# Patient Record
Sex: FEMALE | Race: WHITE | NOT HISPANIC OR LATINO | Employment: UNEMPLOYED | URBAN - METROPOLITAN AREA
[De-identification: names, ages, dates, MRNs, and addresses within clinical notes are randomized per-mention and may not be internally consistent; named-entity substitution may affect disease eponyms.]

---

## 2017-02-21 ENCOUNTER — APPOINTMENT (OUTPATIENT)
Dept: GENERAL RADIOLOGY | Facility: CLINIC | Age: 27
End: 2017-02-21
Attending: EMERGENCY MEDICINE
Payer: COMMERCIAL

## 2017-02-21 ENCOUNTER — HOSPITAL ENCOUNTER (EMERGENCY)
Facility: CLINIC | Age: 27
Discharge: HOME | End: 2017-02-21
Attending: EMERGENCY MEDICINE | Admitting: EMERGENCY MEDICINE
Payer: COMMERCIAL

## 2017-02-21 VITALS
DIASTOLIC BLOOD PRESSURE: 90 MMHG | HEART RATE: 114 BPM | TEMPERATURE: 99.3 F | BODY MASS INDEX: 31.17 KG/M2 | SYSTOLIC BLOOD PRESSURE: 146 MMHG | WEIGHT: 205 LBS | OXYGEN SATURATION: 94 % | RESPIRATION RATE: 22 BRPM

## 2017-02-21 DIAGNOSIS — J18.9 PNEUMONIA OF LEFT LOWER LOBE DUE TO INFECTIOUS ORGANISM: ICD-10-CM

## 2017-02-21 DIAGNOSIS — R50.9 FEVER, UNSPECIFIED: ICD-10-CM

## 2017-02-21 LAB
DEPRECATED S PYO AG THROAT QL EIA: NORMAL
MICRO REPORT STATUS: NORMAL
SPECIMEN SOURCE: NORMAL

## 2017-02-21 PROCEDURE — 71020 XR CHEST 2 VW: CPT

## 2017-02-21 PROCEDURE — 87081 CULTURE SCREEN ONLY: CPT | Performed by: EMERGENCY MEDICINE

## 2017-02-21 PROCEDURE — 94640 AIRWAY INHALATION TREATMENT: CPT

## 2017-02-21 PROCEDURE — 87880 STREP A ASSAY W/OPTIC: CPT | Performed by: EMERGENCY MEDICINE

## 2017-02-21 PROCEDURE — 99284 EMERGENCY DEPT VISIT MOD MDM: CPT | Mod: 25

## 2017-02-21 PROCEDURE — 25000308 HC RX OP HPI UCR WEL MED 250 IP 250: Performed by: EMERGENCY MEDICINE

## 2017-02-21 RX ORDER — AZITHROMYCIN 250 MG/1
TABLET, FILM COATED ORAL
Qty: 6 TABLET | Refills: 0 | Status: SHIPPED | OUTPATIENT
Start: 2017-02-21 | End: 2017-02-27

## 2017-02-21 RX ORDER — ALBUTEROL SULFATE 90 UG/1
2 AEROSOL, METERED RESPIRATORY (INHALATION) EVERY 4 HOURS PRN
Qty: 1 INHALER | Refills: 0 | Status: SHIPPED | OUTPATIENT
Start: 2017-02-21 | End: 2017-12-13

## 2017-02-21 RX ORDER — ALBUTEROL SULFATE 0.83 MG/ML
2.5 SOLUTION RESPIRATORY (INHALATION) ONCE
Status: COMPLETED | OUTPATIENT
Start: 2017-02-21 | End: 2017-02-21

## 2017-02-21 RX ADMIN — ALBUTEROL SULFATE 2.5 MG: 2.5 SOLUTION RESPIRATORY (INHALATION) at 04:53

## 2017-02-21 ASSESSMENT — ENCOUNTER SYMPTOMS
COUGH: 1
MYALGIAS: 1
FEVER: 1
VOMITING: 0

## 2017-02-21 NOTE — ED AVS SNAPSHOT
[facility] Emergency Department    [address]    [facility] [address]    Phone:  [phone]    Fax:  [phone]                                       Mihir Waldron   MRN: [id number]    Department:  [facility] Emergency Department   Date of Visit:  2/21/2017           After Visit Summary Signature Page     I have received my discharge instructions, and my questions have been answered. I have discussed any challenges I see with this plan with the nurse or doctor.    ..........................................................................................................................................  Patient/Patient Representative Signature      ..........................................................................................................................................  Patient Representative Print Name and Relationship to Patient    ..................................................               ................................................  Date                                            Time    ..........................................................................................................................................  Reviewed by Signature/Title    ...................................................              ..............................................  Date                                                            Time

## 2017-02-21 NOTE — ED PROVIDER NOTES
History     Chief Complaint:  Fever & Cough    HPI   Mihir Waldron is a 26 year old female who presents for evaluation of a fever and cough. The patient reports that she took her daughter in yesterday for evaluation of flu-like symptoms and she was diagnosed with influenza and started on Tamiflu. The patient states that she personally began developing a cough 4 days ago, which has been progressively worsening. The patient has also had fevers up to 101, body aches, and chest congestion. The patient decided to present to the ED for further evaluation with concern for influenza, also mentioning that she had a syncopal episode yesterday at 1600 in the afternoon, with which she hit her head and sustained a small abrasion to her forehead. The patient denies vomiting or any other complaints at this time.    Allergies:  Benadryl   Codeine   Latex     Medications:    Celexa  Prenatal MVI  Epipen     Past Medical History:    POTS  Anxiety  Postpartum depression  Depressive disorder    Past Surgical History:    Dental surgery    Family History:    HTN    Social History:  Relationship status:   Tobacco use: Neg  Alcohol use: Neg  The patient presents alone.  PCP: Chante Mueller     Review of Systems   Constitutional: Positive for fever.   Respiratory: Positive for cough.    Gastrointestinal: Negative for vomiting.   Musculoskeletal: Positive for myalgias.   Neurological: Positive for syncope.   All other systems reviewed and are negative.    Physical Exam     Patient Vitals for the past 24 hrs:   BP Temp Temp src Pulse Resp SpO2 Weight   02/21/17 0435 146/90 99.3  F (37.4  C) Oral 114 22 94 % 93 kg (205 lb)     Physical Exam  Nursing note and vitals reviewed.  Constitutional: Cooperative.  HENT:   Mouth/Throat: Mucous membranes are normal. Erythema without abscess in posterior oropharynx.  Eyes: Pupils are equal, round, and reactive to light.   Ears: TMs normal.  Cardiovascular: Tachycardic rate, regular rhythm  and normal heart sounds.  No murmur.  Pulmonary/Chest: Frequent cough. Effort normal and breath sounds normal. No respiratory distress. No wheezes. No rales.   Abdominal: Soft. Normal appearance and bowel sounds are normal. No distension. There is no tenderness. There is no rigidity and no guarding.   Neurological: Alert.   Skin: Skin is warm and dry. No rash noted.   Psychiatric: Normal mood and affect.     Emergency Department Course   Imaging:  Radiographic findings were communicated with the patient who voiced understanding of the findings.  Chest XR, PA and LAT, per radiology:  A small subtle region of patchy opacities projecting over the mid left lung, suspicious for pneumonia.    Laboratory:  Rapid strep screen: Negative  Beta strep group A culture: Pending    Interventions:  0453 Albuterol neb solution, 2.5 mg, Nebulization    Emergency Department Course:  Nursing notes and vitals reviewed.  I performed an exam of the patient as documented above.  The above workup was undertaken.  0515: I rechecked the patient and discussed results.  Findings and plan explained to the Patient. Patient discharged home with instructions regarding supportive care, medications, and reasons to return. The importance of close follow-up was reviewed.    Impression & Plan      Medical Decision Making:  Mihir Waldron is a healthy 26 year old female who presents with 3-4 days of a cough and fever. It sounds like her child was diagnosed with influenza and is on Tamiflu, but she is outside the window for Tamiflu administration. Especially in the setting of a patient without underlying lung disease, I would hold on this. She did get significant relief from the Albuterol, so I will write for an inhaler to go home with. Chest XR does reveal a patchy infiltrate in the left lower lobe consistent with pneumonia. We will treat her with antibiotics. She is not hypoxic, and I anticipate her doing well with outpatient treatment. Otherwise,  fever control with Motrin/Tylenol, good hand hygiene, and supportive care measures were discussed.    Diagnosis:    ICD-10-CM   1. Fever, unspecified R50.9   2. Pneumonia of left lower lobe due to infectious organism J18.9     Disposition:  Discharge to home with primary care follow up.     Discharge Medications:   Details   albuterol (ALBUTEROL) 108 (90 BASE) MCG/ACT Inhaler Inhale 2 puffs into the lungs every 4 hours as needed for shortness of breath / dyspnea  Qty: 1 Inhaler, Refills: 0      azithromycin (ZITHROMAX Z-ALESSIO) 250 MG tablet Two tablets on the first day, then one tablet daily for the next 4 days  Qty: 6 tablet, Refills: 0         Sandor TANG am serving as a scribe on 2/21/2017 at 4:42 AM to personally document services performed by Juvencio Rea MD, based on my observations and the provider's statements to me.     [facility] EMERGENCY DEPARTMENT       Juvencio Rea MD  02/21/17 0552

## 2017-02-21 NOTE — ED NOTES
"26-year-old female presents to the ER with flu-like symptoms for the last couple of days. Cough, fever, and body aches. Child was dx with influenza yesterday. Pt states she \"passed out\" last evening when she was home with her two kids. Pt has an area of \"rug burn\" on her forehead from that episode.   "

## 2017-02-21 NOTE — DISCHARGE INSTRUCTIONS
Pneumonia (Adult)  Pneumonia is an infection deep within the lungs. It is in the small air sacs (alveoli). Pneumonia may be caused by a virus or bacteria. Pneumonia caused by bacteria is usually treated with an antibiotic. Severe cases may need to be treated in the hospital. Milder cases can be treated at home. Symptoms usually start to get better during the first 2 days of treatment.    Home care  Follow these guidelines when caring for yourself at home:    Rest at home for the first 2 to 3 days, or until you feel stronger. Don t let yourself get overly tired when you go back to your activities.    Stay away from cigarette smoke - yours or other people s.    You may use acetaminophen or ibuprofen to control fever or pain, unless another medicine was prescribed. If you have chronic liver or kidney disease, talk with your health care provider before using these medicines. Also talk with your provider if you ve had a stomach ulcer or GI bleeding. Don t give aspirin to anyone younger than 18 years of age who is ill with a fever. It may cause severe liver damage.    Your appetite may be poor, so a light diet is fine.    Drink 6 to 8 glasses of fluids every day to make sure you are getting enough fluids. Beverages can include water, sport drinks, sodas without caffeine, juices, tea, or soup. Fluids will help loosen secretions in the lung. This will make it easier for you to cough up the phlegm (sputum). If you also have heart or kidney disease, check with your health care provider before you drink extra fluids.    Take antibiotic medicine prescribed until it is all gone, even if you are feeling better after a few days.  Follow-up care  Follow up with your health care provider in the next 2 to 3 days, or as advised. This is to be sure the medicine is helping you get better.  If you are 65 or older, you should get a pneumococcal vaccine and a yearly flu (influenza) shot. You should also get these vaccines if you have  chronic lung disease like asthma, emphysema, or COPD. Ask your provider about this.  When to seek medical advice  Call your health care provider right away if any of these occur:    You don t get better within the first 48 hours of treatment    Shortness of breath gets worse    Rapid breathing (more than 25 breaths per minute)    Coughing up blood    Chest pain gets worse with breathing    Fever of 102 F (38 C) or higher that doesn t get better with fever medicine    Weakness, dizziness, or fainting that gets worse    Thirst or dry mouth that gets worse    Sinus pain, headache, or a stiff neck    Chest pain not caused by coughing    [id number] [employer]. [address] [place] [id number]. All rights reserved. This information is not intended as a substitute for professional medical care. Always follow your healthcare professional's instructions.      Discharge Instructions  Influenza    You were diagnosed today with influenza or influenza like illness.  Influenza is a respiratory illness caused by influenza A or B viruses.  Influenza causes fever, headache, muscle aches, and fatigue.  These symptoms start one to four days after you have been around a person with this illness.  People with influenza commonly have a dry cough, sore throat, and a runny nose.   Influenza is spread through sneezing and coughing and can live on surfaces for several days.  It is usually contagious for 5 days but in some cases up to 10 days and often affects several family members. If you have a family member who is less than 2 years old, older than 65 years old, pregnant or has a serious medical condition, they should be seen right away by a physician to decide if they should take preventative medications.      Return to the Emergency Department if:    You have trouble breathing.    You develop pain in your chest.    You have signs of being dehydrated, such as dizziness or unable to urinate at least three times  daily.    You feel confused.    You cannot stop vomiting or you cannot drink enough fluids.    In children, you should seek help if the child has any of the above or if child:    Has blue or purplish skin color.    Is so irritable that he or she does not want to be held.    Does not have tears when crying (in infants) or does not urinate at least three times daily.    Does not wake up easily.    Follow-up with your doctor if you are not improving after 5 days.    What can I do to help myself?    Rest.    Fluids -- Drink hydrating solutions such as Gatorade  or Pedialyte  as often as you can. If you are drinking enough, you should pass urine at least every eight hours.    Tylenol  (acetaminophen) and Advil  (ibuprofen) can relieve fever, headache, and muscle aches. Do not give aspirin to children under 18 years old.     Antiviral treatment -- Antiviral medicines do not make the flu symptoms go away immediately.  They have only been shown to make the symptoms go away 12 to 24 hours sooner than they would without treatment.       Antibiotics -- Antibiotics are NOT useful for treating viral illnesses such as influenza. Antibiotics should only be used if there is a bacterial complication of the flu such as bacterial pneumonia, ear infection, or sinusitis.    Because you were diagnosed with a flu like illness you are very contagious.  This means you cannot work, attend school or  for at least 24 hours or until you no longer have a fever.  If you were given a prescription for medicine here today, be sure to read all of the information (including the package insert) that comes with your prescription.  This will include important information about the medicine, its side effects, and any warnings that you need to know about.  The pharmacist who fills the prescription can provide more information and answer questions you may have about the medicine.  If you have questions or concerns that the pharmacist cannot address,  please call or return to the Emergency Department.   Remember that you can always come back to the Emergency Department if you are not able to see your regular doctor in the amount of time listed above, if you get any new symptoms, or if there is anything that worries you.

## 2017-02-21 NOTE — ED AVS SNAPSHOT
[facility] Emergency Department    [address] [place] [address]    Phone:  [phone]    Fax:  [phone]                                       Mihir Waldron   MRN: [id number]    Department:  [facility] Emergency Department   Date of Visit:  2/21/2017           Patient Information     Date Of Birth          1990        Your diagnoses for this visit were:     Fever, unspecified     Pneumonia of left lower lobe due to infectious organism        You were seen by Juvencio Rea MD.      Follow-up Information     Follow up with Chante Mueller MD.    Specialty:  Family Practice    Why:  As needed    Contact information:    [facility]  [address]  [facility] [address]  [phone]          Discharge Instructions           Pneumonia (Adult)  Pneumonia is an infection deep within the lungs. It is in the small air sacs (alveoli). Pneumonia may be caused by a virus or bacteria. Pneumonia caused by bacteria is usually treated with an antibiotic. Severe cases may need to be treated in the hospital. Milder cases can be treated at home. Symptoms usually start to get better during the first 2 days of treatment.    Home care  Follow these guidelines when caring for yourself at home:    Rest at home for the first 2 to 3 days, or until you feel stronger. Don t let yourself get overly tired when you go back to your activities.    Stay away from cigarette smoke - yours or other people s.    You may use acetaminophen or ibuprofen to control fever or pain, unless another medicine was prescribed. If you have chronic liver or kidney disease, talk with your health care provider before using these medicines. Also talk with your provider if you ve had a stomach ulcer or GI bleeding. Don t give aspirin to anyone younger than 18 years of age who is ill with a fever. It may cause severe liver damage.    Your appetite may be poor, so a light diet is  fine.    Drink 6 to 8 glasses of fluids every day to make sure you are getting enough fluids. Beverages can include water, sport drinks, sodas without caffeine, juices, tea, or soup. Fluids will help loosen secretions in the lung. This will make it easier for you to cough up the phlegm (sputum). If you also have heart or kidney disease, check with your health care provider before you drink extra fluids.    Take antibiotic medicine prescribed until it is all gone, even if you are feeling better after a few days.  Follow-up care  Follow up with your health care provider in the next 2 to 3 days, or as advised. This is to be sure the medicine is helping you get better.  If you are 65 or older, you should get a pneumococcal vaccine and a yearly flu (influenza) shot. You should also get these vaccines if you have chronic lung disease like asthma, emphysema, or COPD. Ask your provider about this.  When to seek medical advice  Call your health care provider right away if any of these occur:    You don t get better within the first 48 hours of treatment    Shortness of breath gets worse    Rapid breathing (more than 25 breaths per minute)    Coughing up blood    Chest pain gets worse with breathing    Fever of 102 F (38 C) or higher that doesn t get better with fever medicine    Weakness, dizziness, or fainting that gets worse    Thirst or dry mouth that gets worse    Sinus pain, headache, or a stiff neck    Chest pain not caused by coughing    7991-4325 [employer]. [address] [place] [address], [place] [address]. All rights reserved. This information is not intended as a substitute for professional medical care. Always follow your healthcare professional's instructions.      Discharge Instructions  Influenza    You were diagnosed today with influenza or influenza like illness.  Influenza is a respiratory illness caused by influenza A or B viruses.  Influenza causes fever, headache, muscle aches, and fatigue.  These  symptoms start one to four days after you have been around a person with this illness.  People with influenza commonly have a dry cough, sore throat, and a runny nose.   Influenza is spread through sneezing and coughing and can live on surfaces for several days.  It is usually contagious for 5 days but in some cases up to 10 days and often affects several family members. If you have a family member who is less than 2 years old, older than 65 years old, pregnant or has a serious medical condition, they should be seen right away by a physician to decide if they should take preventative medications.      Return to the Emergency Department if:    You have trouble breathing.    You develop pain in your chest.    You have signs of being dehydrated, such as dizziness or unable to urinate at least three times daily.    You feel confused.    You cannot stop vomiting or you cannot drink enough fluids.    In children, you should seek help if the child has any of the above or if child:    Has blue or purplish skin color.    Is so irritable that he or she does not want to be held.    Does not have tears when crying (in infants) or does not urinate at least three times daily.    Does not wake up easily.    Follow-up with your doctor if you are not improving after 5 days.    What can I do to help myself?    Rest.    Fluids -- Drink hydrating solutions such as Gatorade  or Pedialyte  as often as you can. If you are drinking enough, you should pass urine at least every eight hours.    Tylenol  (acetaminophen) and Advil  (ibuprofen) can relieve fever, headache, and muscle aches. Do not give aspirin to children under 18 years old.     Antiviral treatment -- Antiviral medicines do not make the flu symptoms go away immediately.  They have only been shown to make the symptoms go away 12 to 24 hours sooner than they would without treatment.       Antibiotics -- Antibiotics are NOT useful for treating viral illnesses such as influenza.  Antibiotics should only be used if there is a bacterial complication of the flu such as bacterial pneumonia, ear infection, or sinusitis.    Because you were diagnosed with a flu like illness you are very contagious.  This means you cannot work, attend school or  for at least 24 hours or until you no longer have a fever.  If you were given a prescription for medicine here today, be sure to read all of the information (including the package insert) that comes with your prescription.  This will include important information about the medicine, its side effects, and any warnings that you need to know about.  The pharmacist who fills the prescription can provide more information and answer questions you may have about the medicine.  If you have questions or concerns that the pharmacist cannot address, please call or return to the Emergency Department.   Remember that you can always come back to the Emergency Department if you are not able to see your regular doctor in the amount of time listed above, if you get any new symptoms, or if there is anything that worries you.        24 Hour Appointment Hotline       To make an appointment at any [facility], call [phone] ([phone]). If you don't have a family doctor or clinic, we will help you find one. [facility] clinics are conveniently located to serve the needs of you and your family.             Review of your medicines      START taking        Dose / Directions Last dose taken    albuterol 108 (90 BASE) MCG/ACT Inhaler   Commonly known as:  albuterol   Dose:  2 puff   Quantity:  1 Inhaler        Inhale 2 puffs into the lungs every 4 hours as needed for shortness of breath / dyspnea   Refills:  0        azithromycin 250 MG tablet   Commonly known as:  ZITHROMAX Z-ALESSIO   Quantity:  6 tablet        Two tablets on the first day, then one tablet daily for the next 4 days   Refills:  0          Our records show that you are taking the medicines listed  below. If these are incorrect, please call your family doctor or clinic.        Dose / Directions Last dose taken    CELEXA PO   Dose:  30 mg        Take 30 mg by mouth daily   Refills:  0        EPINEPHrine 0.3 MG/0.3ML injection   Dose:  0.3 mg   Quantity:  2 each        Inject 0.3 mLs (0.3 mg) into the muscle once as needed   Refills:  3        PRENATAL VITAMIN PO        Refills:  0                Prescriptions were sent or printed at these locations (2 Prescriptions)                   Other Prescriptions                Printed at Department/Unit printer (2 of 2)         albuterol (ALBUTEROL) 108 (90 BASE) MCG/ACT Inhaler               azithromycin (ZITHROMAX Z-ALESSIO) 250 MG tablet                Procedures and tests performed during your visit     Beta strep group A culture    Chest XR,  PA & LAT    Rapid strep screen      Orders Needing Specimen Collection     None      Pending Results     Date and Time Order Name Status Description    2/21/2017 0447 Chest XR,  PA & LAT Preliminary     2/21/2017 0440 Beta strep group A culture In process             Pending Culture Results     Date and Time Order Name Status Description    2/21/2017 0440 Beta strep group A culture In process              Test Results from your hospital stay     2/21/2017  5:02 AM - Interface, Flexilab Results      Component Results     Component    Specimen Description    Throat    Rapid Strep A Screen    NEGATIVE: No Group A streptococcal antigen detected by immunoassay, await   culture report.      Micro Report Status    FINAL 02/21/2017 2/21/2017  5:39 AM - Interface, Radiant Ib      Narrative     CHEST 2 VIEWS  2/21/2017 5:11 AM     HISTORY: Cough.    COMPARISON: 5/21/2013 - CT chest.    FINDINGS: A small subtle region of patchy opacities projecting over  the mid left lung. The lungs are otherwise clear. Normal-sized cardiac  silhouette.        Impression     IMPRESSION: A small subtle region of patchy opacities projecting over  the  mid left lung, suspicious for pneumonia.         2/21/2017  5:02 AM - Interface, Flexilab Results                Clinical Quality Measure: Blood Pressure Screening     Your blood pressure was checked while you were in the emergency department today. The last reading we obtained was  BP: 146/90 . Please read the guidelines below about what these numbers mean and what you should do about them.  If your systolic blood pressure (the top number) is less than 120 and your diastolic blood pressure (the bottom number) is less than 80, then your blood pressure is normal. There is nothing more that you need to do about it.  If your systolic blood pressure (the top number) is 120-139 or your diastolic blood pressure (the bottom number) is 80-89, your blood pressure may be higher than it should be. You should have your blood pressure rechecked within a year by a primary care provider.  If your systolic blood pressure (the top number) is 140 or greater or your diastolic blood pressure (the bottom number) is 90 or greater, you may have high blood pressure. High blood pressure is treatable, but if left untreated over time it can put you at risk for heart attack, stroke, or kidney failure. You should have your blood pressure rechecked by a primary care provider within the next 4 weeks.  If your provider in the emergency department today gave you specific instructions to follow-up with your doctor or provider even sooner than that, you should follow that instruction and not wait for up to 4 weeks for your follow-up visit.        Thank you for choosing [facility]       Thank you for choosing [facility] for your care. Our goal is always to provide you with excellent care. Hearing back from our patients is one way we can continue to improve our services. Please take a few minutes to complete the written survey that you may receive in the mail after you visit with us. Thank you!        All At Home Information     [facility] At [facility] lets you send messages  "to your doctor, view your test results, renew your prescriptions, schedule appointments and more. To sign up, go to [email or web address] . Click on \"Log in\" on the left side of the screen, which will take you to the Welcome page. Then click on \"Sign up Now\" on the right side of the page.     You will be asked to enter the access code listed below, as well as some personal information. Please follow the directions to create your username and password.     Your access code is: [email or web address]  Expires: 2017  5:26 AM     Your access code will  in 90 days. If you need help or a new code, please call your [facility] clinic or [phone].        Care EveryWhere ID     This is your Care EveryWhere ID. This could be used by other organizations to access your [facility] medical records  [id number]        After Visit Summary       This is your record. Keep this with you and show to your community pharmacist(s) and doctor(s) at your next visit.                  "

## 2017-02-23 LAB
BACTERIA SPEC CULT: NORMAL
Lab: NORMAL
MICRO REPORT STATUS: NORMAL
SPECIMEN SOURCE: NORMAL

## 2017-02-27 ENCOUNTER — HOSPITAL ENCOUNTER (EMERGENCY)
Facility: CLINIC | Age: 27
Discharge: HOME | End: 2017-02-27
Attending: EMERGENCY MEDICINE | Admitting: EMERGENCY MEDICINE
Payer: COMMERCIAL

## 2017-02-27 ENCOUNTER — APPOINTMENT (OUTPATIENT)
Dept: GENERAL RADIOLOGY | Facility: CLINIC | Age: 27
End: 2017-02-27
Attending: EMERGENCY MEDICINE
Payer: COMMERCIAL

## 2017-02-27 VITALS
WEIGHT: 204 LBS | TEMPERATURE: 98 F | OXYGEN SATURATION: 97 % | BODY MASS INDEX: 31.02 KG/M2 | DIASTOLIC BLOOD PRESSURE: 77 MMHG | SYSTOLIC BLOOD PRESSURE: 120 MMHG | HEART RATE: 88 BPM | RESPIRATION RATE: 18 BRPM
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
## 2017-02-27 DIAGNOSIS — T73.3XXA OVEREXERTION FROM PROLONGED STATIC POSITION: ICD-10-CM

## 2017-02-27 DIAGNOSIS — S63.501A WRIST SPRAIN, RIGHT, INITIAL ENCOUNTER: ICD-10-CM

## 2017-02-27 PROCEDURE — 25000132 ZZH RX MED GY IP 250 OP 250 PS 637: Performed by: EMERGENCY MEDICINE

## 2017-02-27 PROCEDURE — 29125 APPL SHORT ARM SPLINT STATIC: CPT | Mod: RT

## 2017-02-27 PROCEDURE — 73110 X-RAY EXAM OF WRIST: CPT | Mod: RT

## 2017-02-27 PROCEDURE — 99284 EMERGENCY DEPT VISIT MOD MDM: CPT | Mod: 25

## 2017-02-27 PROCEDURE — 99283 EMERGENCY DEPT VISIT LOW MDM: CPT | Mod: Z6 | Performed by: EMERGENCY MEDICINE

## 2017-02-27 RX ORDER — IBUPROFEN 200 MG
600 TABLET ORAL 3 TIMES DAILY
Qty: 45 TABLET | Refills: 0 | Status: SHIPPED | OUTPATIENT
Start: 2017-02-27 | End: 2017-03-04

## 2017-02-27 RX ORDER — IBUPROFEN 600 MG/1
600 TABLET, FILM COATED ORAL
Status: COMPLETED | OUTPATIENT
Start: 2017-02-27 | End: 2017-02-27

## 2017-02-27 RX ADMIN — IBUPROFEN 600 MG: 600 TABLET ORAL at 12:35

## 2017-02-27 NOTE — LETTER
[facility], [place], EMERGENCY DEPARTMENT  [address]  [facility] [place] [address]  [phone]    2017    Mihir Waldron  [address] [facility] [address]  [phone] (home) none (work)    : 1990      To Whom it may concern:    Mihir Waldron was seen in our Emergency Department today, 2017.  I expect her condition to improve over the next week.  She will be in a wrist brace for 1 week and during that time should not lift anything more than 10 pounds with her right arm.    Sincerely,        Judson Jimenez MD

## 2017-02-27 NOTE — ED AVS SNAPSHOT
[facility], Emergency Department    [address]    [facility] [place] [address]    Phone:  [phone]    Fax:  [phone]                                       Mihir Waldron   MRN: [id number]    Department:  [facility], Emergency Department   Date of Visit:  2/27/2017           Patient Information     Date Of Birth          1990        Your diagnoses for this visit were:     Wrist sprain, right, initial encounter        You were seen by Judson Jimenez MD.        Discharge Instructions       Wear wrist splint for the next week.  No lifting more than 10 pounds of the right arm for one week.    Please make an appointment to follow up with Your Primary Care Provider in one week unless symptoms completely resolve.    Discharge References/Attachments     WRIST SPRAIN (ENGLISH)      24 Hour Appointment Hotline       To make an appointment at any [facility] clinic, call [phone] ([phone]). If you don't have a family doctor or clinic, we will help you find one. [place] clinics are conveniently located to serve the needs of you and your family.          ED Discharge Orders     Wrist splint velcro                    Review of your medicines      START taking        Dose / Directions Last dose taken    ibuprofen 200 MG tablet   Commonly known as:  ADVIL/MOTRIN   Dose:  600 mg   Quantity:  45 tablet        Take 3 tablets (600 mg) by mouth 3 times daily for 5 days   Refills:  0          Our records show that you are taking the medicines listed below. If these are incorrect, please call your family doctor or clinic.        Dose / Directions Last dose taken    albuterol 108 (90 BASE) MCG/ACT Inhaler   Commonly known as:  albuterol   Dose:  2 puff   Quantity:  1 Inhaler        Inhale 2 puffs into the lungs every 4 hours as needed for shortness of breath / dyspnea   Refills:  0        EPINEPHrine 0.3 MG/0.3ML injection   Dose:  0.3 mg   Quantity:  2 each        Inject 0.3 mLs (0.3 mg) into  "the muscle once as needed   Refills:  3                Prescriptions were sent or printed at these locations (1 Prescription)                   Other Prescriptions                Printed at Department/Unit printer (1 of 1)         ibuprofen (ADVIL/MOTRIN) 200 MG tablet                Procedures and tests performed during your visit     Wrist XR, G/E 3 views, right      Orders Needing Specimen Collection     None      Pending Results     No orders found from 2017 to 2017.            Pending Culture Results     No orders found from 2017 to 2017.            Thank you for choosing [facility]       Thank you for choosing [facility] for your care. Our goal is always to provide you with excellent care. Hearing back from our patients is one way we can continue to improve our services. Please take a few minutes to complete the written survey that you may receive in the mail after you visit with us. Thank you!        [employer] Information     [employer] lets you send messages to your doctor, view your test results, renew your prescriptions, schedule appointments and more. To sign up, go to [email or web address] . Click on \"Log in\" on the left side of the screen, which will take you to the Welcome page. Then click on \"Sign up Now\" on the right side of the page.     You will be asked to enter the access code listed below, as well as some personal information. Please follow the directions to create your username and password.     Your access code is: [email or web address]  Expires: 2017  5:26 AM     Your access code will  in 90 days. If you need help or a new code, please call your [facility] clinic or [phone].        Care EveryWhere ID     This is your Care EveryWhere ID. This could be used by other organizations to access your [facility] medical records  [id number]        After Visit Summary       This is your record. Keep this with you and show to your community pharmacist(s) and doctor(s) at your next " visit.

## 2017-02-27 NOTE — ED NOTES
Given discharge instructions.  All questions answered.  Verbalizes understanding.  Discharged home.

## 2017-02-27 NOTE — DISCHARGE INSTRUCTIONS
Wear wrist splint for the next week.  No lifting more than 10 pounds of the right arm for one week.    Please make an appointment to follow up with Your Primary Care Provider in one week unless symptoms completely resolve.

## 2017-02-27 NOTE — ED PROVIDER NOTES
History     Chief Complaint   Patient presents with     Wrist Pain     swelling and numbness in wrist and fingers     HPI  Mihir Waldron is a 26 year old female who twisted her right wrist this morning and experienced pain in her right wrist for which she comes to the ER for evaluation.  Patient denies any direct trauma and denies any elbow or shoulder pain.  Patient denies any other injury.    I have reviewed the Medications, Allergies, Past Medical and Surgical History, and Social History in the Epic system.  Past Medical History   Diagnosis Date     Anxiety      Depressive disorder      Postpartum depression      POTS (postural orthostatic tachycardia syndrome)      Past Surgical History   Procedure Laterality Date     Dental surgery Bilateral      Previous Medications    ALBUTEROL (ALBUTEROL) 108 (90 BASE) MCG/ACT INHALER    Inhale 2 puffs into the lungs every 4 hours as needed for shortness of breath / dyspnea    EPINEPHRINE (EPIPEN) 0.3 MG/0.3ML INJECTION    Inject 0.3 mLs (0.3 mg) into the muscle once as needed      Social History     Social History     Marital status:      Spouse name: N/A     Number of children: N/A     Years of education: N/A     Occupational History     Not on file.     Social History Main Topics     Smoking status: Never Smoker     Smokeless tobacco: Never Used     Alcohol use No     Drug use: No     Sexual activity: Not Currently     Other Topics Concern     Caffeine Concern No     Special Diet No     Exercise No     Seat Belt Yes     Parent/Sibling W/ Cabg, Mi Or Angioplasty Before 65f 55m? No     Social History Narrative    ** Merged History Encounter **             Allergies   Allergen Reactions     Benadryl [Diphenhydramine] Other (See Comments)     Pt got warm and passed out with IV benadryl     Codeine Sulfate GI Disturbance     Latex Hives       Review of Systems    ROS: 10 point ROS neg other than the symptoms noted above in the HPI.    Physical Exam   BP:  125/78  Pulse: 98  Temp: 95.9  F (35.5  C)  Resp: 18  Weight: 92.5 kg (204 lb)  SpO2: 99 %  Physical Exam   Constitutional: She is oriented to person, place, and time. No distress.   HENT:   Head: Atraumatic.   Musculoskeletal:   RUE - Patient has tenderness of the radial carpal joint with no snuffbox tenderness of the right wrist.  There is no elbow or shoulder tenderness and supination pronation is intact.  Distal CMS intact.   Neurological: She is alert and oriented to person, place, and time.   Grossly intact and symmetric   Psychiatric: She has a normal mood and affect.       ED Course     ED Course     Procedures          X-ray evaluation of the right wrist was negative for bony injury.      Assessments & Plan (with Medical Decision Making)     I have reviewed the nursing notes.    Patient will be placed in a Velcro wrist brace and be placed on the medication below.    I have reviewed the findings, diagnosis, plan and need for follow up with the patient.    New Prescriptions    IBUPROFEN (ADVIL/MOTRIN) 200 MG TABLET    Take 3 tablets (600 mg) by mouth 3 times daily for 5 days       Final diagnoses:   Wrist sprain, right, initial encounter     Wear wrist splint for the next week.  No lifting more than 10 pounds of the right arm for one week.  Work note given    Please make an appointment to follow up with Your Primary Care Provider in one week unless symptoms completely resolve.    Routine discharge instructions were given for this diagnosis    Judson Jimenez MD    2/27/2017   [facility], EMERGENCY DEPARTMENT     Judson Jimenez MD  02/27/17 1582

## 2017-02-27 NOTE — ED NOTES
Patient twisted her wrist last night when locking the crib.  (baby upstairs in [place])  She heard a pop.  She wrapped it.  This morning the wrist was very swollen and now she has numbness on posterior hand radiating toward fingertips.

## 2017-02-27 NOTE — ED AVS SNAPSHOT
[facility], [place], Emergency Department    [address]    [facility] [place] [address]    Phone:  [phone]    Fax:  [phone]                                       Mihir Waldron   MRN: [id number]    Department:  [facility], Emergency Department   Date of Visit:  2/27/2017           After Visit Summary Signature Page     I have received my discharge instructions, and my questions have been answered. I have discussed any challenges I see with this plan with the nurse or doctor.    ..........................................................................................................................................  Patient/Patient Representative Signature      ..........................................................................................................................................  Patient Representative Print Name and Relationship to Patient    ..................................................               ................................................  Date                                            Time    ..........................................................................................................................................  Reviewed by Signature/Title    ...................................................              ..............................................  Date                                                            Time

## 2017-06-15 ENCOUNTER — HOSPITAL PATHOLOGY (OUTPATIENT)
Dept: OTHER | Facility: CLINIC | Age: 27
End: 2017-06-15

## 2017-06-16 LAB — COPATH REPORT: NORMAL

## 2017-10-19 ENCOUNTER — APPOINTMENT (OUTPATIENT)
Dept: ULTRASOUND IMAGING | Facility: CLINIC | Age: 27
End: 2017-10-19
Attending: EMERGENCY MEDICINE
Payer: COMMERCIAL

## 2017-10-19 ENCOUNTER — APPOINTMENT (OUTPATIENT)
Dept: CT IMAGING | Facility: CLINIC | Age: 27
End: 2017-10-19
Attending: EMERGENCY MEDICINE
Payer: COMMERCIAL

## 2017-10-19 ENCOUNTER — HOSPITAL ENCOUNTER (EMERGENCY)
Facility: CLINIC | Age: 27
Discharge: HOME | End: 2017-10-19
Attending: EMERGENCY MEDICINE | Admitting: EMERGENCY MEDICINE
Payer: COMMERCIAL

## 2017-10-19 VITALS
RESPIRATION RATE: 30 BRPM | SYSTOLIC BLOOD PRESSURE: 126 MMHG | OXYGEN SATURATION: 97 % | DIASTOLIC BLOOD PRESSURE: 77 MMHG | HEART RATE: 137 BPM | TEMPERATURE: 99.1 F

## 2017-10-19 DIAGNOSIS — R10.11 ABDOMINAL PAIN, RIGHT UPPER QUADRANT: ICD-10-CM

## 2017-10-19 LAB
ALBUMIN SERPL-MCNC: 4.1 G/DL (ref 3.4–5)
ALBUMIN UR-MCNC: NEGATIVE MG/DL
ALP SERPL-CCNC: 172 U/L (ref 40–150)
ALT SERPL W P-5'-P-CCNC: 17 U/L (ref 0–50)
ANION GAP SERPL CALCULATED.3IONS-SCNC: 6 MMOL/L (ref 3–14)
APPEARANCE UR: CLEAR
AST SERPL W P-5'-P-CCNC: 12 U/L (ref 0–45)
BACTERIA #/AREA URNS HPF: ABNORMAL /HPF
BASOPHILS # BLD AUTO: 0 10E9/L (ref 0–0.2)
BASOPHILS NFR BLD AUTO: 0.4 %
BILIRUB SERPL-MCNC: 0.2 MG/DL (ref 0.2–1.3)
BILIRUB UR QL STRIP: NEGATIVE
BUN SERPL-MCNC: 9 MG/DL (ref 7–30)
CALCIUM SERPL-MCNC: 9.1 MG/DL (ref 8.5–10.1)
CHLORIDE SERPL-SCNC: 110 MMOL/L (ref 94–109)
CO2 SERPL-SCNC: 25 MMOL/L (ref 20–32)
COLOR UR AUTO: YELLOW
CREAT SERPL-MCNC: 0.58 MG/DL (ref 0.52–1.04)
DIFFERENTIAL METHOD BLD: ABNORMAL
EOSINOPHIL # BLD AUTO: 0.1 10E9/L (ref 0–0.7)
EOSINOPHIL NFR BLD AUTO: 0.6 %
ERYTHROCYTE [DISTWIDTH] IN BLOOD BY AUTOMATED COUNT: 12.7 % (ref 10–15)
GFR SERPL CREATININE-BSD FRML MDRD: >90 ML/MIN/1.7M2
GLUCOSE SERPL-MCNC: 107 MG/DL (ref 70–99)
GLUCOSE UR STRIP-MCNC: NEGATIVE MG/DL
HCG UR QL: NEGATIVE
HCT VFR BLD AUTO: 44.2 % (ref 35–47)
HGB BLD-MCNC: 14.5 G/DL (ref 11.7–15.7)
HGB UR QL STRIP: ABNORMAL
IMM GRANULOCYTES # BLD: 0 10E9/L (ref 0–0.4)
IMM GRANULOCYTES NFR BLD: 0.4 %
KETONES UR STRIP-MCNC: NEGATIVE MG/DL
LEUKOCYTE ESTERASE UR QL STRIP: NEGATIVE
LIPASE SERPL-CCNC: 114 U/L (ref 73–393)
LYMPHOCYTES # BLD AUTO: 1.9 10E9/L (ref 0.8–5.3)
LYMPHOCYTES NFR BLD AUTO: 16.8 %
MCH RBC QN AUTO: 28.2 PG (ref 26.5–33)
MCHC RBC AUTO-ENTMCNC: 32.8 G/DL (ref 31.5–36.5)
MCV RBC AUTO: 86 FL (ref 78–100)
MONOCYTES # BLD AUTO: 0.6 10E9/L (ref 0–1.3)
MONOCYTES NFR BLD AUTO: 5.3 %
MUCOUS THREADS #/AREA URNS LPF: PRESENT /LPF
NEUTROPHILS # BLD AUTO: 8.5 10E9/L (ref 1.6–8.3)
NEUTROPHILS NFR BLD AUTO: 76.5 %
NITRATE UR QL: NEGATIVE
NRBC # BLD AUTO: 0 10*3/UL
NRBC BLD AUTO-RTO: 0 /100
PH UR STRIP: 6 PH (ref 5–7)
PLATELET # BLD AUTO: 297 10E9/L (ref 150–450)
POTASSIUM SERPL-SCNC: 3.4 MMOL/L (ref 3.4–5.3)
PROT SERPL-MCNC: 8.1 G/DL (ref 6.8–8.8)
RBC # BLD AUTO: 5.14 10E12/L (ref 3.8–5.2)
RBC #/AREA URNS AUTO: 7 /HPF (ref 0–2)
SODIUM SERPL-SCNC: 141 MMOL/L (ref 133–144)
SOURCE: ABNORMAL
SP GR UR STRIP: 1.02 (ref 1–1.03)
SQUAMOUS #/AREA URNS AUTO: 1 /HPF (ref 0–1)
UROBILINOGEN UR STRIP-MCNC: 0 MG/DL (ref 0–2)
WBC # BLD AUTO: 11 10E9/L (ref 4–11)
WBC #/AREA URNS AUTO: 3 /HPF (ref 0–2)

## 2017-10-19 PROCEDURE — 96376 TX/PRO/DX INJ SAME DRUG ADON: CPT

## 2017-10-19 PROCEDURE — 96374 THER/PROPH/DIAG INJ IV PUSH: CPT

## 2017-10-19 PROCEDURE — 80053 COMPREHEN METABOLIC PANEL: CPT | Performed by: EMERGENCY MEDICINE

## 2017-10-19 PROCEDURE — 74176 CT ABD & PELVIS W/O CONTRAST: CPT

## 2017-10-19 PROCEDURE — 85025 COMPLETE CBC W/AUTO DIFF WBC: CPT | Performed by: EMERGENCY MEDICINE

## 2017-10-19 PROCEDURE — 99285 EMERGENCY DEPT VISIT HI MDM: CPT | Mod: 25

## 2017-10-19 PROCEDURE — 81001 URINALYSIS AUTO W/SCOPE: CPT | Performed by: EMERGENCY MEDICINE

## 2017-10-19 PROCEDURE — 81025 URINE PREGNANCY TEST: CPT | Performed by: EMERGENCY MEDICINE

## 2017-10-19 PROCEDURE — 25000128 H RX IP 250 OP 636: Performed by: EMERGENCY MEDICINE

## 2017-10-19 PROCEDURE — 76705 ECHO EXAM OF ABDOMEN: CPT

## 2017-10-19 PROCEDURE — 83690 ASSAY OF LIPASE: CPT | Performed by: EMERGENCY MEDICINE

## 2017-10-19 PROCEDURE — 96375 TX/PRO/DX INJ NEW DRUG ADDON: CPT

## 2017-10-19 PROCEDURE — 96361 HYDRATE IV INFUSION ADD-ON: CPT

## 2017-10-19 RX ORDER — MORPHINE SULFATE 4 MG/ML
4 INJECTION, SOLUTION INTRAMUSCULAR; INTRAVENOUS
Status: COMPLETED | OUTPATIENT
Start: 2017-10-19 | End: 2017-10-19

## 2017-10-19 RX ORDER — HYDROMORPHONE HYDROCHLORIDE 1 MG/ML
0.5 INJECTION, SOLUTION INTRAMUSCULAR; INTRAVENOUS; SUBCUTANEOUS
Status: DISCONTINUED | OUTPATIENT
Start: 2017-10-19 | End: 2017-10-19 | Stop reason: HOSPADM

## 2017-10-19 RX ORDER — ONDANSETRON 2 MG/ML
4 INJECTION INTRAMUSCULAR; INTRAVENOUS EVERY 30 MIN PRN
Status: DISCONTINUED | OUTPATIENT
Start: 2017-10-19 | End: 2017-10-19 | Stop reason: HOSPADM

## 2017-10-19 RX ORDER — SODIUM CHLORIDE 9 MG/ML
1000 INJECTION, SOLUTION INTRAVENOUS CONTINUOUS
Status: DISCONTINUED | OUTPATIENT
Start: 2017-10-19 | End: 2017-10-19 | Stop reason: HOSPADM

## 2017-10-19 RX ADMIN — MORPHINE SULFATE 4 MG: 4 INJECTION, SOLUTION INTRAMUSCULAR; INTRAVENOUS at 18:49

## 2017-10-19 RX ADMIN — ONDANSETRON 4 MG: 2 INJECTION INTRAMUSCULAR; INTRAVENOUS at 18:05

## 2017-10-19 RX ADMIN — SODIUM CHLORIDE 1000 ML: 9 INJECTION, SOLUTION INTRAVENOUS at 18:06

## 2017-10-19 RX ADMIN — MORPHINE SULFATE 4 MG: 4 INJECTION, SOLUTION INTRAMUSCULAR; INTRAVENOUS at 19:13

## 2017-10-19 RX ADMIN — HYDROMORPHONE HYDROCHLORIDE 0.5 MG: 1 INJECTION, SOLUTION INTRAMUSCULAR; INTRAVENOUS; SUBCUTANEOUS at 19:32

## 2017-10-19 RX ADMIN — MORPHINE SULFATE 4 MG: 4 INJECTION, SOLUTION INTRAMUSCULAR; INTRAVENOUS at 18:05

## 2017-10-19 ASSESSMENT — ENCOUNTER SYMPTOMS
ABDOMINAL PAIN: 1
NAUSEA: 1
VOMITING: 1

## 2017-10-19 NOTE — ED AVS SNAPSHOT
[facility] Emergency Department    [address]    [facility] [address]    Phone:  [phone]    Fax:  [phone]                                       Mihir Waldron   MRN: [id number]    Department:  [facility] Emergency Department   Date of Visit:  10/19/2017           After Visit Summary Signature Page     I have received my discharge instructions, and my questions have been answered. I have discussed any challenges I see with this plan with the nurse or doctor.    ..........................................................................................................................................  Patient/Patient Representative Signature      ..........................................................................................................................................  Patient Representative Print Name and Relationship to Patient    ..................................................               ................................................  Date                                            Time    ..........................................................................................................................................  Reviewed by Signature/Title    ...................................................              ..............................................  Date                                                            Time

## 2017-10-19 NOTE — ED AVS SNAPSHOT
[facility] Emergency Department    [address]    [facility] [address]    Phone:  [phone]    Fax:  [phone]                                       Mihir Waldron   MRN: [id number]    Department:  [facility] Emergency Department   Date of Visit:  10/19/2017           Patient Information     Date Of Birth          1990        Your diagnoses for this visit were:     Abdominal pain, right upper quadrant        You were seen by Nehemias Lockhart MD.      Follow-up Information     Follow up with [facility]. Schedule an appointment as soon as possible for a visit in 1 day.    Why:  As needed, If symptoms worsen    Contact information:    [address]  [address]  [facility] [address]          Discharge Instructions         *Abdominal Pain, Unknown Cause (Female)    The exact cause of your abdominal (stomach) pain is not certain. This does not mean that this is something to worry about, or the right tests were not done. Everyone likes to know the exact cause of the problem, but sometimes with abdominal pain, there is no clear-cut cause, and this could be a good thing. The good news is that your symptoms can be treated, and you will feel better.   Your condition does not seem serious now; however, sometimes the signs of a serious problem may take more time to appear. For this reason, it is important for you to watch for any new symptoms, problems, or worsening of your condition.  Over the next few days, the abdominal pain may come and go, or be continuous. Other common symptoms can include nausea and vomiting. Sometimes it can be difficult to tell if you feel nauseous, you may just feel bad and not associate that feeling with nausea. Constipation, diarrhea, and a fever may go along with the pain.  The pain may continue even if treated correctly over the following days. Depending on how things go, sometimes the cause can become clear and  may require further or different treatment. Additional evaluations, medications, or tests may be needed.  Home care  Your health care provider may prescribe medications for pain, symptoms, or an infection.  Follow the health care provider's instructions for taking these medications.  General care    Rest until your next exam. No strenuous activities.    Try to find positions that ease discomfort. A small pillow placed on the abdomen may help relieve pain.    Something warm on your abdomen (such as a heating pad) may help, but be careful not to burn yourself.  Diet    Do not force yourself to eat, especially if having cramps, vomiting, or diarrhea.    Water is important so you do not get dehydrated. Soup may also be good. Sports drinks may also help, especially if they are not too acidic. Make sure you don't drink sugary drinks as this can make things worse. Take liquids in small amounts. Do not guzzle them.    Caffeine sometimes makes the pain and cramping worse.    Avoid dairy products if you have vomiting or diarrhea.    Don't eat large amounts at a time. Wait a few minutes between bites.    Eat a diet low in fiber (called a low-residue diet). Foods allowed include refined breads, white rice, fruit and vegetable juices without pulp, tender meats. These foods will pass more easily through the intestine.    Avoid fried or fatty foods, dairy, alcohol and spicy foods until your symptoms go away.  Follow-up care  Follow up with your health care provider as instructed, or if your pain does not begin to improve in the next 24 hours.  When to seek medical care  Seek prompt medical care if any of the following occur:    Pain gets worse or moves to the right lower abdomen    New or worsening vomiting or diarrhea    Swelling of the abdomen    Unable to pass stool for more than three days    New fever over 101  F (38.3 C), or rising fever    Blood in vomit or bowel movements (dark red or black color)    Jaundice (yellow color  of eyes and skin)    Weakness, dizziness    Chest, arm, back, neck or jaw pain    Unexpected vaginal bleeding or missed period  Call 911  Call emergency services if any of the following occur:    Trouble breathing    Confusion    Fainting or loss of consciousness    Rapid heart rate    Seizure    [phone] Stephanie Granado, [address] [facility], [place] [address]. All rights reserved. This information is not intended as a substitute for professional medical care. Always follow your healthcare professional's instructions.          24 Hour Appointment Hotline       To make an appointment at any [facility], call [phone] ([phone]). If you don't have a family doctor or clinic, we will help you find one. [facility] are conveniently located to serve the needs of you and your family.             Review of your medicines      Our records show that you are taking the medicines listed below. If these are incorrect, please call your family doctor or clinic.        Dose / Directions Last dose taken    albuterol 108 (90 BASE) MCG/ACT Inhaler   Commonly known as:  PROAIR HFA   Dose:  2 puff   Quantity:  1 Inhaler        Inhale 2 puffs into the lungs every 4 hours as needed for shortness of breath / dyspnea   Refills:  0        EPINEPHrine 0.3 MG/0.3ML injection 2-pack   Commonly known as:  EPIPEN/ADRENACLICK/or ANY BX GENERIC EQUIV   Dose:  0.3 mg   Quantity:  2 each        Inject 0.3 mLs (0.3 mg) into the muscle once as needed   Refills:  3                Procedures and tests performed during your visit     CBC with platelets differential    CT Abdomen Pelvis WITHOUT Contrast (stone protocol)    Comprehensive metabolic panel    HCG qualitative urine    Lipase    UA with Microscopic    US Abdomen Limited      Orders Needing Specimen Collection     None      Pending Results     No orders found from 10/17/2017 to 10/20/2017.            Pending Culture Results     No orders found from 10/17/2017 to  10/20/2017.            Pending Results Instructions     If you had any lab results that were not finalized at the time of your Discharge, you can call the ED Lab Result RN at [phone]. You will be contacted by this team for any positive Lab results or changes in treatment. The nurses are available 7 days a week from 10A to 6:30P.  You can leave a message 24 hours per day and they will return your call.        Test Results From Your Hospital Stay        10/19/2017  6:20 PM      Component Results     Component Value Ref Range & Units Status    WBC 11.0 4.0 - 11.0 10e9/L Final    RBC Count 5.14 3.8 - 5.2 10e12/L Final    Hemoglobin 14.5 11.7 - 15.7 g/dL Final    Hematocrit 44.2 35.0 - 47.0 % Final    MCV 86 78 - 100 fl Final    MCH 28.2 26.5 - 33.0 pg Final    MCHC 32.8 31.5 - 36.5 g/dL Final    RDW 12.7 10.0 - 15.0 % Final    Platelet Count 297 150 - 450 10e9/L Final    Diff Method Automated Method  Final    % Neutrophils 76.5 % Final    % Lymphocytes 16.8 % Final    % Monocytes 5.3 % Final    % Eosinophils 0.6 % Final    % Basophils 0.4 % Final    % Immature Granulocytes 0.4 % Final    Nucleated RBCs 0 0 /100 Final    Absolute Neutrophil 8.5 (H) 1.6 - 8.3 10e9/L Final    Absolute Lymphocytes 1.9 0.8 - 5.3 10e9/L Final    Absolute Monocytes 0.6 0.0 - 1.3 10e9/L Final    Absolute Eosinophils 0.1 0.0 - 0.7 10e9/L Final    Absolute Basophils 0.0 0.0 - 0.2 10e9/L Final    Abs Immature Granulocytes 0.0 0 - 0.4 10e9/L Final    Absolute Nucleated RBC 0.0  Final         10/19/2017  6:39 PM      Component Results     Component Value Ref Range & Units Status    Sodium 141 133 - 144 mmol/L Final    Potassium 3.4 3.4 - 5.3 mmol/L Final    Chloride 110 (H) 94 - 109 mmol/L Final    Carbon Dioxide 25 20 - 32 mmol/L Final    Anion Gap 6 3 - 14 mmol/L Final    Glucose 107 (H) 70 - 99 mg/dL Final    Urea Nitrogen 9 7 - 30 mg/dL Final    Creatinine 0.58 0.52 - 1.04 mg/dL Final    GFR Estimate >90 >60 mL/min/1.7m2 Final    Non   GFR Calc    GFR Estimate If Black >90 >60 mL/min/1.7m2 Final    African American GFR Calc    Calcium 9.1 8.5 - 10.1 mg/dL Final    Bilirubin Total 0.2 0.2 - 1.3 mg/dL Final    Albumin 4.1 3.4 - 5.0 g/dL Final    Protein Total 8.1 6.8 - 8.8 g/dL Final    Alkaline Phosphatase 172 (H) 40 - 150 U/L Final    ALT 17 0 - 50 U/L Final    AST 12 0 - 45 U/L Final         10/19/2017  6:39 PM      Component Results     Component Value Ref Range & Units Status    Lipase 114 73 - 393 U/L Final         10/19/2017  7:16 PM      Narrative     US ABDOMEN LIMITED 10/19/2017 7:11 PM    HISTORY: Right upper quadrant pain.    TECHNIQUE: Limited abdominal ultrasound to the right upper quadrant is  performed.    COMPARISON: None.    FINDINGS: The liver is normal, measuring 14.6 cm in diameter.  Visualized portions of the pancreas are unremarkable.    Normal gallbladder. No sonographic evidence of gallstones. Normal  gallbladder wall thickness. No sonographic Polanco sign.    Normal right kidney.        Impression     IMPRESSION:  Normal right upper quadrant ultrasound.      KATRINA INIGUEZ MD         10/19/2017  6:29 PM      Component Results     Component Value Ref Range & Units Status    HCG Qual Urine Negative NEG^Negative Final    This test is for screening purposes.  Results should be interpreted along with   the clinical picture.  Confirmation testing is available if warranted by   ordering [id number], HCG Quantitative Pregnancy.           10/19/2017  6:30 PM      Component Results     Component Value Ref Range & Units Status    Color Urine Yellow  Final    Appearance Urine Clear  Final    Glucose Urine Negative NEG^Negative mg/dL Final    Bilirubin Urine Negative NEG^Negative Final    Ketones Urine Negative NEG^Negative mg/dL Final    Specific Gravity Urine 1.024 1.003 - 1.035 Final    Blood Urine Small (A) NEG^Negative Final    pH Urine 6.0 5.0 - 7.0 pH Final    Protein Albumin Urine Negative NEG^Negative mg/dL Final     Urobilinogen mg/dL 0.0 0.0 - 2.0 mg/dL Final    Nitrite Urine Negative NEG^Negative Final    Leukocyte Esterase Urine Negative NEG^Negative Final    Source Midstream Urine  Final    WBC Urine 3 (H) 0 - 2 /HPF Final    RBC Urine 7 (H) 0 - 2 /HPF Final    Bacteria Urine Few (A) NEG^Negative /HPF Final    Squamous Epithelial /HPF Urine 1 0 - 1 /HPF Final    Mucous Urine Present (A) NEG^Negative /LPF Final         10/19/2017  8:17 PM      Narrative     CT ABDOMEN PELVIS W/O CONTRAST 10/19/2017 8:13 PM    HISTORY: Abdominal pain in the right flank.    TECHNIQUE: CT imaging of the abdomen and pelvis is performed without  IV contrast.  Abdominal organs, pelvic organs, bowel, aorta,  retroperitoneum, and abdominal wall are also assessed to the limits of  no IV contrast.  Radiation dose for this scan was reduced using  automated exposure control, adjustment of the mA and/or kV according  to patient size, or iterative reconstruction technique.    COMPARISON: None.    FINDINGS:    Liver: Normal.  Gallbladder:Normal.  Pancreas:Normal.  Spleen:Normal.  Adrenals:Normal.  Ascites:None.    Kidneys/ureters:Normal.  No evidence of hydronephrosis or  nephrolithiasis.  Bladder:Normal.  Pelvic free fluid:None.    Bowels:Unremarkable.  No evidence of obstruction.  Appendix:Normal.    Abdominal or pelvic lymphadenopathy:None.    Miscellaneous findings:None.        Impression     IMPRESSION:  Unremarkable noncontrast CT scan of the abdomen and pelvis.    KATRINA INIGUEZ MD                Clinical Quality Measure: Blood Pressure Screening     Your blood pressure was checked while you were in the emergency department today. The last reading we obtained was  BP: 130/78 . Please read the guidelines below about what these numbers mean and what you should do about them.  If your systolic blood pressure (the top number) is less than 120 and your diastolic blood pressure (the bottom number) is less than 80, then your blood pressure is normal. There  "is nothing more that you need to do about it.  If your systolic blood pressure (the top number) is 120-139 or your diastolic blood pressure (the bottom number) is 80-89, your blood pressure may be higher than it should be. You should have your blood pressure rechecked within a year by a primary care provider.  If your systolic blood pressure (the top number) is 140 or greater or your diastolic blood pressure (the bottom number) is 90 or greater, you may have high blood pressure. High blood pressure is treatable, but if left untreated over time it can put you at risk for heart attack, stroke, or kidney failure. You should have your blood pressure rechecked by a primary care provider within the next 4 weeks.  If your provider in the emergency department today gave you specific instructions to follow-up with your doctor or provider even sooner than that, you should follow that instruction and not wait for up to 4 weeks for your follow-up visit.        Thank you for choosing [facility]       Thank you for choosing [facility] for your care. Our goal is always to provide you with excellent care. Hearing back from our patients is one way we can continue to improve our services. Please take a few minutes to complete the written survey that you may receive in the mail after you visit with us. Thank you!        [employer] [employer] Information     [facility] lets you send messages to your doctor, view your test results, renew your prescriptions, schedule appointments and more. To sign up, go to [email or web address] . Click on \"Log in\" on the left side of the screen, which will take you to the Welcome page. Then click on \"Sign up Now\" on the right side of the page.     You will be asked to enter the access code listed below, as well as some personal information. Please follow the directions to create your username and password.     Your access code is: [email or web address]  Expires: 2018  8:43 PM     Your access code will  in 90 days. If " you need help or a new code, please call your [facility] or [phone].        Care EveryWhere ID     This is your Care EveryWhere ID. This could be used by other organizations to access your [facility] medical records  [id number]        Equal Access to Services     YANDEL FOOTE : Iftikhar Winter, manuel castellanosadaha, qaeduardo kaalmamadelin carlton, tatiana jerome. So [facility] [phone].    ATENCIÓN: Si habla español, tiene a ventura disposición servicios gratuitos de asistencia lingüística. Llame al [phone].    We comply with applicable federal civil rights laws and [place] laws. We do not discriminate on the basis of race, color, national origin, age, disability, sex, sexual orientation, or gender identity.            After Visit Summary       This is your record. Keep this with you and show to your community pharmacist(s) and doctor(s) at your next visit.

## 2017-10-19 NOTE — ED PROVIDER NOTES
History     Chief Complaint:  Abdominal Pain    HPI   Mihir Waldron is a 27 year old female who presents with abdominal pain. The patient states that she had a sudden onset of right upper quadrant abdominal pain today at approximately 1300 after eating a grilled cheese sandwich, prompting her to visit the ED. Here, the patient states that she has had nausea and 3 episodes of emesis in addition to her abdominal pain.       Allergies:  Benadryl [Diphenhydramine]  Codeine Sulfate  Latex      Medications:    Albuterol  EpiPen    Past Medical History:    Anxiety  Depression  Postpartum depression  POTS    Past Surgical History:    Dental Surgery     Family History:    Hypertension    Social History:  Negative for alcohol use.  Negative for tobacco use.   Marital Status:   [2]     Review of Systems   Gastrointestinal: Positive for abdominal pain, nausea and vomiting.   All other systems reviewed and are negative.      Physical Exam   First Vitals:  BP: (!) 155/110  Pulse: 137  Temp: 99.1  F (37.3  C)  Resp: 30  SpO2: 98 %    Physical Exam  Constitutional: Patient is well appearing. No distress.  Head: Atraumatic.  Mouth/Throat: Oropharynx is clear and moist. No oropharyngeal exudate.  Eyes: Conjunctivae and EOM are normal. No scleral icterus.  Neck: Normal range of motion. Neck supple.   Cardiovascular: Normal rate, regular rhythm, normal heart sounds and intact distal pulses.   Pulmonary/Chest: Breath sounds normal. No respiratory distress.  Abdominal: Right upper quadrant abdominal pain. Positive Polanco's Sign.   Musculoskeletal: Normal range of motion. No edema or tenderness.   Neurological: Alert and orientated to person, place, and time. No observable focal neuro deficit  Skin: Warm and dry. No rash noted. Not diaphoretic.       Emergency Department Course     Imaging:  Radiographic findings were communicated with the patient who voiced understanding of the findings.  US Abdomen, Limited:  Normal right  upper quadrant ultrasound. As per radiology.    CT Abdomen/Pelvis w/o IV contrast-stone protocol:   Unremarkable noncontrast CT scan of the abdomen and pelvis. As per radiology.    Laboratory:  CBC: WBC: 11.0, HGB: 14.5, PLT: 297  CMP: Glucose 107(H), Chloride: 110(H), Alkphos: 172(H), o/w WNL (Creatinine: 0.58)    UA with micro: small blood, few bacteria, mucous present, WBC: 3(H), RBC: 7(H) o/w negative  HCG qualitative:Negative  Lipase:114    Interventions:  1805 Zofran, 4 mg, IV  1805 Morphine, 4 mg, IV   1913 Morphine, 4 mg, IV  1932 Dilaudid, 0.5 mg, IV    Emergency Department Course:  Nursing notes and vitals reviewed. I performed an exam of the patient as documented above.     IV inserted. Medicine administered as documented above. Blood drawn. This was sent to the lab for further testing, results above. The patient provided a urine sample here in the emergency department. This was sent for laboratory testing, findings above.      The patient was sent for an abdominal US while in the emergency department, findings above.     2038 I rechecked the patient and discussed the results of her workup thus far.     Findings and plan explained to the Patient. Patient discharged home with instructions regarding supportive care, medications, and reasons to return. The importance of close follow-up was reviewed.     I personally reviewed the laboratory results with the Patient and answered all related questions prior to discharge.       Impression & Plan      Medical Decision Making:  RUQ and flank pain without resp or chest sx.  Immediately after meal and relieved to nonexistent in the department.  Extensive workup as above screening neg at this time.  GB and bad expectant and return instructions given.    Diagnosis:    ICD-10-CM   1. Abdominal pain, right upper quadrant R10.11       Disposition:  discharged to home    IDian, jamilah serving as a scribe on 10/19/2017 at 5:25 PM to personally document services  performed by Nehemias Lockhart MD based on my observations and the provider's statements to me.      Dian Pratt  10/19/2017   [facility] EMERGENCY DEPARTMENT       eNhemias Lockhart MD  10/20/17 0010

## 2017-10-20 NOTE — DISCHARGE INSTRUCTIONS
*Abdominal Pain, Unknown Cause (Female)    The exact cause of your abdominal (stomach) pain is not certain. This does not mean that this is something to worry about, or the right tests were not done. Everyone likes to know the exact cause of the problem, but sometimes with abdominal pain, there is no clear-cut cause, and this could be a good thing. The good news is that your symptoms can be treated, and you will feel better.   Your condition does not seem serious now; however, sometimes the signs of a serious problem may take more time to appear. For this reason, it is important for you to watch for any new symptoms, problems, or worsening of your condition.  Over the next few days, the abdominal pain may come and go, or be continuous. Other common symptoms can include nausea and vomiting. Sometimes it can be difficult to tell if you feel nauseous, you may just feel bad and not associate that feeling with nausea. Constipation, diarrhea, and a fever may go along with the pain.  The pain may continue even if treated correctly over the following days. Depending on how things go, sometimes the cause can become clear and may require further or different treatment. Additional evaluations, medications, or tests may be needed.  Home care  Your health care provider may prescribe medications for pain, symptoms, or an infection.  Follow the health care provider's instructions for taking these medications.  General care    Rest until your next exam. No strenuous activities.    Try to find positions that ease discomfort. A small pillow placed on the abdomen may help relieve pain.    Something warm on your abdomen (such as a heating pad) may help, but be careful not to burn yourself.  Diet    Do not force yourself to eat, especially if having cramps, vomiting, or diarrhea.    Water is important so you do not get dehydrated. Soup may also be good. Sports drinks may also help, especially if they are not too acidic. Make sure you  don't drink sugary drinks as this can make things worse. Take liquids in small amounts. Do not guzzle them.    Caffeine sometimes makes the pain and cramping worse.    Avoid dairy products if you have vomiting or diarrhea.    Don't eat large amounts at a time. Wait a few minutes between bites.    Eat a diet low in fiber (called a low-residue diet). Foods allowed include refined breads, white rice, fruit and vegetable juices without pulp, tender meats. These foods will pass more easily through the intestine.    Avoid fried or fatty foods, dairy, alcohol and spicy foods until your symptoms go away.  Follow-up care  Follow up with your health care provider as instructed, or if your pain does not begin to improve in the next 24 hours.  When to seek medical care  Seek prompt medical care if any of the following occur:    Pain gets worse or moves to the right lower abdomen    New or worsening vomiting or diarrhea    Swelling of the abdomen    Unable to pass stool for more than three days    New fever over 101  F (38.3 C), or rising fever    Blood in vomit or bowel movements (dark red or black color)    Jaundice (yellow color of eyes and skin)    Weakness, dizziness    Chest, arm, back, neck or jaw pain    Unexpected vaginal bleeding or missed period  Call 911  Call emergency services if any of the following occur:    Trouble breathing    Confusion    Fainting or loss of consciousness    Rapid heart rate    Seizure    [id number] Stephanie RichardsGe[facility], [address] [place] [address], [place] [address]. All rights reserved. This information is not intended as a substitute for professional medical care. Always follow your healthcare professional's instructions.

## 2017-12-03 ENCOUNTER — HEALTH MAINTENANCE LETTER (OUTPATIENT)
Age: 27
End: 2017-12-03

## 2017-12-13 ENCOUNTER — OFFICE VISIT (OUTPATIENT)
Dept: FAMILY MEDICINE | Facility: CLINIC | Age: 27
End: 2017-12-13

## 2017-12-13 VITALS
WEIGHT: 202.2 LBS | HEART RATE: 85 BPM | DIASTOLIC BLOOD PRESSURE: 78 MMHG | TEMPERATURE: 98.5 F | BODY MASS INDEX: 31.74 KG/M2 | HEIGHT: 67 IN | OXYGEN SATURATION: 98 % | SYSTOLIC BLOOD PRESSURE: 124 MMHG

## 2017-12-13 DIAGNOSIS — F41.0 PANIC ATTACK: ICD-10-CM

## 2017-12-13 DIAGNOSIS — Z71.89 ACP (ADVANCE CARE PLANNING): ICD-10-CM

## 2017-12-13 DIAGNOSIS — F41.1 GAD (GENERALIZED ANXIETY DISORDER): Primary | ICD-10-CM

## 2017-12-13 DIAGNOSIS — G90.A POSTURAL ORTHOSTATIC TACHYCARDIA SYNDROME: ICD-10-CM

## 2017-12-13 PROBLEM — Z76.89 HEALTH CARE HOME: Status: ACTIVE | Noted: 2017-12-13

## 2017-12-13 PROCEDURE — 99202 OFFICE O/P NEW SF 15 MIN: CPT | Performed by: FAMILY MEDICINE

## 2017-12-13 RX ORDER — ALPRAZOLAM 0.5 MG
0.5 TABLET ORAL 3 TIMES DAILY PRN
Qty: 10 TABLET | Refills: 0 | Status: SHIPPED | OUTPATIENT
Start: 2017-12-13 | End: 2018-04-15

## 2017-12-13 ASSESSMENT — ANXIETY QUESTIONNAIRES
5. BEING SO RESTLESS THAT IT IS HARD TO SIT STILL: SEVERAL DAYS
GAD7 TOTAL SCORE: 10
6. BECOMING EASILY ANNOYED OR IRRITABLE: MORE THAN HALF THE DAYS
1. FEELING NERVOUS, ANXIOUS, OR ON EDGE: NEARLY EVERY DAY
IF YOU CHECKED OFF ANY PROBLEMS ON THIS QUESTIONNAIRE, HOW DIFFICULT HAVE THESE PROBLEMS MADE IT FOR YOU TO DO YOUR WORK, TAKE CARE OF THINGS AT HOME, OR GET ALONG WITH OTHER PEOPLE: VERY DIFFICULT
2. NOT BEING ABLE TO STOP OR CONTROL WORRYING: SEVERAL DAYS
3. WORRYING TOO MUCH ABOUT DIFFERENT THINGS: SEVERAL DAYS
7. FEELING AFRAID AS IF SOMETHING AWFUL MIGHT HAPPEN: NOT AT ALL

## 2017-12-13 ASSESSMENT — PATIENT HEALTH QUESTIONNAIRE - PHQ9
SUM OF ALL RESPONSES TO PHQ QUESTIONS 1-9: 4
5. POOR APPETITE OR OVEREATING: MORE THAN HALF THE DAYS

## 2017-12-13 NOTE — NURSING NOTE
Mihir is here to establish care and go over medications    Pre-Visit Screening :  Immunizations : up to date    Colonoscopy : NA  Mammogram : NA  Asthma Action Test/Plan : NA  PHQ9/GAD7 :  Done today  Pulse - regular  My Chart - accepts    CLASSIFICATION OF OVERWEIGHT AND OBESITY BY BMI                         Obesity Class           BMI(kg/m2)  Underweight                                    < 18.5  Normal                                         18.5-24.9  Overweight                                     25.0-29.9  OBESITY                     I                  30.0-34.9                              II                 35.0-39.9  EXTREME OBESITY             III                >40                             Patient's  BMI Body mass index is 31.67 kg/(m^2).  http://hin.nhlbi.nih.gov/menuplanner/menu.cgi  Questioned patient about current smoking habits.  Pt. has never smoked.

## 2017-12-13 NOTE — MR AVS SNAPSHOT
After Visit Summary   12/13/2017    Mihir Waldron    MRN: [id number]           Patient Information     Date Of Birth          1990        Visit Information        Provider Department      12/13/2017 5:30 PM Fuentes Green MD [facility]        Today's Diagnoses     CHIDI (generalized anxiety disorder)    -  1    Post partum depression        Panic attack        ACP (advance care planning)        Postural orthostatic tachycardia syndrome           Follow-ups after your visit        Who to contact     If you have questions or need follow up information about today's clinic visit or your schedule please contact [facility] directly at [phone].  Normal or non-critical lab and imaging results will be communicated to you by MyChart, letter or phone within 4 business days after the clinic has received the results. If you do not hear from us within 7 days, please contact the clinic through Azullohart or phone. If you have a critical or abnormal lab result, we will notify you by phone as soon as possible.  Submit refill requests through Medine or call your pharmacy and they will forward the refill request to us. Please allow 3 business days for your refill to be completed.          Additional Information About Your Visit        MyChart Information     Medine gives you secure access to your electronic health record. If you see a primary care provider, you can also send messages to your care team and make appointments. If you have questions, please call your primary care clinic.  If you do not have a primary care provider, please call [phone] and they will assist you.        Care EveryWhere ID     This is your Care EveryWhere ID. This could be used by other organizations to access your [place] medical records  [id number]        Your Vitals Were     Pulse Temperature Height Pulse Oximetry Breastfeeding? BMI (Body Mass Index)    85 98.5  F  "(36.9  C) (Oral) 1.702 m (5' 7\") 98% No 31.67 kg/m2       Blood Pressure from Last 3 Encounters:   12/13/17 124/78   10/19/17 126/77   02/27/17 120/77    Weight from Last 3 Encounters:   12/13/17 91.7 kg (202 lb 3.2 oz)   02/27/17 92.5 kg (204 lb)   02/21/17 93 kg (205 lb)              Today, you had the following     No orders found for display         Today's Medication Changes          These changes are accurate as of: 12/13/17  6:01 PM.  If you have any questions, ask your nurse or doctor.               Start taking these medicines.        Dose/Directions    ALPRAZolam 0.5 MG tablet   Commonly known as:  XANAX   Used for:  CHIDI (generalized anxiety disorder), Panic attack   Started by:  Fuentes Green MD        Dose:  0.5 mg   Take 1 tablet (0.5 mg) by mouth 3 times daily as needed for anxiety   Quantity:  10 tablet   Refills:  0            Where to get your medicines      Some of these will need a paper prescription and others can be bought over the counter.  Ask your nurse if you have questions.     Bring a paper prescription for each of these medications     ALPRAZolam 0.5 MG tablet                Primary Care Provider Office Phone # Fax #    Fuentes Green [phone] [address] [address]        Equal Access to Services     [facility] AH: Hadii aad ku hadasho Soomaali, waaxda luqadaha, qaybta kaalmada adeegyada, tatiana francisco haytamia guillen . So [facility] [phone].    ATENCIÓN: Si habla español, tiene a ventura disposición servicios gratuitos de asistencia lingüística. Llame al [phone].    We comply with applicable federal civil rights laws and [place] laws. We do not discriminate on the basis of race, color, national origin, age, disability, sex, sexual orientation, or gender identity.            Thank you!     Thank you for choosing [facility]  for your care. Our goal is always to provide you with " excellent care. Hearing back from our patients is one way we can continue to improve our services. Please take a few minutes to complete the written survey that you may receive in the mail after your visit with us. Thank you!             Your Updated Medication List - Protect others around you: Learn how to safely use, store and throw away your medicines at www.disposemymeds.org.          This list is accurate as of: 12/13/17  6:01 PM.  Always use your most recent med list.                   Brand Name Dispense Instructions for use Diagnosis    ALPRAZolam 0.5 MG tablet    XANAX    10 tablet    Take 1 tablet (0.5 mg) by mouth 3 times daily as needed for anxiety    CHIDI (generalized anxiety disorder), Panic attack       EPINEPHrine 0.3 MG/0.3ML injection 2-pack    EPIPEN/ADRENACLICK/or ANY BX GENERIC EQUIV    2 each    Inject 0.3 mLs (0.3 mg) into the muscle once as needed        FLUOXETINE HCL PO      Take 20 mg by mouth 3 times daily        VITAMIN D (CHOLECALCIFEROL) PO      Take 10,000 Units by mouth daily

## 2017-12-13 NOTE — PROGRESS NOTES
SUBJECTIVE:  Mihir Waldron, a 27 year old female scheduled an appointment to discuss the following issues:     CHIDI (generalized anxiety disorder)  Post partum depression  Panic attack  ACP (advance care planning)  Postural orthostatic tachycardia syndrome  Pt new to clinic-establishing care.  She has a hx of depression-started post partum- was on citalopram and sertraline in the past with good results.  She started having more issues with anxiety and panic over time and was switched to fluoxetine by psych NP in hopes this would help more with anxious symptoms .  Pt was also given small rx xanax for panic by OB-states she used <10 in 6 months.    Pt was seeing therapist but hasn't had time since second child born.      Pt relates panic attack about 2 /week but only one bad one per month    She does also have a diagnosis of POTS- saw therapist at [facility]-helped some    Pt does not currently see any therapist or psychologist    Medical, social, surgical, and family histories reviewed.    Patient Active Problem List   Diagnosis     Sinus tachycardia     Postural orthostatic tachycardia syndrome     Encounter for triage in pregnant patient     Health Care Home     ACP (advance care planning)     Post partum depression     CHIDI (generalized anxiety disorder)     Past Medical History:   Diagnosis Date     Anxiety      Depressive disorder      Postpartum depression      POTS (postural orthostatic tachycardia syndrome)      Family History   Problem Relation Age of Onset     Coronary Artery Disease Mother      Depression Mother      Anxiety Disorder Mother      Hypertension Father      Anxiety Disorder Father      Depression Father      Depression Sister      Anxiety Disorder Sister      DIABETES Maternal Grandmother      DIABETES Paternal Grandmother      CEREBROVASCULAR DISEASE No family hx of      Breast Cancer No family hx of      Colon Cancer No family hx of      Social History     Social History     Marital status:  "     Spouse name: Tee     Number of children: 2     Years of education: N/A     Occupational History     Not on file.     Social History Main Topics     Smoking status: Never Smoker     Smokeless tobacco: Never Used     Alcohol use No     Drug use: No     Sexual activity: Not Currently     Other Topics Concern     Caffeine Concern No     Special Diet No     Exercise No     Seat Belt Yes     Parent/Sibling W/ Cabg, Mi Or Angioplasty Before 65f 55m? No     Social History Narrative    ** Merged History Encounter **          Past Surgical History:   Procedure Laterality Date     DENTAL SURGERY Bilateral        Current Outpatient Prescriptions on File Prior to Visit:  EPINEPHrine (EPIPEN) 0.3 MG/0.3ML injection Inject 0.3 mLs (0.3 mg) into the muscle once as needed     No current facility-administered medications on file prior to visit.      Allergies: Benadryl [diphenhydramine]; Codeine sulfate; and Latex    Immunization History   Administered Date(s) Administered     Influenza (IIV3) PF 11/21/2014     Rhogam 06/26/2015, 10/12/2016     Tdap (Adacel,Boostrix) 05/11/2015        ROS:  C: NEGATIVE for fever, chills  R: NEGATIVE for significant cough or SOB  CV: NEGATIVE for chest pain, palpitations   GI: NEGATIVE for nausea, abdominal pain, heartburn, or change in bowel habits  M: NEGATIVE for significant arthralgias or myalgia  N: NEGATIVE for weakness, dizziness or paresthesias or headache    OBJECTIVE:  /78 (BP Location: Left arm, Patient Position: Chair, Cuff Size: Adult Large)  Pulse 85  Temp 98.5  F (36.9  C) (Oral)  Ht 1.702 m (5' 7\")  Wt 91.7 kg (202 lb 3.2 oz)  SpO2 98%  Breastfeeding? No  BMI 31.67 kg/m2  EXAM:  GENERAL APPEARANCE: healthy, alert and no distress  EYES: EOMI,  PERRL  HENT: ear canals and TM's normal and nose and mouth without ulcers or lesions  RESP: lungs clear to auscultation - no rales, rhonchi or wheezes  CV: regular rates and rhythm, normal S1 S2, no S3 or S4 and no " murmur, click or rub -  ABDOMEN:  soft, nontender, no HSM or masses and bowel sounds normal  PSYCH: mentation appears normal and affect normal/bright    ASSESSMENT/PLAN:  (F41.1) CHIDI (generalized anxiety disorder)  (primary encounter diagnosis)  Comment: Patient is having persistent symptoms despite previous therapies. We discussed options including BUspar, rare use benzos, CBT  Plan: ALPRAZolam (XANAX) 0.5 MG tablet        Recommend she establish with new psychologist to consider CBT-she is agreeable and will call her insurer    OK for small amount xanax, I reviewed the risks, benefits, and possible side effects of the medication.  The patient had an opportunity to ask any questions regarding the treatment plan. The patient was encouraged to call my office if any problems.     (F53) Post partum depression  Comment: resolved  Plan: continue current medications at current doses     (F41.0) Panic attack  Comment: as above  Plan: ALPRAZolam (XANAX) 0.5 MG tablet            (Z71.89) ACP (advance care planning)  Comment:   Plan:     (R00.0,  I95.1) Postural orthostatic tachycardia syndrome  Comment: stable  Plan: CBT may be helpful for this as well

## 2017-12-14 ASSESSMENT — ANXIETY QUESTIONNAIRES: GAD7 TOTAL SCORE: 10

## 2018-01-05 ENCOUNTER — MYC REFILL (OUTPATIENT)
Dept: FAMILY MEDICINE | Facility: CLINIC | Age: 28
End: 2018-01-05

## 2018-01-05 ENCOUNTER — OFFICE VISIT (OUTPATIENT)
Dept: FAMILY MEDICINE | Facility: CLINIC | Age: 28
End: 2018-01-05

## 2018-01-05 VITALS
OXYGEN SATURATION: 98 % | BODY MASS INDEX: 31.61 KG/M2 | TEMPERATURE: 98.5 F | HEIGHT: 67 IN | DIASTOLIC BLOOD PRESSURE: 80 MMHG | WEIGHT: 201.4 LBS | SYSTOLIC BLOOD PRESSURE: 122 MMHG | HEART RATE: 97 BPM

## 2018-01-05 DIAGNOSIS — F41.1 GAD (GENERALIZED ANXIETY DISORDER): Primary | ICD-10-CM

## 2018-01-05 DIAGNOSIS — J06.9 VIRAL URI WITH COUGH: Primary | ICD-10-CM

## 2018-01-05 DIAGNOSIS — J04.0 LARYNGITIS: ICD-10-CM

## 2018-01-05 LAB — S PYO AG THROAT QL IA.RAPID: NORMAL

## 2018-01-05 PROCEDURE — 99213 OFFICE O/P EST LOW 20 MIN: CPT | Performed by: PHYSICIAN ASSISTANT

## 2018-01-05 PROCEDURE — 87880 STREP A ASSAY W/OPTIC: CPT | Performed by: PHYSICIAN ASSISTANT

## 2018-01-05 PROCEDURE — 87070 CULTURE OTHR SPECIMN AEROBIC: CPT | Performed by: PHYSICIAN ASSISTANT

## 2018-01-05 NOTE — TELEPHONE ENCOUNTER
Message from Ec[employer]iate[employer]:  Mihir Waldron would like a refill of the following medications:  FLUOXETINE HCL PO [Patient Reported]    Preferred pharmacy: [facility] [address] [place] [address] [id number] [place], [place] [address] [place] [address]    Comment:

## 2018-01-05 NOTE — MR AVS SNAPSHOT
After Visit Summary   1/5/2018    Mihir Waldron    MRN: [id number]           Patient Information     Date Of Birth          1990        Visit Information        Provider Department      1/5/2018 10:45 AM Lillian Zambrano PA-C [facility]        Today's Diagnoses     Throat pain    -  1      Care Instructions    Alternate taking Tylenol 2 tablet with ibuprofen (2 tablets, with food) every 3 hours.  Continue to push fluid, warm fluids.   Also consider substitute Dayquil with Tylenol.  Contact me next week if not much better.           Follow-ups after your visit        Who to contact     If you have questions or need follow up information about today's clinic visit or your schedule please contact [facility] directly at [phone].  Normal or non-critical lab and imaging results will be communicated to you by [email or web address], letter or phone within 4 business days after the clinic has received the results. If you do not hear from us within 7 days, please contact the clinic through [email or web address] or phone. If you have a critical or abnormal lab result, we will notify you by phone as soon as possible.  Submit refill requests through [employer] or call your pharmacy and they will forward the refill request to us. Please allow 3 business days for your refill to be completed.          Additional Information About Your Visit        MyChart Information     [employer] gives you secure access to your electronic health record. If you see a primary care provider, you can also send messages to your care team and make appointments. If you have questions, please call your primary care clinic.  If you do not have a primary care provider, please call [phone] and they will assist you.        Care EveryWhere ID     This is your Care EveryWhere ID. This could be used by other organizations to access your [place] medical records  [id number]        Your Vitals Were     Pulse  "Temperature Height Last Period Pulse Oximetry Breastfeeding?    97 98.5  F (36.9  C) (Oral) 1.712 m (5' 7.4\") 12/12/2017 (Within Days) 98% No    BMI (Body Mass Index)                   31.17 kg/m2            Blood Pressure from Last 3 Encounters:   01/05/18 122/80   12/13/17 124/78   10/19/17 126/77    Weight from Last 3 Encounters:   01/05/18 91.4 kg (201 lb 6.4 oz)   12/13/17 91.7 kg (202 lb 3.2 oz)   02/27/17 92.5 kg (204 lb)              We Performed the Following     RAPID STREP (BFP)     THROAT CULTURE (BFP)        Primary Care Provider Office Phone # Fax #    Fuentes Green [phone] [address] [address]        Equal Access to Services     [facility]: Hadii aad ku hadasho Soomaali, waaxda luqadaha, qaybta kaalmada adeegyada, waxay idiin haylaexn kishore covarrubiasn . So [facility] [phone].    ATENCIÓN: Si habla español, tiene a ventura disposición servicios gratuitos de asistencia lingüística. Kalaniame al [phone].    We comply with applicable federal civil rights laws and [place] laws. We do not discriminate on the basis of race, color, national origin, age, disability, sex, sexual orientation, or gender identity.            Thank you!     Thank you for choosing [facility]  for your care. Our goal is always to provide you with excellent care. Hearing back from our patients is one way we can continue to improve our services. Please take a few minutes to complete the written survey that you may receive in the mail after your visit with us. Thank you!             Your Updated Medication List - Protect others around you: Learn how to safely use, store and throw away your medicines at www.disposemymeds.org.          This list is accurate as of: 1/5/18 11:19 AM.  Always use your most recent med list.                   Brand Name Dispense Instructions for use Diagnosis    ALPRAZolam 0.5 MG tablet    XANAX    10 tablet    Take 1 " tablet (0.5 mg) by mouth 3 times daily as needed for anxiety    CHIDI (generalized anxiety disorder), Panic attack       EPINEPHrine 0.3 MG/0.3ML injection 2-pack    EPIPEN/ADRENACLICK/or ANY BX GENERIC EQUIV    2 each    Inject 0.3 mLs (0.3 mg) into the muscle once as needed        FLUOXETINE HCL PO      Take 20 mg by mouth 3 times daily        VITAMIN D (CHOLECALCIFEROL) PO      Take 10,000 Units by mouth daily

## 2018-01-05 NOTE — TELEPHONE ENCOUNTER
Pending Prescriptions:                       Disp   Refills    FLUOXETINE HCL PO                                             Sig: Take 20 mg by mouth 3 times daily    This is in pt chart as historical.   Pt was here to discuss this with you on 12-  This is a my chart refill message, please send message to pt if she needs to see you.  Rosina  [phone] (home) NONE (work)

## 2018-01-05 NOTE — PATIENT INSTRUCTIONS
Alternate taking Tylenol 2 tablet with ibuprofen (2 tablets, with food) every 3 hours.  Continue to push fluid, warm fluids.   Also consider substitute Dayquil with Tylenol.  Contact me next week if not much better.

## 2018-01-05 NOTE — NURSING NOTE
"Mihir Waldron is here today for an URI Sore Throat, Cough, Chest Pressure, Fatigue, and Nausea x3 days.    Pre-visit Planning:    Immunizations -up to date  Asthma test --NA  PHQ9/GAD7 -is up to date    Vitals:    BP Cuff left  Arm with large Cuff  PULSE regular  201 lbs 6.4 oz and 5' 7.4\"  CLASSIFICATION OF OVERWEIGHT AND OBESITY BY BMI                        Obesity Class           BMI(kg/m2)  Underweight                                    < 18.5  Normal                                         18.5-24.9  Overweight                                     25.0-29.9  OBESITY                     I                  30.0-34.9                             II                 35.0-39.9  EXTREME OBESITY             III                >40      Patient's  BMI Body mass index is 31.17 kg/(m^2).  Http://hin.nhlbi.nih.gov/menuplanner/menu.cgi    My Chart: - accepts     Tobacco Use: Questioned patient about current smoking habits.  Pt. has never smoked.    The patient has verbalized that it is ok to leave a detailed voice message on the patient's cell phone with results/recommendations from this visit.     Verified Phone Number:    Telephone Information:   Mobile [phone]   Mobile [phone]       Grisel Lockhart [facility]    "

## 2018-01-05 NOTE — PROGRESS NOTES
"CC: Sore throat, cough    History:  Yesterday, Mihir developed a sore throat. Had pneumonia last February and felt like it was ilke this, but cough is not as deep. When she woke up this morning had laryngitis. Has been having coughing fits, and evened coughed so hard she threw up. Chest feels tight. Sore throat yesterday was 7/8 out of 10 with 10 being the worst, but got better. Has felt better over the course of the day. Has not been eating much due to nausea. Still getting fluids.     Tried hot tea, Dayquil yesterday.     PMH, MEDICATIONS, ALLERGIES, SOCIAL AND FAMILY HISTORY in [facility] and reviewed by me personally.      ROS negative other than the symptoms noted above in the HPI.        Examination   /80 (BP Location: Left arm, Patient Position: Chair, Cuff Size: Adult Large)  Pulse 97  Temp 98.5  F (36.9  C) (Oral)  Ht 1.712 m (5' 7.4\")  Wt 91.4 kg (201 lb 6.4 oz)  LMP 12/12/2017 (Within Days)  SpO2 98%  Breastfeeding? No  BMI 31.17 kg/m2       Constitutional: Sitting comfortably, in no acute distress. Vital signs noted  Eyes: pupils equal round reactive to light and accomodation, extra ocular movements intact  Ears: external canals and TMs free of abnormalities  Nose: patent, without mucosal abnormalities  Mouth and throat: without erythema or lesions of the mucosa  Neck:  no adenopathy, trachea midline and normal to palpation  Cardiovascular:  regular rate and rhythm, no murmurs, clicks, or gallops  Respiratory:  normal respiratory rate and rhythm, lungs clear to auscultation  SKIN: No jaundice/pallor/rash.   Psychiatric: mentation appears normal and affect normal/bright        A/P    ICD-10-CM    1. Viral URI with cough J06.9 RAPID STREP (BFP)    B97.89 THROAT CULTURE (BFP)   2. Laryngitis J04.0        DISCUSSION:   RST negative. Will culture to confirm. This is most consistent with viral URI with cough. Recommended:  Alternate taking Tylenol 2 tablet with ibuprofen (2 tablets, with food) every 3 " hours.  Continue to push fluid, warm fluids.   Also consider substitute Dayquil with Tylenol.  Contact me next week if not much better.     follow up visit: As needed    Lillian Zambrano PA-C  [facility]

## 2018-01-08 LAB — THROAT CULTURE: NORMAL

## 2018-01-08 RX ORDER — FLUOXETINE 10 MG/1
20 CAPSULE ORAL 3 TIMES DAILY
Qty: 30 CAPSULE | Status: CANCELLED | OUTPATIENT
Start: 2018-01-08

## 2018-01-08 NOTE — TELEPHONE ENCOUNTER
Can you clarify dose- I am ok filling as she likely has not found psych provider yet-20 mg TID seems odd

## 2018-01-08 NOTE — TELEPHONE ENCOUNTER
Mihir called back and she takes 60 mg tablets in the am.  She is not sure if they only come in 20 mg tablets so currently she takes 3 three,  20 mg tabs daily for a total of 60 mg.

## 2018-02-05 ENCOUNTER — OFFICE VISIT (OUTPATIENT)
Dept: FAMILY MEDICINE | Facility: CLINIC | Age: 28
End: 2018-02-05

## 2018-02-05 VITALS
RESPIRATION RATE: 16 BRPM | TEMPERATURE: 98.2 F | WEIGHT: 192 LBS | DIASTOLIC BLOOD PRESSURE: 70 MMHG | SYSTOLIC BLOOD PRESSURE: 124 MMHG | HEART RATE: 70 BPM | OXYGEN SATURATION: 98 % | HEIGHT: 68 IN | BODY MASS INDEX: 29.1 KG/M2

## 2018-02-05 DIAGNOSIS — S13.9XXD NECK SPRAIN, SUBSEQUENT ENCOUNTER: ICD-10-CM

## 2018-02-05 DIAGNOSIS — G43.009 MIGRAINE WITHOUT AURA AND WITHOUT STATUS MIGRAINOSUS, NOT INTRACTABLE: Primary | ICD-10-CM

## 2018-02-05 PROCEDURE — 99214 OFFICE O/P EST MOD 30 MIN: CPT | Performed by: FAMILY MEDICINE

## 2018-02-05 RX ORDER — CYCLOBENZAPRINE HCL 5 MG
5 TABLET ORAL 3 TIMES DAILY PRN
Qty: 21 TABLET | Refills: 0 | Status: SHIPPED | OUTPATIENT
Start: 2018-02-05 | End: 2018-04-15

## 2018-02-05 RX ORDER — SUMATRIPTAN 25 MG/1
25-50 TABLET, FILM COATED ORAL
Qty: 9 TABLET | Refills: 1 | Status: SHIPPED | OUTPATIENT
Start: 2018-02-05 | End: 2018-04-15

## 2018-02-05 NOTE — PROGRESS NOTES
" S:    Mihir Waldron is a 27 year old year old female who presents with a 10+ year history of headaches occurring in the occipital area. Those responded ibuprofen. Other headaches were more severe with light sensitivity and responded to rest. This headache last night was immediate in onset of a entire head pain with scalp tenderness and feeling miserable. No response to aspirin and aleve and her last dose of aleve made her stomach upset with an emesis/ keeping down clear liquids now. The headaches are described as \"pain\", throbbing and pounding. They are associated with nausea, vomiting and photophobia.     ROS: 10 point ROS neg other than the symptoms noted above in the HPI.  No new medications or exposures    Long standing neck issues and she goes to a chiropractor for neck manipulation. Her depression and anxiety have been difficult to control especially post partum but she felt she was doing well with fluoxetine.    Current Outpatient Prescriptions   Medication     FLUoxetine (PROZAC) 20 MG capsule     VITAMIN D, CHOLECALCIFEROL, PO     EPINEPHrine (EPIPEN) 0.3 MG/0.3ML injection     ALPRAZolam (XANAX) 0.5 MG tablet     No current facility-administered medications for this visit.                    ROS:       CONSTITUTIONAL: no constitutional symptoms.       EYES:  no eye complaints      .       ENT: no ENT symptoms .       NEUROLOGICAL:  no neuro symptoms  .       PSYCHIATRIC: no psychiatric symptoms .       ENDOCRINE: no endocrine symptoms        .       ALLERGY/IMMUNOLOGIC- COMPLAINS of no allergy/immunologic symptoms.              O:       GENERAL:  Well developed, well nourished female sitting in a dark room. Miserable       HEENT: Normocephalic, atraumatic; Ears-TMs and external auditory        canals normal; Eyes- PERRL, EOMI,  fundi benign; Nose- clear;        Mouth- normal; Pharynx- clear       NECK: Tight with bilateral trapezius musculature spasms, knots and tenderness. Pain with flexion       " CHEST: Normal       LUNGS: CTA       HEART:  RRR, S1S2 without murmur.       ABDOMEN: Soft, nontender, normal bowel sounds, no masses, no        hepatosplenomegaly.       NEUROLOGICAL: Cranial nerves II-XII intact. Reflexes symmetrical.        No focal motor or sensory deficits. Cerebellar normal.       LYMPH: No lymphadenopathy.       SKIN:  No rashes.    CT scan: negative for bleed              Assessment: (G43.009) Migraine without aura and without status migrainosus, not intractable  (primary encounter diagnosis)  Comment: handout given/ options  Plan: SUMAtriptan (IMITREX) 25 MG tablet        Potential medication side effects were discussed with the patient; let me know if any occur.  Monitor response    (S13.9XXD) Neck sprain, subsequent encounter  Comment: anatomy reviewed  Plan: cyclobenzaprine (FLEXERIL) 5 MG tablet        Potential medication side effects were discussed with the patient; let me know if any occur.  Ice. Rehab stretches/ exercises to begin immediately and given in writing with illustrations.    Let's recheck in 24-48 hours/ Called 2/7/2018 at 9:33 am/ doing much better. Low grade headache remains. Neck stretches very helpful.

## 2018-02-05 NOTE — NURSING NOTE
Telephone call made to schedule Mihir Waldron for the following: CT Head w/o contrast    Date: 02/05/2018  Time: 1:30 pm  Place: [facility] [place]

## 2018-02-05 NOTE — MR AVS SNAPSHOT
After Visit Summary   2/5/2018    Mihir Waldron    MRN: [id number]           Patient Information     Date Of Birth          1990        Visit Information        Provider Department      2/5/2018 11:45 AM Cha James MD [facility]        Today's Diagnoses     Migraine without aura and without status migrainosus, not intractable    -  1    Neck sprain, subsequent encounter          Care Instructions    Migraine without aura and without status migrainosus, not intractable  (primary encounter diagnosis)  Comment: handout given/ options  Plan: SUMAtriptan (IMITREX) 25 MG tablet        Potential medication side effects were discussed with the patient; let me know if any occur.  Monitor response    (S13.9XXD) Neck sprain, subsequent encounter  Comment: anatomy reviewed  Plan: cyclobenzaprine (FLEXERIL) 5 MG tablet        Potential medication side effects were discussed with the patient; let me know if any occur.  Ice. Rehab stretches/ exercises to begin immediately and given in writing with illustrations.    Let's recheck in 24-48 hours.            Follow-ups after your visit        Follow-up notes from your care team     Return in about 2 days (around 2/7/2018), or if symptoms worsen or fail to improve.      Who to contact     If you have questions or need follow up information about today's clinic visit or your schedule please contact [facility] directly at [phone].  Normal or non-critical lab and imaging results will be communicated to you by MyChart, letter or phone within 4 business days after the clinic has received the results. If you do not hear from us within 7 days, please contact the clinic through MyChart or phone. If you have a critical or abnormal lab result, we will notify you by phone as soon as possible.  Submit refill requests through [employer] or call your pharmacy and they will forward the refill request to us. Please allow 3  "business days for your refill to be completed.          Additional Information About Your Visit        [employer] Information     [facility] gives you secure access to your electronic health record. If you see a primary care provider, you can also send messages to your care team and make appointments. If you have questions, please call your primary care clinic.  If you do not have a primary care provider, please call [phone] and they will assist you.        Care EveryWhere ID     This is your Care EveryWhere ID. This could be used by other organizations to access your [place] medical records  [id number]        Your Vitals Were     Pulse Temperature Respirations Height Last Period Pulse Oximetry    70 98.2  F (36.8  C) (Oral) 16 1.715 m (5' 7.5\") 02/04/2018 98%    Breastfeeding? BMI (Body Mass Index)                No 29.63 kg/m2           Blood Pressure from Last 3 Encounters:   02/05/18 124/70   01/05/18 122/80   12/13/17 124/78    Weight from Last 3 Encounters:   02/05/18 87.1 kg (192 lb)   01/05/18 91.4 kg (201 lb 6.4 oz)   12/13/17 91.7 kg (202 lb 3.2 oz)              Today, you had the following     No orders found for display         Today's Medication Changes          These changes are accurate as of 2/5/18  2:20 PM.  If you have any questions, ask your nurse or doctor.               Start taking these medicines.        Dose/Directions    cyclobenzaprine 5 MG tablet   Commonly known as:  FLEXERIL   Used for:  Neck sprain, subsequent encounter   Started by:  Cha James MD        Dose:  5 mg   Take 1 tablet (5 mg) by mouth 3 times daily as needed for muscle spasms   Quantity:  21 tablet   Refills:  0       SUMAtriptan 25 MG tablet   Commonly known as:  IMITREX   Used for:  Migraine without aura and without status migrainosus, not intractable   Started by:  Cha James MD        Dose:  25-50 mg   Take 1-2 tablets (25-50 mg) by mouth at onset of headache for migraine May repeat in 2 hours. Max 8 " tablets/24 hours.   Quantity:  9 tablet   Refills:  1            Where to get your medicines      These medications were sent to [facility] [address] [place] [address] [address] AT [place] [address]  [address] [facility] [address]    Hours:  24-hours Phone:  [phone]     cyclobenzaprine 5 MG tablet    SUMAtriptan 25 MG tablet                Primary Care Provider Office Phone # Fax #    Fuentes Green [phone] [address] [facility] [address]  [facility] [address]        Equal Access to Services     [facility]: Hadii aad ku hadasho Soomaali, waaxda luqadaha, qaybta kaalmada adeegyada, waxay idiin hayaan adeeg kharachar guillen . So [facility] [phone].    ATENCIÓN: Si habla español, tiene a ventura disposición servicios gratuitos de asistencia lingüística. [facility] [phone].    We comply with applicable federal civil rights laws and [place] laws. We do not discriminate on the basis of race, color, national origin, age, disability, sex, sexual orientation, or gender identity.            Thank you!     Thank you for choosing [facility]  for your care. Our goal is always to provide you with excellent care. Hearing back from our patients is one way we can continue to improve our services. Please take a few minutes to complete the written survey that you may receive in the mail after your visit with us. Thank you!             Your Updated Medication List - Protect others around you: Learn how to safely use, store and throw away your medicines at www.disposemymeds.org.          This list is accurate as of 2/5/18  2:20 PM.  Always use your most recent med list.                   Brand Name Dispense Instructions for use Diagnosis    ALPRAZolam 0.5 MG tablet    XANAX    10 tablet    Take 1 tablet (0.5 mg) by mouth 3 times daily as needed for anxiety    CHIDI (generalized anxiety disorder), Panic attack       cyclobenzaprine 5 MG  tablet    FLEXERIL    21 tablet    Take 1 tablet (5 mg) by mouth 3 times daily as needed for muscle spasms    Neck sprain, subsequent encounter       EPINEPHrine 0.3 MG/0.3ML injection 2-pack    EPIPEN/ADRENACLICK/or ANY BX GENERIC EQUIV    2 each    Inject 0.3 mLs (0.3 mg) into the muscle once as needed        FLUoxetine 20 MG capsule    PROzac    90 capsule    Take 3 capsules (60 mg) by mouth daily    CHIDI (generalized anxiety disorder)       SUMAtriptan 25 MG tablet    IMITREX    9 tablet    Take 1-2 tablets (25-50 mg) by mouth at onset of headache for migraine May repeat in 2 hours. Max 8 tablets/24 hours.    Migraine without aura and without status migrainosus, not intractable       VITAMIN D (CHOLECALCIFEROL) PO      Take 10,000 Units by mouth daily

## 2018-02-05 NOTE — NURSING NOTE
"Mihir is here for STORY, dizziness, neck pain radiating to her left arm \"worse HA ever\" vomited this morning as well    Pre-visit Screening:  Immunizations:  up to date  Colonoscopy:  NA  Mammogram: NA  Asthma Action Test/Plan:  NA  PHQ9:  NA  GAD7:  NA  Questioned patient about current smoking habits Pt. has never smoked.  Ok to leave detailed message on voice mail for today's visit only Yes, phone # [phone] cell      "

## 2018-02-07 ENCOUNTER — OFFICE VISIT (OUTPATIENT)
Dept: FAMILY MEDICINE | Facility: CLINIC | Age: 28
End: 2018-02-07

## 2018-02-07 ENCOUNTER — TELEPHONE (OUTPATIENT)
Dept: FAMILY MEDICINE | Facility: CLINIC | Age: 28
End: 2018-02-07

## 2018-02-07 VITALS
WEIGHT: 189.2 LBS | HEART RATE: 88 BPM | DIASTOLIC BLOOD PRESSURE: 70 MMHG | HEIGHT: 68 IN | SYSTOLIC BLOOD PRESSURE: 111 MMHG | OXYGEN SATURATION: 98 % | BODY MASS INDEX: 28.67 KG/M2 | TEMPERATURE: 98.4 F

## 2018-02-07 DIAGNOSIS — R11.2 NON-INTRACTABLE VOMITING WITH NAUSEA, UNSPECIFIED VOMITING TYPE: ICD-10-CM

## 2018-02-07 DIAGNOSIS — G43.119 INTRACTABLE MIGRAINE WITH AURA WITHOUT STATUS MIGRAINOSUS: Primary | ICD-10-CM

## 2018-02-07 PROCEDURE — 99213 OFFICE O/P EST LOW 20 MIN: CPT | Performed by: PHYSICIAN ASSISTANT

## 2018-02-07 RX ORDER — ONDANSETRON 4 MG/1
4 TABLET, FILM COATED ORAL EVERY 8 HOURS PRN
Qty: 18 TABLET | Refills: 0 | Status: SHIPPED | OUTPATIENT
Start: 2018-02-07 | End: 2018-04-15

## 2018-02-07 RX ORDER — KETOROLAC TROMETHAMINE 30 MG/ML
30 INJECTION, SOLUTION INTRAMUSCULAR; INTRAVENOUS ONCE
Qty: 1 ML | Refills: 0 | OUTPATIENT
Start: 2018-02-07 | End: 2018-02-07

## 2018-02-07 RX ORDER — RIZATRIPTAN BENZOATE 5 MG/1
5-10 TABLET ORAL
Qty: 18 TABLET | Refills: 1 | Status: SHIPPED | OUTPATIENT
Start: 2018-02-07 | End: 2018-04-15

## 2018-02-07 RX ORDER — ONDANSETRON 4 MG/1
4-8 TABLET, ORALLY DISINTEGRATING ORAL ONCE
Qty: 1 TABLET | Refills: 0 | Status: CANCELLED
Start: 2018-02-07 | End: 2018-02-07

## 2018-02-07 NOTE — NURSING NOTE
The following medication was given:     MEDICATION: Ketorolac Tromethamine 60MG/2ML (30 mg/mL) (Toradol)  ROUTE: IM  SITE: RUQ - Gluteus  DOSE: 1mL/30mg  LOT #: [id number]  :  IdaniaWray  EXPIRATION DATE:  05/01/19  NDC#: 2562-4115-40    Monitored patient for longer than 30 minutes.    The following medication was given:     MEDICATION: Ondansetron Orally Disintegrating Tablets 4mg  ROUTE: PO  SITE: Medication was given orally   DOSE: 4mg  LOT #: [id number]  :  SandPili Pop  EXPIRATION DATE:  04/2018  NDC#: 3796-2571-55     Administered by: Grisel Lockhart CMA

## 2018-02-07 NOTE — PROGRESS NOTES
"CC: Recurrent Migraine    History:  Started migraine 72 hours ago. Has not had a migraine before, but did have 2 years of chronic headache for which she was seen in pain clinic and at [facility]. Took 2 ASA right when it started. Woke up the following morning and took 2 Aleve. Had appt with Dr. James 2/5/2018. Took Imitrex 2 tablets (25 mg, separately) that night. Was able to sleep some. Migraine had resolved by following morning, but still had persistent dull headache. Following day took 2 ASA in AM, then 1 ibuprofen at night. Took 1 imitrex today at 11 AM, but no other medications.     Had normal CT scan 2/5/2018 to rule out hemorrhage, infarction, masses.     Continues to have sharp temporal pain, to the point where she is nauseous, and is having trouble concentrating. Vision seems strained.     Has been on fluoxetine 60 mg daily for the past 2 years without change. Has not scheduled therapy yet, but has not had panic attack, and has not had to Xanax.     Has benefited from neck exercises, greatly improving neck tension and has not taken cyclobenzaprine.     PMH, MEDICATIONS, ALLERGIES, SOCIAL AND FAMILY HISTORY in [facility] and reviewed by me personally.      ROS negative other than the symptoms noted above in the HPI.        Examination   /70 (BP Location: Right arm, Patient Position: Chair, Cuff Size: Adult Large)  Pulse 88  Temp 98.4  F (36.9  C) (Oral)  Ht 1.715 m (5' 7.5\")  Wt 85.8 kg (189 lb 3.2 oz)  LMP 02/04/2018 (Exact Date)  SpO2 98%  Breastfeeding? No  BMI 29.2 kg/m2       Constitutional: Laying down in dark, mildly uncomfortable, but no obvious distress. Vital signs noted  Eyes: pupils equal round reactive to light and accomodation, extra ocular movements intact  Cardiovascular:  regular rate and rhythm, no murmurs, clicks, or gallops  Respiratory:  normal respiratory rate and rhythm, lungs clear to auscultation  SKIN: No jaundice/pallor/rash.   Psychiatric: mentation appears normal and " affect normal/bright. A&O x 3.         A/P    ICD-10-CM    1. Intractable migraine with aura without status migrainosus G43.119 ketorolac (TORADOL) 30 MG/ML injection     rizatriptan (MAXALT) 5 MG tablet     ondansetron (ZOFRAN) 4 MG tablet   2. Non-intractable vomiting with nausea, unspecified vomiting type R11.2        DISCUSSION: This is still consistent with migraine that has not been fully aborted. Given stable symptoms, with partial resolution inbetween, and normal CT scan, recommended:  Toradol 30 mg injection given. After 25 minutes no significant improvement in headache.  Gave SL ondansetron with relief of nausea.  Take 2nd Imitrex to have 50 mg first dose, and then in 1.5-2 hours take 2nd 50 mg dose.  If still not getting relief, tomorrow would like to try Maxalt (rizatriptan) 5 mg tablet and then again in 2 hours. If this isn't effective, for next round, okay to increase to 2 mg tablets.   Perscribed ondansetron tablets as needed for nausea.   Contact me tomorrow with update, or sooner if worsening.     follow up visit: As needed    Lillian Zambrano PA-C  [facility]

## 2018-02-07 NOTE — MR AVS SNAPSHOT
After Visit Summary   2/7/2018    Mihir Waldron    MRN: [id number]           Patient Information     Date Of Birth          1990        Visit Information        Provider Department      2/7/2018 11:30 AM Lillian Zambrano PA-C [facility]        Today's Diagnoses     Intractable migraine with aura without status migrainosus    -  1    Nausea and vomiting, intractability of vomiting not specified, unspecified vomiting type          Care Instructions    Continue to let Toradol take effect.  Take 2nd Imitrex to have 50 mg first dose, and then in 1.5-2 hours take 2nd 50 mg dose.  If still not getting relief, tomorrow would like to try Maxalt (rizatriptan) 5 mg tablet and then again 2 hours. If this isn't effective, for next round, okay to increase to 2 mg tablets.   Okay to use ondansetron tablet (swallow not disintegrating) as needed for nausea.   Contact me tomorrow with update.             Follow-ups after your visit        Who to contact     If you have questions or need follow up information about today's clinic visit or your schedule please contact [facility] directly at [phone].  Normal or non-critical lab and imaging results will be communicated to you by [employer], letter or phone within 4 business days after the clinic has received the results. If you do not hear from us within 7 days, please contact the clinic through [employer] or phone. If you have a critical or abnormal lab result, we will notify you by phone as soon as possible.  Submit refill requests through [employer] or call your pharmacy and they will forward the refill request to us. Please allow 3 business days for your refill to be completed.          Additional Information About Your Visit        [employer] Information     [employer] gives you secure access to your electronic health record. If you see a primary care provider, you can also send messages to your care team and make  "appointments. If you have questions, please call your primary care clinic.  If you do not have a primary care provider, please call [phone] and they will assist you.        Care EveryWhere ID     This is your Care EveryWhere ID. This could be used by other organizations to access your [place] medical records  [id number]        Your Vitals Were     Pulse Temperature Height Last Period Pulse Oximetry Breastfeeding?    88 98.4  F (36.9  C) (Oral) 1.715 m (5' 7.5\") 02/04/2018 (Exact Date) 98% No    BMI (Body Mass Index)                   29.2 kg/m2            Blood Pressure from Last 3 Encounters:   02/07/18 111/70   02/05/18 124/70   01/05/18 122/80    Weight from Last 3 Encounters:   02/07/18 85.8 kg (189 lb 3.2 oz)   02/05/18 87.1 kg (192 lb)   01/05/18 91.4 kg (201 lb 6.4 oz)              Today, you had the following     No orders found for display         Today's Medication Changes          These changes are accurate as of 2/7/18  1:01 PM.  If you have any questions, ask your nurse or doctor.               Start taking these medicines.        Dose/Directions    ketorolac 30 MG/ML injection   Commonly known as:  TORADOL   Used for:  Intractable migraine with aura without status migrainosus   Started by:  Lillian Zambrano PA-C        Dose:  30 mg   Inject 1 mL (30 mg) into the muscle once for 1 dose   Quantity:  1 mL   Refills:  0       ondansetron 4 MG tablet   Commonly known as:  ZOFRAN   Used for:  Intractable migraine with aura without status migrainosus   Started by:  Lillian Zambrano PA-C        Dose:  4 mg   Take 1 tablet (4 mg) by mouth every 8 hours as needed for nausea   Quantity:  18 tablet   Refills:  0       rizatriptan 5 MG tablet   Commonly known as:  MAXALT   Used for:  Intractable migraine with aura without status migrainosus   Started by:  Lillian Zambrano PA-C        Dose:  5-10 mg   Take 1-2 tablets (5-10 mg) by mouth at onset of headache for migraine May repeat in 2 " hours. Max 6 tablets/24 hours.   Quantity:  18 tablet   Refills:  1            Where to get your medicines      These medications were sent to [facility] [address] [facility] [address] AT [place] [address]  [address] [facility] [address]    Hours:  24-hours Phone:  [phone]     ondansetron 4 MG tablet    rizatriptan 5 MG tablet         Some of these will need a paper prescription and others can be bought over the counter.  Ask your nurse if you have questions.     You don't need a prescription for these medications     ketorolac 30 MG/ML injection                Primary Care Provider Office Phone # Fax #    Fuentes Green -[phone] [address] [facility] [address]  [facility] [address]        Equal Access to Services     [employer] [facility] : Hadii alexia do hadasho Soomaali, waaxda luqadaha, qaybta kaalmada adeashleighyamadelin, tatiana guillen . So [facility] [phone].    ATENCIÓN: Si habla español, tiene a ventura disposición servicios gratuitos de asistencia lingüística. Hiram al [phone].    We comply with applicable federal civil rights laws and [place] laws. We do not discriminate on the basis of race, color, national origin, age, disability, sex, sexual orientation, or gender identity.            Thank you!     Thank you for choosing [facility]  for your care. Our goal is always to provide you with excellent care. Hearing back from our patients is one way we can continue to improve our services. Please take a few minutes to complete the written survey that you may receive in the mail after your visit with us. Thank you!             Your Updated Medication List - Protect others around you: Learn how to safely use, store and throw away your medicines at www.disposemymeds.org.          This list is accurate as of 2/7/18  1:01 PM.  Always use your most recent med list.                   Brand Name Dispense  Instructions for use Diagnosis    ALPRAZolam 0.5 MG tablet    XANAX    10 tablet    Take 1 tablet (0.5 mg) by mouth 3 times daily as needed for anxiety    CHIDI (generalized anxiety disorder), Panic attack       cyclobenzaprine 5 MG tablet    FLEXERIL    21 tablet    Take 1 tablet (5 mg) by mouth 3 times daily as needed for muscle spasms    Neck sprain, subsequent encounter       EPINEPHrine 0.3 MG/0.3ML injection 2-pack    EPIPEN/ADRENACLICK/or ANY BX GENERIC EQUIV    2 each    Inject 0.3 mLs (0.3 mg) into the muscle once as needed        FLUoxetine 20 MG capsule    PROzac    90 capsule    Take 3 capsules (60 mg) by mouth daily    CHIDI (generalized anxiety disorder)       ketorolac 30 MG/ML injection    TORADOL    1 mL    Inject 1 mL (30 mg) into the muscle once for 1 dose    Intractable migraine with aura without status migrainosus       ondansetron 4 MG tablet    ZOFRAN    18 tablet    Take 1 tablet (4 mg) by mouth every 8 hours as needed for nausea    Intractable migraine with aura without status migrainosus       rizatriptan 5 MG tablet    MAXALT    18 tablet    Take 1-2 tablets (5-10 mg) by mouth at onset of headache for migraine May repeat in 2 hours. Max 6 tablets/24 hours.    Intractable migraine with aura without status migrainosus       SUMAtriptan 25 MG tablet    IMITREX    9 tablet    Take 1-2 tablets (25-50 mg) by mouth at onset of headache for migraine May repeat in 2 hours. Max 8 tablets/24 hours.    Migraine without aura and without status migrainosus, not intractable       VITAMIN D (CHOLECALCIFEROL) PO      Take 10,000 Units by mouth daily

## 2018-02-07 NOTE — TELEPHONE ENCOUNTER
"She was on the clinic support line at 10:29 and her voice sounded a bit \"out of it\" on the phone and she made no mention of a conversation with you earlier.  "

## 2018-02-07 NOTE — PATIENT INSTRUCTIONS
Continue to let Toradol take effect.  Take 2nd Imitrex to have 50 mg first dose, and then in 1.5-2 hours take 2nd 50 mg dose.  If still not getting relief, tomorrow would like to try Maxalt (rizatriptan) 5 mg tablet and then again 2 hours. If this isn't effective, for next round, okay to increase to 2 mg tablets.   Okay to use ondansetron tablet (swallow not disintegrating) as needed for nausea.   Contact me tomorrow with update.

## 2018-02-07 NOTE — NURSING NOTE
Mihir Waldron is here today for a migraine that started back up at 10:45AM. Patient took a dose of Imitrex at 11:00am. She feels very dizzy and confused, her entire head hurts, nausea/vomitting, and light sensitivity.    Pre-visit Screening:    Immunizations:  up to date  Mammogram: NA  Asthma Action Test/Plan:  NA  PHQ9:  Is due and will be completed by patient at a later visit  GAD7:  is up to date    Questioned patient about current smoking habits Pt. has never smoked.    Is it ok to leave a detailed message on cell phone's voice mail for today's visit only? Yes   Phone # [phone]      Grisel Lockhart [facility]

## 2018-02-07 NOTE — TELEPHONE ENCOUNTER
Mihir was in the other day for migraine.  She was given medication that stopped the migraines, and only had a HA for the last 4 days.  Today she woke up with a migraine again, vomiting, dizziness, blurred vision as well.  She is wondering what the next step would be for her to get relief of this-please advise    Patient's phone #[phone]

## 2018-02-07 NOTE — TELEPHONE ENCOUNTER
I talked to patient one hour ago (9;30 am)as a follow up from 2 days ago. Is this message after that time frame?

## 2018-02-09 NOTE — TELEPHONE ENCOUNTER
Glad to here migraine gone. Could have been Maxalt or end of cycle. Nothing to add at this time. Will hope this does not reoccur.

## 2018-02-09 NOTE — TELEPHONE ENCOUNTER
Mihir called clinic support today and LM that she didn't call to update yesterday because she still had her migraine but today she is doing better because she said her cycle ended and when that ended so did her migraine. She said if Lillian (the call today was a update for Lillian) needed to call back her number is [phone]

## 2018-03-05 ENCOUNTER — OFFICE VISIT (OUTPATIENT)
Dept: ORTHOPEDICS | Facility: CLINIC | Age: 28
End: 2018-03-05
Payer: COMMERCIAL

## 2018-03-05 ENCOUNTER — RADIANT APPOINTMENT (OUTPATIENT)
Dept: GENERAL RADIOLOGY | Facility: CLINIC | Age: 28
End: 2018-03-05
Attending: FAMILY MEDICINE
Payer: COMMERCIAL

## 2018-03-05 VITALS — WEIGHT: 185 LBS | BODY MASS INDEX: 28.04 KG/M2 | HEIGHT: 68 IN | RESPIRATION RATE: 14 BRPM

## 2018-03-05 DIAGNOSIS — M22.2X1 PATELLOFEMORAL PAIN SYNDROME OF BOTH KNEES: ICD-10-CM

## 2018-03-05 DIAGNOSIS — M25.561 ARTHRALGIA OF BOTH LOWER LEGS: Primary | ICD-10-CM

## 2018-03-05 DIAGNOSIS — M25.562 ARTHRALGIA OF BOTH LOWER LEGS: ICD-10-CM

## 2018-03-05 DIAGNOSIS — R29.898 WEAKNESS OF BOTH HIPS: ICD-10-CM

## 2018-03-05 DIAGNOSIS — M25.561 ARTHRALGIA OF BOTH LOWER LEGS: ICD-10-CM

## 2018-03-05 DIAGNOSIS — M25.551 PAIN IN JOINT INVOLVING RIGHT PELVIC REGION AND THIGH: ICD-10-CM

## 2018-03-05 DIAGNOSIS — M22.2X2 PATELLOFEMORAL PAIN SYNDROME OF BOTH KNEES: ICD-10-CM

## 2018-03-05 DIAGNOSIS — M25.562 ARTHRALGIA OF BOTH LOWER LEGS: Primary | ICD-10-CM

## 2018-03-05 PROCEDURE — 73562 X-RAY EXAM OF KNEE 3: CPT | Mod: RT | Performed by: FAMILY MEDICINE

## 2018-03-05 PROCEDURE — 99203 OFFICE O/P NEW LOW 30 MIN: CPT | Performed by: FAMILY MEDICINE

## 2018-03-05 NOTE — MR AVS SNAPSHOT
After Visit Summary   3/5/2018    Mihir Waldron    MRN: [id number]           Patient Information     Date Of Birth          1990        Visit Information        Provider Department      3/5/2018 9:40 AM Adarsh Go MD [facility]        Today's Diagnoses     Arthralgia of both lower legs    -  1    Pain in joint involving right pelvic region and thigh        Weakness of both hips        Patellofemoral pain syndrome of both knees           Follow-ups after your visit        Additional Services     [facility] PT, HAND, AND CHIROPRACTIC REFERRAL       **This order will print in the [facility] Scheduling Office**    Physical Therapy, Hand Therapy and Chiropractic Care are available through:    *[facility]  *[facility]  *[facility]    Call one number to schedule at any of the above locations: [phone].    Your provider has referred you to: Physical Therapy at [facility] or [facility] or [place]    Indication/Reason for Referral:      Weakness of both hips  Patellofemoral pain syndrome of both knees    Onset of Illness:   Therapy Orders: Evaluate and Treat  Special Programs: None  Special Request: None    Jolie Dave      Additional Comments for the Therapist or Chiropractor:     Please be aware that coverage of these services is subject to the terms and limitations of your health insurance plan.  Call member services at your health plan with any benefit or coverage questions.      Please bring the following to your appointment:    *Your personal calendar for scheduling future appointments  *Comfortable clothing                  Who to contact     If you have questions or need follow up information about today's clinic visit or your schedule please contact [facility] SP[facility] directly at [phone].  Normal or non-critical lab and imaging results will be communicated to you by MyChart, letter or phone  "within 4 business days after the clinic has received the results. If you do not hear from us within 7 days, please contact the clinic through [employer] or phone. If you have a critical or abnormal lab result, we will notify you by phone as soon as possible.  Submit refill requests through [employer] or call your pharmacy and they will forward the refill request to us. Please allow 3 business days for your refill to be completed.          Additional Information About Your Visit        [employer] Information     [employer] gives you secure access to your electronic health record. If you see a primary care provider, you can also send messages to your care team and make appointments. If you have questions, please call your primary care clinic.  If you do not have a primary care provider, please call [phone] and they will assist you.        Care EveryWhere ID     This is your Care EveryWhere ID. This could be used by other organizations to access your [place] medical records  [id number]        Your Vitals Were     Respirations Height Last Period BMI (Body Mass Index)          14 5' 8\" (1.727 m) 02/04/2018 (Exact Date) 28.13 kg/m2         Blood Pressure from Last 3 Encounters:   02/07/18 111/70   02/05/18 124/70   01/05/18 122/80    Weight from Last 3 Encounters:   03/05/18 185 lb (83.9 kg)   02/07/18 189 lb 3.2 oz (85.8 kg)   02/05/18 192 lb (87.1 kg)              We Performed the Following     [facility] PT, HAND, AND CHIR[facility]RACTIC REFERRAL        Primary Care Provider Office Phone # Fax #    Fuentes Green [phone] [address] [facility] [address]  [facility] [address]        Equal Access to Services     [facility]: Hadii aad ku hadasho Soomaali, waaxda luqadaha, qaybta kaalmada adeegyada, tatiana guillen . So [facility] [phone].    ATENCIÓN: Si habla español, tiene a ventura disposición servicios gratuitos de asistencia lingüística. Llame al [phone].    We comply with " applicable federal civil rights laws and [place] laws. We do not discriminate on the basis of race, color, national origin, age, disability, sex, sexual orientation, or gender identity.            Thank you!     Thank you for choosing [facility] MEDICINE  for your care. Our goal is always to provide you with excellent care. Hearing back from our patients is one way we can continue to improve our services. Please take a few minutes to complete the written survey that you may receive in the mail after your visit with us. Thank you!             Your Updated Medication List - Protect others around you: Learn how to safely use, store and throw away your medicines at www.disposemymeds.org.          This list is accurate as of 3/5/18 10:33 AM.  Always use your most recent med list.                   Brand Name Dispense Instructions for use Diagnosis    ALPRAZolam 0.5 MG tablet    XANAX    10 tablet    Take 1 tablet (0.5 mg) by mouth 3 times daily as needed for anxiety    CHIDI (generalized anxiety disorder), Panic attack       cyclobenzaprine 5 MG tablet    FLEXERIL    21 tablet    Take 1 tablet (5 mg) by mouth 3 times daily as needed for muscle spasms    Neck sprain, subsequent encounter       EPINEPHrine 0.3 MG/0.3ML injection 2-pack    EPIPEN/ADRENACLICK/or ANY BX GENERIC EQUIV    2 each    Inject 0.3 mLs (0.3 mg) into the muscle once as needed        FLUoxetine 20 MG capsule    PROzac    90 capsule    Take 3 capsules (60 mg) by mouth daily    CHIDI (generalized anxiety disorder)       ondansetron 4 MG tablet    ZOFRAN    18 tablet    Take 1 tablet (4 mg) by mouth every 8 hours as needed for nausea    Intractable migraine with aura without status migrainosus       rizatriptan 5 MG tablet    MAXALT    18 tablet    Take 1-2 tablets (5-10 mg) by mouth at onset of headache for migraine May repeat in 2 hours. Max 6 tablets/24 hours.    Intractable migraine with aura without status migrainosus       SUMAtriptan 25  MG tablet    IMITREX    9 tablet    Take 1-2 tablets (25-50 mg) by mouth at onset of headache for migraine May repeat in 2 hours. Max 8 tablets/24 hours.    Migraine without aura and without status migrainosus, not intractable       VITAMIN D (CHOLECALCIFEROL) PO      Take 10,000 Units by mouth daily

## 2018-03-05 NOTE — LETTER
3/5/2018         RE: Mihir Waldron  [address] [facility] [address]        Dear Colleague,    Thank you for referring your patient, Mihir Waldron, to the [facility]. Please see a copy of my visit note below.    [facility]   Clinic Visit s Mar 5, 2018    PCP: Fuentes Green      Mihir is a 27 year old female who is seen as self referral for   Chief Complaint   Patient presents with     Musculoskeletal Problem     bilateral knee pain       Injury: Patient reports insidious onset without acute precipitating event.     right-hand dominant    Location of Pain: bilateral - some anterior but mostly posterior  Duration of Pain: 10+ years   Rating of Pain at worst: 8/10  Rating of Pain Currently: 2/10  Pain is better with: Aleve  Pain is worse with: taking care of kids, prolonged weight bearing, weather changes  Treatment so far consists of: ice, Aleve,   Associated symptoms: stiffness/sore, no distal numbness or tingling; denies swelling or warmth  Recent imaging completed: No recent imaging completed.  Prior History of related problems: hx of chronic joint pain since child (hips and knees)    Social History: home with kids     Past Medical History:   Diagnosis Date     Anxiety      Depressive disorder      Postpartum depression      POTS (postural orthostatic tachycardia syndrome)        Patient Active Problem List    Diagnosis Date Noted     Post partum depression 12/13/2017     Priority: Medium     Resolved with prozac       CHIDI (generalized anxiety disorder) 12/13/2017     Priority: Medium     Sinus tachycardia 06/14/2016     Priority: Medium     Postural orthostatic tachycardia syndrome 06/14/2016     Priority: Medium     Health Care Home 12/13/2017     Priority: Low     ACP (advance care planning) 12/13/2017     Priority: Low     Advance Care Planning 12/13/2017: ACP Review of Chart / Resources Provided:  Reviewed chart for advance  "care plan.  Mihir Waldron has no plan or code status on file. Discussed available resources and provided with information.   Added by Cris Viera               Family History   Problem Relation Age of Onset     Coronary Artery Disease Mother      Depression Mother      Anxiety Disorder Mother      Hypertension Father      Anxiety Disorder Father      Depression Father      Depression Sister      Anxiety Disorder Sister      DIABETES Maternal Grandmother      DIABETES Paternal Grandmother      CEREBROVASCULAR DISEASE No family hx of      Breast Cancer No family hx of      Colon Cancer No family hx of        Social History     Social History     Marital status:      Spouse name: Tee     Number of children: 2     Years of education: N/A     Social History Main Topics     Smoking status: Never Smoker     Smokeless tobacco: Never Used     Alcohol use No     Drug use: No     Sexual activity: Not Currently     Partners: Male     Birth control/ protection: Surgical     Other Topics Concern     Caffeine Concern No     Special Diet No     Exercise No     Seat Belt Yes     Parent/Sibling W/ Cabg, Mi Or Angioplasty Before 65f 55m? No     Social History Narrative    ** Merged History Encounter **            Past Surgical History:   Procedure Laterality Date     DENTAL SURGERY Bilateral              Review of Systems   Musculoskeletal: Positive for joint pain.   All other systems reviewed and are negative.        Physical Exam   Resp 14  Ht 5' 8\" (1.727 m)  Wt 185 lb (83.9 kg)  LMP 02/04/2018 (Exact Date)  BMI 28.13 kg/m2  Constitutional:well-developed, well-nourished, and in no distress.   Cardiovascular: Intact distal pulses.    Neurological: alert. Gait Normal:   Gait, station, stance, and balance appear normal for age  Skin: Skin is warm and dry.   Psychiatric: Mood and affect normal.   Respiratory: unlabored, speaks in full sentences  Lymph: no LAD, no lymphangitis          Left Knee Exam   Swelling: " None  Effusion: No    Tenderness   The patient is experiencing tenderness in the lateral retinaculum, and lateral patella facet.    Range of Motion   Extension: Normal  Flexion:     Normal    Tests   McMurrays:  Medial - Negative      Lateral - Negative  Lachman:  Anterior - Negative    Posterior - Negative  Drawer:       Anterior - Negative     Posterior - Negative  Varus:  Negative  Valgus: Negative  Pivot Shift: Negative  Patellar Apprehension: No    Right Knee Exam   Swelling: None  Effusion: No    Tenderness   The patient is experiencing tenderness in the lateral retinaculum, and lateral patella facet.    Range of Motion   Extension: Normal  Flexion:     Normal    Tests   McMurrays:  Medial - Negative      Lateral - Negative  Lachman:  Anterior - Negative    Posterior - Negative  Drawer:       Anterior - Negative    Posterior - Negative  Varus:  Negative  Valgus: Negative  Pivot Shift: Negative  Patellar Apprehension: No    Left Hip Exam     Tenderness   None    Range of Motion   Normal left hip ROM    Muscle Strength   Abduction:  4/5  Adduction:  5/5  Flexion:      5/5    Right Hip Exam     Tenderness   The patient is experiencing tenderness in the anterior.    Range of Motion   Normal right hip ROM    Muscle Strength   Abduction:  4/5  Adduction:  5/5  Flexion:      5/5          X-ray images Ordered and independently reviewed by me in the office today with the patient. X-ray shows:   Recent Results (from the past 48 hour(s))   XR Knee Bilateral 3 Views    Narrative    3/5/2018    No fracture, dislocation and or lesion. Normal alignment.  Joint space   maintained no significant arthritis. No appreciable soft tissue   abnormality         Assessment:    ICD-10-CM    1. Arthralgia of both lower legs M25.561 XR Knee Bilateral 3 Views    M25.562    2. Pain in joint involving right pelvic region and thigh M25.551    3. Weakness of both hips R29.898 [facility] HAND, AND CHIROPRACTIC REFERRAL   4. Patellofemoral pain  syndrome of both knees M22.2X1 [facility], HAND, AND CHIROPRACTIC REFERRAL    M22.2X2        Explained nature of syndrome as a patella maltracking issue, now seemingly more related to poor hip mechanics affecting the biomechanical function of the entire leg, leaving the patella to move in an inappropriate or dysfunctional manner in the patellar groove. Physical therapy aimed at restoring correct patella tracking by restoring overall normal leg function recommended.  This includes avoidance of quad exercises especially leg extensions and emphasis on hip abductor and extensor strengthening. Noted mechanism for hip instability causing altered patellofemoral mechanics.  Recheck 4-6 weeks if not better.          Again, thank you for allowing me to participate in the care of your patient.        Sincerely,        Adarsh Go MD

## 2018-03-05 NOTE — PROGRESS NOTES
[facility]   Clinic Visit s Mar 5, 2018    PCP: Fuentes Green      Mihir is a 27 year old female who is seen as self referral for   Chief Complaint   Patient presents with     Musculoskeletal Problem     bilateral knee pain       Injury: Patient reports insidious onset without acute precipitating event.     right-hand dominant    Location of Pain: bilateral - some anterior but mostly posterior  Duration of Pain: 10+ years   Rating of Pain at worst: 8/10  Rating of Pain Currently: 2/10  Pain is better with: Aleve  Pain is worse with: taking care of kids, prolonged weight bearing, weather changes  Treatment so far consists of: ice, Aleve,   Associated symptoms: stiffness/sore, no distal numbness or tingling; denies swelling or warmth  Recent imaging completed: No recent imaging completed.  Prior History of related problems: hx of chronic joint pain since child (hips and knees)    Social History: home with kids     Past Medical History:   Diagnosis Date     Anxiety      Depressive disorder      Postpartum depression      POTS (postural orthostatic tachycardia syndrome)        Patient Active Problem List    Diagnosis Date Noted     Post partum depression 12/13/2017     Priority: Medium     Resolved with prozac       CHIDI (generalized anxiety disorder) 12/13/2017     Priority: Medium     Sinus tachycardia 06/14/2016     Priority: Medium     Postural orthostatic tachycardia syndrome 06/14/2016     Priority: Medium     Health Care Home 12/13/2017     Priority: Low     ACP (advance care planning) 12/13/2017     Priority: Low     Advance Care Planning 12/13/2017: ACP Review of Chart / Resources Provided:  Reviewed chart for advance care plan.  Mihir MILLER Vanita has no plan or code status on file. Discussed available resources and provided with information.   Added by Cris Viera               Family History   Problem Relation Age of Onset     Coronary Artery Disease Mother       "Depression Mother      Anxiety Disorder Mother      Hypertension Father      Anxiety Disorder Father      Depression Father      Depression Sister      Anxiety Disorder Sister      DIABETES Maternal Grandmother      DIABETES Paternal Grandmother      CEREBROVASCULAR DISEASE No family hx of      Breast Cancer No family hx of      Colon Cancer No family hx of        Social History     Social History     Marital status:      Spouse name: Tee     Number of children: 2     Years of education: N/A     Social History Main Topics     Smoking status: Never Smoker     Smokeless tobacco: Never Used     Alcohol use No     Drug use: No     Sexual activity: Not Currently     Partners: Male     Birth control/ protection: Surgical     Other Topics Concern     Caffeine Concern No     Special Diet No     Exercise No     Seat Belt Yes     Parent/Sibling W/ Cabg, Mi Or Angioplasty Before 65f 55m? No     Social History Narrative    ** Merged History Encounter **            Past Surgical History:   Procedure Laterality Date     DENTAL SURGERY Bilateral              Review of Systems   Musculoskeletal: Positive for joint pain.   All other systems reviewed and are negative.        Physical Exam   Resp 14  Ht 5' 8\" (1.727 m)  Wt 185 lb (83.9 kg)  LMP 02/04/2018 (Exact Date)  BMI 28.13 kg/m2  Constitutional:well-developed, well-nourished, and in no distress.   Cardiovascular: Intact distal pulses.    Neurological: alert. Gait Normal:   Gait, station, stance, and balance appear normal for age  Skin: Skin is warm and dry.   Psychiatric: Mood and affect normal.   Respiratory: unlabored, speaks in full sentences  Lymph: no LAD, no lymphangitis          Left Knee Exam   Swelling: None  Effusion: No    Tenderness   The patient is experiencing tenderness in the lateral retinaculum, and lateral patella facet.    Range of Motion   Extension: Normal  Flexion:     Normal    Tests   McMurrays:  Medial - Negative      Lateral - " Negative  Lachman:  Anterior - Negative    Posterior - Negative  Drawer:       Anterior - Negative     Posterior - Negative  Varus:  Negative  Valgus: Negative  Pivot Shift: Negative  Patellar Apprehension: No    Right Knee Exam   Swelling: None  Effusion: No    Tenderness   The patient is experiencing tenderness in the lateral retinaculum, and lateral patella facet.    Range of Motion   Extension: Normal  Flexion:     Normal    Tests   McMurrays:  Medial - Negative      Lateral - Negative  Lachman:  Anterior - Negative    Posterior - Negative  Drawer:       Anterior - Negative    Posterior - Negative  Varus:  Negative  Valgus: Negative  Pivot Shift: Negative  Patellar Apprehension: No    Left Hip Exam     Tenderness   None    Range of Motion   Normal left hip ROM    Muscle Strength   Abduction:  4/5  Adduction:  5/5  Flexion:      5/5    Right Hip Exam     Tenderness   The patient is experiencing tenderness in the anterior.    Range of Motion   Normal right hip ROM    Muscle Strength   Abduction:  4/5  Adduction:  5/5  Flexion:      5/5          X-ray images Ordered and independently reviewed by me in the office today with the patient. X-ray shows:   Recent Results (from the past 48 hour(s))   XR Knee Bilateral 3 Views    Narrative    3/5/2018    No fracture, dislocation and or lesion. Normal alignment.  Joint space   maintained no significant arthritis. No appreciable soft tissue   abnormality         Assessment:    ICD-10-CM    1. Arthralgia of both lower legs M25.561 XR Knee Bilateral 3 Views    M25.562    2. Pain in joint involving right pelvic region and thigh M25.551    3. Weakness of both hips R29.898 [facility] PT, HAND, AND CHIROPRACTIC REFERRAL   4. Patellofemoral pain syndrome of both knees M22.2X1 [facility] PT, HAND, AND CHIROPRACTIC REFERRAL    M22.2X2        Explained nature of syndrome as a patella maltracking issue, now seemingly more related to poor hip mechanics affecting the biomechanical function of the  entire leg, leaving the patella to move in an inappropriate or dysfunctional manner in the patellar groove. Physical therapy aimed at restoring correct patella tracking by restoring overall normal leg function recommended.  This includes avoidance of quad exercises especially leg extensions and emphasis on hip abductor and extensor strengthening. Noted mechanism for hip instability causing altered patellofemoral mechanics.  Recheck 4-6 weeks if not better.

## 2018-04-15 ENCOUNTER — HOSPITAL ENCOUNTER (EMERGENCY)
Facility: CLINIC | Age: 28
Discharge: HOME | End: 2018-04-15
Attending: EMERGENCY MEDICINE | Admitting: EMERGENCY MEDICINE
Payer: COMMERCIAL

## 2018-04-15 VITALS
SYSTOLIC BLOOD PRESSURE: 122 MMHG | DIASTOLIC BLOOD PRESSURE: 66 MMHG | TEMPERATURE: 97.9 F | OXYGEN SATURATION: 97 % | BODY MASS INDEX: 27.28 KG/M2 | WEIGHT: 180 LBS | RESPIRATION RATE: 18 BRPM | HEIGHT: 68 IN

## 2018-04-15 DIAGNOSIS — R20.2 PARESTHESIA: ICD-10-CM

## 2018-04-15 DIAGNOSIS — R55 NEAR SYNCOPE: ICD-10-CM

## 2018-04-15 LAB
ALBUMIN UR-MCNC: NEGATIVE MG/DL
ANION GAP SERPL CALCULATED.3IONS-SCNC: 8 MMOL/L (ref 3–14)
APPEARANCE UR: CLEAR
BASOPHILS # BLD AUTO: 0 10E9/L (ref 0–0.2)
BASOPHILS NFR BLD AUTO: 0.3 %
BILIRUB UR QL STRIP: NEGATIVE
BUN SERPL-MCNC: 10 MG/DL (ref 7–30)
CALCIUM SERPL-MCNC: 8.7 MG/DL (ref 8.5–10.1)
CHLORIDE SERPL-SCNC: 109 MMOL/L (ref 94–109)
CO2 SERPL-SCNC: 23 MMOL/L (ref 20–32)
COLOR UR AUTO: YELLOW
CREAT SERPL-MCNC: 0.52 MG/DL (ref 0.52–1.04)
D DIMER PPP FEU-MCNC: <0.3 UG/ML FEU (ref 0–0.5)
DIFFERENTIAL METHOD BLD: ABNORMAL
EOSINOPHIL # BLD AUTO: 0.1 10E9/L (ref 0–0.7)
EOSINOPHIL NFR BLD AUTO: 0.6 %
ERYTHROCYTE [DISTWIDTH] IN BLOOD BY AUTOMATED COUNT: 12.9 % (ref 10–15)
GFR SERPL CREATININE-BSD FRML MDRD: >90 ML/MIN/1.7M2
GLUCOSE SERPL-MCNC: 84 MG/DL (ref 70–99)
GLUCOSE UR STRIP-MCNC: NEGATIVE MG/DL
HCG SERPL QL: NEGATIVE
HCT VFR BLD AUTO: 45.6 % (ref 35–47)
HGB BLD-MCNC: 15.3 G/DL (ref 11.7–15.7)
HGB UR QL STRIP: NEGATIVE
IMM GRANULOCYTES # BLD: 0 10E9/L (ref 0–0.4)
IMM GRANULOCYTES NFR BLD: 0.3 %
INTERPRETATION ECG - MUSE: NORMAL
KETONES UR STRIP-MCNC: 5 MG/DL
LEUKOCYTE ESTERASE UR QL STRIP: NEGATIVE
LYMPHOCYTES # BLD AUTO: 2.2 10E9/L (ref 0.8–5.3)
LYMPHOCYTES NFR BLD AUTO: 18.6 %
MCH RBC QN AUTO: 29.2 PG (ref 26.5–33)
MCHC RBC AUTO-ENTMCNC: 33.6 G/DL (ref 31.5–36.5)
MCV RBC AUTO: 87 FL (ref 78–100)
MONOCYTES # BLD AUTO: 0.4 10E9/L (ref 0–1.3)
MONOCYTES NFR BLD AUTO: 3.6 %
MUCOUS THREADS #/AREA URNS LPF: PRESENT /LPF
NEUTROPHILS # BLD AUTO: 9 10E9/L (ref 1.6–8.3)
NEUTROPHILS NFR BLD AUTO: 76.6 %
NITRATE UR QL: NEGATIVE
NRBC # BLD AUTO: 0 10*3/UL
NRBC BLD AUTO-RTO: 0 /100
PH UR STRIP: 5 PH (ref 5–7)
PLATELET # BLD AUTO: 342 10E9/L (ref 150–450)
POTASSIUM SERPL-SCNC: 3.6 MMOL/L (ref 3.4–5.3)
RBC # BLD AUTO: 5.24 10E12/L (ref 3.8–5.2)
RBC #/AREA URNS AUTO: 1 /HPF (ref 0–2)
SODIUM SERPL-SCNC: 140 MMOL/L (ref 133–144)
SOURCE: ABNORMAL
SP GR UR STRIP: 1.02 (ref 1–1.03)
SQUAMOUS #/AREA URNS AUTO: 2 /HPF (ref 0–1)
TROPONIN I SERPL-MCNC: <0.015 UG/L (ref 0–0.04)
TSH SERPL DL<=0.005 MIU/L-ACNC: 1.13 MU/L (ref 0.4–4)
UROBILINOGEN UR STRIP-MCNC: 0 MG/DL (ref 0–2)
WBC # BLD AUTO: 11.7 10E9/L (ref 4–11)
WBC #/AREA URNS AUTO: 1 /HPF (ref 0–5)

## 2018-04-15 PROCEDURE — 96360 HYDRATION IV INFUSION INIT: CPT

## 2018-04-15 PROCEDURE — 84443 ASSAY THYROID STIM HORMONE: CPT | Performed by: EMERGENCY MEDICINE

## 2018-04-15 PROCEDURE — 93005 ELECTROCARDIOGRAM TRACING: CPT

## 2018-04-15 PROCEDURE — 25000128 H RX IP 250 OP 636: Performed by: EMERGENCY MEDICINE

## 2018-04-15 PROCEDURE — 84484 ASSAY OF TROPONIN QUANT: CPT | Performed by: EMERGENCY MEDICINE

## 2018-04-15 PROCEDURE — 81001 URINALYSIS AUTO W/SCOPE: CPT | Performed by: EMERGENCY MEDICINE

## 2018-04-15 PROCEDURE — 85025 COMPLETE CBC W/AUTO DIFF WBC: CPT | Performed by: EMERGENCY MEDICINE

## 2018-04-15 PROCEDURE — 96361 HYDRATE IV INFUSION ADD-ON: CPT

## 2018-04-15 PROCEDURE — 99284 EMERGENCY DEPT VISIT MOD MDM: CPT | Mod: 25

## 2018-04-15 PROCEDURE — 80048 BASIC METABOLIC PNL TOTAL CA: CPT | Performed by: EMERGENCY MEDICINE

## 2018-04-15 PROCEDURE — 36415 COLL VENOUS BLD VENIPUNCTURE: CPT | Performed by: EMERGENCY MEDICINE

## 2018-04-15 PROCEDURE — 84703 CHORIONIC GONADOTROPIN ASSAY: CPT | Performed by: EMERGENCY MEDICINE

## 2018-04-15 PROCEDURE — 85379 FIBRIN DEGRADATION QUANT: CPT | Performed by: EMERGENCY MEDICINE

## 2018-04-15 RX ORDER — LIDOCAINE 40 MG/G
CREAM TOPICAL
Status: DISCONTINUED | OUTPATIENT
Start: 2018-04-15 | End: 2018-04-15 | Stop reason: HOSPADM

## 2018-04-15 RX ORDER — SODIUM CHLORIDE 9 MG/ML
1000 INJECTION, SOLUTION INTRAVENOUS CONTINUOUS
Status: DISCONTINUED | OUTPATIENT
Start: 2018-04-15 | End: 2018-04-15 | Stop reason: HOSPADM

## 2018-04-15 RX ADMIN — SODIUM CHLORIDE 1000 ML: 9 INJECTION, SOLUTION INTRAVENOUS at 16:30

## 2018-04-15 ASSESSMENT — ENCOUNTER SYMPTOMS
APPETITE CHANGE: 0
NAUSEA: 1
FEVER: 0
FATIGUE: 1
WEAKNESS: 0
NUMBNESS: 1
VOMITING: 0
HEADACHES: 0

## 2018-04-15 NOTE — ED AVS SNAPSHOT
[facility] Emergency [facility]    [address]    [facility] [address]    Phone:  [phone]    Fax:  [phone]                                       Mihir Waldron   MRN: [id number]    Department:  [facility] Emergency [facility]   Date of Visit:  4/15/2018           Patient Information     Date Of Birth          1990        Your diagnoses for this visit were:     Near syncope     Paresthesia Left ulnar nerve distribution       You were seen by Belen Aguiar MD.      Follow-up Information     Follow up with Fuentes Green MD In 2 days.    Specialty:  Family Practice    Contact information:    [address] [facility] [address]  [facility] [address]  [phone]          Follow up with [facility] Emergency [facility].    Specialty:  EMERGENCY MEDICINE    Why:  If symptoms worsen or recur    Contact information:    [address]  [facility] [id number] [phone]        Discharge Instructions       Discharge Instructions  Syncope    Syncope (fainting) is a sudden, short loss of consciousness (passing out spell). People will usually fall to the ground when they faint or slump over if seated.  People may also shake when this happens, and it can sometimes be difficult to tell the difference between syncope and a seizure. At this time, your provider does not find a reason to suspect that your fainting spell is a sign of anything dangerous or life-threatening.  However, sometimes the signs of serious illness do not show up right away.     Generally, every Emergency Department visit should have a follow-up clinic visit with either a primary or a specialty clinic/provider. Please follow-up as instructed by your emergency provider today.    Return to the Emergency Department if:    You faint again.     You have any significant bleeding.    You have chest pain or a fast or irregular heartbeat.    You feel short of  breath.    You cough up any blood.    You have abdominal (belly) pain or unusual back pain.    You have ongoing vomiting (throwing up) or diarrhea (loose stools).    You have a black or tarry bowel movement, or blood in the stool or in your vomit.    You have a fever over 101 F.    You lose feeling or cannot move a part of your body or cannot talk normally.    You are confused, have a headache, cannot see well, or have a seizure.    DO NOT DRIVE. CALL 911 INSTEAD!    What can I do to help myself?    Follow any specific instructions that your provider discussed with you.    If you feel light-headed, make sure to sit down right away, even if you have to sit on the floor.    Follow up with your regular medical provider as discussed for further management. This may include lowering your blood pressure medications, insulin or other diabetic medications, checking your blood sugar more frequently, and drinking more fluids, taking medicines for vomiting or diarrhea or getting up slower.  If you were given a prescription for medicine here today, be sure to read all of the information (including the package insert) that comes with your prescription.  This will include important information about the medicine, its side effects, and any warnings that you need to know about.  The pharmacist who fills the prescription can provide more information and answer questions you may have about the medicine.  If you have questions or concerns that the pharmacist cannot address, please call or return to the Emergency Department.   Remember that you can always come back to the Emergency Department if you are not able to see your regular provider in the amount of time listed above, if you get any new symptoms, or if there is anything that worries you.      Discharge References/Attachments     PARAESTHESIAS (ENGLISH)    CUBITAL TUNNEL SYNDROME, WHAT IS (ENGLISH)      24 Hour Appointment Hotline       To make an appointment at any [facility]  clinic, call [phone] ([phone]). If you don't have a family doctor or clinic, we will help you find one. [facility] clinics are conveniently located to serve the needs of you and your family.             Review of your medicines      Our records show that you are taking the medicines listed below. If these are incorrect, please call your family doctor or clinic.        Dose / Directions Last dose taken    EPINEPHrine 0.3 MG/0.3ML injection 2-pack   Commonly known as:  EPIPEN/ADRENACLICK/or ANY BX GENERIC EQUIV   Dose:  0.3 mg   Quantity:  2 each        Inject 0.3 mLs (0.3 mg) into the muscle once as needed   Refills:  3        FLUoxetine 20 MG capsule   Commonly known as:  PROzac   Dose:  60 mg   Quantity:  90 capsule        Take 3 capsules (60 mg) by mouth daily   Refills:  1        VITAMIN D (CHOLECALCIFEROL) PO   Dose:  95154 Units        Take 10,000 Units by mouth daily   Refills:  0                Procedures and tests performed during your visit     Basic metabolic panel    CBC with platelets differential    D dimer quantitative    EKG 12 lead    HCG qualitative    TSH    Troponin I    UA with Microscopic      Orders Needing Specimen Collection     None      Pending Results     No orders found from 4/13/2018 to 4/16/2018.            Pending Culture Results     No orders found from 4/13/2018 to 4/16/2018.            Pending Results Instructions     If you had any lab results that were not finalized at the time of your Discharge, you can call the ED Lab Result RN at [phone]. You will be contacted by this team for any positive Lab results or changes in treatment. The nurses are available 7 days a week from 10A to 6:30P.  You can leave a message 24 hours per day and they will return your call.        Test Results From Your Hospital Stay        4/15/2018  4:53 PM      Component Results     Component Value Ref Range & Units Status    WBC 11.7 (H) 4.0 - 11.0 10e9/L Final    RBC Count 5.24 (H) 3.8 - 5.2  10e12/L Final    Hemoglobin 15.3 11.7 - 15.7 g/dL Final    Hematocrit 45.6 35.0 - 47.0 % Final    MCV 87 78 - 100 fl Final    MCH 29.2 26.5 - 33.0 pg Final    MCHC 33.6 31.5 - 36.5 g/dL Final    RDW 12.9 10.0 - 15.0 % Final    Platelet Count 342 150 - 450 10e9/L Final    Diff Method Automated Method  Final    % Neutrophils 76.6 % Final    % Lymphocytes 18.6 % Final    % Monocytes 3.6 % Final    % Eosinophils 0.6 % Final    % Basophils 0.3 % Final    % Immature Granulocytes 0.3 % Final    Nucleated RBCs 0 0 /100 Final    Absolute Neutrophil 9.0 (H) 1.6 - 8.3 10e9/L Final    Absolute Lymphocytes 2.2 0.8 - 5.3 10e9/L Final    Absolute Monocytes 0.4 0.0 - 1.3 10e9/L Final    Absolute Eosinophils 0.1 0.0 - 0.7 10e9/L Final    Absolute Basophils 0.0 0.0 - 0.2 10e9/L Final    Abs Immature Granulocytes 0.0 0 - 0.4 10e9/L Final    Absolute Nucleated RBC 0.0  Final                           4/15/2018  6:59 PM      Component Results     Component Value Ref Range & Units Status    Color Urine Yellow  Final    Appearance Urine Clear  Final    Glucose Urine Negative NEG^Negative mg/dL Final    Bilirubin Urine Negative NEG^Negative Final    Ketones Urine 5 (A) NEG^Negative mg/dL Final    Specific Gravity Urine 1.024 1.003 - 1.035 Final    Blood Urine Negative NEG^Negative Final    pH Urine 5.0 5.0 - 7.0 pH Final    Protein Albumin Urine Negative NEG^Negative mg/dL Final    Urobilinogen mg/dL 0.0 0.0 - 2.0 mg/dL Final    Nitrite Urine Negative NEG^Negative Final    Leukocyte Esterase Urine Negative NEG^Negative Final    Source Midstream Urine  Final    WBC Urine 1 0 - 5 /HPF Final    RBC Urine 1 0 - 2 /HPF Final    Squamous Epithelial /HPF Urine 2 (H) 0 - 1 /HPF Final    Mucous Urine Present (A) NEG^Negative /LPF Final               4/15/2018  6:20 PM      Component Results     Component Value Ref Range & Units Status    Sodium 140 133 - 144 mmol/L Final    Potassium 3.6 3.4 - 5.3 mmol/L Final    Chloride 109 94 - 109 mmol/L Final     Carbon Dioxide 23 20 - 32 mmol/L Final    Anion Gap 8 3 - 14 mmol/L Final    Glucose 84 70 - 99 mg/dL Final    Urea Nitrogen 10 7 - 30 mg/dL Final    Creatinine 0.52 0.52 - 1.04 mg/dL Final    GFR Estimate >90 >60 mL/min/1.7m2 Final    Non  GFR Calc    GFR Estimate If Black >90 >60 mL/min/1.7m2 Final    African American GFR Calc    Calcium 8.7 8.5 - 10.1 mg/dL Final         4/15/2018  6:21 PM      Component Results     Component Value Ref Range & Units Status    HCG Qualitative Serum Negative NEG^Negative Final    This test is for screening purposes.  Results should be interpreted along with   the clinical picture.  Confirmation testing is available if warranted by   ordering [id number], HCG Quantitative Pregnancy.           4/15/2018  6:20 PM      Component Results     Component Value Ref Range & Units Status    Troponin I ES <0.015 0.000 - 0.045 ug/L Final    The 99th percentile for upper reference range is 0.045 ug/L.  Troponin values   in the range of 0.045 - 0.120 ug/L may be associated with risks of adverse   clinical events.           4/15/2018  6:24 PM      Component Results     Component Value Ref Range & Units Status    TSH 1.13 0.40 - 4.00 mU/L Final         4/15/2018  7:13 PM      Component Results     Component Value Ref Range & Units Status    D Dimer <0.3 0.0 - 0.50 ug/ml FEU Final    This D-dimer assay is intended for use in conjunction with a clinical pretest   probability assessment model to exclude pulmonary embolism (PE) and deep   venous thrombosis (DVT) in outpatients suspected of PE or DVT. The cut-off   value is 0.5 ug/mL FEU.                  Clinical Quality Measure: Blood Pressure Screening     Your blood pressure was checked while you were in the emergency department today. The last reading we obtained was  BP: 132/84 . Please read the guidelines below about what these numbers mean and what you should do about them.  If your systolic blood pressure (the top number) is less  than 120 and your diastolic blood pressure (the bottom number) is less than 80, then your blood pressure is normal. There is nothing more that you need to do about it.  If your systolic blood pressure (the top number) is 120-139 or your diastolic blood pressure (the bottom number) is 80-89, your blood pressure may be higher than it should be. You should have your blood pressure rechecked within a year by a primary care provider.  If your systolic blood pressure (the top number) is 140 or greater or your diastolic blood pressure (the bottom number) is 90 or greater, you may have high blood pressure. High blood pressure is treatable, but if left untreated over time it can put you at risk for heart attack, stroke, or kidney failure. You should have your blood pressure rechecked by a primary care provider within the next 4 weeks.  If your provider in the emergency department today gave you specific instructions to follow-up with your doctor or provider even sooner than that, you should follow that instruction and not wait for up to 4 weeks for your follow-up visit.        Thank you for choosing [facility]       Thank you for choosing [facility] for your care. Our goal is always to provide you with excellent care. Hearing back from our patients is one way we can continue to improve our services. Please take a few minutes to complete the written survey that you may receive in the mail after you visit with us. Thank you!        [facility] Information     [employer] gives you secure access to your electronic health record. If you see a primary care provider, you can also send messages to your care team and make appointments. If you have questions, please call your primary care clinic.  If you do not have a primary care provider, please call [phone] and they will assist you.        Care EveryWhere ID     This is your Care EveryWhere ID. This could be used by other organizations to access your [facility] medical  records  [id number]        Equal Access to Services     YANDEL FOOTE : Iftikhar Winter, manuel reina, tatiana xiao. So [facility] [phone].    ATENCIÓN: Si habla español, tiene a ventura disposición servicios gratuitos de asistencia lingüística. Llame al [phone].    We comply with applicable federal civil rights laws and [place] laws. We do not discriminate on the basis of race, color, national origin, age, disability, sex, sexual orientation, or gender identity.            After Visit Summary       This is your record. Keep this with you and show to your community pharmacist(s) and doctor(s) at your next visit.

## 2018-04-15 NOTE — ED AVS SNAPSHOT
[facility] Emergency Department    [address]    [facility] [address]    Phone:  [phone]    Fax:  [phone]                                       Mihir Waldron   MRN: [id number]    Department:  [facility] Emergency Department   Date of Visit:  4/15/2018           After Visit Summary Signature Page     I have received my discharge instructions, and my questions have been answered. I have discussed any challenges I see with this plan with the nurse or doctor.    ..........................................................................................................................................  Patient/Patient Representative Signature      ..........................................................................................................................................  Patient Representative Print Name and Relationship to Patient    ..................................................               ................................................  Date                                            Time    ..........................................................................................................................................  Reviewed by Signature/Title    ...................................................              ..............................................  Date                                                            Time

## 2018-04-15 NOTE — ED NOTES
Patient comes in for evaluation of numbness in the left arm starting yesterday with some episodes of lightheadedness. Patient states that today in [facility] she almost felt like she was going to pass out and is still feeling very light headed, now has numbness on the left side of face, lips. ABCs intact. Patient has a history of POTS, states she has never had numbness with the episodes before, is not typically symptomatic.

## 2018-04-15 NOTE — ED PROVIDER NOTES
History     Chief Complaint:  Numbness    HPI   Mihir Waldron is a 27 year old female with a history of POTS and anxiety who presents with numbness. The patient reports that she felt unwell yesterday after noon with a pressure like sensation in the left side of her face with a pins and needles sensation. She continued to feel unwell and this morning at 1130 while in [place] she felt as if she was going to lose consciousness. She felt as if her heart was racing and sat down, trying to relax and slow her breathing. When she arrived home around 1230 she noted that her right arm felt somewhat weak with some numbness/ tingling in her fingers. She notes that she felt generally weak and fatigued during this time. She took a nap and felt somewhat improved but as soon as she stood up her heart began racing again. She also had some nausea as well.  Her symptoms seem to improve when she lies down and return when she stands. The patient notes that she has had panic attacks and anxiety in the past and that this does not feel like those. The patient has had no fevers, headache, recent sickness, or sick exposure. She reports that she has been eating normally today. The patient's last period was 12 days ago and normal. She has had no recent medication changes.    CT head w/o contrast 2/5/18:  IMPRESSION: Normal head CT. Report called to Dr. James at 1:40 PM.    Allergies:  Benadryl  Codeine sulfate  Contrast dye  Latex    Medications:    Prozac  Epinephrine    Past Medical History:    Anxiety  Depressive disorder  Postpartum depression  POTS  Migraine    Past Surgical History:    Dental surgery    Family History:    Coronary artery disease  Depression  Anxiety  Diabetes  Cancer    Social History:  The patient was accompanied to the ED by her .  Smoking Status: No  Smokeless Tobacco: No  Alcohol Use: No   Marital Status:   [2]    Review of Systems   Constitutional: Positive for fatigue. Negative for appetite  "change and fever.   Eyes: Negative for visual disturbance.   Gastrointestinal: Positive for nausea. Negative for vomiting.   Neurological: Positive for numbness. Negative for syncope, weakness and headaches.   All other systems reviewed and are negative.    Physical Exam   Vitals:  Patient Vitals for the past 24 hrs:   BP Temp Temp src Heart Rate Resp SpO2 Height Weight   04/15/18 1947 - - - - 18 - - -   04/15/18 1935 - - - 67 - 97 % - -   04/15/18 1931 - - - 64 - 96 % - -   04/15/18 1930 122/66 - - 75 - 97 % - -   04/15/18 1915 100/49 - - - - - - -   04/15/18 1845 117/71 - - 86 - 95 % - -   04/15/18 1830 121/74 - - 89 - 96 % - -   04/15/18 1821 132/84 - - 93 - 96 % - -   04/15/18 1800 128/83 - - 76 - 97 % - -   04/15/18 1745 122/66 - - 92 - - - -   04/15/18 1730 - - - 102 - - - -   04/15/18 1715 122/72 - - 84 - - - -   04/15/18 1700 125/67 - - 81 - - - -   04/15/18 1645 122/73 - - 70 - - - -   04/15/18 1630 (!) 124/91 - - 80 - - - -   04/15/18 1615 126/73 - - 118 - - - -   04/15/18 1600 - - - 86 - - - -   04/15/18 1545 - - - 81 - - - -   04/15/18 1538 (!) 147/91 97.9  F (36.6  C) Oral 111 20 100 % - -   04/15/18 1516 135/77 98.6  F (37  C) Temporal 126 26 98 % 1.727 m (5' 8\") 81.6 kg (180 lb)        Physical Exam    Nursing note and vitals reviewed.    Constitutional: Pleasant and well groomed.          HENT:    Mouth/Throat: Oropharynx is without swelling or erythema. Oral mucosa moist.    Eyes: Conjunctivae are normal. No scleral icterus.    Neck: Neck supple.   Cardiovascular: Normal rate, regular rhythm and intact distal pulses.    Pulmonary/Chest: Effort normal and breath sounds normal.   Abdominal: Soft.  No distension. There is no tenderness.   Musculoskeletal:  No edema, No calf tenderness  Neurological:Alert. Coordination normal. Cranial nerves 2-12 intact. Upper and lower extremity strength intact. Negative ulnar drift. Lower extremity sensation intact to light touch. Upper extremity sensation intact to " light touch other than some 2nd and 3rd finger numb ness.  Skin: Skin is warm and dry.   Psychiatric: Normal mood and affect.      Emergency Department Course     ECG:  ECG taken at 1530, ECG read at 1624  Sinus tachycardia. Cannot rule out inferior infarct, age undetermined. Cannot rule out anterior infarct, age undetermined. Abnormal ECG. No significant change as compared to 6/14/16.  Rate 109 bpm. MA interval 158. QRS duration 98. QT/QTc 356/479. P-R-T axes 72, 57, 9.     Laboratory:  Laboratory findings were communicated with the patient who voiced understanding of the findings.  BMP: AWNL (Creatinine 0.52)  HCG qualitative: Negative  Troponin (Collected 1735): <0.015  D Dimer (Collected 1735): <0.3   TSH: 1.13  CBC: WBC: 11.7(H), RBC: 5.24(H), Neutrophil: 9.0(H) o/w WNL (HGB 15.3, )   UA: Urineketon: 5, Squamous epithelial: 2(H), Mucous: present    Interventions:  1630 Normal Saline 1000 mL IV     Emergency Department Course:  Nursing notes and vitals reviewed.  I performed an exam of the patient as documented above.   IV was inserted and blood was drawn for laboratory testing, results above.   The patient provided a urine sample here in the emergency department. This was sent for laboratory testing, findings above.     I discussed the treatment plan with the patient. They expressed understanding of this plan and consented to discharge. They will be discharged home with instructions for care and follow up. In addition, the patient will return to the emergency department if their symptoms persist, worsen, if new symptoms arise or if there is any concern.  All questions were answered.   She reports that she is feeling improved. HR has normalized.   I personally reviewed the laboratory results with the patient and answered all related questions prior to discharge.    Impression & Plan      Medical Decision Making:  Mihir Waldron is a 27 year old female who presents for evaluation of near-syncope.  They  definitely did not have actual syncope.  The differential for near-syncope is broad and includes etiologies such as cardiac arrhythmia, ACS, aortic stenosis, HOCM, PE, orthostatic hypotension, drugs, situational, carotid hypersensitivity, seizure, TIA, stroke, vasovagal.   I also considered ectopic pregnancy and pregnancy as causes.  There are no signs of a concerning etiology for near- syncope at this point.  In addition, there is no family history of sudden death, no chest pain, no seizure activity or post-ictal period, no murmur, and no signs of orthostasis in the ED, no focal neurologic symptoms, and no complaints of concerning headache.  The workup in the ED is negative and the physical exam is re-assurring.  Supportive outpatient management is therefore indicated.      The patient also reported some left arm paresthesias. These are consistent with a peripheral ulnar nerve abnormality for which I do not have a clear etiology at this time. This is not concerning for a central process. Her symptoms improved with IV fluids in the emergency department. With reasonable clinical certainty I feel she is safe for discharge home with ongoing evaluation and management as an outpatient.    Diagnosis:    ICD-10-CM    1. Near syncope R55    2. Paresthesia R20.2     Left ulnar nerve distribution        Disposition:   Discharged    CMS Diagnoses: None     Scribe Disclosure:  I, Salo Stoner, am serving as a scribe at 4:04 PM on 4/15/2018 to document services personally performed by Belen Aguiar MD, based on my observations and the provider's statements to me.   [facility] EMERGENCY DEPARTMENT       Belen Aguiar MD  04/16/18 6643

## 2018-04-20 ENCOUNTER — OFFICE VISIT (OUTPATIENT)
Dept: FAMILY MEDICINE | Facility: CLINIC | Age: 28
End: 2018-04-20

## 2018-04-20 VITALS
DIASTOLIC BLOOD PRESSURE: 70 MMHG | HEART RATE: 97 BPM | WEIGHT: 182.2 LBS | TEMPERATURE: 98.7 F | OXYGEN SATURATION: 99 % | BODY MASS INDEX: 27.61 KG/M2 | HEIGHT: 68 IN | SYSTOLIC BLOOD PRESSURE: 112 MMHG

## 2018-04-20 DIAGNOSIS — F41.0 PANIC ATTACK: ICD-10-CM

## 2018-04-20 DIAGNOSIS — G90.A POSTURAL ORTHOSTATIC TACHYCARDIA SYNDROME: ICD-10-CM

## 2018-04-20 DIAGNOSIS — F41.1 GAD (GENERALIZED ANXIETY DISORDER): ICD-10-CM

## 2018-04-20 DIAGNOSIS — R55 NEAR SYNCOPE: Primary | ICD-10-CM

## 2018-04-20 DIAGNOSIS — Z87.892 HX OF ANAPHYLAXIS: ICD-10-CM

## 2018-04-20 PROCEDURE — 99213 OFFICE O/P EST LOW 20 MIN: CPT | Performed by: FAMILY MEDICINE

## 2018-04-20 RX ORDER — EPINEPHRINE 0.3 MG/.3ML
0.3 INJECTION SUBCUTANEOUS
Qty: 0.3 ML | Refills: 0 | Status: SHIPPED | OUTPATIENT
Start: 2018-04-20 | End: 2021-02-24

## 2018-04-20 RX ORDER — ALPRAZOLAM 0.5 MG
0.5 TABLET ORAL 3 TIMES DAILY PRN
Qty: 10 TABLET | Refills: 0 | Status: SHIPPED | OUTPATIENT
Start: 2018-04-20 | End: 2019-07-10

## 2018-04-20 NOTE — PROGRESS NOTES
SUBJECTIVE:   Mihir Waldron is a 27 year old female who presents to clinic today for the following health issues:      ED/UC Followup:    Facility:  [facility]  Date of visit: 4-15-18  Reason for visit: pt was experiencing tachycardia, and felt delirious,  -dx presyncope and tachycardia  Current Status: stable but still some tachycardia-has hx POTS so not new but seems a bit more prominent    Pt feels her anxiety is not doing well and is likely contributing-daughter may have autism, wants to do therapy but no time as daughter in therapy daily         Anxiety Follow-Up    Status since last visit: Worsened due to stress at home    Other associated symptoms:None    Complicating factors:   Significant life event: Yes-  Daughter likely has autism -18 mo old and being evaluated  Current substance abuse: None  Depression symptoms: Yes-    CHIDI-7 SCORE 12/13/2017   Total Score 10       CHIDI-7    Problem list and histories reviewed & adjusted, as indicated.  Additional history: as documented    Patient Active Problem List   Diagnosis     Sinus tachycardia     Postural orthostatic tachycardia syndrome     Health Care Home     ACP (advance care planning)     Post partum depression     CHIDI (generalized anxiety disorder)     Hx of anaphylaxis     Past Surgical History:   Procedure Laterality Date     DENTAL SURGERY Bilateral        Social History   Substance Use Topics     Smoking status: Never Smoker     Smokeless tobacco: Never Used     Alcohol use No     Family History   Problem Relation Age of Onset     Coronary Artery Disease Mother      Depression Mother      Anxiety Disorder Mother      Hypertension Father      Anxiety Disorder Father      Depression Father      Depression Sister      Anxiety Disorder Sister      DIABETES Maternal Grandmother      DIABETES Paternal Grandmother      CEREBROVASCULAR DISEASE No family hx of      Breast Cancer No family hx of      Colon Cancer No family hx of          Current Outpatient  "Prescriptions   Medication Sig Dispense Refill     ALPRAZolam (XANAX) 0.5 MG tablet Take 1 tablet (0.5 mg) by mouth 3 times daily as needed for anxiety 10 tablet 0     EPINEPHrine (EPIPEN/ADRENACLICK/OR ANY BX GENERIC EQUIV) 0.3 MG/0.3ML injection 2-pack Inject 0.3 mLs (0.3 mg) into the muscle once as needed 0.3 mL 0     FLUoxetine (PROZAC) 20 MG capsule Take 3 capsules (60 mg) by mouth daily 90 capsule 1     VITAMIN D, CHOLECALCIFEROL, PO Take 10,000 Units by mouth daily       Allergies   Allergen Reactions     Benadryl [Diphenhydramine] Other (See Comments)     Pt got warm and passed out with IV benadryl     Codeine Sulfate GI Disturbance     Contrast Dye Hives     Unsure of name of contrast     Latex Hives     Recent Labs   Lab Test  04/15/18   1735  10/19/17   1800   ALT   --   17   CR  0.52  0.58   GFRESTIMATED  >90  >90   GFRESTBLACK  >90  >90   POTASSIUM  3.6  3.4   TSH  1.13   --       BP Readings from Last 3 Encounters:   04/20/18 112/70   04/15/18 122/66   02/07/18 111/70    Wt Readings from Last 3 Encounters:   04/20/18 82.6 kg (182 lb 3.2 oz)   04/15/18 81.6 kg (180 lb)   03/05/18 83.9 kg (185 lb)                    Reviewed and updated as needed this visit by clinical staff  Tobacco  Allergies  Meds  Problems       Reviewed and updated as needed this visit by Provider         ROS:  Constitutional, HEENT, cardiovascular, pulmonary, gi and gu systems are negative, except as otherwise noted.    OBJECTIVE:     /70 (BP Location: Left arm, Patient Position: Chair, Cuff Size: Adult Large)  Pulse 97  Temp 98.7  F (37.1  C) (Oral)  Ht 1.727 m (5' 8\")  Wt 82.6 kg (182 lb 3.2 oz)  LMP 04/03/2018  SpO2 99%  Breastfeeding? No  BMI 27.7 kg/m2  Body mass index is 27.7 kg/(m^2).   GENERAL: healthy, alert and no distress  NECK: no adenopathy, no asymmetry, masses, or scars and thyroid normal to palpation  RESP: lungs clear to auscultation - no rales, rhonchi or wheezes  CV: regular rate and rhythm, " normal S1 S2, no S3 or S4, no murmur, click or rub, no peripheral edema and peripheral pulses strong  ABDOMEN: soft, nontender, no hepatosplenomegaly, no masses and bowel sounds normal  MS: no gross musculoskeletal defects noted, no edema  PSYCH: mentation appears normal, tearful and anxious    Diagnostic Test Results:  none     ASSESSMENT:       PLAN:   (R55) Near syncope  (primary encounter diagnosis)  Comment: resolved-feels this was anxiety driving POTS  Plan: work on anxiety as below    (F41.1) CHIDI (generalized anxiety disorder)  Comment: poorly controlled,experiencing some panic and affecting POTS  Plan: ALPRAZolam (XANAX) 0.5 MG tablet        We agree to allow small rx xanax to hopefully avoid flares or panic, pt will also seek out therapy- may need to transition to different SSRI     (R00.0,  I95.1) Postural orthostatic tachycardia syndrome  Comment: stable  Plan:     (Z87.892) Hx of anaphylaxis  Comment:   Plan: EPINEPHrine (EPIPEN/ADRENACLICK/OR ANY BX         GENERIC EQUIV) 0.3 MG/0.3ML injection 2-pack            (F41.0) Panic attack  Comment:   Plan: ALPRAZolam (XANAX) 0.5 MG tablet                      Fuentes Green MD  [facility]

## 2018-04-20 NOTE — MR AVS SNAPSHOT
"              After Visit Summary   4/20/2018    Mihir Waldron    MRN: [id number]           Patient Information     Date Of Birth          1990        Visit Information        Provider Department      4/20/2018 1:15 PM Fuentes Green MD [facility]        Today's Diagnoses     Near syncope    -  1    CHIDI (generalized anxiety disorder)        Postural orthostatic tachycardia syndrome        Hx of anaphylaxis        Panic attack           Follow-ups after your visit        Who to contact     If you have questions or need follow up information about today's clinic visit or your schedule please contact [facility] directly at [phone].  Normal or non-critical lab and imaging results will be communicated to you by MyChart, letter or phone within 4 business days after the clinic has received the results. If you do not hear from us within 7 days, please contact the clinic through PPIhart or phone. If you have a critical or abnormal lab result, we will notify you by phone as soon as possible.  Submit refill requests through [facility] or call your pharmacy and they will forward the refill request to us. Please allow 3 business days for your refill to be completed.          Additional Information About Your Visit        MyChart Information     [facility] gives you secure access to your electronic health record. If you see a primary care provider, you can also send messages to your care team and make appointments. If you have questions, please call your primary care clinic.  If you do not have a primary care provider, please call [phone] and they will assist you.        Care EveryWhere ID     This is your Care EveryWhere ID. This could be used by other organizations to access your [facility] medical records  [id number]        Your Vitals Were     Pulse Temperature Height Last Period Pulse Oximetry Breastfeeding?    97 98.7  F (37.1  C) (Oral) 1.727 m (5' 8\") " 04/03/2018 99% No    BMI (Body Mass Index)                   27.7 kg/m2            Blood Pressure from Last 3 Encounters:   04/20/18 112/70   04/15/18 122/66   02/07/18 111/70    Weight from Last 3 Encounters:   04/20/18 82.6 kg (182 lb 3.2 oz)   04/15/18 81.6 kg (180 lb)   03/05/18 83.9 kg (185 lb)              Today, you had the following     No orders found for display         Today's Medication Changes          These changes are accurate as of 4/20/18  1:36 PM.  If you have any questions, ask your nurse or doctor.               Start taking these medicines.        Dose/Directions    ALPRAZolam 0.5 MG tablet   Commonly known as:  XANAX   Used for:  CHIDI (generalized anxiety disorder), Panic attack   Started by:  Fuentes Green MD        Dose:  0.5 mg   Take 1 tablet (0.5 mg) by mouth 3 times daily as needed for anxiety   Quantity:  10 tablet   Refills:  0            Where to get your medicines      These medications were sent to [facility] [address] [place] [id number]    Hours:  24-hours Phone:  [phone]     EPINEPHrine 0.3 MG/0.3ML injection 2-pack         Some of these will need a paper prescription and others can be bought over the counter.  Ask your nurse if you have questions.     Bring a paper prescription for each of these medications     ALPRAZolam 0.5 MG tablet                Primary Care Provider Office Phone # Fax #    Fuentes Green [phone] [address]  [place] [address]        Equal Access to Services     [facility]: Hadii alexia huynh Soangelina, waaxda luqadaha, qaybta kaalmada adepranav, tatiana jerome. So [facility] [phone].    ATENCIÓN: Si habla español, tiene a ventura disposición servicios gratuitos de asistencia lingüística. Llame al [phone].    We comply with applicable federal civil rights laws  and [place] laws. We do not discriminate on the basis of race, color, national origin, age, disability, sex, sexual orientation, or gender identity.            Thank you!     Thank you for choosing [facility]  for your care. Our goal is always to provide you with excellent care. Hearing back from our patients is one way we can continue to improve our services. Please take a few minutes to complete the written survey that you may receive in the mail after your visit with us. Thank you!             Your Updated Medication List - Protect others around you: Learn how to safely use, store and throw away your medicines at www.disposemymeds.org.          This list is accurate as of 4/20/18  1:36 PM.  Always use your most recent med list.                   Brand Name Dispense Instructions for use Diagnosis    ALPRAZolam 0.5 MG tablet    XANAX    10 tablet    Take 1 tablet (0.5 mg) by mouth 3 times daily as needed for anxiety    CHIDI (generalized anxiety disorder), Panic attack       EPINEPHrine 0.3 MG/0.3ML injection 2-pack    EPIPEN/ADRENACLICK/or ANY BX GENERIC EQUIV    0.3 mL    Inject 0.3 mLs (0.3 mg) into the muscle once as needed    Hx of anaphylaxis       FLUoxetine 20 MG capsule    PROzac    90 capsule    Take 3 capsules (60 mg) by mouth daily    CHIDI (generalized anxiety disorder)       VITAMIN D (CHOLECALCIFEROL) PO      Take 10,000 Units by mouth daily

## 2018-04-20 NOTE — NURSING NOTE
Mihir is here for a er follow up. Pt also states that she hasn't been symptomatic with her Pots but it has been acting up where she has been having tachycardia and when she went into the ER she was in and out of conscience and doesn't remember going to the ER     Pre-Visit Screening :  Immunizations : up to date    Colonoscopy : na  Mammogram : na  Asthma Action Test/Plan : na  PHQ9/GAD7 :  na    Pulse - regular  My Chart - accepts    CLASSIFICATION OF OVERWEIGHT AND OBESITY BY BMI                         Obesity Class           BMI(kg/m2)  Underweight                                    < 18.5  Normal                                         18.5-24.9  Overweight                                     25.0-29.9  OBESITY                     I                  30.0-34.9                              II                 35.0-39.9  EXTREME OBESITY             III                >40                             Patient's  BMI Body mass index is 22.15 kg/(m^2).  http://hin.nhlbi.nih.gov/menuplanner/menu.cgi  Questioned patient about current smoking habits.  Pt. has never smoked.  The patient has verbalized that it is ok to leave a detailed voice message on the patient's cell phone with results/recommendations from this visit.       Verified [phone]  phone number:

## 2018-06-08 ENCOUNTER — OFFICE VISIT (OUTPATIENT)
Dept: FAMILY MEDICINE | Facility: CLINIC | Age: 28
End: 2018-06-08

## 2018-06-08 VITALS
DIASTOLIC BLOOD PRESSURE: 74 MMHG | BODY MASS INDEX: 27.98 KG/M2 | HEART RATE: 82 BPM | OXYGEN SATURATION: 99 % | WEIGHT: 184 LBS | SYSTOLIC BLOOD PRESSURE: 102 MMHG | TEMPERATURE: 98.4 F

## 2018-06-08 DIAGNOSIS — K64.4 EXTERNAL HEMORRHOIDS: ICD-10-CM

## 2018-06-08 DIAGNOSIS — K60.30 FISTULA, ANAL: Primary | ICD-10-CM

## 2018-06-08 LAB
ERYTHROCYTE [DISTWIDTH] IN BLOOD BY AUTOMATED COUNT: 12.9 %
HCT VFR BLD AUTO: 45.3 % (ref 35–47)
HEMOGLOBIN: 15 G/DL (ref 11.7–15.7)
MCH RBC QN AUTO: 29.7 PG (ref 26–33)
MCHC RBC AUTO-ENTMCNC: 33.1 G/DL (ref 31–36)
MCV RBC AUTO: 89.8 FL (ref 78–100)
PLATELET COUNT - QUEST: 205 10^9/L (ref 150–375)
RBC # BLD AUTO: 5.05 10*12/L (ref 3.8–5.2)
WBC # BLD AUTO: 10.6 10*9/L (ref 4–11)

## 2018-06-08 PROCEDURE — 36415 COLL VENOUS BLD VENIPUNCTURE: CPT | Performed by: PHYSICIAN ASSISTANT

## 2018-06-08 PROCEDURE — 85027 COMPLETE CBC AUTOMATED: CPT | Performed by: PHYSICIAN ASSISTANT

## 2018-06-08 PROCEDURE — 99213 OFFICE O/P EST LOW 20 MIN: CPT | Performed by: PHYSICIAN ASSISTANT

## 2018-06-08 NOTE — NURSING NOTE
Mihir is here due to having a rectal sore that is bleeding and has not stopped.      Pre-visit Screening:  Immunizations:  up to date  Colonoscopy:  NA  Mammogram: NA  Asthma Action Test/Plan:  No concerns  PHQ9:  NA  GAD7:  NA  Questioned patient about current smoking habits Pt. has never smoked.  Ok to leave detailed message on voice mail for today's visit only Yes, phone # [phone]

## 2018-06-08 NOTE — MR AVS SNAPSHOT
After Visit Summary   6/8/2018    Mihir Waldron    MRN: [id number]           Patient Information     Date Of Birth          1990        Visit Information        Provider Department      6/8/2018 3:00 PM Lillian Zambrano PA-C [facility]        Today's Diagnoses     Fistula, anal    -  1    External hemorrhoids           Follow-ups after your visit        Additional Services     GASTROENTEROLOGY ADULT REF CONSULT ONLY       Preferred Location: [facility] [phone], Patient already scheduled for 6/14/2018 at 1:10 PM in [place].    Will have Chiquita fax this as well as office visit note.     Please be aware that coverage of these services is subject to the terms and limitations of your health insurance plan.  Call member services at your health plan with any benefit or coverage questions.  Any procedures must be performed at a [place] facility OR coordinated by your clinic's referral office.    Please bring the following with you to your appointment:    (1) Any X-Rays, CTs or MRIs which have been performed.  Contact the facility where they were done to arrange for  prior to your scheduled appointment.    (2) List of current medications   (3) This referral request   (4) Any documents/labs given to you for this referral                  Follow-up notes from your care team     Return if symptoms worsen or fail to improve.      Who to contact     If you have questions or need follow up information about today's clinic visit or your schedule please contact [facility] directly at [phone].  Normal or non-critical lab and imaging results will be communicated to you by MyChart, letter or phone within 4 business days after the clinic has received the results. If you do not hear from us within 7 days, please contact the clinic through MyChart or phone. If you have a critical or abnormal lab result, we will notify you by phone as soon as  possible.  Submit refill requests through [facility] or call your pharmacy and they will forward the refill request to us. Please allow 3 business days for your refill to be completed.          Additional Information About Your Visit        Blockboard[employer] Information     [facility] gives you secure access to your electronic health record. If you see a primary care provider, you can also send messages to your care team and make appointments. If you have questions, please call your primary care clinic.  If you do not have a primary care provider, please call [phone] and they will assist you.        Care EveryWhere ID     This is your Care EveryWhere ID. This could be used by other organizations to access your [facility] medical records  [id number]        Your Vitals Were     Pulse Temperature Last Period Pulse Oximetry BMI (Body Mass Index)       82 98.4  F (36.9  C) (Oral) 06/03/2018 (Exact Date) 99% 27.98 kg/m2        Blood Pressure from Last 3 Encounters:   06/08/18 102/74   04/20/18 112/70   04/15/18 122/66    Weight from Last 3 Encounters:   06/08/18 83.5 kg (184 lb)   04/20/18 82.6 kg (182 lb 3.2 oz)   04/15/18 81.6 kg (180 lb)              We Performed the Following     GASTROENTEROLOGY ADULT REF CONSULT ONLY     HEMOGRAM/PLATELET (BFP)     VENOUS COLLECTION        Primary Care Provider Office Phone # Fax #    Fuentes Yuri Green [phone] [address] [address]        Equal Access to Services     [facility] DARY : Hadii aad ku hadasho Sosanthoshali, waaxda luqadaha, qaybta kaalmada kishoreyada, tatiana guillen . So [facility] [phone].    ATENCIÓN: Si habla español, tiene a ventura disposición servicios gratuitos de asistencia lingüística. Llame al [phone].    We comply with applicable federal civil rights laws and [place] laws. We do not discriminate on the basis of race, color, national origin, age, disability, sex, sexual orientation, or gender  identity.            Thank you!     Thank you for choosing [facility]  for your care. Our goal is always to provide you with excellent care. Hearing back from our patients is one way we can continue to improve our services. Please take a few minutes to complete the written survey that you may receive in the mail after your visit with us. Thank you!             Your Updated Medication List - Protect others around you: Learn how to safely use, store and throw away your medicines at www.disposemymeds.org.          This list is accurate as of 6/8/18  8:20 PM.  Always use your most recent med list.                   Brand Name Dispense Instructions for use Diagnosis    ALPRAZolam 0.5 MG tablet    XANAX    10 tablet    Take 1 tablet (0.5 mg) by mouth 3 times daily as needed for anxiety    CHIDI (generalized anxiety disorder), Panic attack       EPINEPHrine 0.3 MG/0.3ML injection 2-pack    EPIPEN/ADRENACLICK/or ANY BX GENERIC EQUIV    0.3 mL    Inject 0.3 mLs (0.3 mg) into the muscle once as needed    Hx of anaphylaxis       FLUoxetine 20 MG capsule    PROzac    90 capsule    Take 3 capsules (60 mg) by mouth daily    CHIDI (generalized anxiety disorder)       VITAMIN D (CHOLECALCIFEROL) PO      Take 10,000 Units by mouth daily

## 2018-06-08 NOTE — PROGRESS NOTES
CC: Bleeding around rectum    History:  Mihir is here today with 1 weeks of bleeding from around the rectum. Thinks she has a lesion on her left bottock causing the bleeding. Does have a hsitory of internal hemorrhoids thought to be from pregnancy. These flare up once in a while, but this lesion seems to be different where it is constantly bleeding. The lesion stings, but not severely tender in general. No obvious signs of infection as far as redness, pustular drainage, fever, sweats, chills. No change in bowel or bladder habits. No abdominal pain, pelvic pain.    Mihir has already tried several creams including preparation H to try to help this, and it did not seem to help.     PMH, MEDICATIONS, ALLERGIES, SOCIAL AND FAMILY HISTORY in EPIC and reviewed by me personally.    ROS negative other than the symptoms noted above in the HPI.      Examination   /74 (BP Location: Left arm, Patient Position: Sitting, Cuff Size: Adult Large)  Pulse 82  Temp 98.4  F (36.9  C) (Oral)  Wt 83.5 kg (184 lb)  LMP 06/03/2018 (Exact Date)  SpO2 99%  BMI 27.98 kg/m2       Constitutional: Sitting comfortably, in no acute distress. Vital signs noted  Neck:  no adenopathy, trachea midline and normal to palpation  Cardiovascular:  regular rate and rhythm, no murmurs, clicks, or gallops  Respiratory:  normal respiratory rate and rhythm, lungs clear to auscultation  Abdomen: Abdomen soft, non-tender. BS normal. No masses, organomegaly  Rectal: External hemorroid approximately 8 mm in diameter at 6 o'clock position. Does not appeared thrombosed, and does not appear to be bleeding. Small 2 mm opening suspicious for fistula with small amount of blood exiting at 3 o'clock position.   SKIN: No jaundice/pallor/rash.   Psychiatric: mentation appears normal and affect normal/bright        A/P    ICD-10-CM    1. Fistula, anal K60.3 HEMOGRAM/PLATELET (BFP)     VENOUS COLLECTION     GASTROENTEROLOGY ADULT REF CONSULT ONLY   2. External  hemorrhoids K64.4 GASTROENTEROLOGY ADULT REF CONSULT ONLY       Office Visit on 06/08/2018   Component Date Value Ref Range Status     WBC 06/08/2018 10.6  4.0 - 11 10*9/L Final     RBC Count 06/08/2018 5.05  3.8 - 5.2 10*12/L Final     Hemoglobin 06/08/2018 15.0  11.7 - 15.7 g/dL Final     Hematocrit 06/08/2018 45.3  35.0 - 47.0 % Final     MCV 06/08/2018 89.8  78 - 100 fL Final     MCH 06/08/2018 29.7  26 - 33 pg Final     MCHC 06/08/2018 33.1  31 - 36 g/dL Final     RDW 06/08/2018 12.9  % Final     Platelet Count 06/08/2018 205  150 - 375 10^9/L Final         DISCUSSION:  CBC today normal with hemoglobin of 15.0. Based on exam, bleeding is likely due to possible fistula, while hemorrhoid is contributing to discomfort. Called [facility] for urgent appt for further evaluation, and Mihir was scheduled for next Thursday. She will monitor symptoms closely until then, and contact us with severe fatigue, dizziness, headache.    follow up visit: As needed    Lillian Zambrano PA-C  [facility]

## 2018-06-18 ENCOUNTER — TRANSFERRED RECORDS (OUTPATIENT)
Dept: FAMILY MEDICINE | Facility: CLINIC | Age: 28
End: 2018-06-18

## 2018-07-16 ENCOUNTER — OFFICE VISIT (OUTPATIENT)
Dept: FAMILY MEDICINE | Facility: CLINIC | Age: 28
End: 2018-07-16

## 2018-07-16 VITALS
SYSTOLIC BLOOD PRESSURE: 124 MMHG | WEIGHT: 185.6 LBS | HEART RATE: 96 BPM | DIASTOLIC BLOOD PRESSURE: 70 MMHG | HEIGHT: 68 IN | BODY MASS INDEX: 28.13 KG/M2

## 2018-07-16 DIAGNOSIS — R61 NIGHT SWEATS: Primary | ICD-10-CM

## 2018-07-16 DIAGNOSIS — R53.83 OTHER FATIGUE: ICD-10-CM

## 2018-07-16 LAB
% GRANULOCYTES: 65.1 %
HCT VFR BLD AUTO: 41.1 % (ref 35–47)
HEMOGLOBIN: 13.9 G/DL (ref 11.7–15.7)
LYMPHOCYTES NFR BLD AUTO: 26.5 %
MCH RBC QN AUTO: 30 PG (ref 26–33)
MCHC RBC AUTO-ENTMCNC: 33.8 G/DL (ref 31–36)
MCV RBC AUTO: 88.6 FL (ref 78–100)
MONOCYTES NFR BLD AUTO: 8.4 %
MONONUCLEOSIS SCREEN: NORMAL
PLATELET COUNT - QUEST: 270 10^9/L (ref 150–375)
RBC # BLD AUTO: 4.64 10*12/L (ref 3.8–5.2)
WBC # BLD AUTO: 7.9 10*9/L (ref 4–11)

## 2018-07-16 PROCEDURE — 99213 OFFICE O/P EST LOW 20 MIN: CPT | Performed by: PHYSICIAN ASSISTANT

## 2018-07-16 PROCEDURE — 36415 COLL VENOUS BLD VENIPUNCTURE: CPT | Performed by: PHYSICIAN ASSISTANT

## 2018-07-16 PROCEDURE — 86318 IA INFECTIOUS AGENT ANTIBODY: CPT | Performed by: PHYSICIAN ASSISTANT

## 2018-07-16 PROCEDURE — 85025 COMPLETE CBC W/AUTO DIFF WBC: CPT | Performed by: PHYSICIAN ASSISTANT

## 2018-07-16 PROCEDURE — 84443 ASSAY THYROID STIM HORMONE: CPT | Mod: 90 | Performed by: PHYSICIAN ASSISTANT

## 2018-07-16 NOTE — MR AVS SNAPSHOT
"              After Visit Summary   7/16/2018    Mihir Waldron    MRN: [id number]           Patient Information     Date Of Birth          1990        Visit Information        Provider Department      7/16/2018 9:00 AM Christine Ryan PA [facility]        Today's Diagnoses     Night sweats    -  1    Other fatigue           Follow-ups after your visit        Who to contact     If you have questions or need follow up information about today's clinic visit or your schedule please contact [facility] directly at [phone].  Normal or non-critical lab and imaging results will be communicated to you by CloudFlarehart, letter or phone within 4 business days after the clinic has received the results. If you do not hear from us within 7 days, please contact the clinic through CloudFlarehart or phone. If you have a critical or abnormal lab result, we will notify you by phone as soon as possible.  Submit refill requests through Sodbuster or call your pharmacy and they will forward the refill request to us. Please allow 3 business days for your refill to be completed.          Additional Information About Your Visit        MyChart Information     Sodbuster gives you secure access to your electronic health record. If you see a primary care provider, you can also send messages to your care team and make appointments. If you have questions, please call your primary care clinic.  If you do not have a primary care provider, please call [phone] and they will assist you.        Care EveryWhere ID     This is your Care EveryWhere ID. This could be used by other organizations to access your [facility] medical records  [id number]        Your Vitals Were     Pulse Height Last Period BMI (Body Mass Index)          96 1.727 m (5' 8\") 07/02/2018 28.22 kg/m2         Blood Pressure from Last 3 Encounters:   07/16/18 124/70   06/08/18 102/74   04/20/18 112/70    Weight from Last 3 " Encounters:   07/16/18 84.2 kg (185 lb 9.6 oz)   06/08/18 83.5 kg (184 lb)   04/20/18 82.6 kg (182 lb 3.2 oz)              We Performed the Following     CL AFF HEMOGRAM/PLATE/DIFF (BFP)     CL AFF MONO SCREEN (BFP)     TSH with free T4 reflex ([facility])        Primary Care Provider Office Phone # Fax #    Fuentes Green [phone] [address] [facility] [address]  [facility] [address]        Equal Access to Services     [facility]: Hadii aad ku hadasho Soomaali, waaxda luqadaha, qaybta kaalmada adeegyamadelin, waxnella guillen . So [facility] [phone].    ATENCIÓN: Si habla español, tiene a ventura disposición servicios gratuitos de asistencia lingüística. Ll[facility] [phone].    We comply with applicable federal civil rights laws and [place] laws. We do not discriminate on the basis of race, color, national origin, age, disability, sex, sexual orientation, or gender identity.            Thank you!     Thank you for choosing [facility]  for your care. Our goal is always to provide you with excellent care. Hearing back from our patients is one way we can continue to improve our services. Please take a few minutes to complete the written survey that you may receive in the mail after your visit with us. Thank you!             Your Updated Medication List - Protect others around you: Learn how to safely use, store and throw away your medicines at www.disposemymeds.org.          This list is accurate as of 7/16/18  9:30 AM.  Always use your most recent med list.                   Brand Name Dispense Instructions for use Diagnosis    ALPRAZolam 0.5 MG tablet    XANAX    10 tablet    Take 1 tablet (0.5 mg) by mouth 3 times daily as needed for anxiety    CHIDI (generalized anxiety disorder), Panic attack       EPINEPHrine 0.3 MG/0.3ML injection 2-pack    EPIPEN/ADRENACLICK/or ANY BX GENERIC EQUIV    0.3 mL    Inject 0.3 mLs (0.3 mg) into the muscle once as  needed    Hx of anaphylaxis       FLUoxetine 20 MG capsule    PROzac    90 capsule    Take 3 capsules (60 mg) by mouth daily    CHIDI (generalized anxiety disorder)       VITAMIN D (CHOLECALCIFEROL) PO      Take 10,000 Units by mouth daily

## 2018-07-16 NOTE — NURSING NOTE
Mihir PAUL Waldron is here for some fatigu, night sweats and possible swollen lymph nodes.    Questioned patient about current smoking habits.  Pt. has never smoked.  PULSE regular  My Chart: active  CLASSIFICATION OF OVERWEIGHT AND OBESITY BY BMI                        Obesity Class           BMI(kg/m2)  Underweight                                    < 18.5  Normal                                         18.5-24.9  Overweight                                     25.0-29.9  OBESITY                     I                  30.0-34.9                             II                 35.0-39.9  EXTREME OBESITY             III                >40                            Patient's  BMI Body mass index is 28.22 kg/(m^2).  http://hin.nhlbi.nih.gov/menuplanner/menu.cgi  Pre-visit planning  Immunizations - up to date  Colonoscopy -   Mammogram -   Asthma -   PHQ9 -    CHIDI-7 -

## 2018-07-16 NOTE — NURSING NOTE
The patient has verbalized that it is ok to leave a detailed voice message on the patient's cell phone with results/recommendations from this visit.

## 2018-07-16 NOTE — PROGRESS NOTES
SUBJECTIVE:                                                    Mihir Waldron is a 27 year old female who presents to clinic today for the following health issues:    Mihir is here with swollen tender lymph node on the posterior left chain.  3 days ago started getting fatigue and night sweats. She denies fevers.  She did have a little pain in her right inguinal area that has resolved    No sick contacts      ROS:  C: NEGATIVE for fever, chills, change in weight   + for drenching night sweats  Eyes: NEGATIVE for vision changes or irritation  Ears: NEGATIVE for pain, discharge, decreased hearing  Nose: NEGATIVE for rhinorrhea, epistaxis or congestion  Mouth: NEGATIVE for ulcerations, sore throat.   R: NEGATIVE for significant cough or SOB  CV: NEGATIVE for chest pain, palpitations or peripheral edema  GI: NEGATIVE for nausea, abdominal pain, heartburn, or change in bowel habits          BP Readings from Last 3 Encounters:   07/16/18 124/70   06/08/18 102/74   04/20/18 112/70    Wt Readings from Last 3 Encounters:   07/16/18 84.2 kg (185 lb 9.6 oz)   06/08/18 83.5 kg (184 lb)   04/20/18 82.6 kg (182 lb 3.2 oz)            Patient Active Problem List   Diagnosis     Sinus tachycardia     Postural orthostatic tachycardia syndrome     Health Care Home     ACP (advance care planning)     Post partum depression     CHIDI (generalized anxiety disorder)     Hx of anaphylaxis     Past Surgical History:   Procedure Laterality Date     DENTAL SURGERY Bilateral        Social History   Substance Use Topics     Smoking status: Never Smoker     Smokeless tobacco: Never Used     Alcohol use No     Family History   Problem Relation Age of Onset     Coronary Artery Disease Mother      Depression Mother      Anxiety Disorder Mother      Hypertension Father      Anxiety Disorder Father      Depression Father      Depression Sister      Anxiety Disorder Sister      Diabetes Maternal Grandmother      Diabetes Paternal Grandmother       "Cerebrovascular Disease No family hx of      Breast Cancer No family hx of      Colon Cancer No family hx of          Current Outpatient Prescriptions   Medication Sig Dispense Refill     ALPRAZolam (XANAX) 0.5 MG tablet Take 1 tablet (0.5 mg) by mouth 3 times daily as needed for anxiety 10 tablet 0     EPINEPHrine (EPIPEN/ADRENACLICK/OR ANY BX GENERIC EQUIV) 0.3 MG/0.3ML injection 2-pack Inject 0.3 mLs (0.3 mg) into the muscle once as needed 0.3 mL 0     FLUoxetine (PROZAC) 20 MG capsule Take 3 capsules (60 mg) by mouth daily 90 capsule 1     VITAMIN D, CHOLECALCIFEROL, PO Take 10,000 Units by mouth daily         Allergies   Allergen Reactions     Benadryl [Diphenhydramine] Other (See Comments)     Pt got warm and passed out with IV benadryl     Codeine Sulfate GI Disturbance     Contrast Dye Hives     Unsure of name of contrast     Latex Hives       OBJECTIVE:                                                    /70  Pulse 96  Ht 1.727 m (5' 8\")  Wt 84.2 kg (185 lb 9.6 oz)  LMP 07/02/2018  BMI 28.22 kg/m2 Body mass index is 28.22 kg/(m^2).     GENERAL: alert and no distress  HEAD: Normocephalic, atraumatic  EYES: Eyes grossly normal to inspection, extraocular movements - intact  EARS:   Right: External ear and canal normal, TM normal  Left: External ear and canal normal, TM normal  Bilaterally there is clear fluid behind TMs  NOSE: No discharge noted  MOUTH/THROAT: no ulcers, no lesions, pharynx non-erythematous, no exudates present, tonsils without hypertrophy, mucous membranes moist.   NECK: no tenderness, no adenopathy, no asymmetry, no masses, no stiffness; thyroid- normal to palpation  RESP: lungs clear to auscultation - no crackles, no rhonchi, no wheezes  CV: regular rates and rhythm, normal S1 S2, no S3 or S4 and no murmur, no click or rub -  Abdomen: Normal bowel sounds. Soft, no masses, or organomegaly. Non-tender. No guarding, no rebound tenderness.     Lymphadenopathy:  Anterior cervical - " N  Posterior cervical - Y on right  Preauricular - N  Posterior auricular - N  Occipital - N  Submental - N  Submandibular - N  Supraclavicular - N  Parotid - N  Tonsillar  - N    No right inguinal lymphadenopathy      Labs Resulted Today:   Results for orders placed or performed in visit on 07/16/18   CL AFF HEMOGRAM/PLATE/DIFF (BFP)   Result Value Ref Range    WBC 7.9 4.0 - 11 10*9/L    RBC Count 4.64 3.8 - 5.2 10*12/L    Hemoglobin 13.9 11.7 - 15.7 g/dL    Hematocrit 41.1 35.0 - 47.0 %    MCV 88.6 78 - 100 fL    MCH 30.0 26 - 33 pg    MCHC 33.8 31 - 36 g/dL    Platelet Count 270 150 - 375 10^9/L    % Granulocytes 65.1 %    % Lymphocytes 26.5 %    % Monocytes 8.4 %   CL AFF MONO SCREEN (BFP)   Result Value Ref Range    Mononucleosis Screen Neg Neg              ASSESSMENT/PLAN:                                                        ICD-10-CM    1. Night sweats R61 CL AFF HEMOGRAM/PLATE/DIFF (BFP)     CL AFF MONO SCREEN (BFP)     TSH with free T4 reflex ([facility])   2. Other fatigue R53.83 CL AFF HEMOGRAM/PLATE/DIFF (BFP)     CL AFF MONO SCREEN (BFP)     TSH with free T4 reflex ([facility])       TSH pending.  Advised this could be early mono or other virus  No contact sports advised  I would like to see her in 7 days for recheck, sooner with new/worsening sx  NSAID/APAP prn and cold compress advised on lymph node.       Christine Ryan PA-C  [facility]

## 2018-07-17 LAB — TSH SERPL-ACNC: 1.87 MIU/L

## 2018-08-08 DIAGNOSIS — F41.1 GAD (GENERALIZED ANXIETY DISORDER): ICD-10-CM

## 2018-08-08 NOTE — TELEPHONE ENCOUNTER
Pending Prescriptions:                       Disp   Refills    FLUoxetine (PROZAC) 20 MG capsule [Pharma*90 cap*0            Sig: TAKE 3 CAPSULES(60 MG) BY MOUTH DAILY    Please review for JCC:    Unable to leave vm on pharmacy machine   Pt is due for a non fasting ov  Please fax 30/ 90 pt takes three a day   Send to FD for a non fasting ov  Thank you  Rosina  [phone] (home) NONE (work)

## 2018-08-08 NOTE — TELEPHONE ENCOUNTER
Pending Prescriptions:                       Disp   Refills    FLUoxetine (PROZAC) 20 MG capsule         90 cap*1            Sig: Take 3 capsules (60 mg) by mouth daily  Refused Prescriptions:                       Disp   Refills    FLUoxetine (PROZAC) 20 MG capsule [Pharmac*90 cap*0        Sig: TAKE 3 CAPSULES(60 MG) BY MOUTH DAILY  Refused By: PAMELA BRODERICK  Reason for Refusal: Unexpected request, review all meds with pt.      I talked to pt. She states that [facility] wanted her to talk to someone for counseling, so she is going to her [employer] for support. Pt states that she not realize that it was a speciality provider.   Pt will be out of meds in two days. Pt states that [facility] told her just to talk to someone, if he wants her to go to a specialist she will  Please fax 30 days(90 total) pt takes three and forward to [facility] to let me know what pt needs to do.  Thank you  Rosina  [phone] (home) NONE (work)

## 2018-08-08 NOTE — TELEPHONE ENCOUNTER
According to DR Green's note 1/2018, he filled this until she could find a psych provider??? Has she? denied

## 2018-08-27 ENCOUNTER — OFFICE VISIT (OUTPATIENT)
Dept: FAMILY MEDICINE | Facility: CLINIC | Age: 28
End: 2018-08-27

## 2018-08-27 VITALS
DIASTOLIC BLOOD PRESSURE: 66 MMHG | OXYGEN SATURATION: 99 % | BODY MASS INDEX: 29.07 KG/M2 | TEMPERATURE: 99.3 F | HEART RATE: 91 BPM | WEIGHT: 191.8 LBS | HEIGHT: 68 IN | SYSTOLIC BLOOD PRESSURE: 124 MMHG

## 2018-08-27 DIAGNOSIS — F41.1 GAD (GENERALIZED ANXIETY DISORDER): ICD-10-CM

## 2018-08-27 PROCEDURE — 99213 OFFICE O/P EST LOW 20 MIN: CPT | Performed by: FAMILY MEDICINE

## 2018-08-27 ASSESSMENT — ANXIETY QUESTIONNAIRES
5. BEING SO RESTLESS THAT IT IS HARD TO SIT STILL: SEVERAL DAYS
2. NOT BEING ABLE TO STOP OR CONTROL WORRYING: SEVERAL DAYS
1. FEELING NERVOUS, ANXIOUS, OR ON EDGE: NEARLY EVERY DAY
GAD7 TOTAL SCORE: 10
IF YOU CHECKED OFF ANY PROBLEMS ON THIS QUESTIONNAIRE, HOW DIFFICULT HAVE THESE PROBLEMS MADE IT FOR YOU TO DO YOUR WORK, TAKE CARE OF THINGS AT HOME, OR GET ALONG WITH OTHER PEOPLE: SOMEWHAT DIFFICULT
7. FEELING AFRAID AS IF SOMETHING AWFUL MIGHT HAPPEN: NOT AT ALL
3. WORRYING TOO MUCH ABOUT DIFFERENT THINGS: MORE THAN HALF THE DAYS
6. BECOMING EASILY ANNOYED OR IRRITABLE: MORE THAN HALF THE DAYS

## 2018-08-27 ASSESSMENT — PATIENT HEALTH QUESTIONNAIRE - PHQ9: 5. POOR APPETITE OR OVEREATING: SEVERAL DAYS

## 2018-08-27 NOTE — PROGRESS NOTES
SUBJECTIVE:                                                    Mihir Waldron is a 28 year old female who presents to clinic today for the following health issues:      Anxiety Follow-Up    Status since last visit: No change-feels prozac not really doing anything but she has made a lot of other changes to help aside from meds-thinks she would like to wean off     Other associated symptoms:None    Complicating factors:   Significant life event: Yes-  Daughter going to [facility] for diagnosis of possible spectrum issue-she is nonverbal at age 2-this has been very stressfull   Current substance abuse: None  Depression symptoms: slight    Pt still has 9 xanax from last rx-uses only in panic situations    Pt has been on sertraline, citalopram, and prozac and does not feel any helped-now in therapy, exercising, in moms suport group-would like ot try off meds  CHIDI-7 SCORE 12/13/2017   Total Score 10       CHIDI-7    Amount of exercise or physical activity: 2-3 days/week for an average of 30-45 minutes    Problems taking medications regularly: No    Medication side effects: none    Diet: regular (no restrictions)            Problem list and histories reviewed & adjusted, as indicated.  Additional history: as documented    Patient Active Problem List   Diagnosis     Sinus tachycardia     Postural orthostatic tachycardia syndrome     Health Care Home     ACP (advance care planning)     Post partum depression     CHIDI (generalized anxiety disorder)     Hx of anaphylaxis     Past Surgical History:   Procedure Laterality Date     DENTAL SURGERY Bilateral        Social History   Substance Use Topics     Smoking status: Never Smoker     Smokeless tobacco: Never Used     Alcohol use No     Family History   Problem Relation Age of Onset     Coronary Artery Disease Mother      Depression Mother      Anxiety Disorder Mother      Hypertension Father      Anxiety Disorder Father      Depression Father      Depression Sister       "Anxiety Disorder Sister      Diabetes Maternal Grandmother      Diabetes Paternal Grandmother      Cerebrovascular Disease No family hx of      Breast Cancer No family hx of      Colon Cancer No family hx of          Current Outpatient Prescriptions   Medication Sig Dispense Refill     FLUoxetine (PROZAC) 20 MG capsule Take 3 capsules (60 mg) by mouth daily 90 capsule 0     VITAMIN D, CHOLECALCIFEROL, PO Take 10,000 Units by mouth daily       ALPRAZolam (XANAX) 0.5 MG tablet Take 1 tablet (0.5 mg) by mouth 3 times daily as needed for anxiety 10 tablet 0     EPINEPHrine (EPIPEN/ADRENACLICK/OR ANY BX GENERIC EQUIV) 0.3 MG/0.3ML injection 2-pack Inject 0.3 mLs (0.3 mg) into the muscle once as needed 0.3 mL 0     Allergies   Allergen Reactions     Benadryl [Diphenhydramine] Other (See Comments)     Pt got warm and passed out with IV benadryl     Codeine Sulfate GI Disturbance     Contrast Dye Hives     Unsure of name of contrast     Latex Hives     Recent Labs   Lab Test  07/16/18   0956  04/15/18   1735  10/19/17   1800   ALT   --    --   17   CR   --   0.52  0.58   GFRESTIMATED   --   >90  >90   GFRESTBLACK   --   >90  >90   POTASSIUM   --   3.6  3.4   TSH  1.87  1.13   --       BP Readings from Last 3 Encounters:   08/27/18 124/66   07/16/18 124/70   06/08/18 102/74    Wt Readings from Last 3 Encounters:   08/27/18 87 kg (191 lb 12.8 oz)   07/16/18 84.2 kg (185 lb 9.6 oz)   06/08/18 83.5 kg (184 lb)                    ROS:  Constitutional, HEENT, cardiovascular, pulmonary, gi and gu systems are negative, except as otherwise noted.    OBJECTIVE:     /66 (BP Location: Right arm, Patient Position: Chair, Cuff Size: Adult Large)  Pulse 91  Temp 99.3  F (37.4  C) (Oral)  Ht 1.727 m (5' 8\")  Wt 87 kg (191 lb 12.8 oz)  LMP 08/26/2018 (Exact Date)  SpO2 99%  Breastfeeding? No  BMI 29.16 kg/m2  Body mass index is 29.16 kg/(m^2).   GENERAL: healthy, alert and no distress  NECK: no adenopathy, no asymmetry, " "masses, or scars and thyroid normal to palpation  RESP: lungs clear to auscultation - no rales, rhonchi or wheezes  CV: regular rate and rhythm, normal S1 S2, no S3 or S4, no murmur, click or rub, no peripheral edema and peripheral pulses strong  ABDOMEN: soft, nontender, no hepatosplenomegaly, no masses and bowel sounds normal  PSYCH: mentation appears normal, affect normal/bright    Diagnostic Test Results:  none     ASSESSMENT:       PLAN:   (F41.1) CHIDI (generalized anxiety disorder)  Comment: we agree to wean off prozac due to lack of response-may consider effexor if needs other options  Plan: wean as per schedule in AVS, f/u if not improving or worsening     patient given instructions to go to emergency department immediately if worsening of symptoms and verbalizes this understanding     BMI:   Estimated body mass index is 29.16 kg/(m^2) as calculated from the following:    Height as of this encounter: 1.727 m (5' 8\").    Weight as of this encounter: 87 kg (191 lb 12.8 oz).   Weight management plan: Discussed healthy diet and exercise guidelines and patient will follow up in 6 months in clinic to re-evaluate.      FUTURE APPOINTMENTS:       - Follow-up visit in 6 mo, sooner if wants to try effexor  Work on weight loss  Regular exercise    Fuentes Green MD  [facility]      "

## 2018-08-27 NOTE — MR AVS SNAPSHOT
After Visit Summary   8/27/2018    Mihir Waldron    MRN: [id number]           Patient Information     Date Of Birth          1990        Visit Information        Provider Department      8/27/2018 9:00 AM Fuentes Green MD [facility]        Today's Diagnoses     CHIDI (generalized anxiety disorder)          Care Instructions    Weaning schedule:    Go to 40 mg (2 capsules) daily for next week, then decrease to 20 mg (one capsule) daily for week two, then go to 20 mg (one capsule) every other day for a week and then stop    Call if any problems          Follow-ups after your visit        Follow-up notes from your care team     Return in about 6 months (around 2/27/2019).      Who to contact     If you have questions or need follow up information about today's clinic visit or your schedule please contact [facility] directly at [phone].  Normal or non-critical lab and imaging results will be communicated to you by MyChart, letter or phone within 4 business days after the clinic has received the results. If you do not hear from us within 7 days, please contact the clinic through Voltaixhart or phone. If you have a critical or abnormal lab result, we will notify you by phone as soon as possible.  Submit refill requests through "[employer] or call your pharmacy and they will forward the refill request to us. Please allow 3 business days for your refill to be completed.          Additional Information About Your Visit        MyChart Information     "[employer] gives you secure access to your electronic health record. If you see a primary care provider, you can also send messages to your care team and make appointments. If you have questions, please call your primary care clinic.  If you do not have a primary care provider, please call [phone] and they will assist you.        Care EveryWhere ID     This is your Care EveryWhere ID. This could be  "used by other organizations to access your [place] medical records  [id number]        Your Vitals Were     Pulse Temperature Height Last Period Pulse Oximetry Breastfeeding?    91 99.3  F (37.4  C) (Oral) 1.727 m (5' 8\") 08/26/2018 (Exact Date) 99% No    BMI (Body Mass Index)                   29.16 kg/m2            Blood Pressure from Last 3 Encounters:   08/27/18 124/66   07/16/18 124/70   06/08/18 102/74    Weight from Last 3 Encounters:   08/27/18 87 kg (191 lb 12.8 oz)   07/16/18 84.2 kg (185 lb 9.6 oz)   06/08/18 83.5 kg (184 lb)              Today, you had the following     No orders found for display       Primary Care Provider Office Phone # Fax #    Fuentes Green [phone] [address] [address]        Equal Access to Services     [facility]: Hadii aad ku hadasho Soomaali, waaxda luqadaha, qaybta kaalmada adeegyada, waxay idiin hayaan pauleg coltarachar ladennys . So [facility] [phone].    ATENCIÓN: Si habla español, tiene a ventura disposición servicios gratuitos de asistencia lingüística. Llame al [phone].    We comply with applicable federal civil rights laws and [place] laws. We do not discriminate on the basis of race, color, national origin, age, disability, sex, sexual orientation, or gender identity.            Thank you!     Thank you for choosing [facility]  for your care. Our goal is always to provide you with excellent care. Hearing back from our patients is one way we can continue to improve our services. Please take a few minutes to complete the written survey that you may receive in the mail after your visit with us. Thank you!             Your Updated Medication List - Protect others around you: Learn how to safely use, store and throw away your medicines at www.disposemymeds.org.          This list is accurate as of 8/27/18  9:49 AM.  Always use your most recent med list.                   Brand Name " Dispense Instructions for use Diagnosis    ALPRAZolam 0.5 MG tablet    XANAX    10 tablet    Take 1 tablet (0.5 mg) by mouth 3 times daily as needed for anxiety    CHIDI (generalized anxiety disorder), Panic attack       EPINEPHrine 0.3 MG/0.3ML injection 2-pack    EPIPEN/ADRENACLICK/or ANY BX GENERIC EQUIV    0.3 mL    Inject 0.3 mLs (0.3 mg) into the muscle once as needed    Hx of anaphylaxis       FLUoxetine 20 MG capsule    PROzac    90 capsule    Take 3 capsules (60 mg) by mouth daily    CHIDI (generalized anxiety disorder)       VITAMIN D (CHOLECALCIFEROL) PO      Take 10,000 Units by mouth daily

## 2018-08-27 NOTE — PATIENT INSTRUCTIONS
Weaning schedule:    Go to 40 mg (2 capsules) daily for next week, then decrease to 20 mg (one capsule) daily for week two, then go to 20 mg (one capsule) every other day for a week and then stop    Call if any problems

## 2018-08-27 NOTE — NURSING NOTE
Mihir PAUL Waldron is here today for a non fasting medication recheck.    Pre-visit Screening:    Immunizations:  up to date  Colonoscopy:  NA  Mammogram: NA  Asthma Action Test/Plan:  NA  PHQ9:  is completed today  GAD7:  is completed today    Questioned patient about current smoking habits Pt. has never smoked.    Is it ok to leave a detailed message on cell phone's voice mail for today's visit only? Yes     Telephone Information:   Mobile [phone]   Mobile [phone]         Grisel Lockhart CMA

## 2018-08-28 ASSESSMENT — ANXIETY QUESTIONNAIRES: GAD7 TOTAL SCORE: 10

## 2018-08-28 ASSESSMENT — PATIENT HEALTH QUESTIONNAIRE - PHQ9: SUM OF ALL RESPONSES TO PHQ QUESTIONS 1-9: 4

## 2018-10-23 ENCOUNTER — OFFICE VISIT (OUTPATIENT)
Dept: FAMILY MEDICINE | Facility: CLINIC | Age: 28
End: 2018-10-23

## 2018-10-23 VITALS
SYSTOLIC BLOOD PRESSURE: 110 MMHG | DIASTOLIC BLOOD PRESSURE: 64 MMHG | BODY MASS INDEX: 29.56 KG/M2 | WEIGHT: 194.4 LBS | OXYGEN SATURATION: 99 % | TEMPERATURE: 98.4 F | HEART RATE: 80 BPM

## 2018-10-23 DIAGNOSIS — H69.93 EUSTACHIAN TUBE DYSFUNCTION, BILATERAL: Primary | ICD-10-CM

## 2018-10-23 PROCEDURE — 99213 OFFICE O/P EST LOW 20 MIN: CPT | Performed by: PHYSICIAN ASSISTANT

## 2018-10-23 NOTE — MR AVS SNAPSHOT
After Visit Summary   10/23/2018    Mihir Waldron    MRN: [id number]           Patient Information     Date Of Birth          1990        Visit Information        Provider Department      10/23/2018 5:45 PM Lillian Zambrano PA-C [facility]        Today's Diagnoses     Eustachian tube dysfunction, bilateral    -  1       Follow-ups after your visit        Follow-up notes from your care team     Return if symptoms worsen or fail to improve.      Who to contact     If you have questions or need follow up information about today's clinic visit or your schedule please contact [facility] directly at [phone].  Normal or non-critical lab and imaging results will be communicated to you by MyChart, letter or phone within 4 business days after the clinic has received the results. If you do not hear from us within 7 days, please contact the clinic through ByeCityhart or phone. If you have a critical or abnormal lab result, we will notify you by phone as soon as possible.  Submit refill requests through Uanbai or call your pharmacy and they will forward the refill request to us. Please allow 3 business days for your refill to be completed.          Additional Information About Your Visit        MyChart Information     Uanbai gives you secure access to your electronic health record. If you see a primary care provider, you can also send messages to your care team and make appointments. If you have questions, please call your primary care clinic.  If you do not have a primary care provider, please call [phone] and they will assist you.        Care EveryWhere ID     This is your Care EveryWhere ID. This could be used by other organizations to access your [place] medical records  [id number]        Your Vitals Were     Pulse Temperature Last Period Pulse Oximetry BMI (Body Mass Index)       80 98.4  F (36.9  C) (Oral) 10/20/2018 99% 29.56 kg/m2         Blood Pressure from Last 3 Encounters:   10/23/18 110/64   08/27/18 124/66   07/16/18 124/70    Weight from Last 3 Encounters:   10/23/18 88.2 kg (194 lb 6.4 oz)   08/27/18 87 kg (191 lb 12.8 oz)   07/16/18 84.2 kg (185 lb 9.6 oz)              Today, you had the following     No orders found for display       Primary Care Provider Office Phone # Fax #    Fuentes Green [phone]       [address] [facility] [address]  [facility] [address]        Equal Access to Services     [facility]: Hadii aad ku hadasho Soomaali, waaxda luqadaha, qaybta kaalmada adeegyada, tatiana francisco hayalexn kishore guillen . So [facility] [phone].    ATENCIÓN: Si habla español, tiene a ventura disposición servicios gratuitos de asistencia lingüística. Llame al [phone].    We comply with applicable federal civil rights laws and [place] laws. We do not discriminate on the basis of race, color, national origin, age, disability, sex, sexual orientation, or gender identity.            Thank you!     Thank you for choosing [facility]  for your care. Our goal is always to provide you with excellent care. Hearing back from our patients is one way we can continue to improve our services. Please take a few minutes to complete the written survey that you may receive in the mail after your visit with us. Thank you!             Your Updated Medication List - Protect others around you: Learn how to safely use, store and throw away your medicines at www.disposemymeds.org.          This list is accurate as of 10/23/18 11:59 PM.  Always use your most recent med list.                   Brand Name Dispense Instructions for use Diagnosis    ALPRAZolam 0.5 MG tablet    XANAX    10 tablet    Take 1 tablet (0.5 mg) by mouth 3 times daily as needed for anxiety    CHIDI (generalized anxiety disorder), Panic attack       EPINEPHrine 0.3 MG/0.3ML injection 2-pack    EPIPEN/ADRENACLICK/or ANY BX GENERIC EQUIV    0.3  mL    Inject 0.3 mLs (0.3 mg) into the muscle once as needed    Hx of anaphylaxis

## 2018-10-23 NOTE — PROGRESS NOTES
CC: Ear pressure    History:  For the past 3 days, Mihir has been having ear pain alternating back and forth. Both children are fighting colds, so wonders if it could be from them. Also is getting ringing in the affected ear at that time, worse and most persistent in right ear. Does seem to be making hearing muffled and voice sounds different. No fever, sweats, chills. No nasal drainage, face pain. No history of allergies.     Has been drinking tea, taking warm showers, warm compresses on ears. Does have TMJ, but this has been well controlled.     PMH, MEDICATIONS, ALLERGIES, SOCIAL AND FAMILY HISTORY in [facility] and reviewed by me personally.      ROS negative other than the symptoms noted above in the HPI.        Examination   /64 (BP Location: Left arm, Patient Position: Sitting, Cuff Size: Adult Large)  Pulse 80  Temp 98.4  F (36.9  C) (Oral)  Wt 88.2 kg (194 lb 6.4 oz)  LMP 10/20/2018  SpO2 99%  BMI 29.56 kg/m2       Constitutional: Sitting comfortably, in no acute distress. Vital signs noted  Eyes: pupils equal round reactive to light and accomodation, extra ocular movements intact  Ears: external canals and TMs free of abnormalities. Mild scaring on right TM.   Nose: patent, without mucosal abnormalities  Mouth and throat: without erythema or lesions of the mucosa  Neck:  no adenopathy, trachea midline and normal to palpation  Cardiovascular:  regular rate and rhythm, no murmurs, clicks, or gallops  Respiratory:  normal respiratory rate and rhythm, lungs clear to auscultation  SKIN: No jaundice/pallor/rash.   Psychiatric: mentation appears normal and affect normal/bright        A/P    ICD-10-CM    1. Eustachian tube dysfunction, bilateral H69.83        DISCUSSION:  Mihir's ear symptoms are most consistent with eustachian tube dysfunction. Recommended using Sudafed in the morning, as pt has tolerated this in the past. In addition, take 24 hour allergy medication to help with any allergic component.  Also encouraged frequent valsalva to ears to help with pressure sensation. Take ibuprofen with food to help with pain and inflammation. Contact me later this week if symptoms not improving, or sooner if worsening, and would likely need to get ENT involved. Would consider abx if right ear worsens, as scar did obscure TM somewhat.       follow up visit: As needed    Lillian Zambrano PA-C  [facility]

## 2018-10-23 NOTE — NURSING NOTE
Mihir is here for possible ear infection---bilateral ear pressure, very painful and deep pain            Pre-visit Screening:  Immunizations:  up to date  Colonoscopy:  NA  Mammogram: NA  Asthma Action Test/Plan:  NA  PHQ9:  NA  GAD7:  NA  Questioned patient about current smoking habits Pt. has never smoked.  Ok to leave detailed message on voice mail for today's visit only Yes, phone # [phone]

## 2018-11-21 ENCOUNTER — OFFICE VISIT (OUTPATIENT)
Dept: FAMILY MEDICINE | Facility: CLINIC | Age: 28
End: 2018-11-21

## 2018-11-21 VITALS
OXYGEN SATURATION: 99 % | SYSTOLIC BLOOD PRESSURE: 110 MMHG | WEIGHT: 197.2 LBS | HEART RATE: 85 BPM | BODY MASS INDEX: 29.89 KG/M2 | DIASTOLIC BLOOD PRESSURE: 68 MMHG | TEMPERATURE: 98.3 F | HEIGHT: 68 IN

## 2018-11-21 DIAGNOSIS — F41.1 GAD (GENERALIZED ANXIETY DISORDER): ICD-10-CM

## 2018-11-21 DIAGNOSIS — M25.50 PAIN IN JOINT, MULTIPLE SITES: ICD-10-CM

## 2018-11-21 DIAGNOSIS — F33.1 MODERATE EPISODE OF RECURRENT MAJOR DEPRESSIVE DISORDER (H): ICD-10-CM

## 2018-11-21 DIAGNOSIS — R53.83 FATIGUE, UNSPECIFIED TYPE: Primary | ICD-10-CM

## 2018-11-21 PROCEDURE — 86431 RHEUMATOID FACTOR QUANT: CPT | Mod: 90 | Performed by: FAMILY MEDICINE

## 2018-11-21 PROCEDURE — 84443 ASSAY THYROID STIM HORMONE: CPT | Mod: 90 | Performed by: FAMILY MEDICINE

## 2018-11-21 PROCEDURE — 36415 COLL VENOUS BLD VENIPUNCTURE: CPT | Performed by: FAMILY MEDICINE

## 2018-11-21 PROCEDURE — 82306 VITAMIN D 25 HYDROXY: CPT | Mod: 90 | Performed by: FAMILY MEDICINE

## 2018-11-21 PROCEDURE — 99214 OFFICE O/P EST MOD 30 MIN: CPT | Performed by: FAMILY MEDICINE

## 2018-11-21 ASSESSMENT — ANXIETY QUESTIONNAIRES
6. BECOMING EASILY ANNOYED OR IRRITABLE: NEARLY EVERY DAY
3. WORRYING TOO MUCH ABOUT DIFFERENT THINGS: NEARLY EVERY DAY
2. NOT BEING ABLE TO STOP OR CONTROL WORRYING: NEARLY EVERY DAY
IF YOU CHECKED OFF ANY PROBLEMS ON THIS QUESTIONNAIRE, HOW DIFFICULT HAVE THESE PROBLEMS MADE IT FOR YOU TO DO YOUR WORK, TAKE CARE OF THINGS AT HOME, OR GET ALONG WITH OTHER PEOPLE: EXTREMELY DIFFICULT
1. FEELING NERVOUS, ANXIOUS, OR ON EDGE: NEARLY EVERY DAY
7. FEELING AFRAID AS IF SOMETHING AWFUL MIGHT HAPPEN: MORE THAN HALF THE DAYS
GAD7 TOTAL SCORE: 20
5. BEING SO RESTLESS THAT IT IS HARD TO SIT STILL: NEARLY EVERY DAY

## 2018-11-21 ASSESSMENT — PATIENT HEALTH QUESTIONNAIRE - PHQ9
SUM OF ALL RESPONSES TO PHQ QUESTIONS 1-9: 18
5. POOR APPETITE OR OVEREATING: NEARLY EVERY DAY

## 2018-11-21 NOTE — MR AVS SNAPSHOT
After Visit Summary   11/21/2018    Mihir Waldron    MRN: [id number]           Patient Information     Date Of Birth          1990        Visit Information        Provider Department      11/21/2018 6:30 PM Fuentes Green MD [facility]        Today's Diagnoses     Fatigue, unspecified type    -  1    CHIDI (generalized anxiety disorder)        Moderate episode of recurrent major depressive disorder (H)        Pain in joint, multiple sites           Follow-ups after your visit        Who to contact     If you have questions or need follow up information about today's clinic visit or your schedule please contact [facility] directly at [phone].  Normal or non-critical lab and imaging results will be communicated to you by MyChart, letter or phone within 4 business days after the clinic has received the results. If you do not hear from us within 7 days, please contact the clinic through Angie's Listhart or phone. If you have a critical or abnormal lab result, we will notify you by phone as soon as possible.  Submit refill requests through App.io or call your pharmacy and they will forward the refill request to us. Please allow 3 business days for your refill to be completed.          Additional Information About Your Visit        MyChart Information     App.io gives you secure access to your electronic health record. If you see a primary care provider, you can also send messages to your care team and make appointments. If you have questions, please call your primary care clinic.  If you do not have a primary care provider, please call [phone] and they will assist you.        Care EveryWhere ID     This is your Care EveryWhere ID. This could be used by other organizations to access your [place] medical records  [id number]        Your Vitals Were     Pulse Temperature Height Last Period Pulse Oximetry BMI (Body Mass Index)    85 98.3  F  "(36.8  C) (Oral) 1.727 m (5' 8\") 11/18/2018 99% 29.98 kg/m2       Blood Pressure from Last 3 Encounters:   11/21/18 110/68   10/23/18 110/64   08/27/18 124/66    Weight from Last 3 Encounters:   11/21/18 89.4 kg (197 lb 3.2 oz)   10/23/18 88.2 kg (194 lb 6.4 oz)   08/27/18 87 kg (191 lb 12.8 oz)              We Performed the Following     ANTINUCLEAR ANTIBODIES     RHEUMATOID FACTOR (QUEST)     TSH with free T4 reflex (QUEST)     VENOUS COLLECTION     Vitamin D deficiency screening (QUEST)        Primary Care Provider Office Phone # Fax #    Fuentes Green [phone] [address] [address]        Equal Access to Services     [facility] : Hadii alexia do hadasho Soomaali, waaxda luqadaha, qaybta kaalmada adeegyada, tatiana francisco haytamia guillen . So [facility] [phone].    ATENCIÓN: Si habla español, tiene a ventura disposición servicios gratuitos de asistencia lingüística. Llame al [phone].    We comply with applicable federal civil rights laws and [place] laws. We do not discriminate on the basis of race, color, national origin, age, disability, sex, sexual orientation, or gender identity.            Thank you!     Thank you for choosing [facility]  for your care. Our goal is always to provide you with excellent care. Hearing back from our patients is one way we can continue to improve our services. Please take a few minutes to complete the written survey that you may receive in the mail after your visit with us. Thank you!             Your Updated Medication List - Protect others around you: Learn how to safely use, store and throw away your medicines at www.disposemymeds.org.          This list is accurate as of 11/21/18  6:57 PM.  Always use your most recent med list.                   Brand Name Dispense Instructions for use Diagnosis    ALPRAZolam 0.5 MG tablet    XANAX    10 tablet    Take 1 tablet (0.5 mg) by mouth " 3 times daily as needed for anxiety    CHIDI (generalized anxiety disorder), Panic attack       EPINEPHrine 0.3 MG/0.3ML injection 2-pack    EPIPEN/ADRENACLICK/or ANY BX GENERIC EQUIV    0.3 mL    Inject 0.3 mLs (0.3 mg) into the muscle once as needed    Hx of anaphylaxis       MAXIMUM D3 68219 units Caps   Generic drug:  Cholecalciferol

## 2018-11-22 ASSESSMENT — ANXIETY QUESTIONNAIRES: GAD7 TOTAL SCORE: 20

## 2018-11-22 NOTE — NURSING NOTE
Mihir is here today to discuss her hormones and feeling off.    Pre-visit Screening:  Immunizations:  up to date  Colonoscopy:  NA  Mammogram: NA  Asthma Action Test/Plan:  NA  PHQ9:  Done today  GAD7:  Done today  Questioned patient about current smoking habits Pt. has never smoked.  Ok to leave detailed message on voice mail for today's visit only Yes, phone # [phone]

## 2018-11-22 NOTE — PROGRESS NOTES
"SUBJECTIVE:  Mihir Waldron, a 28 year old female scheduled an appointment to discuss the following issues:     Fatigue, unspecified type  CHIDI (generalized anxiety disorder)  Moderate episode of recurrent major depressive disorder (H)  Pain in joint, multiple sites  Mihir comes in today stating she just does not feel well at all.  She relates weeks of fatigue and depression/anxiety.  She has had mood issues since childhood-tried on serotonin specific reuptake inhibitor's including prozac, citalopram, lexapro all without any improvement.  She has seen several therapists-not currently but does not feel it helped.  She does have a mentor at [employer] she meets with regularly.  She exercises, reads, goes on date nights, eats well and still feels no better.  She absolutely denies any suicidal of homicidal thinking.  She has \"great kids\" and a great .     Pt previously saw psychiatry at [facility]     She feels she needs \"all of my hormones\" checked.  \"Something is not right\"    She notes pain in many joints most of the time.  She did see sports med for her knee and was recommended to PT but she did not follow through.    Pt did have normal TSH and mono, CBC in July    Medical, social, surgical, and family histories reviewed.    Patient Active Problem List   Diagnosis     Sinus tachycardia     Postural orthostatic tachycardia syndrome     Health Care Home     ACP (advance care planning)     Post partum depression     CHIDI (generalized anxiety disorder)     Hx of anaphylaxis     Past Medical History:   Diagnosis Date     Anxiety      Depressive disorder      Postpartum depression      POTS (postural orthostatic tachycardia syndrome)      POTS (postural orthostatic tachycardia syndrome)      Family History   Problem Relation Age of Onset     Coronary Artery Disease Mother      Depression Mother      Anxiety Disorder Mother      Hypertension Father      Anxiety Disorder Father      Depression Father      " Depression Sister      Anxiety Disorder Sister      Diabetes Maternal Grandmother      Diabetes Paternal Grandmother      Cerebrovascular Disease No family hx of      Breast Cancer No family hx of      Colon Cancer No family hx of      Social History     Social History     Marital status:      Spouse name: Tee     Number of children: 2     Years of education: N/A     Occupational History     Not on file.     Social History Main Topics     Smoking status: Never Smoker     Smokeless tobacco: Never Used     Alcohol use No     Drug use: No     Sexual activity: Not Currently     Partners: Male     Birth control/ protection: Surgical     Other Topics Concern     Caffeine Concern No     Special Diet No     Exercise No     Seat Belt Yes     Parent/Sibling W/ Cabg, Mi Or Angioplasty Before 65f 55m? No     Social History Narrative    ** Merged History Encounter **          Past Surgical History:   Procedure Laterality Date     DENTAL SURGERY Bilateral        Current Outpatient Prescriptions on File Prior to Visit:  EPINEPHrine (EPIPEN/ADRENACLICK/OR ANY BX GENERIC EQUIV) 0.3 MG/0.3ML injection 2-pack Inject 0.3 mLs (0.3 mg) into the muscle once as needed   ALPRAZolam (XANAX) 0.5 MG tablet Take 1 tablet (0.5 mg) by mouth 3 times daily as needed for anxiety     No current facility-administered medications on file prior to visit.      Allergies: Benadryl [diphenhydramine]; Codeine sulfate; Contrast dye; and Latex    Immunization History   Administered Date(s) Administered     Influenza (IIV3) PF 11/21/2014     Rhogam 06/26/2015, 10/12/2016     TDAP Vaccine (Boostrix) 05/11/2015, 08/08/2016        ROS:  CONSTITUTIONAL: NEGATIVE for fever, chills  INTEGUMENTARY/SKIN: NEGATIVE for worrisome rashes, moles or lesions  EYES: NEGATIVE for vision changes   RESP: NEGATIVE for significant cough or SOB  CV: NEGATIVE for chest pain, palpitations   GI: NEGATIVE for nausea, abdominal pain, heartburn, or change in bowel habits  :  "NEGATIVE for frequency, dysuria, or hematuria  NEURO: NEGATIVE for weakness, dizziness or paresthesias or headache  ENDOCRINE: NEGATIVE for temperature intolerance, skin/hair changes  HEME: NEGATIVE for bleeding problems  PSYCHIATRIC: NEGATIVE for changes in mood or affect    OBJECTIVE:  /68 (BP Location: Right arm, Patient Position: Sitting, Cuff Size: Adult Large)  Pulse 85  Temp 98.3  F (36.8  C) (Oral)  Ht 1.727 m (5' 8\")  Wt 89.4 kg (197 lb 3.2 oz)  LMP 11/18/2018  SpO2 99%  BMI 29.98 kg/m2  EXAM:  GENERAL APPEARANCE: healthy, alert and no distress  EYES: EOMI,  PERRL  HENT: ear canals and TM's normal and nose and mouth without ulcers or lesions  RESP: lungs clear to auscultation - no rales, rhonchi or wheezes  CV: regular rates and rhythm, normal S1 S2, no S3 or S4 and no murmur, click or rub -  ABDOMEN:  soft, nontender, no HSM or masses and bowel sounds normal  MS: extremities normal- no gross deformities noted, no evidence of inflammation in joints, FROM in all extremities.  SKIN: no suspicious lesions or rashes  NEURO: Normal strength and tone, sensory exam grossly normal, mentation intact and speech normal  PSYCH: mentation appears normal and crying some but also happy and smiling when referring to kids    ASSESSMENT/PLAN:  (R53.83) Fatigue, unspecified type  (primary encounter diagnosis)  Comment: differential diagnosis includes anemia, hypothyroidism, depression, sleep problems (apnea), chronic illness, low testosterone,  and medications   -I suspect there is some mood component here but feel it reasonable to check some labs-wonder if she may need to see endocrine or possibly rheumatology  Plan: RHEUMATOID FACTOR (QUEST), ANTINUCLEAR         ANTIBODIES, TSH with free T4 reflex (QUEST),         Vitamin D deficiency screening (QUEST), VENOUS         COLLECTION        Check labs, consider referral-maybe functional med as we do not check hormones generally    (F41.1) CHIDI (generalized anxiety " disorder)  Comment: Patient is having persistent symptoms despite previous therapies. Not worse but not great-discussed my concerrns that this is at the very least contributing to symptoms -options discussed  Plan: she wants to get labs first-recommend she consider seeing psych again or possibly trying an SNRI which we could do here-will discuss following lab results    (F33.1) Moderate episode of recurrent major depressive disorder (H)  Comment: as above  Plan: as abov3    (M25.50) Pain in joint, multiple sites  Comment: will check some screening labs-doubt autoimmune issue  Plan: RHEUMATOID FACTOR (QUEST), ANTINUCLEAR         ANTIBODIES, TSH with free T4 reflex (QUEST),         Vitamin D deficiency screening (QUEST), VENOUS         COLLECTION        Await labs

## 2018-11-24 LAB
ANA SCREEN - QUEST: NEGATIVE
RHEUMATOID FACT SER NEPH-ACNC: <14 IU/ML (ref 0–20)
TSH SERPL-ACNC: 1.94 MIU/L
VITAMIN D, 25-OH, TOTAL - QUEST: 31 NG/ML (ref 30–100)

## 2018-11-28 NOTE — PROGRESS NOTES
Order(s) created erroneously. Erroneous order ID: [id number]   Order canceled by: ENEIDA TEJEDA   Order cancel date/time: 11/28/2018 2:31 PM

## 2018-12-02 ENCOUNTER — APPOINTMENT (OUTPATIENT)
Dept: GENERAL RADIOLOGY | Facility: CLINIC | Age: 28
End: 2018-12-02
Attending: EMERGENCY MEDICINE
Payer: COMMERCIAL

## 2018-12-02 ENCOUNTER — HOSPITAL ENCOUNTER (EMERGENCY)
Facility: CLINIC | Age: 28
Discharge: HOME | End: 2018-12-02
Attending: EMERGENCY MEDICINE | Admitting: EMERGENCY MEDICINE
Payer: COMMERCIAL

## 2018-12-02 VITALS
SYSTOLIC BLOOD PRESSURE: 130 MMHG | OXYGEN SATURATION: 98 % | TEMPERATURE: 98.1 F | RESPIRATION RATE: 24 BRPM | DIASTOLIC BLOOD PRESSURE: 89 MMHG

## 2018-12-02 DIAGNOSIS — S90.511A ABRASION OF RIGHT ANKLE WITHOUT INFECTION, INITIAL ENCOUNTER: ICD-10-CM

## 2018-12-02 DIAGNOSIS — M25.571 PAIN IN JOINT INVOLVING ANKLE AND FOOT, RIGHT: ICD-10-CM

## 2018-12-02 DIAGNOSIS — M79.674 PAIN OF TOE OF RIGHT FOOT: ICD-10-CM

## 2018-12-02 PROCEDURE — 73610 X-RAY EXAM OF ANKLE: CPT | Mod: RT

## 2018-12-02 PROCEDURE — 73630 X-RAY EXAM OF FOOT: CPT | Mod: RT

## 2018-12-02 PROCEDURE — 99285 EMERGENCY DEPT VISIT HI MDM: CPT

## 2018-12-02 PROCEDURE — 96374 THER/PROPH/DIAG INJ IV PUSH: CPT

## 2018-12-02 PROCEDURE — 25000128 H RX IP 250 OP 636: Performed by: EMERGENCY MEDICINE

## 2018-12-02 PROCEDURE — 96375 TX/PRO/DX INJ NEW DRUG ADDON: CPT

## 2018-12-02 RX ORDER — HYDROCODONE BITARTRATE AND ACETAMINOPHEN 5; 325 MG/1; MG/1
1 TABLET ORAL EVERY 6 HOURS PRN
Qty: 10 TABLET | Refills: 0 | Status: SHIPPED | OUTPATIENT
Start: 2018-12-02 | End: 2018-12-18

## 2018-12-02 RX ORDER — HYDROMORPHONE HYDROCHLORIDE 1 MG/ML
0.5 INJECTION, SOLUTION INTRAMUSCULAR; INTRAVENOUS; SUBCUTANEOUS
Status: DISCONTINUED | OUTPATIENT
Start: 2018-12-02 | End: 2018-12-02 | Stop reason: HOSPADM

## 2018-12-02 RX ORDER — ONDANSETRON 2 MG/ML
4 INJECTION INTRAMUSCULAR; INTRAVENOUS EVERY 30 MIN PRN
Status: DISCONTINUED | OUTPATIENT
Start: 2018-12-02 | End: 2018-12-02 | Stop reason: HOSPADM

## 2018-12-02 RX ADMIN — ONDANSETRON 4 MG: 2 INJECTION INTRAMUSCULAR; INTRAVENOUS at 12:48

## 2018-12-02 RX ADMIN — Medication 0.5 MG: at 12:48

## 2018-12-02 ASSESSMENT — ENCOUNTER SYMPTOMS: ARTHRALGIAS: 1

## 2018-12-02 NOTE — ED AVS SNAPSHOT
[facility] Emergency Department    [address]    [facility] [address]    Phone:  [phone]    Fax:  [phone]                                       Mihir Waldron   MRN: [id number]    Department:  [facility] Emergency Department   Date of Visit:  12/2/2018           After Visit Summary Signature Page     I have received my discharge instructions, and my questions have been answered. I have discussed any challenges I see with this plan with the nurse or doctor.    ..........................................................................................................................................  Patient/Patient Representative Signature      ..........................................................................................................................................  Patient Representative Print Name and Relationship to Patient    ..................................................               ................................................  Date                                   Time    ..........................................................................................................................................  Reviewed by Signature/Title    ...................................................              ..............................................  Date                                               Time          22EPIC Rev 08/18

## 2018-12-18 ENCOUNTER — OFFICE VISIT (OUTPATIENT)
Dept: FAMILY MEDICINE | Facility: CLINIC | Age: 28
End: 2018-12-18

## 2018-12-18 VITALS
HEART RATE: 105 BPM | WEIGHT: 198.2 LBS | TEMPERATURE: 98.7 F | DIASTOLIC BLOOD PRESSURE: 80 MMHG | SYSTOLIC BLOOD PRESSURE: 122 MMHG | RESPIRATION RATE: 16 BRPM | BODY MASS INDEX: 30.04 KG/M2 | OXYGEN SATURATION: 98 % | HEIGHT: 68 IN

## 2018-12-18 DIAGNOSIS — S61.211A LACERATION OF LEFT INDEX FINGER WITHOUT FOREIGN BODY WITHOUT DAMAGE TO NAIL, INITIAL ENCOUNTER: Primary | ICD-10-CM

## 2018-12-18 PROCEDURE — 12001 RPR S/N/AX/GEN/TRNK 2.5CM/<: CPT | Performed by: FAMILY MEDICINE

## 2018-12-18 ASSESSMENT — MIFFLIN-ST. JEOR: SCORE: 1682.53

## 2018-12-19 NOTE — PROGRESS NOTES
SUBJECTIVE:   28 year old female sustained laceration of finger 2 hours ago. Nature of injury: cut her left pointer finger with a knife peeling apples. Tetanus vaccination status reviewed: last tetanus booster within 10 years.     OBJECTIVE:   Patient appears well, vitals are normal. Laceration 2 cm noted.  Description: clean wound edges, no foreign bodies. Neurovascular and tendon structures are intact.    ASSESSMENT:   Laceration as described.    PLAN:   Anesthesia with Lidocaine 1% plain. Wound cleansed, debrided of visible foreign material and necrotic tissue, and sutured.5-0 2 sutures  Dressing applied.  Wound care instructions provided.  Observe for any signs of infection or other problems.  Return for suture removal in 10 days.

## 2018-12-19 NOTE — NURSING NOTE
Mihir is here for cut on her left hand 2nd digit    Pre-visit Screening:  Immunizations:  up to date declines flu  Colonoscopy:  NA  Mammogram: NA  Asthma Action Test/Plan:  NA  PHQ9:  NA  GAD7:  NA  Questioned patient about current smoking habits Pt. has never smoked.  Ok to leave detailed message on voice mail for today's visit only Yes, phone # [phone]

## 2018-12-31 ENCOUNTER — HOSPITAL ENCOUNTER (EMERGENCY)
Facility: CLINIC | Age: 28
Discharge: HOME | End: 2018-12-31
Attending: EMERGENCY MEDICINE | Admitting: EMERGENCY MEDICINE
Payer: COMMERCIAL

## 2018-12-31 ENCOUNTER — APPOINTMENT (OUTPATIENT)
Dept: GENERAL RADIOLOGY | Facility: CLINIC | Age: 28
End: 2018-12-31
Attending: EMERGENCY MEDICINE
Payer: COMMERCIAL

## 2018-12-31 VITALS
HEART RATE: 113 BPM | SYSTOLIC BLOOD PRESSURE: 146 MMHG | BODY MASS INDEX: 29.55 KG/M2 | TEMPERATURE: 98.2 F | OXYGEN SATURATION: 97 % | WEIGHT: 195 LBS | DIASTOLIC BLOOD PRESSURE: 97 MMHG | RESPIRATION RATE: 11 BRPM | HEIGHT: 68 IN

## 2018-12-31 DIAGNOSIS — J11.1 INFLUENZA-LIKE ILLNESS: ICD-10-CM

## 2018-12-31 DIAGNOSIS — E87.6 HYPOKALEMIA: ICD-10-CM

## 2018-12-31 LAB
ANION GAP SERPL CALCULATED.3IONS-SCNC: 6 MMOL/L (ref 3–14)
B-HCG FREE SERPL-ACNC: <5 IU/L
BASOPHILS # BLD AUTO: 0 10E9/L (ref 0–0.2)
BASOPHILS NFR BLD AUTO: 0.3 %
BUN SERPL-MCNC: 7 MG/DL (ref 7–30)
CALCIUM SERPL-MCNC: 8.5 MG/DL (ref 8.5–10.1)
CHLORIDE SERPL-SCNC: 107 MMOL/L (ref 94–109)
CO2 SERPL-SCNC: 27 MMOL/L (ref 20–32)
CREAT SERPL-MCNC: 0.54 MG/DL (ref 0.52–1.04)
D DIMER PPP FEU-MCNC: <0.3 UG/ML FEU (ref 0–0.5)
DEPRECATED S PYO AG THROAT QL EIA: NORMAL
DIFFERENTIAL METHOD BLD: ABNORMAL
EOSINOPHIL # BLD AUTO: 0.2 10E9/L (ref 0–0.7)
EOSINOPHIL NFR BLD AUTO: 1.3 %
ERYTHROCYTE [DISTWIDTH] IN BLOOD BY AUTOMATED COUNT: 12.4 % (ref 10–15)
GFR SERPL CREATININE-BSD FRML MDRD: >90 ML/MIN/{1.73_M2}
GLUCOSE SERPL-MCNC: 100 MG/DL (ref 70–99)
HCT VFR BLD AUTO: 43.1 % (ref 35–47)
HETEROPH AB SER QL: NEGATIVE
HGB BLD-MCNC: 14.2 G/DL (ref 11.7–15.7)
IMM GRANULOCYTES # BLD: 0.1 10E9/L (ref 0–0.4)
IMM GRANULOCYTES NFR BLD: 0.4 %
INTERPRETATION ECG - MUSE: NORMAL
LYMPHOCYTES # BLD AUTO: 1.7 10E9/L (ref 0.8–5.3)
LYMPHOCYTES NFR BLD AUTO: 14.2 %
MCH RBC QN AUTO: 29 PG (ref 26.5–33)
MCHC RBC AUTO-ENTMCNC: 32.9 G/DL (ref 31.5–36.5)
MCV RBC AUTO: 88 FL (ref 78–100)
MONOCYTES # BLD AUTO: 0.7 10E9/L (ref 0–1.3)
MONOCYTES NFR BLD AUTO: 6.3 %
NEUTROPHILS # BLD AUTO: 9 10E9/L (ref 1.6–8.3)
NEUTROPHILS NFR BLD AUTO: 77.5 %
NRBC # BLD AUTO: 0 10*3/UL
NRBC BLD AUTO-RTO: 0 /100
PLATELET # BLD AUTO: 230 10E9/L (ref 150–450)
POTASSIUM SERPL-SCNC: 3.3 MMOL/L (ref 3.4–5.3)
RBC # BLD AUTO: 4.89 10E12/L (ref 3.8–5.2)
SODIUM SERPL-SCNC: 140 MMOL/L (ref 133–144)
SPECIMEN SOURCE: NORMAL
WBC # BLD AUTO: 11.6 10E9/L (ref 4–11)

## 2018-12-31 PROCEDURE — 87081 CULTURE SCREEN ONLY: CPT | Performed by: EMERGENCY MEDICINE

## 2018-12-31 PROCEDURE — 99285 EMERGENCY DEPT VISIT HI MDM: CPT | Mod: 25

## 2018-12-31 PROCEDURE — 87880 STREP A ASSAY W/OPTIC: CPT | Performed by: EMERGENCY MEDICINE

## 2018-12-31 PROCEDURE — 85025 COMPLETE CBC W/AUTO DIFF WBC: CPT | Performed by: EMERGENCY MEDICINE

## 2018-12-31 PROCEDURE — 80048 BASIC METABOLIC PNL TOTAL CA: CPT | Performed by: EMERGENCY MEDICINE

## 2018-12-31 PROCEDURE — 93005 ELECTROCARDIOGRAM TRACING: CPT

## 2018-12-31 PROCEDURE — 84702 CHORIONIC GONADOTROPIN TEST: CPT

## 2018-12-31 PROCEDURE — 96360 HYDRATION IV INFUSION INIT: CPT

## 2018-12-31 PROCEDURE — 85379 FIBRIN DEGRADATION QUANT: CPT | Performed by: EMERGENCY MEDICINE

## 2018-12-31 PROCEDURE — 71046 X-RAY EXAM CHEST 2 VIEWS: CPT

## 2018-12-31 PROCEDURE — 25000131 ZZH RX MED GY IP 250 OP 636 PS 637: Performed by: EMERGENCY MEDICINE

## 2018-12-31 PROCEDURE — 25000128 H RX IP 250 OP 636: Performed by: EMERGENCY MEDICINE

## 2018-12-31 PROCEDURE — 94640 AIRWAY INHALATION TREATMENT: CPT

## 2018-12-31 PROCEDURE — 25000132 ZZH RX MED GY IP 250 OP 250 PS 637: Performed by: EMERGENCY MEDICINE

## 2018-12-31 PROCEDURE — 86308 HETEROPHILE ANTIBODY SCREEN: CPT | Performed by: EMERGENCY MEDICINE

## 2018-12-31 PROCEDURE — 25000125 ZZHC RX 250: Performed by: EMERGENCY MEDICINE

## 2018-12-31 PROCEDURE — 96361 HYDRATE IV INFUSION ADD-ON: CPT

## 2018-12-31 RX ORDER — POTASSIUM CHLORIDE 1500 MG/1
40 TABLET, EXTENDED RELEASE ORAL ONCE
Status: COMPLETED | OUTPATIENT
Start: 2018-12-31 | End: 2018-12-31

## 2018-12-31 RX ORDER — IBUPROFEN 600 MG/1
600 TABLET, FILM COATED ORAL ONCE
Status: COMPLETED | OUTPATIENT
Start: 2018-12-31 | End: 2018-12-31

## 2018-12-31 RX ORDER — ALBUTEROL SULFATE 0.83 MG/ML
2.5 SOLUTION RESPIRATORY (INHALATION) ONCE
Status: COMPLETED | OUTPATIENT
Start: 2018-12-31 | End: 2018-12-31

## 2018-12-31 RX ORDER — ONDANSETRON 4 MG/1
4 TABLET, ORALLY DISINTEGRATING ORAL ONCE
Status: COMPLETED | OUTPATIENT
Start: 2018-12-31 | End: 2018-12-31

## 2018-12-31 RX ORDER — DEXAMETHASONE 4 MG/1
12 TABLET ORAL ONCE
Status: COMPLETED | OUTPATIENT
Start: 2018-12-31 | End: 2018-12-31

## 2018-12-31 RX ORDER — BENZONATATE 100 MG/1
200 CAPSULE ORAL ONCE
Status: COMPLETED | OUTPATIENT
Start: 2018-12-31 | End: 2018-12-31

## 2018-12-31 RX ORDER — BENZONATATE 200 MG/1
200 CAPSULE ORAL 3 TIMES DAILY PRN
Qty: 21 CAPSULE | Refills: 0 | Status: SHIPPED | OUTPATIENT
Start: 2018-12-31 | End: 2019-01-08

## 2018-12-31 RX ORDER — ONDANSETRON 4 MG/1
4 TABLET, ORALLY DISINTEGRATING ORAL EVERY 8 HOURS PRN
Qty: 10 TABLET | Refills: 0 | Status: SHIPPED | OUTPATIENT
Start: 2018-12-31 | End: 2019-03-13

## 2018-12-31 RX ADMIN — SODIUM CHLORIDE 1000 ML: 9 INJECTION, SOLUTION INTRAVENOUS at 07:55

## 2018-12-31 RX ADMIN — ONDANSETRON 4 MG: 4 TABLET, ORALLY DISINTEGRATING ORAL at 09:25

## 2018-12-31 RX ADMIN — SODIUM CHLORIDE 1000 ML: 9 INJECTION, SOLUTION INTRAVENOUS at 08:36

## 2018-12-31 RX ADMIN — POTASSIUM CHLORIDE 40 MEQ: 1500 TABLET, EXTENDED RELEASE ORAL at 09:44

## 2018-12-31 RX ADMIN — IBUPROFEN 600 MG: 600 TABLET ORAL at 06:54

## 2018-12-31 RX ADMIN — DEXAMETHASONE 12 MG: 4 TABLET ORAL at 08:01

## 2018-12-31 RX ADMIN — ALBUTEROL SULFATE 2.5 MG: 2.5 SOLUTION RESPIRATORY (INHALATION) at 06:54

## 2018-12-31 RX ADMIN — BENZONATATE 200 MG: 100 CAPSULE ORAL at 06:54

## 2018-12-31 ASSESSMENT — ENCOUNTER SYMPTOMS
SORE THROAT: 1
APPETITE CHANGE: 1
FEVER: 0
VOMITING: 1
ABDOMINAL PAIN: 0
ARTHRALGIAS: 0
MYALGIAS: 1
COUGH: 1
JOINT SWELLING: 0
HEADACHES: 1
CONSTIPATION: 0
SHORTNESS OF BREATH: 1
DIARRHEA: 0

## 2018-12-31 ASSESSMENT — MIFFLIN-ST. JEOR: SCORE: 1668.01

## 2018-12-31 NOTE — ED AVS SNAPSHOT
[facility] [place] [facility]  [address]  [facility] [address]  Phone:  [phone]  Fax:  [phone]                                    Mihir Waldron   MRN: [id number]    Department:  [facility] [place] [facility]   Date of Visit:  12/31/2018           After Visit Summary Signature Page    I have received my discharge instructions, and my questions have been answered. I have discussed any challenges I see with this plan with the nurse or doctor.    ..........................................................................................................................................  Patient/Patient Representative Signature      ..........................................................................................................................................  Patient Representative Print Name and Relationship to Patient    ..................................................               ................................................  Date                                   Time    ..........................................................................................................................................  Reviewed by Signature/Title    ...................................................              ..............................................  Date                                               Time          22EPIC Rev 08/18

## 2018-12-31 NOTE — ED PROVIDER NOTES
History     Chief Complaint:  Cough     HPI   Mihir Waldron is a 28 year old, otherwise healthy female who presents to the emergency department for evaluation of a cough and other cold symptoms. She reports her symptoms initially presented 5 days ago with a cough and sore throat. She began taking Dayquil and Nyquil at that time, but reports her symptoms have progressively worsened. Today, she woke up feeling increasingly short of breath at 0400. She took one dose of Dayquil at 0500, but had an episode of post-tussive emesis afterwards so she decided to present to the ED. Here, she reports additional headaches, myalgias, and an inability to tolerate fluids. She denies any fevers, chest pain, abdominal pain, urinary symptoms, stool changes, arthralgias, or rashes. She denies any recent travel or exposures, however her children are ill with similar symptoms. Additionally, she denies any chance of pregnancy due to regular menstrual cycles. Of note, she has a history of pneumonia which began similarly to today's symptoms. She denies any history of mononucleosis.     Allergies:  Benadryl (IV)  Codeine   Contrast Dye  Latex      Medications:    The patient is currently on no regular medications.    Past Medical History:    Anxiety & Depression  Pneumonia  POTS    Past Surgical History:    Dental surgery    Family History:    CAD  Anxiety & Depression  HTN    Social History:  Marital Status:   [2]  Negative for tobacco use.  Negative for alcohol use.     Review of Systems   Constitutional: Positive for appetite change. Negative for fever.   HENT: Positive for sore throat.    Respiratory: Positive for cough and shortness of breath.    Cardiovascular: Negative for chest pain.   Gastrointestinal: Positive for vomiting (post tussive). Negative for abdominal pain, constipation and diarrhea.   Genitourinary: Negative.    Musculoskeletal: Positive for myalgias. Negative for arthralgias and joint swelling.   Skin:  "Negative for rash.   Neurological: Positive for headaches.   All other systems reviewed and are negative.      Physical Exam     Patient Vitals for the past 24 hrs:   BP Temp Temp src Pulse Heart Rate Resp SpO2 Height Weight   12/31/18 1000 (!) 146/97 -- -- 113 109 11 97 % -- --   12/31/18 0945 138/82 -- -- -- 108 12 97 % -- --   12/31/18 0930 -- -- -- -- 101 16 96 % -- --   12/31/18 0915 147/89 -- -- -- 104 22 100 % -- --   12/31/18 0900 122/71 -- -- 99 95 15 98 % -- --   12/31/18 0845 127/87 -- -- -- 104 16 97 % -- --   12/31/18 0720 136/87 -- -- 116 -- -- 98 % -- --   12/31/18 0705 -- -- -- 107 -- -- -- -- --   12/31/18 0700 134/73 -- -- 107 -- -- 96 % -- --   12/31/18 0640 119/73 -- -- 85 -- -- 93 % -- --   12/31/18 0632 -- -- -- -- -- 28 -- -- --   12/31/18 0622 (!) 152/93 -- -- -- -- -- -- -- --   12/31/18 0620 -- 98.2  F (36.8  C) Oral 96 -- 24 99 % 1.727 m (5' 8\") 88.5 kg (195 lb)     Physical Exam  General: Adult female, sitting upright  Eyes: PERRL, Conjunctive within normal limits  ENT: Moist mucous membranes, oropharynx clear.   Neck: No rigidity. Mild anterior cervical lymphadenopathy bilaterally. No posterior lymphadenopathy.  CV: Normal S1S2, no murmur, rub or gallop. Regular rate and rhythm  Resp: Clear to auscultation bilaterally, no wheezes, rales or rhonchi. Cough prompted by deep breath. Normal respiratory effort.  GI: Abdomen is soft, nontender and nondistended. No palpable masses. No rebound or guarding.  MSK: No edema. Nontender. Normal active range of motion.  Skin: Warm and dry. No rashes or lesions or ecchymoses on visible skin.  Neuro: Alert and oriented. Responds appropriately to all questions and commands. No focal findings appreciated. Normal muscle tone.  Psych: Normal mood and affect. Pleasant.    Emergency Department Course   ECG:  Indication: Near Syncope  Time: 0739  Vent. Rate 109 bpm. HI interval 150. QRS duration 94. QT/QTc 340/457. P-R-T axis 84 72 22.  Sinus tachycardia. " Cannot rule out inferior infarct, age undetermined. Anterior infarct, age undetermined. Abnormal ECG. No significant change compared to EKG dated 4/15/18. Read time: 0745.    Imaging:  Radiographic findings were communicated with the patient who voiced understanding of the findings.    XR Chest PA & LAT:   No radiographic evidence of acute chest abnormality, as per radiology.     Laboratory:  Rapid strep screen: Negative    Beta strep group A culture: Pending    Mononucleosis screen: Negative    CBC: WBC: 11.6 (H), HGB: 14.2, PLT: 230    BMP: Glucose: 100, K: 3.3 (L), o/w WNL (Creatinine: 0.54)    D dimer: <0.3    ISTAT HCG: <5.0    Interventions:  0654 Ibuprofen, 600 mg, PO   Proventil, 2.5 mg, Nebulization   Tessalon, 200 mg, PO  0755 NS 1L IV  0801 Decadron, 12 mg, PO  0836 NS 1L IV  0925 Zofran-ODT 4mg disintegrating tablet PO  0944 Potassium chloride, 40 mEq, PO    Emergency Department Course:  (0630) Nursing notes and vitals reviewed. I performed an exam of the patient as documented above.      Medicine administered as documented above. Throat swabbed. This was sent to the lab for further testing, results above.     The patient was sent for a chest x-ray while in the emergency department, findings above.      EKG obtained in the ED, see results above.     (0730) Per nursing, the patient was near syncope after exiting the bathroom.    (0737) I rechecked the patient and discussed the results of her workup thus far.     Interventions as above.     Patient reassessed. Feeling much better. PO challenge.      (1000) I rechecked the patient. Tolerating po. No new concerns. Findings and plan explained to the Patient. Patient discharged home with instructions regarding supportive care, medications, and reasons to return. The importance of close follow-up was reviewed. The patient was prescribed Tessalon and Zofran.      I personally reviewed the laboratory and imaging results with the Patient and answered all related  questions prior to discharge.        Impression & Plan      Medical Decision Making:  Mihir Waldron is a 28 year old female who presents for evaluation of cough and myalgias.  This is consistent with an influenza like illness.  There is no indication for rapid testing. The patient is out of treatment window and recommendation for influenza. They are at risk for pneumonia but no signs of this are detected on today's visit.  Close followup of primary care physician is indicated within 2-3 days as needed and return to the ED for high fevers > 103 for more than 48 hours more, increasing productive cough, shortness of breath, or confusion.  There is no signs of serious bacterial infection such as bacteremia, meningitis, UTI/pyelonephritis, strep pharyngitis, etc.        Diagnosis:    ICD-10-CM    1. Influenza-like illness R69    2. Hypokalemia E87.6        Disposition:  discharged to home    Discharge Medications:     Medication List      Started    benzonatate 200 MG capsule  Commonly known as:  TESSALON  200 mg, Oral, 3 TIMES DAILY PRN     ondansetron 4 MG ODT tab  Commonly known as:  ZOFRAN ODT  4 mg, Oral, EVERY 8 HOURS PRN          Scribe Disclosure:  I, Farida Vergara, am serving as a scribe on 12/31/2018 at 6:30 AM to personally document services performed by Shannon Salcido MD based on my observations and the provider's statements to me.     Farida Vergara  12/31/2018   [facility] EMERGENCY DEPARTMENT       Shannon Salcido MD  01/01/19 2256

## 2018-12-31 NOTE — DISCHARGE INSTRUCTIONS
Discharge Instructions  Influenza or Influenza like illness    You were diagnosed today with influenza or influenza like illness.  Influenza is a respiratory (breathing) illness caused by influenza A or B viruses.  Influenza causes five primary symptoms: fever, headache, muscle aches/fatigue/malaise, sore throat and cough.  These symptoms start one to four days after you have been around a person with this illness. Influenza is spread through sneezing and coughing and can live on surfaces for several days.  It is usually contagious for 5 days but in some cases up to 10 days and often affects several family members. If you have a family member who is less than 2 years old, older than 65 years old, pregnant or has a serious medical condition, they should be seen right away by a provider to decide if they should take preventative medications. Although influenza will make you feel very ill, most patients don?t require any specific treatment. An antiviral medication might be prescribed for certain groups of patients (older patients, younger patients, and those with certain chronic medical problems).    Generally, every Emergency Department visit should have a follow-up clinic visit with either a primary or a specialty clinic/provider. Please follow-up as instructed by your emergency provider today.    Return to the Emergency Department if:  You have trouble breathing.  You develop pain in your chest.  You have signs of being dehydrated, such as dizziness or unable to urinate (pee) at least three times daily.  You are confused or severely weak.  You cannot stop vomiting (throwing up) or you cannot drink enough fluids.    In children, you should seek help if the child has any of the above or if child:  Has blue or purplish skin color.  Is so irritable that he or she does not want to be held.  Does not have tears when crying (in infants) or does not urinate at least three times daily.  Does not wake up easily.    What can I  do to help myself?  Rest.  Fluids -- Drink hydrating solutions such as Gatorade  or Pedialyte  as often as you can. If you are drinking enough, you should pass urine at least every eight hours.  Tylenol  (acetaminophen) and Advil  (ibuprofen) can relieve fever, headache, and muscle aches. Do not give aspirin to children under 18 years old.   Antiviral treatment -- Antiviral medicines do not make the flu symptoms go away immediately.  They have only been shown to make the symptoms go away 12 to 24 hours sooner than they would without treatment.     Antibiotics -- Antibiotics are NOT useful for treating viral illnesses such as influenza. Antibiotics should only be used if there is a bacterial complication of the flu such as bacterial pneumonia, ear infection, or sinusitis.  Because you were diagnosed with a flu like illness you are very contagious.  This means you cannot work, attend school or  for at least 24 hours or until you no longer have a fever.  If you were given a prescription for medicine here today, be sure to read all of the information (including the package insert) that comes with your prescription.  This will include important information about the medicine, its side effects, and any warnings that you need to know about.  The pharmacist who fills the prescription can provide more information and answer questions you may have about the medicine.  If you have questions or concerns that the pharmacist cannot address, please call or return to the Emergency Department.   Remember that you can always come back to the Emergency Department if you are not able to see your regular provider in the amount of time listed above, if you get any new symptoms, or if there is anything that worries you.

## 2019-01-02 ENCOUNTER — OFFICE VISIT (OUTPATIENT)
Dept: FAMILY MEDICINE | Facility: CLINIC | Age: 29
End: 2019-01-02

## 2019-01-02 VITALS
HEART RATE: 96 BPM | TEMPERATURE: 97.9 F | SYSTOLIC BLOOD PRESSURE: 114 MMHG | DIASTOLIC BLOOD PRESSURE: 72 MMHG | OXYGEN SATURATION: 98 % | WEIGHT: 195 LBS | BODY MASS INDEX: 29.55 KG/M2 | HEIGHT: 68 IN

## 2019-01-02 DIAGNOSIS — R68.89 FLU-LIKE SYMPTOMS: ICD-10-CM

## 2019-01-02 DIAGNOSIS — Z09 ENCOUNTER FOR EXAMINATION FOLLOWING TREATMENT AT HOSPITAL: Primary | ICD-10-CM

## 2019-01-02 DIAGNOSIS — E87.6 LOW BLOOD POTASSIUM: ICD-10-CM

## 2019-01-02 DIAGNOSIS — R19.7 DIARRHEA, UNSPECIFIED TYPE: ICD-10-CM

## 2019-01-02 LAB
% GRANULOCYTES: 75.1 %
BACTERIA SPEC CULT: NORMAL
FLUAV AG UPPER RESP QL IA.RAPID: NORMAL
FLUBV AG UPPER RESP QL IA.RAPID: NORMAL
HCT VFR BLD AUTO: 41.4 % (ref 35–47)
HEMOGLOBIN: 14 G/DL (ref 11.7–15.7)
LYMPHOCYTES NFR BLD AUTO: 16 %
Lab: NORMAL
MCH RBC QN AUTO: 29 PG (ref 26–33)
MCHC RBC AUTO-ENTMCNC: 33.8 G/DL (ref 31–36)
MCV RBC AUTO: 85.7 FL (ref 78–100)
MONOCYTES NFR BLD AUTO: 8.9 %
PLATELET COUNT - QUEST: 254 10^9/L (ref 150–375)
RBC # BLD AUTO: 4.83 10*12/L (ref 3.8–5.2)
SPECIMEN SOURCE: NORMAL
WBC # BLD AUTO: 12 10*9/L (ref 4–11)

## 2019-01-02 PROCEDURE — 99214 OFFICE O/P EST MOD 30 MIN: CPT | Performed by: FAMILY MEDICINE

## 2019-01-02 PROCEDURE — 84132 ASSAY OF SERUM POTASSIUM: CPT | Mod: 90 | Performed by: FAMILY MEDICINE

## 2019-01-02 PROCEDURE — 87804 INFLUENZA ASSAY W/OPTIC: CPT | Performed by: FAMILY MEDICINE

## 2019-01-02 PROCEDURE — 85025 COMPLETE CBC W/AUTO DIFF WBC: CPT | Performed by: FAMILY MEDICINE

## 2019-01-02 PROCEDURE — 36415 COLL VENOUS BLD VENIPUNCTURE: CPT | Performed by: FAMILY MEDICINE

## 2019-01-02 ASSESSMENT — MIFFLIN-ST. JEOR: SCORE: 1668.01

## 2019-01-02 NOTE — PROGRESS NOTES
SUBJECTIVE:   Mihir Waldron is a 28 year old female who presents to clinic today for the following health issues:      ED/UC Followup:    Facility:  [facility]   Date of visit: 12/31/18  Reason for visit: Flu like symptoms, URI, low potassium  Current Status: Still not feeling well, has a cough still and severe congestion, feels very weak and dehydrated again       PROBLEMS TO ADD ON...  Flu symptoms- cough, malaise, body aches  Sweating- diaphoresis  Diarrhea started on Wednesday (8 watery stools yesterday)  Cough started on Thursday  Feels like her lungs are tight  Nose is stuffed since yesterday    Medications: ibuprofen for the past week on and off  Stomach pains- ?? From ibuprofen  Two bags of fluids- in ER- still not urinating regularly    Mother of two young children- has a child with her today    Problem list and histories reviewed & adjusted, as indicated.  Additional history: as documented  Test results from ER reviewed  Negative strep- negative chest x-ray- negative mono  CBC- elevated WBC    Patient Active Problem List   Diagnosis     Sinus tachycardia     Postural orthostatic tachycardia syndrome     Health Care Home     ACP (advance care planning)     Post partum depression     CHIDI (generalized anxiety disorder)     Hx of anaphylaxis     Past Surgical History:   Procedure Laterality Date     DENTAL SURGERY Bilateral        Social History     Tobacco Use     Smoking status: Never Smoker     Smokeless tobacco: Never Used   Substance Use Topics     Alcohol use: No     Family History   Problem Relation Age of Onset     Coronary Artery Disease Mother      Depression Mother      Anxiety Disorder Mother      Hypertension Father      Anxiety Disorder Father      Depression Father      Depression Sister      Anxiety Disorder Sister      Diabetes Maternal Grandmother      Diabetes Paternal Grandmother      Cerebrovascular Disease No family hx of      Breast Cancer No family hx of      Colon Cancer No  "family hx of          Current Outpatient Medications   Medication Sig Dispense Refill     ALPRAZolam (XANAX) 0.5 MG tablet Take 1 tablet (0.5 mg) by mouth 3 times daily as needed for anxiety 10 tablet 0     benzonatate (TESSALON) 200 MG capsule Take 1 capsule (200 mg) by mouth 3 times daily as needed for cough 21 capsule 0     Cholecalciferol (MAXIMUM D3) 35918 units CAPS        EPINEPHrine (EPIPEN/ADRENACLICK/OR ANY BX GENERIC EQUIV) 0.3 MG/0.3ML injection 2-pack Inject 0.3 mLs (0.3 mg) into the muscle once as needed 0.3 mL 0     ondansetron (ZOFRAN ODT) 4 MG ODT tab Take 1 tablet (4 mg) by mouth every 8 hours as needed for nausea or vomiting 10 tablet 0       Reviewed and updated as needed this visit by clinical staff  Allergies       Reviewed and updated as needed this visit by Provider         ROS:  Constitutional, HEENT, cardiovascular, pulmonary, GI, , musculoskeletal, neuro, skin, endocrine and psych systems are negative, except as otherwise noted.    OBJECTIVE:     /72 (BP Location: Right arm, Patient Position: Sitting, Cuff Size: Adult Regular)   Pulse 96   Temp 97.9  F (36.6  C) (Oral)   Ht 1.727 m (5' 8\")   Wt 88.5 kg (195 lb)   SpO2 98%   BMI 29.65 kg/m    Body mass index is 29.65 kg/m .   No acute distress- but she appears ill  External ears  and canals clear bilaterally. TM's normal bilaterally. Nose normal without lesions or discharge. Oropharynx normal. Neck supple without palpable adenopathy.  Regular rate and  rhythm. S1 and S2 normal, no murmurs, clicks, gallops or rubs. No edema or JVD. Chest is clear; no wheezes or rales.  The abdomen is soft without tenderness, guarding, mass or organomegaly. Bowel sounds are normal. No CVA tenderness.    Flu Ag negative  WBC remains elevated 12,000 with left shift       ASSESSMENT/PLAN:     Problem List Items Addressed This Visit     None      Visit Diagnoses     Flu-like symptoms    -  Primary    Relevant Orders    CL AFF HEMOGRAM/PLATE/DIFF " ([facility])    VENOUS COLLECTION (Completed)    Influenza A and B ([facility])    Low blood potassium        Relevant Orders    POTASSIUM ([facility])    VENOUS COLLECTION (Completed)         Call back with progress tomorrow  Sports drinks encouraged- not eating much  Analgesics (tylenol)  ER if increasing weakness, dizziness, - malaise       Mirna Gambino MD  [facility]

## 2019-01-02 NOTE — RESULT ENCOUNTER NOTE
Final Beta strep group A r/o culture is NEGATIVE for Group A streptococcus.    No treatment or change in treatment per [facility] Strep protocol.

## 2019-01-03 LAB — POTASSIUM SERPL-SCNC: 4 MMOL/L (ref 3.5–5.3)

## 2019-01-08 ENCOUNTER — OFFICE VISIT (OUTPATIENT)
Dept: FAMILY MEDICINE | Facility: CLINIC | Age: 29
End: 2019-01-08

## 2019-01-08 VITALS
TEMPERATURE: 98.2 F | WEIGHT: 194 LBS | SYSTOLIC BLOOD PRESSURE: 114 MMHG | HEART RATE: 94 BPM | OXYGEN SATURATION: 98 % | DIASTOLIC BLOOD PRESSURE: 70 MMHG | BODY MASS INDEX: 29.4 KG/M2 | HEIGHT: 68 IN

## 2019-01-08 DIAGNOSIS — J01.00 ACUTE NON-RECURRENT MAXILLARY SINUSITIS: Primary | ICD-10-CM

## 2019-01-08 PROCEDURE — 99213 OFFICE O/P EST LOW 20 MIN: CPT | Performed by: PHYSICIAN ASSISTANT

## 2019-01-08 RX ORDER — AMOXICILLIN 875 MG
875 TABLET ORAL 2 TIMES DAILY
Qty: 20 TABLET | Refills: 0 | Status: SHIPPED | OUTPATIENT
Start: 2019-01-08 | End: 2019-03-13

## 2019-01-08 ASSESSMENT — MIFFLIN-ST. JEOR: SCORE: 1659.51

## 2019-01-08 NOTE — PROGRESS NOTES
"CC: Sinus infection    History:  12/31/2018 went to ER with bad cough for 5 days. Was dehydrated and found to have mildly low potassium of 3.3 as well as slight leukocytosis of 11.6. Discharged with diagnosis of flu like illness, and given tessalon perles, which did help with the coughing. Followed up in our clinic 1/2/2019, and fortunately, influenza A and B negative, potassium had returned to normal, but CBC was still showing WBC count of 12. Was instructed to continue monitoring this flu like illness.     Since that appt, Mihir is still having a lot of pressure over right cheek. Drainage down back of throat. Nasal congestion. Teeth on right side even hurt Symptoms if anything are getting worse.     PMH, MEDICATIONS, ALLERGIES, SOCIAL AND FAMILY HISTORY in [facility] and reviewed by me personally.      ROS negative other than the symptoms noted above in the HPI.        Examination   /70 (BP Location: Left arm, Patient Position: Sitting, Cuff Size: Adult Large)   Pulse 94   Temp 98.2  F (36.8  C) (Oral)   Ht 1.721 m (5' 7.75\")   Wt 88 kg (194 lb)   SpO2 98%   BMI 29.72 kg/m         Constitutional: Sitting comfortably, in no acute distress. Vital signs noted  Eyes: pupils equal round reactive to light and accomodation, extra ocular movements intact  Ears: external canals and TMs free of abnormalities  Nose: patent, without mucosal abnormalities. Moderate congestion left side.  Mouth and throat: without erythema or lesions of the mucosa  Neck:  no adenopathy, trachea midline and normal to palpation  Cardiovascular:  regular rate and rhythm, no murmurs, clicks, or gallops  Respiratory:  normal respiratory rate and rhythm, lungs clear to auscultation  SKIN: No jaundice/pallor/rash.   Psychiatric: mentation appears normal and affect normal/bright        A/P    ICD-10-CM    1. Acute non-recurrent maxillary sinusitis J01.00 amoxicillin (AMOXIL) 875 MG tablet       DISCUSSION:  Given duration of symptoms, recommended " Mihir complete 10 day course of amoxicillin (not Augmentin given generally sensitive stomach). She should take this twice daily with food, and should consider taking probiotic or eating yogurt in between. Warned her of possible side effects including loose stool. Provided her with recommendations on OTC therapies based on symptoms. She should contact me in 1 week if symptoms are not improving, or sooner with any intolerable side effects or worsening symptoms.    follow up visit: As needed    [facility]

## 2019-01-08 NOTE — NURSING NOTE
Mihir is here for severe pressure and congestion in sinuses for a couple weeks          Pre-visit Screening:  Immunizations:  up to date  Colonoscopy:  NA  Mammogram: NA  Asthma Action Test/Plan:  NA  PHQ9:  NA  GAD7:  NA to today's date  Questioned patient about current smoking habits Pt. has never smoked.  Ok to leave detailed message on voice mail for today's visit only Yes, phone # [phone]

## 2019-01-17 ENCOUNTER — OFFICE VISIT (OUTPATIENT)
Dept: FAMILY MEDICINE | Facility: CLINIC | Age: 29
End: 2019-01-17

## 2019-01-17 VITALS
BODY MASS INDEX: 29.72 KG/M2 | OXYGEN SATURATION: 98 % | WEIGHT: 194 LBS | SYSTOLIC BLOOD PRESSURE: 118 MMHG | TEMPERATURE: 98.2 F | DIASTOLIC BLOOD PRESSURE: 76 MMHG | HEART RATE: 83 BPM

## 2019-01-17 DIAGNOSIS — J20.9 ACUTE BRONCHITIS, UNSPECIFIED ORGANISM: Primary | ICD-10-CM

## 2019-01-17 PROCEDURE — 99213 OFFICE O/P EST LOW 20 MIN: CPT | Performed by: PHYSICIAN ASSISTANT

## 2019-01-17 RX ORDER — PREDNISONE 20 MG/1
20 TABLET ORAL 2 TIMES DAILY
Qty: 10 TABLET | Refills: 0 | Status: SHIPPED | OUTPATIENT
Start: 2019-01-17 | End: 2019-03-13

## 2019-01-17 NOTE — PATIENT INSTRUCTIONS
Unfortunately, this has transitioned into an acute bronchitis.  Recommend trial of prednisone steroid pill. 1 tablet twice daily with food for 5 days.   Monitor for side effects, upset stomach, drowsiness, stimulation, constipation, diarrhea, irritability, mental fogginess, and contact me if bothersome.   Contact me early next week if not significantly better.  Continue to use OTC cough suppressants like Mucinex DM.  Consider of Cepachol or Chloraseptic to use as lozenges that have lidocaine.

## 2019-01-17 NOTE — NURSING NOTE
Mihir is here today for an ongoing cough and SOB.    Pre-visit Screening:  Immunizations:  up to date  Colonoscopy:  NA  Mammogram: NA  Asthma Action Test/Plan:  NA  PHQ9:  NA  GAD7:  NA  Questioned patient about current smoking habits Pt. has never smoked.  Ok to leave detailed message on voice mail for today's visit only Yes, phone # [phone]

## 2019-01-17 NOTE — PROGRESS NOTES
CC: Cough, ongoing illness.     History:  Since Mihir's appointment 1/8/2019 (see note from that day), within 3 days of starting amoxicillin sinuses opened and felt much better. However, symptoms seemed to move down and settle into chest causing day and night cough especially the past 3 days. 2 days ago, in the evening, was having trouble laying flat due to shortness of breath and cough. Cough has been quite productive. Now getting pain in center of chest with deep breath when she cough. No fever, but has had sweats and chills.     PMH, MEDICATIONS, ALLERGIES, SOCIAL AND FAMILY HISTORY in [facility] and reviewed by me personally.      ROS negative other than the symptoms noted above in the HPI.        Examination   /76 (BP Location: Left arm, Patient Position: Sitting, Cuff Size: Adult Large)   Pulse 83   Temp 98.2  F (36.8  C) (Oral)   Wt 88 kg (194 lb)   SpO2 98%   BMI 29.72 kg/m         Constitutional: Sitting comfortably, in no acute distress. Vital signs noted  Eyes: pupils equal round reactive to light and accomodation, extra ocular movements intact  Ears: external canals and TMs free of abnormalities  Nose: patent, without mucosal abnormalities  Mouth and throat: without erythema or lesions of the mucosa  Neck:  no adenopathy, trachea midline and normal to palpation  Cardiovascular:  regular rate and rhythm, no murmurs, clicks, or gallops  Respiratory:  normal respiratory rate and rhythm, lungs clear to auscultation, other than moderate rhonchi throughout  SKIN: No jaundice/pallor/rash.   Psychiatric: mentation appears normal and affect normal/bright        A/P    ICD-10-CM    1. Acute bronchitis, unspecified organism J20.9 predniSONE (DELTASONE) 20 MG tablet       DISCUSSION:  Unfortunately, this appears to have transitioned into an acute bronchitis.  Recommend trial of prednisone steroid pill. 1 tablet twice daily with food for 5 days.   Monitor for side effects, upset stomach, drowsiness, stimulation,  constipation, diarrhea, irritability, mental fogginess, and contact me if bothersome.   Contact me early next week if not significantly better.  Continue to use OTC cough suppressants like Mucinex DM.  Consider of Cepachol or Chloraseptic to use as lozenges that have lidocaine.      follow up visit: As needed    Lillian Zambrano PA-C  [facility]

## 2019-01-24 ENCOUNTER — MYC MEDICAL ADVICE (OUTPATIENT)
Dept: FAMILY MEDICINE | Facility: CLINIC | Age: 29
End: 2019-01-24

## 2019-01-24 DIAGNOSIS — J20.9 ACUTE BRONCHITIS, UNSPECIFIED ORGANISM: Primary | ICD-10-CM

## 2019-01-24 RX ORDER — AZITHROMYCIN 250 MG/1
TABLET, FILM COATED ORAL
Qty: 6 TABLET | Refills: 0 | Status: SHIPPED | OUTPATIENT
Start: 2019-01-24 | End: 2019-03-13

## 2019-01-24 NOTE — TELEPHONE ENCOUNTER
From: Mihir Waldron  To: Lillian Zambrano PA-C  Sent: 1/24/2019 12:51 PM CST  Subject: Updates about my health    Hello,      Just following up with you after my course of antibiotics and prednisone. I am still feeling very sick and my cough is still persistent. It's not as productive or as exhausting as it was before the five days of prednisone but it is still all day and all night. I have been continuously taking over the counter medications to help with low-grade fever, body aches, congestion, cough and sore throat since Christmas. I am still feeling very very fatigued and overall unwell with this. Wondering what you recommend at this point.   Thank you.  Mihir Waldron

## 2019-03-13 ENCOUNTER — HOSPITAL ENCOUNTER (OUTPATIENT)
Facility: CLINIC | Age: 29
Setting detail: OBSERVATION
Discharge: HOME | End: 2019-03-14
Attending: NURSE PRACTITIONER | Admitting: INTERNAL MEDICINE
Payer: COMMERCIAL

## 2019-03-13 DIAGNOSIS — G90.A POTS (POSTURAL ORTHOSTATIC TACHYCARDIA SYNDROME): ICD-10-CM

## 2019-03-13 DIAGNOSIS — G90.A POSTURAL ORTHOSTATIC TACHYCARDIA SYNDROME: Primary | ICD-10-CM

## 2019-03-13 LAB
ALBUMIN SERPL-MCNC: 4.1 G/DL (ref 3.4–5)
ALP SERPL-CCNC: 94 U/L (ref 40–150)
ALT SERPL W P-5'-P-CCNC: 18 U/L (ref 0–50)
ANION GAP SERPL CALCULATED.3IONS-SCNC: 7 MMOL/L (ref 3–14)
ANION GAP SERPL CALCULATED.3IONS-SCNC: 8 MMOL/L (ref 3–14)
AST SERPL W P-5'-P-CCNC: 13 U/L (ref 0–45)
BASOPHILS # BLD AUTO: 0.1 10E9/L (ref 0–0.2)
BASOPHILS NFR BLD AUTO: 0.5 %
BILIRUB SERPL-MCNC: 0.4 MG/DL (ref 0.2–1.3)
BUN SERPL-MCNC: 11 MG/DL (ref 7–30)
BUN SERPL-MCNC: 11 MG/DL (ref 7–30)
CALCIUM SERPL-MCNC: 8.6 MG/DL (ref 8.5–10.1)
CALCIUM SERPL-MCNC: 8.7 MG/DL (ref 8.5–10.1)
CHLORIDE SERPL-SCNC: 111 MMOL/L (ref 94–109)
CHLORIDE SERPL-SCNC: 111 MMOL/L (ref 94–109)
CO2 SERPL-SCNC: 22 MMOL/L (ref 20–32)
CO2 SERPL-SCNC: 22 MMOL/L (ref 20–32)
CREAT SERPL-MCNC: 0.58 MG/DL (ref 0.52–1.04)
CREAT SERPL-MCNC: 0.59 MG/DL (ref 0.52–1.04)
D DIMER PPP FEU-MCNC: 0.4 UG/ML FEU (ref 0–0.5)
DIFFERENTIAL METHOD BLD: NORMAL
EOSINOPHIL # BLD AUTO: 0.1 10E9/L (ref 0–0.7)
EOSINOPHIL NFR BLD AUTO: 1 %
ERYTHROCYTE [DISTWIDTH] IN BLOOD BY AUTOMATED COUNT: 12.6 % (ref 10–15)
ERYTHROCYTE [DISTWIDTH] IN BLOOD BY AUTOMATED COUNT: 12.7 % (ref 10–15)
GFR SERPL CREATININE-BSD FRML MDRD: >90 ML/MIN/{1.73_M2}
GFR SERPL CREATININE-BSD FRML MDRD: >90 ML/MIN/{1.73_M2}
GLUCOSE SERPL-MCNC: 87 MG/DL (ref 70–99)
GLUCOSE SERPL-MCNC: 90 MG/DL (ref 70–99)
HCG SERPL QL: NEGATIVE
HCT VFR BLD AUTO: 42.4 % (ref 35–47)
HCT VFR BLD AUTO: 43.7 % (ref 35–47)
HGB BLD-MCNC: 13.9 G/DL (ref 11.7–15.7)
HGB BLD-MCNC: 14 G/DL (ref 11.7–15.7)
IMM GRANULOCYTES # BLD: 0 10E9/L (ref 0–0.4)
IMM GRANULOCYTES NFR BLD: 0.3 %
LYMPHOCYTES # BLD AUTO: 2.8 10E9/L (ref 0.8–5.3)
LYMPHOCYTES NFR BLD AUTO: 27.7 %
MAGNESIUM SERPL-MCNC: 2.3 MG/DL (ref 1.6–2.3)
MCH RBC QN AUTO: 28.9 PG (ref 26.5–33)
MCH RBC QN AUTO: 29.1 PG (ref 26.5–33)
MCHC RBC AUTO-ENTMCNC: 32 G/DL (ref 31.5–36.5)
MCHC RBC AUTO-ENTMCNC: 32.8 G/DL (ref 31.5–36.5)
MCV RBC AUTO: 89 FL (ref 78–100)
MCV RBC AUTO: 90 FL (ref 78–100)
MONOCYTES # BLD AUTO: 0.6 10E9/L (ref 0–1.3)
MONOCYTES NFR BLD AUTO: 5.5 %
NEUTROPHILS # BLD AUTO: 6.5 10E9/L (ref 1.6–8.3)
NEUTROPHILS NFR BLD AUTO: 65 %
NRBC # BLD AUTO: 0 10*3/UL
NRBC BLD AUTO-RTO: 0 /100
PLATELET # BLD AUTO: 277 10E9/L (ref 150–450)
PLATELET # BLD AUTO: 279 10E9/L (ref 150–450)
POTASSIUM SERPL-SCNC: 3.4 MMOL/L (ref 3.4–5.3)
POTASSIUM SERPL-SCNC: 3.8 MMOL/L (ref 3.4–5.3)
PROT SERPL-MCNC: 7.7 G/DL (ref 6.8–8.8)
RBC # BLD AUTO: 4.78 10E12/L (ref 3.8–5.2)
RBC # BLD AUTO: 4.85 10E12/L (ref 3.8–5.2)
SODIUM SERPL-SCNC: 140 MMOL/L (ref 133–144)
SODIUM SERPL-SCNC: 141 MMOL/L (ref 133–144)
TROPONIN I SERPL-MCNC: <0.015 UG/L (ref 0–0.04)
TROPONIN I SERPL-MCNC: <0.015 UG/L (ref 0–0.04)
WBC # BLD AUTO: 10 10E9/L (ref 4–11)
WBC # BLD AUTO: 11.5 10E9/L (ref 4–11)

## 2019-03-13 PROCEDURE — 80053 COMPREHEN METABOLIC PANEL: CPT | Performed by: NURSE PRACTITIONER

## 2019-03-13 PROCEDURE — 84484 ASSAY OF TROPONIN QUANT: CPT | Performed by: EMERGENCY MEDICINE

## 2019-03-13 PROCEDURE — 83735 ASSAY OF MAGNESIUM: CPT | Performed by: NURSE PRACTITIONER

## 2019-03-13 PROCEDURE — 99207 ZZC CDG-HISTORY COMP: MEETS EXP. PROB FOCUSED- DOWN CODED LACK OF PFSH: CPT | Performed by: INTERNAL MEDICINE

## 2019-03-13 PROCEDURE — 84484 ASSAY OF TROPONIN QUANT: CPT | Mod: 91 | Performed by: NURSE PRACTITIONER

## 2019-03-13 PROCEDURE — 80048 BASIC METABOLIC PNL TOTAL CA: CPT | Performed by: EMERGENCY MEDICINE

## 2019-03-13 PROCEDURE — 25000132 ZZH RX MED GY IP 250 OP 250 PS 637: Performed by: NURSE PRACTITIONER

## 2019-03-13 PROCEDURE — 96360 HYDRATION IV INFUSION INIT: CPT

## 2019-03-13 PROCEDURE — 25000128 H RX IP 250 OP 636: Performed by: NURSE PRACTITIONER

## 2019-03-13 PROCEDURE — 85379 FIBRIN DEGRADATION QUANT: CPT | Performed by: NURSE PRACTITIONER

## 2019-03-13 PROCEDURE — 96361 HYDRATE IV INFUSION ADD-ON: CPT

## 2019-03-13 PROCEDURE — 99218 ZZC INITIAL OBSERVATION CARE,LEVL I: CPT | Performed by: INTERNAL MEDICINE

## 2019-03-13 PROCEDURE — 84703 CHORIONIC GONADOTROPIN ASSAY: CPT | Performed by: NURSE PRACTITIONER

## 2019-03-13 PROCEDURE — 85027 COMPLETE CBC AUTOMATED: CPT | Performed by: NURSE PRACTITIONER

## 2019-03-13 PROCEDURE — 93005 ELECTROCARDIOGRAM TRACING: CPT

## 2019-03-13 PROCEDURE — 85025 COMPLETE CBC W/AUTO DIFF WBC: CPT | Performed by: EMERGENCY MEDICINE

## 2019-03-13 PROCEDURE — 99285 EMERGENCY DEPT VISIT HI MDM: CPT | Mod: 25

## 2019-03-13 PROCEDURE — 36415 COLL VENOUS BLD VENIPUNCTURE: CPT | Performed by: EMERGENCY MEDICINE

## 2019-03-13 RX ORDER — ACETAMINOPHEN 325 MG/1
650 TABLET ORAL ONCE
Status: COMPLETED | OUTPATIENT
Start: 2019-03-13 | End: 2019-03-13

## 2019-03-13 RX ADMIN — SODIUM CHLORIDE 1000 ML: 9 INJECTION, SOLUTION INTRAVENOUS at 21:07

## 2019-03-13 RX ADMIN — ACETAMINOPHEN 650 MG: 325 TABLET, FILM COATED ORAL at 23:29

## 2019-03-13 RX ADMIN — SODIUM CHLORIDE 1000 ML: 9 INJECTION, SOLUTION INTRAVENOUS at 22:01

## 2019-03-13 ASSESSMENT — ENCOUNTER SYMPTOMS
SHORTNESS OF BREATH: 1
VOMITING: 0
NAUSEA: 0
FEVER: 0
COUGH: 0
LIGHT-HEADEDNESS: 1
PALPITATIONS: 1
CHILLS: 0
DIARRHEA: 0

## 2019-03-13 NOTE — ED TRIAGE NOTES
Arrives with complaints of tachycardia and irregular heart beat, as well as chest pain that started yesterday. Alert and oriented, ABCs intact.

## 2019-03-14 VITALS
HEIGHT: 68 IN | WEIGHT: 201.8 LBS | TEMPERATURE: 96.9 F | RESPIRATION RATE: 20 BRPM | OXYGEN SATURATION: 97 % | DIASTOLIC BLOOD PRESSURE: 76 MMHG | SYSTOLIC BLOOD PRESSURE: 120 MMHG | BODY MASS INDEX: 30.58 KG/M2 | HEART RATE: 90 BPM

## 2019-03-14 PROBLEM — R07.9 CHEST PAIN: Status: ACTIVE | Noted: 2019-03-14

## 2019-03-14 LAB
INTERPRETATION ECG - MUSE: NORMAL
TROPONIN I SERPL-MCNC: <0.015 UG/L (ref 0–0.04)

## 2019-03-14 PROCEDURE — 36415 COLL VENOUS BLD VENIPUNCTURE: CPT | Performed by: INTERNAL MEDICINE

## 2019-03-14 PROCEDURE — 25000132 ZZH RX MED GY IP 250 OP 250 PS 637: Performed by: PHYSICIAN ASSISTANT

## 2019-03-14 PROCEDURE — G0378 HOSPITAL OBSERVATION PER HR: HCPCS

## 2019-03-14 PROCEDURE — 25800030 ZZH RX IP 258 OP 636: Performed by: INTERNAL MEDICINE

## 2019-03-14 PROCEDURE — 99217 ZZC OBSERVATION CARE DISCHARGE: CPT | Performed by: PHYSICIAN ASSISTANT

## 2019-03-14 PROCEDURE — 84484 ASSAY OF TROPONIN QUANT: CPT | Performed by: INTERNAL MEDICINE

## 2019-03-14 RX ORDER — SODIUM CHLORIDE 9 MG/ML
INJECTION, SOLUTION INTRAVENOUS CONTINUOUS
Status: DISCONTINUED | OUTPATIENT
Start: 2019-03-14 | End: 2019-03-14

## 2019-03-14 RX ORDER — ONDANSETRON 2 MG/ML
4 INJECTION INTRAMUSCULAR; INTRAVENOUS EVERY 6 HOURS PRN
Status: DISCONTINUED | OUTPATIENT
Start: 2019-03-14 | End: 2019-03-14 | Stop reason: HOSPADM

## 2019-03-14 RX ORDER — ACETAMINOPHEN 650 MG/1
650 SUPPOSITORY RECTAL EVERY 4 HOURS PRN
Status: DISCONTINUED | OUTPATIENT
Start: 2019-03-14 | End: 2019-03-14 | Stop reason: HOSPADM

## 2019-03-14 RX ORDER — NALOXONE HYDROCHLORIDE 0.4 MG/ML
.1-.4 INJECTION, SOLUTION INTRAMUSCULAR; INTRAVENOUS; SUBCUTANEOUS
Status: DISCONTINUED | OUTPATIENT
Start: 2019-03-14 | End: 2019-03-14 | Stop reason: HOSPADM

## 2019-03-14 RX ORDER — FLUDROCORTISONE ACETATE 0.1 MG/1
0.05 TABLET ORAL DAILY
Qty: 30 TABLET | Refills: 0 | Status: SHIPPED | OUTPATIENT
Start: 2019-03-14 | End: 2019-04-17

## 2019-03-14 RX ORDER — ACETAMINOPHEN 325 MG/1
650 TABLET ORAL EVERY 4 HOURS PRN
Status: DISCONTINUED | OUTPATIENT
Start: 2019-03-14 | End: 2019-03-14 | Stop reason: HOSPADM

## 2019-03-14 RX ORDER — ONDANSETRON 4 MG/1
4 TABLET, ORALLY DISINTEGRATING ORAL EVERY 6 HOURS PRN
Status: DISCONTINUED | OUTPATIENT
Start: 2019-03-14 | End: 2019-03-14 | Stop reason: HOSPADM

## 2019-03-14 RX ADMIN — FLUDROCORTISONE ACETATE 50 MCG: 0.1 TABLET ORAL at 11:25

## 2019-03-14 RX ADMIN — SODIUM CHLORIDE: 9 INJECTION, SOLUTION INTRAVENOUS at 01:05

## 2019-03-14 ASSESSMENT — MIFFLIN-ST. JEOR: SCORE: 1698.86

## 2019-03-14 NOTE — PLAN OF CARE
OBSERVATION patient END time: 1140    Patient's After Visit Summary was reviewed with patient and/or parents.   Patient verbalized understanding of After Visit Summary, recommended follow up and was given an opportunity to ask questions.   Discharge medications sent home with patient/family: RX sent to pt pharmacy    Discharged with mother

## 2019-03-14 NOTE — PROGRESS NOTES
ROOM # [id number]    Living Situation (if not independent, order SW consult): home with family   Facility name:  : [facility], [phone]    Activity level at baseline: Ind   Activity level on admit: SBA       Patient registered to observation; given Patient Bill of Rights; given the opportunity to ask questions about observation status and their plan of care.  Patient has been oriented to the observation room, bathroom and call light is in place.    Discussed discharge goals and expectations with patient/family.

## 2019-03-14 NOTE — PHARMACY-ADMISSION MEDICATION HISTORY
Admission medication history interview status for this patient is complete. See [facility] admission navigator for allergy information, prior to admission medications and immunization status.     Medication history interview source(s):Patient  Medication history resources (including written lists, pill bottles, clinic record):[facility] med list  Primary pharmacy:[facility]    Changes made to [facility] medication list:  Added: none  Deleted: amoxicillin, Zithromax, Zofrzn, prednisone  Changed: none    Actions taken by pharmacist (provider contacted, etc):None     Additional medication history information:None    Medication reconciliation/reorder completed by provider prior to medication history? No    Do you take OTC medications (eg tylenol, ibuprofen, fish oil, eye/ear drops, etc)? No(Y/N)    For patients on insulin therapy: No (Y/N)  Lantus/levemir/NPH/Mix 70/30 dose:   (Y/N) (see Med list for doses)   Sliding scale Novolog Y/N  If Yes, do you have a baseline novolog pre-meal dose:  units with meals  Patients eat three meals a day:   Y/N    How many episodes of hypoglycemia do you have per week: _______  How many missed doses do you have per week: ______  How many times do you check your blood glucose per day: _______  Do you have a Continuous glucose monitor (CGM)   Y/N (remind pt that not approved for hospital use)   Any Barriers to therapy - Be specific :  cost of medications, comfortable with giving injections (if applicable), comfortable and confident with current diabetes regimen: Y/N ______________      Prior to Admission medications    Medication Sig Last Dose Taking? Auth Provider   Cholecalciferol (MAXIMUM D3) 54582 units CAPS Take 1 capsule by mouth daily  Past Month at Unknown time Yes Reported, Patient   ALPRAZolam (XANAX) 0.5 MG tablet Take 1 tablet (0.5 mg) by mouth 3 times daily as needed for anxiety More than a month at Unknown time  Fuentes Green MD   EPINEPHrine (EPIPEN/ADRENACLICK/OR ANY BX GENERIC  EQUIV) 0.3 MG/0.3ML injection 2-pack Inject 0.3 mLs (0.3 mg) into the muscle once as needed   Fuentes Green MD

## 2019-03-14 NOTE — ED NOTES
Pt had syncopal episode upon sitting up in bed. This tech immediately laid patient flat in bed. Syncopal episode lasted approximately 5 seconds. RN and provider notified of event.

## 2019-03-14 NOTE — ED NOTES
[facility]  ED Nurse Handoff Report    Mihir Waldron is a 28 year old female   ED Chief complaint: Chest Pain  . ED Diagnosis:   Final diagnoses:   POTS (postural orthostatic tachycardia syndrome)     Allergies:   Allergies   Allergen Reactions     Benadryl [Diphenhydramine] Other (See Comments)     Pt got warm and passed out with IV benadryl     Codeine Sulfate GI Disturbance     Contrast Dye Hives     Unsure of name of contrast     Latex Hives       Code Status: Full Code  Activity level - Baseline/Home:  Independent. Activity Level - Current:  Unable to stand. Becomes faint in sitting position.  Lift room needed: No. Bariatric: No   Needed: No   Isolation: No. Infection: Not Applicable.     Vital Signs:   Vitals:    03/13/19 2118 03/13/19 2120 03/13/19 2123 03/13/19 2200   BP:  (!) 115/93     Pulse:       Resp: 20 12 10 17   Temp:       TempSrc:       SpO2: 100% 100% 100% 98%       Cardiac Rhythm:  ,   Cardiac  Cardiac Rhythm: Sinus tachycardia  Pain level:    Patient confused: No. Patient Falls Risk: Yes.   Elimination Status: has not voided yet.    Patient Report - Initial Complaint: pt with a history of POTS. Has been having more episodes this week. Pt reports feeling lightheaded and faint with position changes. Pt diagnosed as a teen at the [facility]. Minimal symptoms for many years. Not currently taking any medication or seeing a cardiologist. Last appointment was during pregnancy two years ago.  Focused Assessment: Pt had syncopal episode moving from laying to sitting at the side of bed. HR up to 170. Pt diaphoretic. Symptoms resolved upon laying flat.    Tests Performed:   Labs Ordered and Resulted from Time of ED Arrival Up to the Time of Departure from the ED   BASIC METABOLIC PANEL - Abnormal; Notable for the following components:       Result Value    Chloride 111 (*)     All other components within normal limits   CBC WITH PLATELETS - Abnormal; Notable for the following  components:    WBC 11.5 (*)     All other components within normal limits   COMPREHENSIVE METABOLIC PANEL - Abnormal; Notable for the following components:    Chloride 111 (*)     All other components within normal limits   CBC WITH PLATELETS DIFFERENTIAL   TROPONIN I   TROPONIN I   HCG QUALITATIVE   MAGNESIUM   ORTHOSTATIC BLOOD PRESSURE AND PULSE   . Abnormal Results:   Labs Ordered and Resulted from Time of ED Arrival Up to the Time of Departure from the ED   BASIC METABOLIC PANEL - Abnormal; Notable for the following components:       Result Value    Chloride 111 (*)     All other components within normal limits   CBC WITH PLATELETS - Abnormal; Notable for the following components:    WBC 11.5 (*)     All other components within normal limits   COMPREHENSIVE METABOLIC PANEL - Abnormal; Notable for the following components:    Chloride 111 (*)     All other components within normal limits   CBC WITH PLATELETS DIFFERENTIAL   TROPONIN I   TROPONIN I   HCG QUALITATIVE   MAGNESIUM   ORTHOSTATIC BLOOD PRESSURE AND PULSE   .   Treatments provided: IV 18 g right arm   Family Comments: Has a two and four year old daughters..    OBS brochure/video discussed/provided to patient:  TBD  ED Medications:   Medications   0.9% sodium chloride BOLUS (1,000 mLs Intravenous New Bag 3/13/19 2201)   0.9% sodium chloride BOLUS (0 mLs Intravenous Stopped 3/13/19 2201)     Drips infusing:  No  For the majority of the shift, the patient's behavior Green. Interventions performed were n/a.     Severe Sepsis OR Septic Shock Diagnosis Present: No      ED Nurse Name/Phone Number: Candis MIRIAN Chaudhary,   10:20 PM  RECEIVING UNIT ED HANDOFF REVIEW    Above ED Nurse Handoff Report was reviewed: Yes  Reviewed by: Natalie White on March 14, 2019 at 12:24 AM

## 2019-03-14 NOTE — ED PROVIDER NOTES
"  History     Chief Complaint:  Chest Pain    HPI   Mihir Waldron is a 28 year old female, with a history of POTS, who presents to the ED for evaluation of chest pain. The patient reports she developed lightheadedness, tachycardia, palpitations, and shortness of breath yesterday afternoon. She also developed chest pain. The patient notes her last menstrual cycle was on 2/12. The patient denies any blood thinners, recent cold symptoms, congestion, cough, nausea, vomiting, diarrhea, or urinary symptoms.     Allergies:  Benadryl (IV): \"got warm and passed out\"   Codeine: GI disturbance    Contrast dye: hives   Latex: hives     Medications:    Xanax  Maxium D3  Epipen     Past Medical History:    Postpartum depression   Anxiety   POTS    Past Surgical History:    Bilateral dental surgery     Family History:    CAD  Depression   Anxiety   HTN    Social History:  Smoking status: Never smoker    Alcohol use: No  Presents to ED alone    Marital Status:   [2]     Review of Systems   Constitutional: Negative for chills and fever.   HENT: Negative for congestion.    Respiratory: Positive for shortness of breath. Negative for cough.    Cardiovascular: Positive for chest pain and palpitations.   Gastrointestinal: Negative for diarrhea, nausea and vomiting.   Neurological: Positive for light-headedness.   All other systems reviewed and are negative.    Physical Exam     Patient Vitals for the past 24 hrs:   BP Temp Temp src Pulse Heart Rate Resp SpO2   03/13/19 2200 -- -- -- -- 86 17 98 %   03/13/19 2123 -- -- -- -- 96 10 100 %   03/13/19 2120 (!) 115/93 -- -- -- 105 12 100 %   03/13/19 2118 -- -- -- -- 141 20 100 %   03/13/19 2117 -- -- -- -- 168 23 100 %   03/13/19 2116 (!) 115/93 -- -- 142 144 21 99 %   03/13/19 2114 -- -- -- 106 116 24 99 %   03/13/19 2110 136/77 -- -- -- 105 19 --   03/13/19 1601 (!) 155/101 99.8  F (37.7  C) Temporal 109 -- 20 98 %     Physical Exam  General: Alert, Mild  discomfort, well " kept  Eyes: PERRL, conjunctivae pink no scleral icterus or conjunctival injection  ENT:   Moist mucus membranes, posterior oropharynx clear without erythema or exudates, No lymphadenopathy, Normal voice  Resp:  Lungs clear to auscultation bilaterally, no crackles/rubs/wheezes. Good air movement  CV:  Variable heart rate from 80s-110+ well at rest.  No murmurs/rubs/gallops, syncopal episode with change of position from laying to sitting  GI:  Abdomen soft and non-distended.  Normoactive BS.  No tenderness, guarding or rebound, No masses  Skin:  Warm, dry.  No rashes or petechiae  Musculoskeletal: No peripheral edema or calf tenderness, Normal gross ROM   Neuro: Alert and oriented to person/place/time, normal sensation  Psychiatric: Normal affect, cooperative, good eye contact    Emergency Department Course     ECG (16:10:46):  Rate 97 bpm. WA interval 146. QRS duration 96. QT/QTc 358/454. P-R-T axes 64 44 31. Normal sinus rhythm. Normal ECG. No significant change compared to EKG dated 12/31/18. Interpreted at 2055 by Dr. Mariscal and reviewed by Wilian Small, APRN CNP.    Laboratory:  HCG pregnancy blood: Negative     Troponin I #1 (1948): <0.015  Troponin I #2 (2106): <0.015    D dimer (2106): 0.4    Magnesium: 2.3    CBC #1 (1948): o/w WNL (WBC 10.0, HGB 14.0, )  CBC #2 (2106): WBC 11.5(H), o/w WNL (HGB 13.9, )  BMP: Chloride 111(H), o/w WNL (Creatinine 0.58)   CMP: Chloride 111(H), o/w WNL (Creatinine 0.59)     Interventions:  2107: NS 1L Bolus IV  2201: NS 1L Bolus IV  2329: Tylenol 650mg PO    Emergency Department Course:  Past medical records, nursing notes, and vitals reviewed.  2058: I performed an exam of the patient and obtained history, as documented above.    IV inserted and blood drawn.    2116: Per nurse's report, the patient lost consciousness while doing orthostatics.     2117: I rechecked the patient.     2151: I spoke to Mari Julian PA-C, of the hospitalist service.     2251:  Per nurse's report, patient is still symptomatic after receiving the 2nd liter of fluids.     2257: I spoke to Dr. Acharya of the hospitalist service who accepts the patient for admission.     Findings and plan explained to the Patient who consents to admission. Discussed the patient with Dr. Acharya, who will admit the patient to an obs bed for further monitoring, evaluation, and treatment.     Impression & Plan      Medical Decision Making:  Mihir Waldron is a 28 year old female who presents today for evaluation of increase in her POTS symptoms.  Over the last couple days she has had increasing feelings of near syncope and chest discomfort associated with this.  She is usually able to manage the symptoms by drinking lots of water and resting.  However last couple days she was unable to manage the symptoms to the point where she became scared that she will have a syncopal episode while trying to take care of her children therefore she presented for evaluation.  Her examination here did show a variable heart rate at rest she did initially have a syncopal episode with movement from laying to sitting where her heart rate shot up into the 150s.  After 2 L of fluids she still remains symptomatic.  As a result will admit patient to the observational unit with telemetry.  Her laboratory studies are noncontributory.  She has normal electrolytes.  She is not pregnant.  Is a negative d-dimer.  Normal troponin and delta troponin.    Diagnosis:    ICD-10-CM   1. POTS (postural orthostatic tachycardia syndrome) R00.0    I95.1     Disposition: Patient admitted to an obs bed by Dr. Marck Ocampo  3/13/2019   [facility] EMERGENCY DEPARTMENT    Scribe Disclosure:  I, Carlene Ocampo, am serving as a scribe at 8:58 PM on 3/13/2019 to document services personally performed by Wilian Small APRN CNP based on my observations and the provider's statements to me.        Wilian Small APRN  CNP  03/14/19 0035

## 2019-03-14 NOTE — PLAN OF CARE
PRIMARY DIAGNOSIS: CHEST PAIN  OUTPATIENT/OBSERVATION GOALS TO BE MET BEFORE DISCHARGE:  1. Ruled out acute coronary syndrome (negative or stable Troponin):  N/A   2. Pain Status: Pain free.  3. Appropriate provocative testing performed: N/A  - Stress Test Procedure: Not ordered   - Interpretation of cardiac rhythm per telemetry tech: Sinus Tach 111    4. Cleared by Consultants (if applicable):N/A  5. Return to near baseline physical activity: No, still lightheaded when stending  Discharge Planner Nurse   Safe discharge environment identified: Yes  Barriers to discharge: No       Entered by: Carlene Wakefield 03/14/2019 8:52 AM     Please review provider order for any additional goals.   Nurse to notify provider when observation goals have been met and patient is ready for discharge.  Pt is alert and oriented. Has not had chest pains since ED. Palpitations continue, less severe. Lightheadness when standing. SOB after activity, but recovering faster now.

## 2019-03-14 NOTE — PLAN OF CARE
PRIMARY DIAGNOSIS: CHEST PAIN/PALPITATIONS   OUTPATIENT/OBSERVATION GOALS TO BE MET BEFORE DISCHARGE:  1. Ruled out acute coronary syndrome (negative or stable Troponin):  Yes, trops negative   2. Pain Status: Pain free.  3. Appropriate provocative testing performed: N/A  - Stress Test Procedure: None at this time    - Interpretation of cardiac rhythm per telemetry tech: SR 74    4. Cleared by Consultants (if applicable):N/A  5. Return to near baseline physical activity: No, SBA for safety with lightheadedness.   Pt is A&Ox4. VSS. Temp: 97.6  F (36.4  C) Temp src: Oral BP: 128/73 Pulse: 90 Heart Rate: 104 Resp: 16 SpO2: 100 % O2 Device: None (Room air)     Pt denies any pain. Pt does report mild lightheadedness. Education about fall prevention provided. IVF. Tele. Regular. Pt still reporting palpations. Will continue to monitor.   Discharge Planner Nurse   Safe discharge environment identified: Yes  Barriers to discharge: Yes       Entered by: Natalie White 03/14/2019 4:05 AM     Please review provider order for any additional goals.   Nurse to notify provider when observation goals have been met and patient is ready for discharge.

## 2019-03-14 NOTE — H&P
Admitted:     03/13/2019      CHIEF COMPLAINT:  Rapid heart rate, shortness of breath, chest pain for 1 day.      HISTORY OF PRESENT ILLNESS:  This is a 28-year-old white female with a history of POTS diagnosed years back at [facility] through a tilt table test.  She was on medications to include a beta blocker and other medications; however, has been off the medications as the symptoms got better.  Nonetheless, the patient has noted that her heart rate has been rapid, along with that when the heart rate is rapid she gets this pain in her left chest under her left breast with an intensity of 3/10.  She describes it as a sharp kind of pain.  She has been feeling lightheaded and dizzy and short of breath when she gets these episodes.  She has also had some syncopal episodes when she gets up too fast at home.  She denies any nausea, vomiting, diarrhea or abdominal pain.  Her oral intake has been good.  In the ER over here, she had a brief syncopal episode which lasted 5 seconds, which was noticed by the ER Tech.  In the ER, she was seen by the PA, Wilian Small.  I discussed care with him.  I am asked to admit her for further evaluation.      PAST MEDICAL HISTORY:  POTS, depression, anxiety.      PAST SURGICAL HISTORY:  Significant for dental surgery.      FAMILY HISTORY:  Significant for coronary artery disease with MI in her mother at age 30.      HOME MEDICATIONS:  List includes Xanax, EpiPen.      ALLERGIES:  BENADRYL, CODEINE, CONTRAST DYE AND LATEX.      REVIEW OF SYSTEMS:  As mentioned in the HPI.  All other systems are reviewed and deemed unremarkable and negative.      PHYSICAL EXAMINATION:   VITAL SIGNS:  Temperature is 99.8, pulse is 86.  Blood pressure is 115/93, respiratory rate 17, O2 saturation is 98% on room air.   GENERAL:  She is alert, awake, oriented, coherent, nontoxic, in no acute distress.   HEENT:  Pupils equal, round, react to light.  Pharynx, there is no exudate noted.   LUNGS:  Clear to  auscultation bilaterally.   HEART:  Regular rate, S1, S2 normal, occasional ectopy.  No murmurs or gallops.   ABDOMEN:  Soft, nontender, with good bowel sounds.   EXTREMITIES:  There is no edema.   SKIN:  There is no rash.      LABORATORY:  Lab work obtained here included a CMP which is grossly unremarkable.  Serum hCG was read as negative.  Troponin x2 was read as negative.  A CBC with differential is grossly unremarkable.  An EKG obtained over here shows normal sinus rhythm at 97 beats per minute.  There is no significant ST-T wave changes.  She does have some tele strips which seemed to show SVT at 163 beats per minute.      ASSESSMENT AND PLAN:  Chest pain in the setting of rapid heart rate accompanied with shortness of breath.  We will admit her under observation status.  We will hydrate her with intravenous fluids.  We will monitor her on telemetry.  We will get serial troponins on her.  She did have an episode of SVT.  We will monitor her.  We will check her magnesium levels.  She may require further evaluation with Cardiology as an outpatient for reinstitution of her medications for POTS.      CODE STATUS:  FULL CODE.      She will be admitted under observation status.         MORRIS HONG MD             D: 2019   T: 2019   MT: IRIS      Name:     JAS OCHOA   MRN:      [id number]        Account:      [id number]   :      1990        Admitted:     2019                   Document: [id number]

## 2019-03-14 NOTE — ED NOTES
"Pt reports \" I can tell my heart rate is down and that part feels better but I still feel dizzy, weak and not very good. Monitor shows SR-ST with occasional unifocal PVC. Denies chest pain. HOB at 45 degrees.  "

## 2019-03-14 NOTE — ED NOTES
Head of bed moved from 30 degrees to 90 degrees. HR up to 134. Pt reports dizziness. Pt diaphoretic.

## 2019-03-14 NOTE — DISCHARGE SUMMARY
"[facility] Outpatient / Observation Unit  Discharge Summary        Mihir Waldron MRN# [id number]   YOB: 1990 Age: 28 year old     Date of Admission:  3/13/2019  Date of Discharge:  3/14/2019  Admitting Physician:  Quoc Acharya MD  Discharge Physician: Lia Spivey PA-C  Discharging Service: Hospitalist      Primary Provider: Fuentes Green  Primary Care Physician Phone Number: [phone]         Primary Discharge Diagnoses:    Mihir Waldron was admitted on 3/13/2019 for concerns of palpitations, chest pain and syncope.     1. POTS - constellation of sx likely due to POTS, dx 10+ yrs ago at [facility], was initially started on gabapentin and metoprolol. Apparently did not tolerate these medications. Her symptoms were fairly well controlled for many years, she was careful with position changes and exertion to avoid sx and syncope. She had a \"flare\" of symptoms in 2016 while pregnant. Seen by Dr. Grover, echocardiogram done which was fairly unremarkable, recommended a beta blocker at that time but pt reported that BB made sx worse in the past and declined trying the medication while pregnant. Again, sx remained fairly well controlled since that time, able to push fluids to improve sx at home, until recently. Increasing palpitations, fatigue, dizziness, chest pain with elevated blood pressures and syncopal episodes yesterday, witnessed in ED. Symptoms improved overnight with IVFs, brief episodes of SVT noted overnight. Curbside with cardiology, recommend trial of low dose Florinef, BB may also be considered. Pt was open to trying BB again but opted to try Florinef first. Pt ambulated on unit with significant sx. Recommend follow up with EP cardiology. Also follow up with PCP, may consider Zio Patch monitor if palpitations remain persistent.            Secondary Discharge Diagnoses:     Past Medical History:   Diagnosis Date     Anxiety      Depressive disorder      Postpartum " depression      POTS (postural orthostatic tachycardia syndrome)      POTS (postural orthostatic tachycardia syndrome)                 Code Status:      Full Code        Brief Hospital Summary:        Reason for your hospital stay      Palpitations and syncope with a history of POTS. Hydrated with IVFs and   symptoms improved. Discussed with cardiology, discussed treatment options,   will start Florinef and follow up with EP cardiology.             Please refer to initial admission history and physical for further details.   Briefly, Mihir Waldron was admitted on 3/13/2019 for concerns of palpitations, chest pain and syncope.   Initial work up in the ED did not reveal evidence of STEMI or findings consistent with unstable angina or acute coronary ischemia. Blood pressures were orthostatic and she was given IVFs. She did have a witnessed syncopal episode in the ED. Pt was registered to the Observation Unit for further evaluation.   Pt ruled out with serial troponins and was monitored on tele. Brief episodes of SVT noted overnight, otherwise fairly unremarkable. Labs were reviewed and significant results addressed. On the day of discharge, pt symptoms had improved, with no complaints of chest pain. Medications were reviewed and adjustments made as necessary. Pt is instructed to follow up as below.           Significant Lab During Hospitalization:      Recent Labs   Lab 03/13/19 2106 03/13/19 1948   WBC 11.5* 10.0   HGB 13.9 14.0   HCT 42.4 43.7   MCV 89 90    277     Recent Labs   Lab 03/13/19 2106 03/13/19 1948    140   POTASSIUM 3.4 3.8   CHLORIDE 111* 111*   CO2 22 22   ANIONGAP 8 7   GLC 87 90   BUN 11 11   CR 0.59 0.58   GFRESTIMATED >90 >90   GFRESTBLACK >90 >90   KARLOS 8.7 8.6   MAG 2.3  --    PROTTOTAL 7.7  --    ALBUMIN 4.1  --    BILITOTAL 0.4  --    ALKPHOS 94  --    AST 13  --    ALT 18  --      Recent Labs   Lab 03/13/19 2106   DD 0.4     Recent Labs   Lab 03/14/19 0215  03/13/19 2106 03/13/19  1948   TROPI <0.015 <0.015 <0.015                Significant Imaging During Hospitalization:      No results found for this or any previous visit (from the past 48 hour(s)).           Pending Results:        Unresulted Labs Ordered in the Past 30 Days of this Admission     No orders found for last 61 day(s).              Consultations This Hospital Stay:      No consultations were requested during this admission         Discharge Instructions and Follow-Up:      Follow-up Appointments     Follow-up and recommended labs and tests       Follow up with primary care provider, Fuentes Green, within 7   days to evaluate medication change and for hospital follow- up.  No follow   up labs or test are needed.           Pt instructed to follow up with PCP in 7 days and with Consultant if indicated.   Follow-up Labs None        Discharge Disposition:      Discharged to home         Discharge Medications:        Current Discharge Medication List      START taking these medications    Details   fludrocortisone (FLORINEF) 0.1 MG tablet Take 0.5 tablets (0.05 mg) by mouth daily  Qty: 30 tablet, Refills: 0    Associated Diagnoses: Postural orthostatic tachycardia syndrome         CONTINUE these medications which have NOT CHANGED    Details   Cholecalciferol (MAXIMUM D3) 79966 units CAPS Take 1 capsule by mouth daily       ALPRAZolam (XANAX) 0.5 MG tablet Take 1 tablet (0.5 mg) by mouth 3 times daily as needed for anxiety  Qty: 10 tablet, Refills: 0    Associated Diagnoses: CHIDI (generalized anxiety disorder); Panic attack      EPINEPHrine (EPIPEN/ADRENACLICK/OR ANY BX GENERIC EQUIV) 0.3 MG/0.3ML injection 2-pack Inject 0.3 mLs (0.3 mg) into the muscle once as needed  Qty: 0.3 mL, Refills: 0    Associated Diagnoses: Hx of anaphylaxis                 Allergies:         Allergies   Allergen Reactions     Benadryl [Diphenhydramine] Other (See Comments)     Pt got warm and passed out with IV benadryl  "    Codeine Sulfate GI Disturbance     Contrast Dye Hives     Unsure of name of contrast     Latex Hives           Condition and Physical on Discharge:      Discharge condition: Stable   Vitals: Blood pressure 129/77, pulse 90, temperature 97.4  F (36.3  C), temperature source Oral, resp. rate 20, height 1.727 m (5' 8\"), weight 91.5 kg (201 lb 12.8 oz), last menstrual period 02/12/2019, SpO2 97 %, not currently breastfeeding.  201 lbs 12.8 oz      GENERAL:  Comfortable.  PSYCH: pleasant, oriented, No acute distress.  HEART:  RRR  LUNGS: Normal Respiratory effort.  EXTREMITIES:  Able to ambulate independently.  SKIN:  Dry to touch, No rash, wound or ulcerations.  NEUROLOGIC:  Grossly intact    Lia Spivey PA-C  "

## 2019-03-15 ENCOUNTER — CARE COORDINATION (OUTPATIENT)
Dept: FAMILY MEDICINE | Facility: CLINIC | Age: 29
End: 2019-03-15

## 2019-03-15 NOTE — PROGRESS NOTES
Care Coordination Assessment    PCP: Fuentes Green    Referral Source:  ED/IP List      Clinical Data; Patient discharged from inpatient at [facility] 03/14/2019 with POTS (Postural Orthostatic Tachycardia Syndrome).  Patient discharged home with instructions to follow up with PCP in 7 days    Plan: I will forward radha Morris in clinical support to assess and assist with appropriate follow up

## 2019-03-18 ENCOUNTER — OFFICE VISIT (OUTPATIENT)
Dept: FAMILY MEDICINE | Facility: CLINIC | Age: 29
End: 2019-03-18

## 2019-03-18 VITALS
OXYGEN SATURATION: 99 % | TEMPERATURE: 98 F | WEIGHT: 199.6 LBS | BODY MASS INDEX: 30.35 KG/M2 | HEART RATE: 86 BPM | SYSTOLIC BLOOD PRESSURE: 112 MMHG | DIASTOLIC BLOOD PRESSURE: 68 MMHG

## 2019-03-18 DIAGNOSIS — G90.A POTS (POSTURAL ORTHOSTATIC TACHYCARDIA SYNDROME): Primary | ICD-10-CM

## 2019-03-18 PROCEDURE — 99213 OFFICE O/P EST LOW 20 MIN: CPT | Performed by: FAMILY MEDICINE

## 2019-03-18 NOTE — PROGRESS NOTES
Mihir is here for hospital follow up      SUBJECTIVE:   Mihir Waldron is a 28 year old female who presents to clinic today for the following health issues:          Hospital Follow-up Visit:    Hospital/Nursing Home/IP Rehab Facility: [facility]  Date of Admission: 03/13/2019  Date of Discharge: 03/14/2019  Reason(s) for Admission: PVC's-palpitations-normal cardiac w/u overall-started on Florinef and feeling better, back to baseline, has not been started on betablocker            Problems taking medications regularly:  None       Medication changes since discharge: None       Problems adhering to non-medication therapy:  None    Summary of hospitalization:  [facility] discharge summary reviewed  Diagnostic Tests/Treatments reviewed.  Follow up needed: cardiology  Other Healthcare Providers Involved in Patient s Care:         Specialist appointment - cardiology in a week  Update since discharge: improved.     Post Discharge Medication Reconciliation: discharge medications reconciled, continue medications without change.  Plan of care communicated with patient     Coding guidelines for this visit:  Type of Medical   Decision Making Face-to-Face Visit       within 7 Days of discharge Face-to-Face Visit        within 14 days of discharge   Moderate Complexity 07960 [id number]   High Complexity [id number]              PT has appt upcoming at [facility] for psychologic w/u in a week    Problem list and histories reviewed & adjusted, as indicated.  Additional history: as documented    Patient Active Problem List   Diagnosis     Sinus tachycardia     Postural orthostatic tachycardia syndrome     Health Care Home     ACP (advance care planning)     Post partum depression     CHIDI (generalized anxiety disorder)     Hx of anaphylaxis     Chest pain     Past Surgical History:   Procedure Laterality Date     DENTAL SURGERY Bilateral        Social History     Tobacco Use     Smoking status: Never Smoker      Smokeless tobacco: Never Used   Substance Use Topics     Alcohol use: No     Family History   Problem Relation Age of Onset     Coronary Artery Disease Mother      Depression Mother      Anxiety Disorder Mother      Hypertension Father      Anxiety Disorder Father      Depression Father      Depression Sister      Anxiety Disorder Sister      Diabetes Maternal Grandmother      Diabetes Paternal Grandmother      Cerebrovascular Disease No family hx of      Breast Cancer No family hx of      Colon Cancer No family hx of          Current Outpatient Medications   Medication Sig Dispense Refill     Cholecalciferol (MAXIMUM D3) 21107 units CAPS Take 1 capsule by mouth daily        EPINEPHrine (EPIPEN/ADRENACLICK/OR ANY BX GENERIC EQUIV) 0.3 MG/0.3ML injection 2-pack Inject 0.3 mLs (0.3 mg) into the muscle once as needed 0.3 mL 0     fludrocortisone (FLORINEF) 0.1 MG tablet Take 0.5 tablets (0.05 mg) by mouth daily 30 tablet 0     ALPRAZolam (XANAX) 0.5 MG tablet Take 1 tablet (0.5 mg) by mouth 3 times daily as needed for anxiety 10 tablet 0     Allergies   Allergen Reactions     Benadryl [Diphenhydramine] Other (See Comments)     Pt got warm and passed out with IV benadryl     Codeine Sulfate GI Disturbance     Contrast Dye Hives     Unsure of name of contrast     Latex Hives     Recent Labs   Lab Test 03/13/19  2106 03/13/19  1948  11/21/18  1902 07/16/18  0956  10/19/17  1800   ALT 18  --   --   --   --   --  17   CR 0.59 0.58   < >  --   --    < > 0.58   GFRESTIMATED >90 >90   < >  --   --    < > >90   GFRESTBLACK >90 >90   < >  --   --    < > >90   POTASSIUM 3.4 3.8   < >  --   --    < > 3.4   TSH  --   --   --  1.94 1.87   < >  --     < > = values in this interval not displayed.      BP Readings from Last 3 Encounters:   03/18/19 112/68   03/14/19 120/76   01/17/19 118/76    Wt Readings from Last 3 Encounters:   03/18/19 90.5 kg (199 lb 9.6 oz)   03/14/19 91.5 kg (201 lb 12.8 oz)   01/17/19 88 kg (194 lb)     "                Reviewed and updated as needed this visit by clinical staff       Reviewed and updated as needed this visit by Provider         ROS:  Constitutional, HEENT, cardiovascular, pulmonary, gi and gu systems are negative, except as otherwise noted.    OBJECTIVE:     /68 (BP Location: Left arm, Patient Position: Sitting, Cuff Size: Adult Large)   Pulse 86   Temp 98  F (36.7  C) (Oral)   Wt 90.5 kg (199 lb 9.6 oz)   LMP 03/15/2019   SpO2 99%   BMI 30.35 kg/m    Body mass index is 30.35 kg/m .   GENERAL: healthy, alert and no distress  NECK: no adenopathy, no asymmetry, masses, or scars and thyroid normal to palpation  RESP: lungs clear to auscultation - no rales, rhonchi or wheezes  CV: regular rate and rhythm, normal S1 S2, no S3 or S4, no murmur, click or rub, no peripheral edema and peripheral pulses strong  ABDOMEN: soft, nontender, no hepatosplenomegaly, no masses and bowel sounds normal  MS: no gross musculoskeletal defects noted, no edema  PSYCH: mentation appears normal, affect normal/bright    Diagnostic Test Results:  none     ASSESSMENT:       PLAN:   (R00.0,  I95.1) POTS (postural orthostatic tachycardia syndrome)  (primary encounter diagnosis)  Comment: doing very well today  Plan: see cardiology as planned    BMI:   Estimated body mass index is 30.35 kg/m  as calculated from the following:    Height as of 3/14/19: 1.727 m (5' 8\").    Weight as of this encounter: 90.5 kg (199 lb 9.6 oz).   Weight management plan: Discussed healthy diet and exercise guidelines        Work on weight loss  Regular exercise    Fuentes Green MD  [facility]    "

## 2019-03-28 ENCOUNTER — OFFICE VISIT (OUTPATIENT)
Dept: FAMILY MEDICINE | Facility: CLINIC | Age: 29
End: 2019-03-28

## 2019-03-28 VITALS
BODY MASS INDEX: 29.4 KG/M2 | SYSTOLIC BLOOD PRESSURE: 118 MMHG | WEIGHT: 194 LBS | OXYGEN SATURATION: 99 % | HEART RATE: 81 BPM | DIASTOLIC BLOOD PRESSURE: 68 MMHG | HEIGHT: 68 IN | TEMPERATURE: 97.9 F

## 2019-03-28 DIAGNOSIS — N76.0 BACTERIAL VAGINOSIS: Primary | ICD-10-CM

## 2019-03-28 DIAGNOSIS — R35.0 URINARY FREQUENCY: ICD-10-CM

## 2019-03-28 DIAGNOSIS — H61.21 IMPACTED CERUMEN OF RIGHT EAR: ICD-10-CM

## 2019-03-28 DIAGNOSIS — B96.89 BACTERIAL VAGINOSIS: Primary | ICD-10-CM

## 2019-03-28 LAB
ALBUMIN (URINE): ABNORMAL MG/DL
APPEARANCE UR: CLEAR
BACTERIA (WET PREP): ABNORMAL
BACTERIA, UR: ABNORMAL
BILIRUB UR QL: ABNORMAL
CASTS/LPF: ABNORMAL
CLUE CELLS: ABNORMAL
COLOR UR: YELLOW
EP/HPF: ABNORMAL
GLUCOSE URINE: ABNORMAL MG/DL
HGB UR QL: ABNORMAL
KETONES UR QL: ABNORMAL MG/DL
LEUKOCYTE ESTERASE - QUEST: ABNORMAL
MISC.: ABNORMAL
NITRITE UR QL STRIP: ABNORMAL
PH FLUID SOURCE: 6 (ref 3.5–4.5)
PH UR STRIP: 7 PH (ref 5–7)
PMNS (WET PREP): ABNORMAL
RBC, UR MICRO: ABNORMAL (ref ?–2)
SP. GRAVITY: 1.01
TRICHOMONAS VAGINALS: ABNORMAL
UROBILINOGEN UR QL STRIP: 0.2 EU/DL (ref 0.2–1)
WBC, UR MICRO: ABNORMAL (ref ?–2)
YEAST CELLS: ABNORMAL

## 2019-03-28 PROCEDURE — 87210 SMEAR WET MOUNT SALINE/INK: CPT | Performed by: PHYSICIAN ASSISTANT

## 2019-03-28 PROCEDURE — 99213 OFFICE O/P EST LOW 20 MIN: CPT | Performed by: PHYSICIAN ASSISTANT

## 2019-03-28 PROCEDURE — 81001 URINALYSIS AUTO W/SCOPE: CPT | Performed by: PHYSICIAN ASSISTANT

## 2019-03-28 RX ORDER — METRONIDAZOLE 500 MG/1
500 TABLET ORAL 2 TIMES DAILY
Qty: 14 TABLET | Refills: 0 | Status: SHIPPED | OUTPATIENT
Start: 2019-03-28 | End: 2019-04-17

## 2019-03-28 ASSESSMENT — MIFFLIN-ST. JEOR: SCORE: 1663.48

## 2019-03-28 NOTE — NURSING NOTE
Mihir is here for a vaginal problem-- excessive vaginal discharge and lower abdominal cramping.          Pre-visit Screening:  Immunizations:  up to date  Colonoscopy:  NA  Mammogram: NA  Asthma Action Test/Plan:  NA  PHQ9:  NA to today's visit  GAD7:  NA to today's visit  Questioned patient about current smoking habits Pt. has never smoked.  Ok to leave detailed message on voice mail for today's visit only Yes, phone # [phone]

## 2019-03-28 NOTE — PROGRESS NOTES
"CC: Vaginal discharge    History:  Mihir is here today with a change in vaginal discharge that she first noticed 2 days ago. After noticing the discharge, she then developed urinary frequency, lower abdominal cramping, as well as some vaginal discharge. Discharge is white and thick. She denies any history of urinary of vaginal infection. She has been sexually active with her , but no concerns for STDs.     PMH, MEDICATIONS, ALLERGIES, SOCIAL AND FAMILY HISTORY in [facility] and reviewed by me personally.      ROS negative other than the symptoms noted above in the HPI.        Examination   /68 (BP Location: Right arm, Patient Position: Sitting, Cuff Size: Adult Large)   Pulse 81   Temp 97.9  F (36.6  C) (Oral)   Ht 1.727 m (5' 8\")   Wt 88 kg (194 lb)   LMP 03/15/2019   SpO2 99%   BMI 29.50 kg/m         Constitutional: Sitting comfortably, in no acute distress. Vital signs noted  Cardiovascular:  regular rate and rhythm, no murmurs, clicks, or gallops  Respiratory:  normal respiratory rate and rhythm, lungs clear to auscultation  Abdomen: Abdomen soft, non-tender. BS normal. No masses, organomegaly  : External genitalia without abnormality. Vaginal canal with moderate amount of thin white discharge. Cervix intact without abnormality.   SKIN: No jaundice/pallor/rash.   Psychiatric: mentation appears normal and affect normal/bright        A/P    ICD-10-CM    1. Bacterial vaginosis N76.0 A WET PREP (BFP)    B96.89 metroNIDAZOLE (FLAGYL) 500 MG tablet   2. Urinary frequency R35.0 A WET PREP (BFP)     HCL  Urinalysis, Routine (BFP)   3. Impacted cerumen of right ear H61.21 Removal Impacted Cerumen Irrigation Unilateral (Ear Wash)       DISCUSSION:  Wet prep shows clue cells, bacteria, consistent with diagnosis of bacterial vaginosis. Explained to pt that this tends to affect people who are sexually active. Advised frequent hand washing and good general hygeine, consider rinsing vaginal area with warm water " after sexual activity, wear breathable/cotton underwear, and go without underwear when possible. Will treat with 7 day course of metronidazole 1 tablet twice daily. Take with food. Warned of side effects.  Advised of absolutely no alcohol consumption while taking this medication. Should refrain from sexual activity for 2 weeks while infection resolves. Contact me in 1 week if not significantly improved, or in 2 weeks if not back to normal.      follow up visit: As needed    Lillian Zambrano PA-C  [facility]

## 2019-04-01 ENCOUNTER — TELEPHONE (OUTPATIENT)
Dept: FAMILY MEDICINE | Facility: CLINIC | Age: 29
End: 2019-04-01

## 2019-04-01 NOTE — TELEPHONE ENCOUNTER
Pt called in stating she has been throwing up all night. She was wondering if it would be ok to stop her antibiotic for the day. I spoke with pt and advised her that if she isn't going to be able to keep it down then not to worry about taking her medication for the day. Advised her to start back up when she can keep it down. Pt understood and will work on feeling better.

## 2019-04-02 NOTE — TELEPHONE ENCOUNTER
Pt called this morning asking if it was okay to take her Zofran for vomiting in interaction with her heart medication. I advised her it is okay to take because her heart doctor can see her med list when prescribing.

## 2019-04-04 ENCOUNTER — TELEPHONE (OUTPATIENT)
Dept: FAMILY MEDICINE | Facility: CLINIC | Age: 29
End: 2019-04-04

## 2019-04-04 DIAGNOSIS — B96.89 BACTERIAL VAGINOSIS: Primary | ICD-10-CM

## 2019-04-04 DIAGNOSIS — N76.0 BACTERIAL VAGINOSIS: Primary | ICD-10-CM

## 2019-04-04 RX ORDER — METRONIDAZOLE 7.5 MG/G
1 GEL VAGINAL AT BEDTIME
Qty: 70 G | Refills: 0 | Status: SHIPPED | OUTPATIENT
Start: 2019-04-04 | End: 2019-04-17

## 2019-04-04 NOTE — TELEPHONE ENCOUNTER
Mihir called to ask if there is a medication she can take that is not a huge oral pill as she is still nauseated and stated you mentioned you could call something else in for her antibiotic to help with the infection.  Please advise    Patient's phone #[phone]

## 2019-04-17 ENCOUNTER — OFFICE VISIT (OUTPATIENT)
Dept: CARDIOLOGY | Facility: CLINIC | Age: 29
End: 2019-04-17
Attending: PHYSICIAN ASSISTANT
Payer: COMMERCIAL

## 2019-04-17 VITALS
SYSTOLIC BLOOD PRESSURE: 120 MMHG | DIASTOLIC BLOOD PRESSURE: 68 MMHG | BODY MASS INDEX: 29.25 KG/M2 | HEIGHT: 68 IN | HEART RATE: 80 BPM | WEIGHT: 193 LBS

## 2019-04-17 DIAGNOSIS — G90.A POSTURAL ORTHOSTATIC TACHYCARDIA SYNDROME: ICD-10-CM

## 2019-04-17 PROCEDURE — 99215 OFFICE O/P EST HI 40 MIN: CPT | Performed by: INTERNAL MEDICINE

## 2019-04-17 RX ORDER — FLUDROCORTISONE ACETATE 0.1 MG/1
0.05 TABLET ORAL DAILY
Qty: 30 TABLET | Refills: 11 | Status: SHIPPED | OUTPATIENT
Start: 2019-04-17 | End: 2019-07-24 | Stop reason: SINTOL

## 2019-04-17 ASSESSMENT — MIFFLIN-ST. JEOR: SCORE: 1658.94

## 2019-04-17 NOTE — PROGRESS NOTES
"Service Date: 04/17/2019      HISTORY OF PRESENT ILLNESS:    It was my pleasure seeing Ms. Mihir Waldron, a delightful 28-year-old female with POTS.  Chavo has been referred by the hospitalist service at [facility].  She has the following cardiac/medical problems:   A.  Postural orthostatic tachycardia syndrome, see details below.   B.  Recurrent syncope.   C.  Anxiety.   D. \"Chronic fatigue.\"   E.  Migraine headaches.      Ms. Waldron was recently hospitalized at [facility] following ER presentation with palpitation, chest pain, tachycardia and syncope.  This was attributed to her POTS.  Her POTS was formally diagnosed at age 17 at the [facility].  Initial trials of meds, including gabapentin and beta blockers, did not improve symptoms.  Over the years, her POTS improved.  Mihir told me that in her teens she was passing out on a weekly basis.  More recently she has had about 2 syncopal events per year.  Her orthostatic tachycardia has significantly improved as well.      Unfortunately, starting in the fall of 2018, she has had a flare of POTS.  Her symptoms are not as profound as they were years ago, but they are still bothersome.  On occasion, she comes to the emergency room for \"rehydration.\"  She tells me that if she feels severely dizzy and tachycardic, typically 1-2 liters of IV fluid make her feel much better.  Unfortunately, the effect is temporary.      During her recent [facility] hospitalization, she was started on fludrocortisone 0.05 mg daily.  She says this has made a remarkable difference and she feels significantly better.  She is a very compliant patient who exercises on a regular basis, drinks at least 100 ounces of fluid per day and adds salt to her diet liberally.      She is a mother of 2.  She lives in [place].  She does not smoke and does not drink alcohol.  She does not have history of POTS or other significant cardiovascular disease in a first-degree relative.      PHYSICAL EXAMINATION: "   VITAL SIGNS:  Blood pressure 120/68.  Sitting heart rate 80.  Standing heart rate showed an interesting pattern: the patient would have waves of tachycardia up to 115-120 beats per minute, but then her heart rate would decrease down to the 80s (while standing).  She did not complain of symptoms while standing.  Weight 87 kg, height 173 cm.   GENERAL:  She is a healthy-appearing woman in no distress.   LUNGS:  Clear.   CARDIOVASCULAR:  Regular rhythm.  No gallop, murmur, or rub.      DIAGNOSTIC STUDIES:    I reviewed her ECG from the hospital in 03/2019 showing mild sinus tachycardia and borderline QT prolongation.  There is mild nonspecific ST-T abnormality.      IMPRESSION & PLAN:   1.  Postural orthostatic tachycardia syndrome.  The diagnosis has been well established.  The patient is already following several nonpharmacologic measures that can be of benefit in POTS.  I think she could improve her aerobic activity;  I strongly encouraged her to pursue at least 30 minutes of aerobic activity every day.  There is good evidence that this is one of the most helpful nonpharmacologic measures for stabilizing POTS.      I am pleased that low-dose fludrocortisone has made such a big difference in her symptoms.  She should obviously continue it.  I refilled her prescription.  If she enters periods with increased symptoms, it would be okay to double the dose for 2 weeks and then resume the baseline dose of 0.05 mg daily.      Her overall disease trajectory has been favorable.  Symptoms peaked in her late teens and early 20s, but have significantly improved overall.  I do expect that flares will decrease over time.  Hopefully by her mid-to-late 30s she will have only minor issues related to POTS.      Mihir had many questions and I tried to answer them to the best of my ability.  I spent 40 minutes in the clinic with her today.  More than 50% of the time was discussion of her condition.  Follow-up with us as needed.         MAKSIM SUTTON MD, [facility]       cc:   Fuentes Green MD   [facility]    [address]     [address]   [place]  [address]             D: 2019   T: 2019   MT: RANDELL      Name:     JAS OCHOA   MRN:      [id number]        Account:      [id number]   :      1990           Service Date: 2019      Document: [id number]

## 2019-04-17 NOTE — LETTER
4/17/2019    Fuentes Green MD  [address] [facility] [address]  [facility] [address]    RE: Mihir Waldron       Dear Colleague,    I had the pleasure of seeing Mihir Waldron in the [facility].    HPI and Plan:   See dictation    Orders Placed This Encounter   Procedures     Follow-Up with Cardiac Advanced Practice Provider       Orders Placed This Encounter   Medications     fludrocortisone (FLORINEF) 0.1 MG tablet     Sig: Take 0.5 tablets (0.05 mg) by mouth daily     Dispense:  30 tablet     Refill:  11       Medications Discontinued During This Encounter   Medication Reason     metroNIDAZOLE (FLAGYL) 500 MG tablet      metroNIDAZOLE (METROGEL) 0.75 % vaginal gel      fludrocortisone (FLORINEF) 0.1 MG tablet Reorder         Encounter Diagnosis   Name Primary?     Postural orthostatic tachycardia syndrome        CURRENT MEDICATIONS:  Current Outpatient Medications   Medication Sig Dispense Refill     EPINEPHrine (EPIPEN/ADRENACLICK/OR ANY BX GENERIC EQUIV) 0.3 MG/0.3ML injection 2-pack Inject 0.3 mLs (0.3 mg) into the muscle once as needed 0.3 mL 0     fludrocortisone (FLORINEF) 0.1 MG tablet Take 0.5 tablets (0.05 mg) by mouth daily 30 tablet 11     ALPRAZolam (XANAX) 0.5 MG tablet Take 1 tablet (0.5 mg) by mouth 3 times daily as needed for anxiety (Patient not taking: Reported on 4/17/2019) 10 tablet 0     Cholecalciferol (MAXIMUM D3) 47348 units CAPS Take 1 capsule by mouth daily          ALLERGIES     Allergies   Allergen Reactions     Benadryl [Diphenhydramine] Other (See Comments)     Pt got warm and passed out with IV benadryl     Codeine Sulfate GI Disturbance     Contrast Dye Hives     Unsure of name of contrast     Latex Hives       PAST MEDICAL HISTORY:  Past Medical History:   Diagnosis Date     Anxiety      Chest pain      Depressive disorder      Postpartum depression      POTS (postural orthostatic tachycardia syndrome)      Sinus tachycardia        PAST  SURGICAL HISTORY:  Past Surgical History:   Procedure Laterality Date     DENTAL SURGERY Bilateral        FAMILY HISTORY:  Family History   Problem Relation Age of Onset     Coronary Artery Disease Mother      Depression Mother      Anxiety Disorder Mother      Hypertension Father      Anxiety Disorder Father      Depression Father      Depression Sister      Anxiety Disorder Sister      Diabetes Maternal Grandmother      Diabetes Paternal Grandmother      Cerebrovascular Disease No family hx of      Breast Cancer No family hx of      Colon Cancer No family hx of        SOCIAL HISTORY:  Social History     Socioeconomic History     Marital status:      Spouse name: Tee     Number of children: 2     Years of education: None     Highest education level: None   Occupational History     None   Social Needs     Financial resource strain: None     Food insecurity:     Worry: None     Inability: None     Transportation needs:     Medical: None     Non-medical: None   Tobacco Use     Smoking status: Never Smoker     Smokeless tobacco: Never Used   Substance and Sexual Activity     Alcohol use: No     Comment: None     Drug use: No     Sexual activity: Not Currently     Partners: Male     Birth control/protection: Surgical   Lifestyle     Physical activity:     Days per week: None     Minutes per session: None     Stress: None   Relationships     Social connections:     Talks on phone: None     Gets together: None     Attends Rastafari service: None     Active member of club or organization: None     Attends meetings of clubs or organizations: None     Relationship status: None     Intimate partner violence:     Fear of current or ex partner: None     Emotionally abused: None     Physically abused: None     Forced sexual activity: None   Other Topics Concern      Service Not Asked     Blood Transfusions Not Asked     Caffeine Concern No     Occupational Exposure Not Asked     Hobby Hazards Not Asked     Sleep  Concern Not Asked     Stress Concern Not Asked     Weight Concern Not Asked     Special Diet No     Back Care Not Asked     Exercise No     Bike Helmet Not Asked     Seat Belt Yes     Self-Exams Not Asked     Parent/sibling w/ CABG, MI or angioplasty before 65F 55M? No   Social History Narrative    ** Merged History Encounter **            Review of Systems:  Skin:  Negative for       Eyes:  Negative for      ENT:  Negative for      Respiratory:  Negative for       Cardiovascular:    fatigue;lightheadedness;Positive for    Gastroenterology: Positive for nausea    Genitourinary:  Negative for      Musculoskeletal:         Neurologic:  Positive for headaches    Psychiatric:  Positive for anxiety    Heme/Lymph/Imm:  Positive for allergies    Endocrine:  Negative        [id number]              Thank you for allowing me to participate in the care of your patient.      Sincerely,     Vera Sharma MD     [facility]    cc:   Lia Spivey PA-C  [address] [place] [address]

## 2019-04-17 NOTE — PROGRESS NOTES
HPI and Plan:   See dictation    Orders Placed This Encounter   Procedures     Follow-Up with Cardiac Advanced Practice Provider       Orders Placed This Encounter   Medications     fludrocortisone (FLORINEF) 0.1 MG tablet     Sig: Take 0.5 tablets (0.05 mg) by mouth daily     Dispense:  30 tablet     Refill:  11       Medications Discontinued During This Encounter   Medication Reason     metroNIDAZOLE (FLAGYL) 500 MG tablet      metroNIDAZOLE (METROGEL) 0.75 % vaginal gel      fludrocortisone (FLORINEF) 0.1 MG tablet Reorder         Encounter Diagnosis   Name Primary?     Postural orthostatic tachycardia syndrome        CURRENT MEDICATIONS:  Current Outpatient Medications   Medication Sig Dispense Refill     EPINEPHrine (EPIPEN/ADRENACLICK/OR ANY BX GENERIC EQUIV) 0.3 MG/0.3ML injection 2-pack Inject 0.3 mLs (0.3 mg) into the muscle once as needed 0.3 mL 0     fludrocortisone (FLORINEF) 0.1 MG tablet Take 0.5 tablets (0.05 mg) by mouth daily 30 tablet 11     ALPRAZolam (XANAX) 0.5 MG tablet Take 1 tablet (0.5 mg) by mouth 3 times daily as needed for anxiety (Patient not taking: Reported on 4/17/2019) 10 tablet 0     Cholecalciferol (MAXIMUM D3) 21994 units CAPS Take 1 capsule by mouth daily          ALLERGIES     Allergies   Allergen Reactions     Benadryl [Diphenhydramine] Other (See Comments)     Pt got warm and passed out with IV benadryl     Codeine Sulfate GI Disturbance     Contrast Dye Hives     Unsure of name of contrast     Latex Hives       PAST MEDICAL HISTORY:  Past Medical History:   Diagnosis Date     Anxiety      Chest pain      Depressive disorder      Postpartum depression      POTS (postural orthostatic tachycardia syndrome)      Sinus tachycardia        PAST SURGICAL HISTORY:  Past Surgical History:   Procedure Laterality Date     DENTAL SURGERY Bilateral        FAMILY HISTORY:  Family History   Problem Relation Age of Onset     Coronary Artery Disease Mother      Depression Mother      Anxiety  Disorder Mother      Hypertension Father      Anxiety Disorder Father      Depression Father      Depression Sister      Anxiety Disorder Sister      Diabetes Maternal Grandmother      Diabetes Paternal Grandmother      Cerebrovascular Disease No family hx of      Breast Cancer No family hx of      Colon Cancer No family hx of        SOCIAL HISTORY:  Social History     Socioeconomic History     Marital status:      Spouse name: Tee     Number of children: 2     Years of education: None     Highest education level: None   Occupational History     None   Social Needs     Financial resource strain: None     Food insecurity:     Worry: None     Inability: None     Transportation needs:     Medical: None     Non-medical: None   Tobacco Use     Smoking status: Never Smoker     Smokeless tobacco: Never Used   Substance and Sexual Activity     Alcohol use: No     Comment: None     Drug use: No     Sexual activity: Not Currently     Partners: Male     Birth control/protection: Surgical   Lifestyle     Physical activity:     Days per week: None     Minutes per session: None     Stress: None   Relationships     Social connections:     Talks on phone: None     Gets together: None     Attends Scientology service: None     Active member of club or organization: None     Attends meetings of clubs or organizations: None     Relationship status: None     Intimate partner violence:     Fear of current or ex partner: None     Emotionally abused: None     Physically abused: None     Forced sexual activity: None   Other Topics Concern      Service Not Asked     Blood Transfusions Not Asked     Caffeine Concern No     Occupational Exposure Not Asked     Hobby Hazards Not Asked     Sleep Concern Not Asked     Stress Concern Not Asked     Weight Concern Not Asked     Special Diet No     Back Care Not Asked     Exercise No     Bike Helmet Not Asked     Seat Belt Yes     Self-Exams Not Asked     Parent/sibling w/ CABG, MI or  angioplasty before 65F 55M? No   Social History Narrative    ** Merged History Encounter **            Review of Systems:  Skin:  Negative for       Eyes:  Negative for      ENT:  Negative for      Respiratory:  Negative for       Cardiovascular:    fatigue;lightheadedness;Positive for    Gastroenterology: Positive for nausea    Genitourinary:  Negative for      Musculoskeletal:         Neurologic:  Positive for headaches    Psychiatric:  Positive for anxiety    Heme/Lymph/Imm:  Positive for allergies    Endocrine:  Negative        [id number]

## 2019-04-17 NOTE — LETTER
"4/17/2019      Fuentes Green MD  [address] [facility] [address]  [facility] [address]      RE: Mihir Waldron       Dear Colleague,    I had the pleasure of seeing Mihir Waldron in the [facility].    Service Date: 04/17/2019      HISTORY OF PRESENT ILLNESS:    It was my pleasure seeing Ms. Mihir Waldron, a delightful 28-year-old female with POTS.  Chavo has been referred by the hospitalist service at [facility].  She has the following cardiac/medical problems:   A.  Postural orthostatic tachycardia syndrome, see details below.   B.  Recurrent syncope.   C.  Anxiety.   D. \"Chronic fatigue.\"   E.  Migraine headaches.      Ms. Waldron was recently hospitalized at [facility] following ER presentation with palpitation, chest pain, tachycardia and syncope.  This was attributed to her POTS.  Her POTS was formally diagnosed at age 17 at the [facility].  Initial trials of meds, including gabapentin and beta blockers, did not improve symptoms.  Over the years, her POTS improved.  Mihir told me that in her teens she was passing out on a weekly basis.  More recently she has had about 2 syncopal events per year.  Her orthostatic tachycardia has significantly improved as well.      Unfortunately, starting in the fall of 2018, she has had a flare of POTS.  Her symptoms are not as profound as they were years ago, but they are still bothersome.  On occasion, she comes to the emergency room for \"rehydration.\"  She tells me that if she feels severely dizzy and tachycardic, typically 1-2 liters of IV fluid make her feel much better.  Unfortunately, the effect is temporary.      During her recent [facility] hospitalization, she was started on fludrocortisone 0.05 mg daily.  She says this has made a remarkable difference and she feels significantly better.  She is a very compliant patient who exercises on a regular basis, drinks at least 100 ounces of fluid per day and adds salt to her diet " liberally.      She is a mother of 2.  She lives in [place].  She does not smoke and does not drink alcohol.  She does not have history of POTS or other significant cardiovascular disease in a first-degree relative.      PHYSICAL EXAMINATION:   VITAL SIGNS:  Blood pressure 120/68.  Sitting heart rate 80.  Standing heart rate showed an interesting pattern: the patient would have waves of tachycardia up to 115-120 beats per minute, but then her heart rate would decrease down to the 80s (while standing).  She did not complain of symptoms while standing.  Weight 87 kg, height 173 cm.   GENERAL:  She is a healthy-appearing woman in no distress.   LUNGS:  Clear.   CARDIOVASCULAR:  Regular rhythm.  No gallop, murmur, or rub.      DIAGNOSTIC STUDIES:    I reviewed her ECG from the hospital in 03/2019 showing mild sinus tachycardia and borderline QT prolongation.  There is mild nonspecific ST-T abnormality.      IMPRESSION & PLAN:   1.  Postural orthostatic tachycardia syndrome.  The diagnosis has been well established.  The patient is already following several nonpharmacologic measures that can be of benefit in POTS.  I think she could improve her aerobic activity;  I strongly encouraged her to pursue at least 30 minutes of aerobic activity every day.  There is good evidence that this is one of the most helpful nonpharmacologic measures for stabilizing POTS.      I am pleased that low-dose fludrocortisone has made such a big difference in her symptoms.  She should obviously continue it.  I refilled her prescription.  If she enters periods with increased symptoms, it would be okay to double the dose for 2 weeks and then resume the baseline dose of 0.05 mg daily.      Her overall disease trajectory has been favorable.  Symptoms peaked in her late teens and early 20s, but have significantly improved overall.  I do expect that flares will decrease over time.  Hopefully by her mid-to-late 30s she will have only minor issues  related to POTS.      Mihir had many questions and I tried to answer them to the best of my ability.  I spent 40 minutes in the clinic with her today.  More than 50% of the time was discussion of her condition.  Follow-up with us as needed.        MAKSIM SUTTON MD, [facility]       cc:   Fuentes Green MD   [facility]    [address]     [address]   [place]  [address]             D: 2019   T: 2019   MT: RANDELL      Name:     MIHIR OCHOA   MRN:      [id number]        Account:      [id number]   :      1990           Service Date: 2019      Document: [id number]           Outpatient Encounter Medications as of 2019   Medication Sig Dispense Refill     EPINEPHrine (EPIPEN/ADRENACLICK/OR ANY BX GENERIC EQUIV) 0.3 MG/0.3ML injection 2-pack Inject 0.3 mLs (0.3 mg) into the muscle once as needed 0.3 mL 0     fludrocortisone (FLORINEF) 0.1 MG tablet Take 0.5 tablets (0.05 mg) by mouth daily 30 tablet 11     ALPRAZolam (XANAX) 0.5 MG tablet Take 1 tablet (0.5 mg) by mouth 3 times daily as needed for anxiety (Patient not taking: Reported on 2019) 10 tablet 0     Cholecalciferol (MAXIMUM D3) 78855 units CAPS Take 1 capsule by mouth daily        [DISCONTINUED] fludrocortisone (FLORINEF) 0.1 MG tablet Take 0.5 tablets (0.05 mg) by mouth daily 30 tablet 0     [DISCONTINUED] metroNIDAZOLE (FLAGYL) 500 MG tablet Take 1 tablet (500 mg) by mouth 2 times daily for 7 days 14 tablet 0     [DISCONTINUED] metroNIDAZOLE (METROGEL) 0.75 % vaginal gel Place 1 applicator (5 g) vaginally At Bedtime for 5 days 70 g 0     No facility-administered encounter medications on file as of 2019.        Again, thank you for allowing me to participate in the care of your patient.      Sincerely,    Maksim Sutton MD     [facility]    cc:   Lia Spivey PA-C  [address] [place] [address]

## 2019-04-29 ENCOUNTER — TELEPHONE (OUTPATIENT)
Dept: CARDIOLOGY | Facility: CLINIC | Age: 29
End: 2019-04-29

## 2019-04-29 NOTE — TELEPHONE ENCOUNTER
04/29/19 Pt called [facility] nurse line with concerns over nausea she is experiencing since starting Florinef for POTS. She reports her POTS sx are greatly diminished but she is experiencing extreme nausea 3-4/7 days for approx 5-6 hours after taking med. She has consulted w her pharmacy and has tried to take with and without food, drinking plenty of water. Pharmacist recommended she only take med in the morning. Pharmacist recommended possible Zofran. Will pass information to dr Sharma for further recommendations. Teresa 10:15 am    04/30/19 9:15 am LM on pts VM with recommendations from Dr Sharma. Enc to call back w questions or concerns. Teresa 9:16 am    04/30/19 9:25 am Pt returned call to ask if it is ok if she just stops medication abruptly. Writer reassured her this is fine, for a week and then restart as prescribed . Pt voiced understanding. Teresa

## 2019-04-29 NOTE — TELEPHONE ENCOUNTER
I literally just saw her few days ago and she mentioned nothing like this...    Please have her stop Florinef for 1 week and see whether these sx's improve.  If they do, restart it (re-challenge) and see how it goes.  But I would avoid prescribing a medication (Zofran) to treat the S.E. of another medication....  It is a slippery slope.     DI

## 2019-07-10 ENCOUNTER — OFFICE VISIT (OUTPATIENT)
Dept: FAMILY MEDICINE | Facility: CLINIC | Age: 29
End: 2019-07-10

## 2019-07-10 VITALS
DIASTOLIC BLOOD PRESSURE: 70 MMHG | TEMPERATURE: 97.9 F | BODY MASS INDEX: 29.74 KG/M2 | HEART RATE: 113 BPM | OXYGEN SATURATION: 98 % | SYSTOLIC BLOOD PRESSURE: 140 MMHG | WEIGHT: 195.6 LBS

## 2019-07-10 DIAGNOSIS — R22.32 MASS OF LEFT AXILLA: Primary | ICD-10-CM

## 2019-07-10 DIAGNOSIS — N64.4 BREAST TENDERNESS IN FEMALE: ICD-10-CM

## 2019-07-10 PROCEDURE — 99213 OFFICE O/P EST LOW 20 MIN: CPT | Performed by: PHYSICIAN ASSISTANT

## 2019-07-10 NOTE — PROGRESS NOTES
CC: Lump in armpit    History:  5 weeks ago, Mihir was in shower, and felt a lump in left armpit. At first was size of a rice grain, but now grown to pea-sized and has become somewhat painful. No redness, drainage, hair. Has tried applying a heating pack without much relief. No breast symptoms, no history of mammograms, no family history of mammograms.     PMH, MEDICATIONS, ALLERGIES, SOCIAL AND FAMILY HISTORY in [facility] and reviewed by me personally.    ROS negative other than the symptoms noted above in the HPI.    Examination   /70 (BP Location: Right arm, Patient Position: Sitting, Cuff Size: Adult Large)   Pulse 113   Temp 97.9  F (36.6  C) (Oral)   Wt 88.7 kg (195 lb 9.6 oz)   SpO2 98%   BMI 29.74 kg/m       Constitutional: Sitting comfortably, in no acute distress. Vital signs noted  Neck:  no adenopathy, trachea midline and normal to palpation  Cardiovascular:  regular rate and rhythm, no murmurs, clicks, or gallops  Respiratory:  normal respiratory rate and rhythm, lungs clear to auscultation  Breast/axilla: Breasts without discrete masses. Left breast lateral aspect with some mild tenderness to palpation. Small mobile mass 8 mm diameter palpated in left axilla. No superficial skin changes.  Mild tenderness to palpation.   SKIN: No jaundice/pallor/rash.   Psychiatric: mentation appears normal and affect normal/bright        A/P    ICD-10-CM    1. Mass of left axilla R22.32 US Breast Left Complete 4 Quadrants     MA Diagnostic Digital Left   2. Breast tenderness in female N64.4 US Breast Left Complete 4 Quadrants     MA Diagnostic Digital Left       DISCUSSION:  Given palpable axilla mass and breast tenderness will order US of left breast and axilla to rule out concerning etiology. Suspect reactive lymph node vs small cyst.  She will contact imaging group to get this done, and I will contact her with results when available. She can try heating/icing, and should try to refrain from manipulating mass.      follow up visit: As needed    [facility]

## 2019-07-10 NOTE — NURSING NOTE
Mihir is here for lump under her left armpit    Pre-visit Screening:  Immunizations:  up to date  Colonoscopy:  NA  Mammogram: NA  Asthma Action Test/Plan:  Na  PHQ9:  na  GAD7:  na  Questioned patient about current smoking habits Pt. has never smoked.  Ok to leave detailed message on voice mail for today's visit only yES, phone # [phone]

## 2019-07-15 ENCOUNTER — HOSPITAL ENCOUNTER (OUTPATIENT)
Dept: ULTRASOUND IMAGING | Facility: CLINIC | Age: 29
Discharge: HOME | End: 2019-07-15
Attending: PHYSICIAN ASSISTANT | Admitting: PHYSICIAN ASSISTANT
Payer: COMMERCIAL

## 2019-07-15 ENCOUNTER — TELEPHONE (OUTPATIENT)
Dept: CARDIOLOGY | Facility: CLINIC | Age: 29
End: 2019-07-15

## 2019-07-15 DIAGNOSIS — R22.32 MASS OF LEFT AXILLA: ICD-10-CM

## 2019-07-15 DIAGNOSIS — G90.A POSTURAL ORTHOSTATIC TACHYCARDIA SYNDROME: Primary | ICD-10-CM

## 2019-07-15 DIAGNOSIS — N64.4 BREAST TENDERNESS IN FEMALE: ICD-10-CM

## 2019-07-15 PROCEDURE — 76642 ULTRASOUND BREAST LIMITED: CPT | Mod: LT

## 2019-07-15 NOTE — TELEPHONE ENCOUNTER
Patient called to reports she has not been feeling well for the last 6 days.  Reports she has been feeling weak, shaky, dizzy, breaks out in cold sweat and sometimes feels like she is going to pass out.   When she was seen by Dr wilder on 4/17  he recommended when she becomes symptomatic to double her dose of florinef.  Patient reports she has done this for 6 days and has not noticed any changes in her symptoms.  She was also told to try and avoid the ED.  She is calling today for recommendations as she does not feel the doubling of the florinef is helping her symptoms.  She also reports she has been compliant with increasing sodium in her diet and keeping herself hydrated.  Has been working on increasing her aerobic exercise.   Informed patient that Dr Wilder is out of the office for two weeks.  Will have Dr Lion review for recommendation.  RIKI Skaggs

## 2019-07-16 RX ORDER — MIDODRINE HYDROCHLORIDE 2.5 MG/1
2.5 TABLET ORAL 3 TIMES DAILY
Qty: 90 TABLET | Refills: 3 | Status: SHIPPED | OUTPATIENT
Start: 2019-07-16 | End: 2019-08-14 | Stop reason: SINTOL

## 2019-07-16 NOTE — TELEPHONE ENCOUNTER
Spoke to patient regarding recommendations per Dr Lion in Dr Sharma absence.    ---florinef 0.1mg (take 1/2 tablet) po every day  ---midodrine 2.5mg po tid  Medication list updated.  Script sent to requested pharmacy.   Instructed patient to call with an update on her symptoms next week.  Patient provided verbal understanding.  RIKI Skaggs

## 2019-07-23 NOTE — TELEPHONE ENCOUNTER
Spoke to patient regarding recommendations per Dr Lion on administration of midodrine over 12 hour period.  Patient provided verbal understanding.  Asked patient to update us next week on how she is doing with new medications.  Patient provided verbal understanding.  RIKI Skaggs

## 2019-07-23 NOTE — TELEPHONE ENCOUNTER
Patient called reporting she has not started the medication recommended per Dr Lion on 7/16.  Reports pharmacist gave her specific instructions that she was not to take the medication 3-4 hours prior to going to bed.  Wanted to be sure it was okay to get 3 doses in over a 12 hour period.  Will have Dr Lion review.  RIKI Skaggs

## 2019-07-23 NOTE — TELEPHONE ENCOUNTER
She can take Midodrine up to 3 times a day with the last dose no later than 3-4 hours before bedtime. She can take one in am when she is up assuming around 8 am then another one at noon or 1 pm and the last one at 6 pm.

## 2019-07-24 ENCOUNTER — TELEPHONE (OUTPATIENT)
Dept: CARDIOLOGY | Facility: CLINIC | Age: 29
End: 2019-07-24

## 2019-07-24 DIAGNOSIS — G90.A POSTURAL ORTHOSTATIC TACHYCARDIA SYNDROME: Primary | ICD-10-CM

## 2019-07-24 NOTE — TELEPHONE ENCOUNTER
"07/24/19 Pt called, stating she is\"feeling horrible\" since she began taking the Midodrine 2.5 mg TID yesterday . She had 3 doses yesterday and 2 doses so far today. She describes extreme pressure in her head and neck, so bad she cannot sit down. BP today was 143/104 and pulse 130 (Normally runs 118/68 with pulse 75-80). Also taking Florinef 0.1 mg every day. Will route question to Dr Lion and return call to pt. She voiced agreement and understanding. Teresa 3:08 pm  "

## 2019-07-24 NOTE — TELEPHONE ENCOUNTER
07/24/19 Pt called and relayed information from Dr Lion. Will hold Florinef and call back by Friday 7/26 if not any better. Med list updated. Teresa 4:10 pm

## 2019-07-26 NOTE — TELEPHONE ENCOUNTER
Patient called in follow up to report her symptoms have not improved with removal of florinef. Continues to have extreme pressure in her head and neck and feels terrible.  /104 HR ranging 130-145.  Will update Dr Lion.  Sharifa RN

## 2019-08-01 NOTE — TELEPHONE ENCOUNTER
Message left for patient to review recommendations per Dr Sharma.  Awaiting return call.  RIKI Skaggs

## 2019-08-01 NOTE — TELEPHONE ENCOUNTER
POTS is a chronic condition with occasional flares.  She is likely experiencing a flare, though unclear what the trigger is.    1.  If she is still taking midodrine, have her stop it (it sounds, from the messagges I reviewed today that she is not tolerating it...).  2.  Try to increase salt and fluid in her diet as much as she can.  Drink at least 70-80 oz of fluid every day.  3.  Restart fludrocortisone at 0.05 mg daily (that was the dose she was taking when I saw her).    4.  Please refer her to Dr. Sanders at the [facility] for consultation.     JUAN JOSE Flaherty

## 2019-08-01 NOTE — TELEPHONE ENCOUNTER
If she is still taking midodrine, have her stop it (it sounds, from the messagges I reviewed today that she is not tolerating it...).  2.  Try to increase salt and fluid in her diet as much as she can.  Drink at least 70-80 oz of fluid every day.  3.  Restart fludrocortisone at 0.05 mg daily (that was the dose she was taking when I saw her).    4.  Please refer her to Dr. Sanders at the [facility] for consultation.

## 2019-08-14 RX ORDER — FLUDROCORTISONE ACETATE 0.1 MG/1
0.05 TABLET ORAL DAILY
Status: ON HOLD
Start: 2019-08-14 | End: 2020-02-07

## 2019-08-14 NOTE — TELEPHONE ENCOUNTER
Called patient again and was able to review recommendations per Dr Sharma:  1.   Stop midodrine  (it sounds, from the messagges I reviewed today that she is not tolerating it...) patient stopped medication  2.  Try to increase salt and fluid in her diet as much as she can.  Drink at least 70-80 oz of fluid every day.  3.  Restart fludrocortisone at 0.05 mg daily (that was the dose she was taking when I saw her).  This was resumed.  Medication list updated in epic.  4.  Please refer her to Dr. Sanders at the [facility] for consultation.  Patient reports she is feeling much better.  She indicated she had a virus that made her POTS flair up.  She provided verbal understanding regarding above.  RIKI Skaggs

## 2019-09-10 ENCOUNTER — OFFICE VISIT (OUTPATIENT)
Dept: FAMILY MEDICINE | Facility: CLINIC | Age: 29
End: 2019-09-10

## 2019-09-10 VITALS
BODY MASS INDEX: 29.43 KG/M2 | RESPIRATION RATE: 20 BRPM | HEIGHT: 68 IN | WEIGHT: 194.2 LBS | HEART RATE: 76 BPM | SYSTOLIC BLOOD PRESSURE: 122 MMHG | DIASTOLIC BLOOD PRESSURE: 78 MMHG | TEMPERATURE: 98 F

## 2019-09-10 DIAGNOSIS — Z91.018 FOOD ALLERGY: Primary | ICD-10-CM

## 2019-09-10 PROCEDURE — 99213 OFFICE O/P EST LOW 20 MIN: CPT | Performed by: FAMILY MEDICINE

## 2019-09-10 ASSESSMENT — MIFFLIN-ST. JEOR: SCORE: 1659.39

## 2019-09-10 NOTE — PROGRESS NOTES
"SUBJECTIVE:  Mihir Waldron, a 29 year old female scheduled an appointment to discuss the following issues:  Food allergy  Pt comes in today with concerns about food allergies.  She relates 2 years of gradually worsening issues with stone fruits-generally mild itching in mouth and ears but last week she had the sensation of throat swelling along with mouth itching after eating a plum. She lso developed nausea.    Pt has a hx of \"spontaneous urticaria\" that can lead to anaphylaxis dating back to childhood.  She had w/u at [place] that did not find cause.  Given this she has had epi pens most of her life.    She was about to use the epi pen and go to ER last week when the plum incident occurred but she started feeling better and waited it out-went to sleep and felt mostly normal the next day.    Pt does also note more recent issues with dairy products causing diarrhea and nausea-some itching as well      Medical, social, surgical, and family histories reviewed.    Patient Active Problem List   Diagnosis     Sinus tachycardia     Postural orthostatic tachycardia syndrome     Health Care Home     ACP (advance care planning)     Post partum depression     CHIDI (generalized anxiety disorder)     Hx of anaphylaxis     Chest pain     Anxiety     Past Medical History:   Diagnosis Date     Anxiety      Chest pain      Depressive disorder      Postpartum depression      POTS (postural orthostatic tachycardia syndrome)      Sinus tachycardia      Family History   Problem Relation Age of Onset     Coronary Artery Disease Mother      Depression Mother      Anxiety Disorder Mother      Hypertension Father      Anxiety Disorder Father      Depression Father      Depression Sister      Anxiety Disorder Sister      Diabetes Maternal Grandmother      Diabetes Paternal Grandmother      Cerebrovascular Disease No family hx of      Breast Cancer No family hx of      Colon Cancer No family hx of      Social History     Socioeconomic " History     Marital status:      Spouse name: Tee     Number of children: 2     Years of education: Not on file     Highest education level: Not on file   Occupational History     Not on file   Social Needs     Financial resource strain: Not on file     Food insecurity:     Worry: Not on file     Inability: Not on file     Transportation needs:     Medical: Not on file     Non-medical: Not on file   Tobacco Use     Smoking status: Never Smoker     Smokeless tobacco: Never Used   Substance and Sexual Activity     Alcohol use: No     Comment: None     Drug use: No     Sexual activity: Not Currently     Partners: Male     Birth control/protection: Surgical   Lifestyle     Physical activity:     Days per week: Not on file     Minutes per session: Not on file     Stress: Not on file   Relationships     Social connections:     Talks on phone: Not on file     Gets together: Not on file     Attends Jain service: Not on file     Active member of club or organization: Not on file     Attends meetings of clubs or organizations: Not on file     Relationship status: Not on file     Intimate partner violence:     Fear of current or ex partner: Not on file     Emotionally abused: Not on file     Physically abused: Not on file     Forced sexual activity: Not on file   Other Topics Concern      Service Not Asked     Blood Transfusions Not Asked     Caffeine Concern No     Occupational Exposure Not Asked     Hobby Hazards Not Asked     Sleep Concern Not Asked     Stress Concern Not Asked     Weight Concern Not Asked     Special Diet No     Back Care Not Asked     Exercise No     Bike Helmet Not Asked     Seat Belt Yes     Self-Exams Not Asked     Parent/sibling w/ CABG, MI or angioplasty before 65F 55M? No   Social History Narrative    ** Merged History Encounter **          Past Surgical History:   Procedure Laterality Date     DENTAL SURGERY Bilateral        Current Outpatient Medications on File Prior to  "Visit:  EPINEPHrine (EPIPEN/ADRENACLICK/OR ANY BX GENERIC EQUIV) 0.3 MG/0.3ML injection 2-pack Inject 0.3 mLs (0.3 mg) into the muscle once as needed   fludrocortisone (FLORINEF) 0.1 MG tablet Take 0.5 tablets (0.05 mg) by mouth daily     No current facility-administered medications on file prior to visit.      Allergies: Benadryl [diphenhydramine]; Codeine sulfate; Contrast dye; Latex; and Plum pulp    Immunization History   Administered Date(s) Administered     Influenza (IIV3) PF 11/21/2014     Rhogam 06/26/2015, 10/12/2016     TDAP Vaccine (Boostrix) 05/11/2015, 08/08/2016        ROS:  CONSTITUTIONAL: NEGATIVE for fever, chills  EYES: NEGATIVE for vision changes   RESP: NEGATIVE for significant cough or SOB  CV: NEGATIVE for chest pain, palpitations   GI: NEGATIVE for nausea, abdominal pain, heartburn, or change in bowel habits  : NEGATIVE for frequency, dysuria, or hematuria  MUSCULOSKELETAL: NEGATIVE for significant arthralgias or myalgia  NEURO: NEGATIVE for weakness, dizziness or paresthesias or headache    OBJECTIVE:  /78 (BP Location: Left arm, Patient Position: Chair, Cuff Size: Adult Regular)   Pulse 76   Temp 98  F (36.7  C) (Oral)   Resp 20   Ht 1.727 m (5' 8\")   Wt 88.1 kg (194 lb 3.2 oz)   LMP 09/04/2019   BMI 29.53 kg/m    EXAM:  GENERAL APPEARANCE: healthy, alert and no distress  EYES: EOMI,  PERRL  HENT: ear canals and TM's normal and nose and mouth without ulcers or lesions  RESP: lungs clear to auscultation - no rales, rhonchi or wheezes  CV: regular rates and rhythm, normal S1 S2, no S3 or S4 and no murmur, click or rub -  ABDOMEN:  soft, nontender, no HSM or masses and bowel sounds normal    ASSESSMENT/PLAN:  (Z91.018) Food allergy  (primary encounter diagnosis)  Comment: likely stone fruit allergy, concerning given escalating symptoms -needs allergy eval, suspect dairy issue may be more lactose intolerance  Plan: ALLERGY/ASTHMA ADULT REFERRAL        Pt has epi pen and will keep " with her at all times , see allergy asap

## 2019-09-10 NOTE — NURSING NOTE
Mihir Waldron is here for possible allergic reaction to a plum. Seems to be getting more allergic to fruit and would like food allergy testing done.    Questioned patient about current smoking habits.  Pt. has never smoked.  PULSE regular  My Chart: active  CLASSIFICATION OF OVERWEIGHT AND OBESITY BY BMI                        Obesity Class           BMI(kg/m2)  Underweight                                    < 18.5  Normal                                         18.5-24.9  Overweight                                     25.0-29.9  OBESITY                     I                  30.0-34.9                             II                 35.0-39.9  EXTREME OBESITY             III                >40                            Patient's  BMI Body mass index is 29.53 kg/m .  http://hin.nhlbi.nih.gov/menuplanner/menu.cgi  Pre-visit planning  Immunizations - up to date  Colonoscopy -   Mammogram -   Asthma -   PHQ9 -    CHIDI-7 -

## 2019-09-17 NOTE — TELEPHONE ENCOUNTER
RECORDS RECEIVED FROM:    DATE RECEIVED:    NOTES STATUS DETAILS   OFFICE NOTE from referring provider    Internal Per [facility] message from Dr. Sharma 8-1-19   OFFICE NOTE from other cardiologists   and neurologists Internal Dr. Sharma 4-17-19   DISCHARGE SUMMARY from hospital    Internal 3-13-19   DISCHARGE REPORT from the ER   Internal 4-15-18   OPERATIVE REPORT    N/A    MEDICATION LIST   Internal    LABS     BMP   Internal 3-13-19   CBC   Internal 3-13-19   CMP   Internal 3-13-19   Lipids   N/A    TSH   Internal 11-21-18   DIAGNOSTIC PROCEDURES     EKG  Strips *important Internal    Monitor Reports  Strips *important N/A    Cardioversions   N/A    ICD/pacemaker implant   No    Tilt table studies   N/A    IMAGING (DISC & REPORT)      ECHO's   Internal 2016   Stress Tests   N/A    Cath   N/A    CT/CTA   N/A    MRI/MRA   N/A

## 2019-11-04 ENCOUNTER — TRANSFERRED RECORDS (OUTPATIENT)
Dept: FAMILY MEDICINE | Facility: CLINIC | Age: 29
End: 2019-11-04

## 2019-11-12 ENCOUNTER — OFFICE VISIT (OUTPATIENT)
Dept: FAMILY MEDICINE | Facility: CLINIC | Age: 29
End: 2019-11-12

## 2019-11-12 VITALS
DIASTOLIC BLOOD PRESSURE: 76 MMHG | TEMPERATURE: 97 F | HEIGHT: 68 IN | RESPIRATION RATE: 20 BRPM | BODY MASS INDEX: 29.34 KG/M2 | HEART RATE: 72 BPM | SYSTOLIC BLOOD PRESSURE: 120 MMHG | WEIGHT: 193.6 LBS

## 2019-11-12 DIAGNOSIS — F32.81 PMDD (PREMENSTRUAL DYSPHORIC DISORDER): Primary | ICD-10-CM

## 2019-11-12 DIAGNOSIS — G43.119 INTRACTABLE MIGRAINE WITH AURA WITHOUT STATUS MIGRAINOSUS: ICD-10-CM

## 2019-11-12 PROCEDURE — 99213 OFFICE O/P EST LOW 20 MIN: CPT | Performed by: FAMILY MEDICINE

## 2019-11-12 RX ORDER — CITALOPRAM HYDROBROMIDE 10 MG/1
10 TABLET ORAL DAILY
Qty: 30 TABLET | Refills: 1 | Status: SHIPPED | OUTPATIENT
Start: 2019-11-12 | End: 2020-04-15

## 2019-11-12 RX ORDER — RIZATRIPTAN BENZOATE 5 MG/1
5-10 TABLET ORAL
Qty: 18 TABLET | Refills: 1 | Status: SHIPPED | OUTPATIENT
Start: 2019-11-12 | End: 2021-02-24

## 2019-11-12 ASSESSMENT — MIFFLIN-ST. JEOR: SCORE: 1656.66

## 2019-11-12 NOTE — NURSING NOTE
Questioned patient about current smoking habits.  Pt. has never smoked.  PULSE regular  My Chart: active  CLASSIFICATION OF OVERWEIGHT AND OBESITY BY BMI                        Obesity Class           BMI(kg/m2)  Underweight                                    < 18.5  Normal                                         18.5-24.9  Overweight                                     25.0-29.9  OBESITY                     I                  30.0-34.9                             II                 35.0-39.9  EXTREME OBESITY             III                >40                            Patient's  BMI Body mass index is 29.44 kg/m .  http://hin.nhlbi.nih.gov/menuplanner/menu.cgi  Pre-visit planning  Immunizations - up to date  Colonoscopy -   Mammogram -   Asthma -   PHQ9 -    CHIDI-7 -

## 2019-11-12 NOTE — PROGRESS NOTES
Subjective     Mihir Waldron is a 29 year old female who presents to clinic today for the following health issues:    HPI   Depression Followup-pt has been seeing therapist and they are thinking she may have PMDD-has been evaluated at [facility] in past and was diagnosed with nonspecific mood disorder-pt now notes her symptoms always occur 4-5 days before cycle and last into menses-then she feel fine        Are you having other symptoms that might be associated with depression? No    Have you had a significant life event?  NoStill dealing with issues involved with raising an autistic child     Are you feeling anxious or having panic attacks?   No    Do you have any concerns with your use of alcohol or other drugs? No    Social History     Tobacco Use     Smoking status: Never Smoker     Smokeless tobacco: Never Used   Substance Use Topics     Alcohol use: No     Comment: None     Drug use: No     PHQ 12/13/2017 8/27/2018 11/21/2018   PHQ-9 Total Score 4 4 18   Q9: Thoughts of better off dead/self-harm past 2 weeks Not at all Not at all Not at all     CHIDI-7 SCORE 12/13/2017 8/27/2018 11/21/2018   Total Score 10 10 20     Last PHQ-9 11/21/2018   1.  Little interest or pleasure in doing things 2   2.  Feeling down, depressed, or hopeless 2   3.  Trouble falling or staying asleep, or sleeping too much 2   4.  Feeling tired or having little energy 3   5.  Poor appetite or overeating 3   6.  Feeling bad about yourself 2   7.  Trouble concentrating 3   8.  Moving slowly or restless 1   Q9: Thoughts of better off dead/self-harm past 2 weeks 0   PHQ-9 Total Score 18   Difficulty at work, home, or with people Very difficult     CHIDI-7  11/21/2018   1. Feeling nervous, anxious, or on edge 3   2. Not being able to stop or control worrying 3   3. Worrying too much about different things 3   4. Trouble relaxing 3   5. Being so restless that it is hard to sit still 3   6. Becoming easily annoyed or irritable 3   7. Feeling  afraid, as if something awful might happen 2   CHIDI-7 Total Score 20   If you checked any problems, how difficult have they made it for you to do your work, take care of things at home, or get along with other people? Extremely difficult         Suicide Assessment Five-step Evaluation and Treatment (SAFE-T)    Migraine     Since your last clinic visit, how have your headaches changed?  No change    How often are you getting headaches or migraines? Menstrual only     Are you able to do normal daily activities when you have a migraine? Yes    Are you taking rescue/relief medications? (Select all that apply) Maxalt    How helpful is your rescue/relief medication?  I get some relief    Are you taking any medications to prevent migraines? (Select all that apply)  No    In the past 4 weeks, how often have you gone to urgent care or the emergency room because of your headaches?  0      How many servings of fruits and vegetables do you eat daily?  2-3    On average, how many sweetened beverages do you drink each day (soda, juice, sweet tea, etc)?   1    How many days per week do you miss taking your medication? 0        Patient Active Problem List   Diagnosis     Sinus tachycardia     Postural orthostatic tachycardia syndrome     Health Care Home     ACP (advance care planning)     Post partum depression     CHIDI (generalized anxiety disorder)     Hx of anaphylaxis     Chest pain     Anxiety     Past Surgical History:   Procedure Laterality Date     DENTAL SURGERY Bilateral        Social History     Tobacco Use     Smoking status: Never Smoker     Smokeless tobacco: Never Used   Substance Use Topics     Alcohol use: No     Comment: None     Family History   Problem Relation Age of Onset     Coronary Artery Disease Mother      Depression Mother      Anxiety Disorder Mother      Hypertension Father      Anxiety Disorder Father      Depression Father      Depression Sister      Anxiety Disorder Sister      Diabetes Maternal  Grandmother      Diabetes Paternal Grandmother      Cerebrovascular Disease No family hx of      Breast Cancer No family hx of      Colon Cancer No family hx of          Current Outpatient Medications   Medication Sig Dispense Refill     citalopram (CELEXA) 10 MG tablet Take 1 tablet (10 mg) by mouth daily 30 tablet 1     EPINEPHrine (EPIPEN/ADRENACLICK/OR ANY BX GENERIC EQUIV) 0.3 MG/0.3ML injection 2-pack Inject 0.3 mLs (0.3 mg) into the muscle once as needed 0.3 mL 0     fludrocortisone (FLORINEF) 0.1 MG tablet Take 0.5 tablets (0.05 mg) by mouth daily       rizatriptan (MAXALT) 5 MG tablet Take 1-2 tablets (5-10 mg) by mouth at onset of headache for migraine May repeat in 2 hours. Max 6 tablets/24 hours. 18 tablet 1     Allergies   Allergen Reactions     Benadryl [Diphenhydramine] Other (See Comments)     Pt got warm and passed out with IV benadryl     Codeine Sulfate GI Disturbance     Contrast Dye Hives     Unsure of name of contrast     Latex Hives     Plum Pulp Nausea and Vomiting and Itching     Itching in throat and throat swelling     Recent Labs   Lab Test 03/13/19  2106 03/13/19  1948  11/21/18  1902 07/16/18  0956  10/19/17  1800   ALT 18  --   --   --   --   --  17   CR 0.59 0.58   < >  --   --    < > 0.58   GFRESTIMATED >90 >90   < >  --   --    < > >90   GFRESTBLACK >90 >90   < >  --   --    < > >90   POTASSIUM 3.4 3.8   < >  --   --    < > 3.4   TSH  --   --   --  1.94 1.87   < >  --     < > = values in this interval not displayed.      BP Readings from Last 3 Encounters:   11/12/19 120/76   09/10/19 122/78   07/10/19 140/70    Wt Readings from Last 3 Encounters:   11/12/19 87.8 kg (193 lb 9.6 oz)   09/10/19 88.1 kg (194 lb 3.2 oz)   07/10/19 88.7 kg (195 lb 9.6 oz)                      Reviewed and updated as needed this visit by Provider         Review of Systems   ROS COMP: Constitutional, HEENT, cardiovascular, pulmonary, gi and gu systems are negative, except as otherwise noted.      Objective  "   /76 (BP Location: Right arm)   Pulse 72   Resp 20   Ht 1.727 m (5' 8\")   Wt 87.8 kg (193 lb 9.6 oz)   LMP 11/02/2019   BMI 29.44 kg/m    Body mass index is 29.44 kg/m .  Physical Exam   GENERAL: healthy, alert and no distress  NECK: no adenopathy, no asymmetry, masses, or scars and thyroid normal to palpation  RESP: lungs clear to auscultation - no rales, rhonchi or wheezes  CV: regular rate and rhythm, normal S1 S2, no S3 or S4, no murmur, click or rub, no peripheral edema and peripheral pulses strong  ABDOMEN: soft, nontender, no hepatosplenomegaly, no masses and bowel sounds normal  MS: no gross musculoskeletal defects noted, no edema  PSYCH: mentation appears normal, affect normal/bright    Diagnostic Test Results:  Labs reviewed in Epic        Assessment & Plan   Assessment      Plan  (F32.81) PMDD (premenstrual dysphoric disorder)  (primary encounter diagnosis)  Comment: pt with symptoms that clearly could fit with PMDD-discussed various options for medication dosing including lutela phase dosing, symptom related, or continuous-pt feels with her regular periods and regular symptoms the symptom approach is best  Plan: citalopram (CELEXA) 10 MG tablet        We agree to try citalopram 10 mg at onset of symptoms , generally 4-5 days before period, continue into menses and then stop, pt can increase to 20 or even 30 mg if no response, pt understand how to adjust and will let me know what she is doing      I reviewed the risks, benefits, and possible side effects of the medication.  The patient had an opportunity to ask any questions regarding the treatment plan. The patient was encouraged to call my office if any problems.    We discussed the new findings of possibly increased suicidal risk in teens and young adults beginning treatment with SSRI's. Pt is aware of risk and will seek help immediately if develops any suicidal thoughts.     (G43.119) Intractable migraine with aura without status " "migrainosus  Comment: well controlled  Plan: rizatriptan (MAXALT) 5 MG tablet        continue current medications at current doses       BMI:   Estimated body mass index is 29.44 kg/m  as calculated from the following:    Height as of this encounter: 1.727 m (5' 8\").    Weight as of this encounter: 87.8 kg (193 lb 9.6 oz).   Weight management plan: Discussed healthy diet and exercise guidelines        FUTURE APPOINTMENTS:       - Follow-up visit in 3 mo to see how serotonin specific reuptake inhibitor doing  Work on weight loss  Regular exercise    No follow-ups on file.    Fuentes Green MD  [facility]            "

## 2019-12-03 ENCOUNTER — DOCUMENTATION ONLY (OUTPATIENT)
Dept: CARE COORDINATION | Facility: CLINIC | Age: 29
End: 2019-12-03

## 2019-12-05 ENCOUNTER — PRE VISIT (OUTPATIENT)
Dept: CARDIOLOGY | Facility: CLINIC | Age: 29
End: 2019-12-05

## 2019-12-30 ENCOUNTER — TRANSFERRED RECORDS (OUTPATIENT)
Dept: FAMILY MEDICINE | Facility: CLINIC | Age: 29
End: 2019-12-30

## 2020-01-04 ASSESSMENT — ENCOUNTER SYMPTOMS
NIGHT SWEATS: 1
FEVER: 0
MEMORY LOSS: 0
HEADACHES: 1
ABDOMINAL PAIN: 1
DIARRHEA: 1
CHILLS: 0
SLEEP DISTURBANCES DUE TO BREATHING: 0
WEAKNESS: 1
NAUSEA: 1
LOSS OF CONSCIOUSNESS: 1
HYPERTENSION: 1
INCREASED ENERGY: 0
DIZZINESS: 1
PALPITATIONS: 1
BLOATING: 1
NUMBNESS: 0
MUSCLE CRAMPS: 0
JOINT SWELLING: 0
MUSCLE WEAKNESS: 0
HYPOTENSION: 0
WEIGHT GAIN: 0
POLYDIPSIA: 0
SYNCOPE: 1
ARTHRALGIAS: 1
VOMITING: 0
ORTHOPNEA: 0
HEARTBURN: 0
EXERCISE INTOLERANCE: 1
FATIGUE: 1
TINGLING: 0
JAUNDICE: 0
TREMORS: 0
RECTAL PAIN: 0
SPEECH CHANGE: 0
ALTERED TEMPERATURE REGULATION: 1
MYALGIAS: 1
WEIGHT LOSS: 0
DISTURBANCES IN COORDINATION: 0
NECK PAIN: 1
CONSTIPATION: 0
BOWEL INCONTINENCE: 0
BACK PAIN: 0
DECREASED APPETITE: 0
LIGHT-HEADEDNESS: 1
POLYPHAGIA: 0
BLOOD IN STOOL: 0
HALLUCINATIONS: 0
STIFFNESS: 0
LEG PAIN: 1
PARALYSIS: 0
SEIZURES: 0

## 2020-01-07 ENCOUNTER — TRANSFERRED RECORDS (OUTPATIENT)
Dept: FAMILY MEDICINE | Facility: CLINIC | Age: 30
End: 2020-01-07

## 2020-01-07 PROBLEM — E74.10 DIETARY FRUCTOSE INTOLERANCE: Status: ACTIVE | Noted: 2020-01-07

## 2020-01-16 ENCOUNTER — OFFICE VISIT (OUTPATIENT)
Dept: CARDIOLOGY | Facility: CLINIC | Age: 30
End: 2020-01-16
Attending: INTERNAL MEDICINE
Payer: COMMERCIAL

## 2020-01-16 VITALS
HEIGHT: 68 IN | BODY MASS INDEX: 27.89 KG/M2 | HEART RATE: 95 BPM | SYSTOLIC BLOOD PRESSURE: 151 MMHG | DIASTOLIC BLOOD PRESSURE: 88 MMHG | WEIGHT: 184 LBS

## 2020-01-16 DIAGNOSIS — G90.A POSTURAL ORTHOSTATIC TACHYCARDIA SYNDROME: ICD-10-CM

## 2020-01-16 DIAGNOSIS — G90.9 AUTONOMIC DYSFUNCTION: Primary | ICD-10-CM

## 2020-01-16 LAB
ALBUMIN SERPL-MCNC: 4 G/DL (ref 3.4–5)
ALP SERPL-CCNC: 102 U/L (ref 40–150)
ALT SERPL W P-5'-P-CCNC: 20 U/L (ref 0–50)
ANION GAP SERPL CALCULATED.3IONS-SCNC: 3 MMOL/L (ref 3–14)
AST SERPL W P-5'-P-CCNC: 9 U/L (ref 0–45)
BILIRUB SERPL-MCNC: 0.4 MG/DL (ref 0.2–1.3)
BUN SERPL-MCNC: 10 MG/DL (ref 7–30)
CALCIUM SERPL-MCNC: 8.5 MG/DL (ref 8.5–10.1)
CHLORIDE SERPL-SCNC: 110 MMOL/L (ref 94–109)
CO2 SERPL-SCNC: 27 MMOL/L (ref 20–32)
CREAT SERPL-MCNC: 0.56 MG/DL (ref 0.52–1.04)
CRP SERPL-MCNC: 3.4 MG/L (ref 0–8)
ERYTHROCYTE [DISTWIDTH] IN BLOOD BY AUTOMATED COUNT: 12.8 % (ref 10–15)
ERYTHROCYTE [SEDIMENTATION RATE] IN BLOOD BY WESTERGREN METHOD: 7 MM/H (ref 0–20)
FERRITIN SERPL-MCNC: 44 NG/ML (ref 12–150)
GFR SERPL CREATININE-BSD FRML MDRD: >90 ML/MIN/{1.73_M2}
GLUCOSE SERPL-MCNC: 81 MG/DL (ref 70–99)
HCT VFR BLD AUTO: 42.5 % (ref 35–47)
HGB BLD-MCNC: 14.1 G/DL (ref 11.7–15.7)
MCH RBC QN AUTO: 29 PG (ref 26.5–33)
MCHC RBC AUTO-ENTMCNC: 33.2 G/DL (ref 31.5–36.5)
MCV RBC AUTO: 87 FL (ref 78–100)
PLATELET # BLD AUTO: 247 10E9/L (ref 150–450)
POTASSIUM SERPL-SCNC: 3.2 MMOL/L (ref 3.4–5.3)
PROT SERPL-MCNC: 7.4 G/DL (ref 6.8–8.8)
RBC # BLD AUTO: 4.87 10E12/L (ref 3.8–5.2)
SODIUM SERPL-SCNC: 141 MMOL/L (ref 133–144)
TSH SERPL DL<=0.005 MIU/L-ACNC: 2.5 MU/L (ref 0.4–4)
WBC # BLD AUTO: 6.5 10E9/L (ref 4–11)

## 2020-01-16 PROCEDURE — 84443 ASSAY THYROID STIM HORMONE: CPT | Performed by: INTERNAL MEDICINE

## 2020-01-16 PROCEDURE — G0463 HOSPITAL OUTPT CLINIC VISIT: HCPCS | Mod: ZF

## 2020-01-16 PROCEDURE — 99214 OFFICE O/P EST MOD 30 MIN: CPT | Mod: GC | Performed by: INTERNAL MEDICINE

## 2020-01-16 PROCEDURE — 86140 C-REACTIVE PROTEIN: CPT | Performed by: INTERNAL MEDICINE

## 2020-01-16 PROCEDURE — 86038 ANTINUCLEAR ANTIBODIES: CPT | Performed by: INTERNAL MEDICINE

## 2020-01-16 PROCEDURE — 85027 COMPLETE CBC AUTOMATED: CPT | Performed by: INTERNAL MEDICINE

## 2020-01-16 PROCEDURE — 86617 LYME DISEASE ANTIBODY: CPT | Performed by: INTERNAL MEDICINE

## 2020-01-16 PROCEDURE — 85652 RBC SED RATE AUTOMATED: CPT | Performed by: INTERNAL MEDICINE

## 2020-01-16 PROCEDURE — 80053 COMPREHEN METABOLIC PANEL: CPT | Performed by: INTERNAL MEDICINE

## 2020-01-16 PROCEDURE — 86431 RHEUMATOID FACTOR QUANT: CPT | Performed by: INTERNAL MEDICINE

## 2020-01-16 PROCEDURE — 82728 ASSAY OF FERRITIN: CPT | Performed by: INTERNAL MEDICINE

## 2020-01-16 PROCEDURE — 36415 COLL VENOUS BLD VENIPUNCTURE: CPT | Performed by: INTERNAL MEDICINE

## 2020-01-16 RX ORDER — LIDOCAINE 40 MG/G
CREAM TOPICAL
Status: CANCELLED | OUTPATIENT
Start: 2020-01-16

## 2020-01-16 RX ORDER — SODIUM CHLORIDE 9 MG/ML
INJECTION, SOLUTION INTRAVENOUS CONTINUOUS
Status: CANCELLED | OUTPATIENT
Start: 2020-01-16

## 2020-01-16 ASSESSMENT — ENCOUNTER SYMPTOMS
PANIC: 0
BRUISES/BLEEDS EASILY: 0
SKIN CHANGES: 0
NECK MASS: 0
EXERCISE INTOLERANCE: 1
DOUBLE VISION: 0
EYE IRRITATION: 0
SNORES LOUDLY: 0
MYALGIAS: 1
NAUSEA: 1
HALLUCINATIONS: 0
DECREASED CONCENTRATION: 0
RECTAL PAIN: 0
SLEEP DISTURBANCES DUE TO BREATHING: 0
VOMITING: 0
POLYDIPSIA: 0
WEAKNESS: 1
NERVOUS/ANXIOUS: 0
COUGH: 0
TROUBLE SWALLOWING: 0
HYPOTENSION: 0
HYPERTENSION: 1
BLOOD IN STOOL: 0
COUGH DISTURBING SLEEP: 0
HOARSE VOICE: 0
BREAST PAIN: 0
RESPIRATORY PAIN: 0
BLOATING: 1
HOT FLASHES: 0
CONSTIPATION: 0
SORE THROAT: 0
ARTHRALGIAS: 1
SPEECH CHANGE: 0
PALPITATIONS: 1
SINUS PAIN: 0
ABDOMINAL PAIN: 1
WEIGHT GAIN: 0
LEG PAIN: 1
FEVER: 0
DECREASED LIBIDO: 0
BACK PAIN: 0
SPUTUM PRODUCTION: 0
DEPRESSION: 0
DYSURIA: 0
SHORTNESS OF BREATH: 0
HEMOPTYSIS: 0
BOWEL INCONTINENCE: 0
CHILLS: 0
ORTHOPNEA: 0
SINUS CONGESTION: 0
DIZZINESS: 1
TINGLING: 0
DECREASED APPETITE: 0
LIGHT-HEADEDNESS: 1
NAIL CHANGES: 0
DYSPNEA ON EXERTION: 0
TASTE DISTURBANCE: 0
EYE WATERING: 0
FATIGUE: 1
TREMORS: 0
FLANK PAIN: 0
WEIGHT LOSS: 0
NIGHT SWEATS: 1
EYE REDNESS: 0
HEARTBURN: 0
SEIZURES: 0
NUMBNESS: 0
BREAST MASS: 0
POSTURAL DYSPNEA: 0
SYNCOPE: 1
PARALYSIS: 0
HEMATURIA: 0
INSOMNIA: 0
HEADACHES: 1
MUSCLE CRAMPS: 0
JOINT SWELLING: 0
NECK PAIN: 1
EYE PAIN: 0
DISTURBANCES IN COORDINATION: 0
DIFFICULTY URINATING: 0
MUSCLE WEAKNESS: 0
MEMORY LOSS: 0
ALTERED TEMPERATURE REGULATION: 1
WHEEZING: 0
POLYPHAGIA: 0
INCREASED ENERGY: 0
JAUNDICE: 0
STIFFNESS: 0
DIARRHEA: 1
SMELL DISTURBANCE: 0
LOSS OF CONSCIOUSNESS: 1
SWOLLEN GLANDS: 0
POOR WOUND HEALING: 0

## 2020-01-16 ASSESSMENT — MIFFLIN-ST. JEOR: SCORE: 1613.12

## 2020-01-16 ASSESSMENT — PAIN SCALES - GENERAL: PAINLEVEL: NO PAIN (0)

## 2020-01-16 NOTE — PROGRESS NOTES
"        HPI:    30 y/o with PMHx significant for POTS (diagnosed in 2009), CHIDI and IBS comes in because of recent syncopal episode.     Patient states that 4 days ago she was having a bowel movement and during straining she had a syncope and fell to the ground. She hit her head but denies any neurological sequelae. Regained consciousness a few seconds later. Last time she had a syncopal episode was 03/2019 during which she was hospitalized and responded well to IV fluids and starting fludrocortisone.     As for there POTS diagnosis, this work up was at [facility] in 2009. During that time patient was having frequent presyncopal episodes and palpitations with shortness of breath that was aggravated with prolonged standing. This can cause chest tightness when short of breath. She had an autonomic reflex study that did not show blood pressure fluctuations with positions or valsalva, but did have excessive postural tachycardia. These symptoms improved throughout time, but became progressively worse since becoming pregnant about 4 years ago. She describes her \"POTS flares\" as palpitations, presyncope and shortness of breath. These tend to occur during the spring or when she has a URI or GI infection. Also has become progressively fatigued with joint pain and joint swelling.  Admits to cyclical fever and sweats and thinks that she gets \"easily sick\" as well.     Patient is seen a GI specialist for IBS and has found to have allergies to fructose and other sugar moieties.     She indicates that these symptoms have been worked up at [facility] as well without any unifying diagnosis. Otherwise denies cough, rhinorrhea, tingling, numbness, orthopnea, PND, hematochezia, melena, dysuria and hematuria.     Patient does not smoke, drink alcohol or use other substances. Family history is significant for mother with MI at the age of 35 and a sister with an undefined allergic condition for which she is on chronic dose of steroids.     Active " Ambulatory Problems     Diagnosis Date Noted     Sinus tachycardia 2016     Postural orthostatic tachycardia syndrome 2016     Health Care Home 2017     ACP (advance care planning) 2017     Post partum depression 2017     CHIDI (generalized anxiety disorder) 2017     Hx of anaphylaxis 2018     Chest pain 2019     Anxiety      Dietary fructose intolerance 2020     Autonomic dysfunction 2020     Resolved Ambulatory Problems     Diagnosis Date Noted     Labor and delivery indication for care or intervention 2015     SOB (shortness of breath) 2016     Encounter for triage in pregnant patient 10/07/2016     Indication for care in labor or delivery 10/10/2016      (spontaneous vaginal delivery) 10/12/2016     Past Medical History:   Diagnosis Date     Depressive disorder      Postpartum depression      POTS (postural orthostatic tachycardia syndrome)      Past Surgical History:   Procedure Laterality Date     DENTAL SURGERY Bilateral          Allergies   Allergen Reactions     Benadryl [Diphenhydramine] Other (See Comments)     Pt got warm and passed out with IV benadryl     Codeine Sulfate GI Disturbance     Contrast Dye Hives     Unsure of name of contrast     Latex Hives     Plum Pulp Nausea and Vomiting and Itching     Itching in throat and throat swelling     Current Outpatient Medications   Medication     citalopram (CELEXA) 10 MG tablet     EPINEPHrine (EPIPEN/ADRENACLICK/OR ANY BX GENERIC EQUIV) 0.3 MG/0.3ML injection 2-pack     fludrocortisone (FLORINEF) 0.1 MG tablet     rizatriptan (MAXALT) 5 MG tablet     No current facility-administered medications for this visit.      Family History   Problem Relation Age of Onset     Coronary Artery Disease Mother      Depression Mother      Anxiety Disorder Mother      Hypertension Father      Anxiety Disorder Father      Depression Father      Depression Sister      Anxiety Disorder Sister       Diabetes Maternal Grandmother      Diabetes Paternal Grandmother      Cerebrovascular Disease No family hx of      Breast Cancer No family hx of      Colon Cancer No family hx of      Social History     Socioeconomic History     Marital status:      Spouse name: Tee     Number of children: 2     Years of education: Not on file     Highest education level: Not on file   Occupational History     Not on file   Social Needs     Financial resource strain: Not on file     Food insecurity:     Worry: Not on file     Inability: Not on file     Transportation needs:     Medical: Not on file     Non-medical: Not on file   Tobacco Use     Smoking status: Never Smoker     Smokeless tobacco: Never Used   Substance and Sexual Activity     Alcohol use: No     Comment: None     Drug use: No     Sexual activity: Not Currently     Partners: Male     Birth control/protection: Surgical   Lifestyle     Physical activity:     Days per week: Not on file     Minutes per session: Not on file     Stress: Not on file   Relationships     Social connections:     Talks on phone: Not on file     Gets together: Not on file     Attends Christianity service: Not on file     Active member of club or organization: Not on file     Attends meetings of clubs or organizations: Not on file     Relationship status: Not on file     Intimate partner violence:     Fear of current or ex partner: Not on file     Emotionally abused: Not on file     Physically abused: Not on file     Forced sexual activity: Not on file   Other Topics Concern      Service Not Asked     Blood Transfusions Not Asked     Caffeine Concern No     Occupational Exposure Not Asked     Hobby Hazards Not Asked     Sleep Concern Not Asked     Stress Concern Not Asked     Weight Concern Not Asked     Special Diet No     Back Care Not Asked     Exercise No     Bike Helmet Not Asked     Seat Belt Yes     Self-Exams Not Asked     Parent/sibling w/ CABG, MI or angioplasty before 65F  55M? No   Social History Narrative    ** Merged History Encounter **                 Review of Systems:     Review of Systems     Constitutional:  Positive for fatigue, night sweats and recent stressors. Negative for fever, chills, weight loss, weight gain, decreased appetite, height loss, post-operative complications, incisional pain, hallucinations, increased energy, hyperactivity and confused.   HENT:  Negative for ear pain, hearing loss, tinnitus, nosebleeds, trouble swallowing, hoarse voice, mouth sores, sore throat, ear discharge, tooth pain, gum tenderness, taste disturbance, smell disturbance, hearing aid, bleeding gums, dry mouth, sinus pain, sinus congestion and neck mass.    Eyes:  Negative for double vision, pain, redness, eye pain, decreased vision, eye watering, eye bulging, eye dryness, flashing lights, spots, floaters, strabismus, tunnel vision, jaundice and eye irritation.   Respiratory:   Negative for cough, hemoptysis, sputum production, shortness of breath, wheezing, sleep disturbances due to breathing, snores loudly, respiratory pain, dyspnea on exertion, cough disturbing sleep and postural dyspnea.    Cardiovascular:  Positive for palpitations, hypertension, syncope, leg pain, light-headedness and exercise intolerance. Negative for chest pain, dyspnea on exertion, orthopnea, fingers/toes turn blue, hypotension, history of heart murmur, pacemaker, few scattered varicosities, sleep disturbances due to breathing and edema.   Gastrointestinal:  Positive for nausea, abdominal pain, diarrhea and bloating. Negative for heartburn, vomiting, constipation, blood in stool, melena, rectal pain, bowel incontinence, jaundice, coffee ground emesis and change in stool.   Genitourinary:  Negative for bladder incontinence, dysuria, urgency, hematuria, flank pain, vaginal discharge, difficulty urinating, genital sores, dyspareunia, decreased libido, nocturia, voiding less frequently, arousal difficulty, abnormal  "vaginal bleeding, excessive menstruation, menstrual changes, hot flashes, vaginal dryness and postmenopausal bleeding.   Musculoskeletal:  Positive for myalgias, arthralgias and neck pain. Negative for back pain, joint swelling, stiffness, muscle cramps, bone pain, muscle weakness and fracture.   Skin:  Negative for nail changes, itching, poor wound healing, rash, hair changes, skin changes, acne, warts, poor wound healing, scarring, flaky skin, Raynaud's phenomenon, sensitivity to sunlight and skin thickening.   Neurological:  Positive for dizziness, loss of consciousness, weakness, light-headedness and headaches. Negative for tingling, tremors, speech change, seizures, numbness, disturbances in coordination, memory loss, difficulty walking and paralysis.   Endo/Heme:  Negative for anemia, swollen glands and bruises/bleeds easily.   Psychiatric/Behavioral:  Negative for depression, hallucinations, memory loss, decreased concentration, mood swings and panic attacks.    Breast:  Negative for breast discharge, breast mass, breast pain and nipple retraction.   Endocrine:  Positive for altered temperature regulation.Negative for polyphagia, polydipsia, unwanted hair growth and change in facial hair.    I have personally reviewed and updated the complete ROS on the day of the visit.           Physical Exam:   BP (!) 151/88 (Patient Position: Standing)   Pulse 95   Ht 1.727 m (5' 8\")   Wt 83.5 kg (184 lb)   BMI 27.98 kg/m    Body mass index is 27.98 kg/m .  Vitals were reviewed       GENERAL APPEARANCE: healthy, alert and no distress     EYES: EOMI, PERRL     HENT: ear canals and TM's normal and nose and mouth without ulcers or lesions     NECK: no adenopathy, no asymmetry, masses, or scars and thyroid normal to palpation     RESP: lungs clear to auscultation - no rales, rhonchi or wheezes     CV: regular rates and rhythm, normal S1 S2, no S3 or S4 and no murmur, click or rub     ABDOMEN:  soft, nontender, no HSM or " masses and bowel sounds normal     MS: extremities normal- no gross deformities noted, no evidence of inflammation in joints, FROM in all extremities.     SKIN: no suspicious lesions or rashes     NEURO: Normal strength and tone, sensory exam grossly normal, mentation intact and speech normal     PSYCH: mentation appears normal. and affect normal/bright     LYMPHATICS: No cervical adenopathy      BP  HR  Symptoms  Supine  132/82  82  Palpitations  Sitting  151/90  100  Clammy, sweat hands, pressure in ears  Standing 144/91  102  Normal  Standing 1min  141/90 110  Normal  Standing 3min  140/91 108  Normal  Standing 5min  151/88 119  Legs tired      Autonomic reflex screen 2016  FINAL REPORT       AUTONOMIC REFLEX SCREEN          [id number]    Referring Physician:                                                                      Test Date:5/8/2009  Autonomic Consultant: Shailesh Dos Santos M.D. ([id number])                                     Lab #: [id number]  Clinical problem: Dizziness; flushing; abdomen pain                                       Dx Code: 4620                                                                                            Age: 18 Years                                                                                            Sex: F                                                     CONCLUSION     There is no evidence of autonomic failure or of an autonomic neuropathy.  There may be a predisposition to orthostatic intolerance with excessive postural tachycardia as can be seen with deconditioning, hypovolemia, venous pooling, hyperadrenergic states (including anxiety), or restricted adrenergic neuropathy in the lower limbs.                                                        COMMENTS                                   HR RESPONSES AND QUANTITATIVE SUDOMOTOR AXON REFLEX TEST (QSART)     (1) Heart rate responses to deep breathing and the Valsalva maneuver were normal.    (2) QSART  responses were normal for all sites.                                    VALSALVA AND TILT     (1)  Patient was tilted for 10 minutes. Orthostatic hypotension was not detected. Heart rate response was excessive with blood pressure variability and symptoms of lightheadedness, headache and fatigue during head-up tilt.   (2)  Lupo-nd-plzg blood pressure responses to the Valsalva maneuver were normal.                                                         TEST DETAIL                                    QUANTITATIVE SUDOMOTOR AXON REFLEX TEST (QSART)                                     Test                             Normal      Sweat              Normal   Site            Current      Duration         Latency           values      Output             values                    (mA)          (min)           (min)            (min)      (uL/cm2)           (uL/cm2)     L Forearm          2             10              1.7       [0.90-2.90]         0.32      [M 0.84-5.42;F 0.12-4.39]   L Proximal Leg     2             10              1.8       [0.50-2.20]         1.33      [M 0.76-3.91;F 0.20-2.36]   L Distal Leg       2             10              1.4       [0.50-2.00]         1.84      [M 0.93-4.98;F 0.20-2.98]   L Foot             2             10              3.3       [0.80-3.90]         1.07      [M 0.70-5.39;F 0.16-3.03]                                     VALSALVA AND TILT                      HEART RATE RESPONSES     Test              Parameter                     Result         [Range]      Valsalva Maneuver Valsalva Ratio               2.07           [ > 1.46 ]     Deep Breathing    Heart Rate Range (bpm)       31.90          [ > 14 ]                    BLOOD PRESSURE AND HEART RATE RESPONSES TO TILT                                               Values with subject to 70 degree tilt up                   Supine         1 min          5min           10min          min            min      BP (mmHg)     116/58          120/68         140/76         112/68         /              /      Pulse         66             91             102            109                                    -99 = missing value    Other Result Information   Conversion, [facility] Reports [id number] - 04/16/2018  2:00 PM CDT    FINAL REPORT       AUTONOMIC REFLEX SCREEN          [id number]    Referring Physician:                                                                      Test Date:5/8/2009  Autonomic Consultant: Shailesh Dos Santos M.D. ([id number])                                     Lab #: [id number]  Clinical problem: Dizziness; flushing; abdomen pain                                       Dx Code: 4620                 Age: 18 Years                 Sex: F                                                     CONCLUSION     There is no evidence of autonomic failure or of an autonomic neuropathy.  There may be a predisposition to orthostatic intolerance with excessive postural tachycardia as can be seen with deconditioning, hypovolemia, venous pooling, hyperadrenergic states (including anxiety), or restricted adrenergic neuropathy in the lower limbs.                                                        COMMENTS                                   HR RESPONSES AND QUANTITATIVE SUDOMOTOR AXON REFLEX TEST (QSART)     (1) Heart rate responses to deep breathing and the Valsalva maneuver were normal.    (2) QSART responses were normal for all sites.                                    VALSALVA AND TILT     (1)  Patient was tilted for 10 minutes. Orthostatic hypotension was not detected. Heart rate response was excessive with blood pressure variability and symptoms of lightheadedness, headache and fatigue during head-up tilt.   (2)  Jriv-bd-scfu blood pressure responses to the Valsalva maneuver were normal.                                                         TEST DETAIL                                    QUANTITATIVE SUDOMOTOR AXON REFLEX TEST (QSART)                                      Test                             Normal      Sweat              Normal   Site            Current      Duration         Latency           values      Output             values                    (mA)          (min)           (min)            (min)      (uL/cm2)           (uL/cm2)     L Forearm          2             10              1.7       [0.90-2.90]         0.32      [M 0.84-5.42;F 0.12-4.39]   L Proximal Leg     2             10              1.8       [0.50-2.20]         1.33      [M 0.76-3.91;F 0.20-2.36]   L Distal Leg       2             10              1.4       [0.50-2.00]         1.84      [M 0.93-4.98;F 0.20-2.98]   L Foot             2             10              3.3       [0.80-3.90]         1.07      [M 0.70-5.39;F 0.16-3.03]                                     VALSALVA AND TILT                      HEART RATE RESPONSES     Test              Parameter                     Result         [Range]      Valsalva Maneuver Valsalva Ratio               2.07           [ > 1.46 ]     Deep Breathing    Heart Rate Range (bpm)       31.90          [ > 14 ]                    BLOOD PRESSURE AND HEART RATE RESPONSES TO TILT                                               Values with subject to 70 degree tilt up                   Supine         1 min          5min           10min          min            min      BP (mmHg)     116/58         120/68         140/76         112/68         /              /      Pulse         66             91             102            109                     Assessment and Plan     Mihir was seen today for new patient.    Diagnoses and all orders for this visit:    #Autonomic dysfunction  #?Postural orthostatic tachycardia syndrome  Patient with POTS like symptoms, but her symptomatology more consistent with some autonomic dysfunction; mainly orthostatic hypotension. Patient reports a wide amalgam of systemic issues that suggest perhaps an autoimmune disorder. It  is unclear what work up has been done at [place], beyond what is available. At this point will warrant re-work up of POTS or her autonomic issues in tandem with inflammatory markers to explore other causes. Interestingly, a lot of her symptoms occur in the spring and has a sister with some undefined allergic disorder needing chronic steroids. Depending on the results will consider referral to Allergist/Immunologist and/or Rheumatologist.   -     Case Request EP: EP Tilt Table; Future  -     Anti Nuclear Margaret IgG by IFA with Reflex; Future  -     Catecholamines fractioned free urine; Future  -     CBC with platelets; Future  -     CRP inflammation; Future  -     Erythrocyte sedimentation rate auto; Future  -     Histamine urine; Future  -     Comprehensive metabolic panel; Future  -     TSH with free T4 reflex; Future  -     Lyme Confirm IgG by Immunoblot; Future  -     Metanephrine random or 24 hr urine; Future  -     Rheumatoid factor; Future        -     Ferritin; Future    Malcolm Quiñonez MD PhD  Cardiology Fellow  [phone]    I very much appreciated the opportunity to see and assess Mihir Waldron in the clinic with CV Fellow Dr Quiñonez.     I agree with the note above which summarizes my findings and current recommendations    Please do not hesitate to contact my office if you have any questions or concerns.      Clayton Sanders MD  Cardiac Arrhythmia Service  [facility]  [phone]

## 2020-01-16 NOTE — PATIENT INSTRUCTIONS
You were seen in the Electrophysiology Clinic today by: Dr. Clayton Sanders MD    Plan:   -Tilt Table Test (Lindy will call you next week to schedule)  -Laboratory Testing today after your appointment  -Follow-up pending tilt table test    Your Care Team:  [facility]   Telephone Number     Claire Johnson RN [phone]     For scheduling appts or procedures:    Lindy Gann   [phone]   For the Device Clinic (Pacemakers, ICDs, Loop Recorders)    During business hours: [phone]  After business hours:   [phone]- select option 4 and ask for job code [id number].       Cardiovascular Clinic:   [address] [place] [address]. [place] [address]      As always, Thank you for trusting us with your health care needs!         You have been scheduled for a Tilt Table/Autonomic study with Dr. Sanders  on ______.    Below are instructions, if you have questions please contact our office.     1. You should arrive at the [facility] at   ([address], [place]: [phone]).    2. Report to the [facility] waiting room. Your procedure will be done in the Catheterization Lab.     3. Wear comfortable clothing. (sweat/yoga pants, t-shirt). Please allow 3-4 hours for this procedure to be completed.     4. Do not eat or drink ANYTHING for 6 hours prior to arrival.   Please hold your fludrocortisone for 1 week prior to the test.    5. The morning of your procedure, you may take any scheduled medications with a SIP of water- unless you were instructed differently by your physician.   Do not take any vitamins, minerals or herbal supplements.     6. You may drive yourself to and from this procedure.       If you have further questions, please utilize [employer] to contact us.   If your question concerns the above instructions, contact:  Claire Johnson RN  Electrophysiology Nurse Coordinator.  [phone]    If your question concerns the schedule/appointment times, contact:  [facility]    [phone]    Patient Education     Tilt Table Testing    Tilt table testing is a simple test that helps the doctor identify the cause of your fainting. It checks how changes in body position can affect your blood pressure and heart rate. To do this, you are placed on a table that is tilted upward. The test tries to recreate fainting symptoms while your blood pressure and heart rate are monitored. The test can be done in a hospital or at your doctor s office.  During your test  Tilt table testing takes about 60 minutes. The testing room is kept quiet and dimly lit. During the test:    Small pads (electrodes) are put on your chest to monitor your heartbeat.    A blood pressure cuff is put on your arm.    An IV (intravenous) line may be placed in your other arm. The IV line delivers fluids and medicine if needed.    You ll be asked to lie flat on the table. Your upper body and thighs will be held in place with straps.    Baseline vital signs are recorded such as blood pressure, breathing rate, and pulse.    The table tilts until you are almost standing upright. Heart rate and blood pressure are continuously monitored. Any changes in these readings or any symptoms that develop are recorded.    You ll remain upright for up to 60 minutes. In most cases, the test is over in 30 to 45 minutes.    Sometimes the technician or healthcare provider may rub the arteries in your neck to check for a fainting reflex.    Occasionally, people are given certain IV medicines to stimulate heart rate and heart pumping and then are retested. These medications may make you feel shaky or anxious.    After your test  Any medicines used during the test should leave your system within 15 minutes. If you were told to skip daily medicines before the test, ask if you should start taking them again. You re likely to be sent home right away. It s a good idea to have a friend or family member drive. If you fainted during the test, you  may want to rest for a few hours once you re home.  Report any symptoms you have during the test  Let the doctor or technician know if you notice:    Feeling like you are going to faint    Overall weakness    Nausea    Dimmed vision    Sweating, dizziness,  or lightheadedness    A rapid heartbeat    Chest pain    Any other symptoms

## 2020-01-16 NOTE — LETTER
"1/16/2020      RE: Mihir Waldron  [address] [facility] [id number]       Dear Colleague,    Thank you for the opportunity to participate in the care of your patient, Mihir Waldron, at the [facility]. Please see a copy of my visit note below.          HPI:    30 y/o with PMHx significant for POTS (diagnosed in 2009), CHIDI and IBS comes in because of recent syncopal episode.     Patient states that 4 days ago she was having a bowel movement and during straining she had a syncope and fell to the ground. She hit her head but denies any neurological sequelae. Regained consciousness a few seconds later. Last time she had a syncopal episode was 03/2019 during which she was hospitalized and responded well to IV fluids and starting fludrocortisone.     As for there POTS diagnosis, this work up was at [place] in 2009. During that time patient was having frequent presyncopal episodes and palpitations with shortness of breath that was aggravated with prolonged standing. This can cause chest tightness when short of breath. She had an autonomic reflex study that did not show blood pressure fluctuations with positions or valsalva, but did have excessive postural tachycardia. These symptoms improved throughout time, but became progressively worse since becoming pregnant about 4 years ago. She describes her \"POTS flares\" as palpitations, presyncope and shortness of breath. These tend to occur during the spring or when she has a URI or GI infection. Also has become progressively fatigued with joint pain and joint swelling.  Admits to cyclical fever and sweats and thinks that she gets \"easily sick\" as well.     Patient is seen a GI specialist for IBS and has found to have allergies to fructose and other sugar moieties.     She indicates that these symptoms have been worked up at [place] as well without any unifying diagnosis. Otherwise denies cough, rhinorrhea, " tingling, numbness, orthopnea, PND, hematochezia, melena, dysuria and hematuria.     Patient does not smoke, drink alcohol or use other substances. Family history is significant for mother with MI at the age of 35 and a sister with an undefined allergic condition for which she is on chronic dose of steroids.     Active Ambulatory Problems     Diagnosis Date Noted     Sinus tachycardia 2016     Postural orthostatic tachycardia syndrome 2016     Health Care Home 2017     ACP (advance care planning) 2017     Post partum depression 2017     CHIDI (generalized anxiety disorder) 2017     Hx of anaphylaxis 2018     Chest pain 2019     Anxiety      Dietary fructose intolerance 2020     Autonomic dysfunction 2020     Resolved Ambulatory Problems     Diagnosis Date Noted     Labor and delivery indication for care or intervention 2015     SOB (shortness of breath) 2016     Encounter for triage in pregnant patient 10/07/2016     Indication for care in labor or delivery 10/10/2016      (spontaneous vaginal delivery) 10/12/2016     Past Medical History:   Diagnosis Date     Depressive disorder      Postpartum depression      POTS (postural orthostatic tachycardia syndrome)      Past Surgical History:   Procedure Laterality Date     DENTAL SURGERY Bilateral          Allergies   Allergen Reactions     Benadryl [Diphenhydramine] Other (See Comments)     Pt got warm and passed out with IV benadryl     Codeine Sulfate GI Disturbance     Contrast Dye Hives     Unsure of name of contrast     Latex Hives     Plum Pulp Nausea and Vomiting and Itching     Itching in throat and throat swelling     Current Outpatient Medications   Medication     citalopram (CELEXA) 10 MG tablet     EPINEPHrine (EPIPEN/ADRENACLICK/OR ANY BX GENERIC EQUIV) 0.3 MG/0.3ML injection 2-pack     fludrocortisone (FLORINEF) 0.1 MG tablet     rizatriptan (MAXALT) 5 MG tablet     No current  facility-administered medications for this visit.      Family History   Problem Relation Age of Onset     Coronary Artery Disease Mother      Depression Mother      Anxiety Disorder Mother      Hypertension Father      Anxiety Disorder Father      Depression Father      Depression Sister      Anxiety Disorder Sister      Diabetes Maternal Grandmother      Diabetes Paternal Grandmother      Cerebrovascular Disease No family hx of      Breast Cancer No family hx of      Colon Cancer No family hx of      Social History     Socioeconomic History     Marital status:      Spouse name: Tee     Number of children: 2     Years of education: Not on file     Highest education level: Not on file   Occupational History     Not on file   Social Needs     Financial resource strain: Not on file     Food insecurity:     Worry: Not on file     Inability: Not on file     Transportation needs:     Medical: Not on file     Non-medical: Not on file   Tobacco Use     Smoking status: Never Smoker     Smokeless tobacco: Never Used   Substance and Sexual Activity     Alcohol use: No     Comment: None     Drug use: No     Sexual activity: Not Currently     Partners: Male     Birth control/protection: Surgical   Lifestyle     Physical activity:     Days per week: Not on file     Minutes per session: Not on file     Stress: Not on file   Relationships     Social connections:     Talks on phone: Not on file     Gets together: Not on file     Attends Mosque service: Not on file     Active member of club or organization: Not on file     Attends meetings of clubs or organizations: Not on file     Relationship status: Not on file     Intimate partner violence:     Fear of current or ex partner: Not on file     Emotionally abused: Not on file     Physically abused: Not on file     Forced sexual activity: Not on file   Other Topics Concern      Service Not Asked     Blood Transfusions Not Asked     Caffeine Concern No      Occupational Exposure Not Asked     Hobby Hazards Not Asked     Sleep Concern Not Asked     Stress Concern Not Asked     Weight Concern Not Asked     Special Diet No     Back Care Not Asked     Exercise No     Bike Helmet Not Asked     Seat Belt Yes     Self-Exams Not Asked     Parent/sibling w/ CABG, MI or angioplasty before 65F 55M? No   Social History Narrative    ** Merged History Encounter **                 Review of Systems:     Review of Systems     Constitutional:  Positive for fatigue, night sweats and recent stressors. Negative for fever, chills, weight loss, weight gain, decreased appetite, height loss, post-operative complications, incisional pain, hallucinations, increased energy, hyperactivity and confused.   HENT:  Negative for ear pain, hearing loss, tinnitus, nosebleeds, trouble swallowing, hoarse voice, mouth sores, sore throat, ear discharge, tooth pain, gum tenderness, taste disturbance, smell disturbance, hearing aid, bleeding gums, dry mouth, sinus pain, sinus congestion and neck mass.    Eyes:  Negative for double vision, pain, redness, eye pain, decreased vision, eye watering, eye bulging, eye dryness, flashing lights, spots, floaters, strabismus, tunnel vision, jaundice and eye irritation.   Respiratory:   Negative for cough, hemoptysis, sputum production, shortness of breath, wheezing, sleep disturbances due to breathing, snores loudly, respiratory pain, dyspnea on exertion, cough disturbing sleep and postural dyspnea.    Cardiovascular:  Positive for palpitations, hypertension, syncope, leg pain, light-headedness and exercise intolerance. Negative for chest pain, dyspnea on exertion, orthopnea, fingers/toes turn blue, hypotension, history of heart murmur, pacemaker, few scattered varicosities, sleep disturbances due to breathing and edema.   Gastrointestinal:  Positive for nausea, abdominal pain, diarrhea and bloating. Negative for heartburn, vomiting, constipation, blood in stool,  "melena, rectal pain, bowel incontinence, jaundice, coffee ground emesis and change in stool.   Genitourinary:  Negative for bladder incontinence, dysuria, urgency, hematuria, flank pain, vaginal discharge, difficulty urinating, genital sores, dyspareunia, decreased libido, nocturia, voiding less frequently, arousal difficulty, abnormal vaginal bleeding, excessive menstruation, menstrual changes, hot flashes, vaginal dryness and postmenopausal bleeding.   Musculoskeletal:  Positive for myalgias, arthralgias and neck pain. Negative for back pain, joint swelling, stiffness, muscle cramps, bone pain, muscle weakness and fracture.   Skin:  Negative for nail changes, itching, poor wound healing, rash, hair changes, skin changes, acne, warts, poor wound healing, scarring, flaky skin, Raynaud's phenomenon, sensitivity to sunlight and skin thickening.   Neurological:  Positive for dizziness, loss of consciousness, weakness, light-headedness and headaches. Negative for tingling, tremors, speech change, seizures, numbness, disturbances in coordination, memory loss, difficulty walking and paralysis.   Endo/Heme:  Negative for anemia, swollen glands and bruises/bleeds easily.   Psychiatric/Behavioral:  Negative for depression, hallucinations, memory loss, decreased concentration, mood swings and panic attacks.    Breast:  Negative for breast discharge, breast mass, breast pain and nipple retraction.   Endocrine:  Positive for altered temperature regulation.Negative for polyphagia, polydipsia, unwanted hair growth and change in facial hair.    I have personally reviewed and updated the complete ROS on the day of the visit.           Physical Exam:   BP (!) 151/88 (Patient Position: Standing)   Pulse 95   Ht 1.727 m (5' 8\")   Wt 83.5 kg (184 lb)   BMI 27.98 kg/m     Body mass index is 27.98 kg/m .  Vitals were reviewed       GENERAL APPEARANCE: healthy, alert and no distress     EYES: EOMI, PERRL     HENT: ear canals and TM's " normal and nose and mouth without ulcers or lesions     NECK: no adenopathy, no asymmetry, masses, or scars and thyroid normal to palpation     RESP: lungs clear to auscultation - no rales, rhonchi or wheezes     CV: regular rates and rhythm, normal S1 S2, no S3 or S4 and no murmur, click or rub     ABDOMEN:  soft, nontender, no HSM or masses and bowel sounds normal     MS: extremities normal- no gross deformities noted, no evidence of inflammation in joints, FROM in all extremities.     SKIN: no suspicious lesions or rashes     NEURO: Normal strength and tone, sensory exam grossly normal, mentation intact and speech normal     PSYCH: mentation appears normal. and affect normal/bright     LYMPHATICS: No cervical adenopathy      BP  HR  Symptoms  Supine  132/82  82  Palpitations  Sitting  151/90  100  Clammy, sweat hands, pressure in ears  Standing 144/91  102  Normal  Standing 1min  141/90 110  Normal  Standing 3min  140/91 108  Normal  Standing 5min  151/88 119  Legs tired      Autonomic reflex screen 2016  FINAL REPORT       AUTONOMIC REFLEX SCREEN          [id number]    Referring Physician:                                                                      Test Date:5/8/2009  Autonomic Consultant: Shailesh Dos Santos M.D. ([id number])                                     Lab #: [id number]  Clinical problem: Dizziness; flushing; abdomen pain                                       Dx Code: 4620                                                                                            Age: 18 Years                                                                                            Sex: F                                                     CONCLUSION     There is no evidence of autonomic failure or of an autonomic neuropathy.  There may be a predisposition to orthostatic intolerance with excessive postural tachycardia as can be seen with deconditioning, hypovolemia, venous pooling, hyperadrenergic states  (including anxiety), or restricted adrenergic neuropathy in the lower limbs.                                                        COMMENTS                                   HR RESPONSES AND QUANTITATIVE SUDOMOTOR AXON REFLEX TEST (QSART)     (1) Heart rate responses to deep breathing and the Valsalva maneuver were normal.    (2) QSART responses were normal for all sites.                                    VALSALVA AND TILT     (1)  Patient was tilted for 10 minutes. Orthostatic hypotension was not detected. Heart rate response was excessive with blood pressure variability and symptoms of lightheadedness, headache and fatigue during head-up tilt.   (2)  Meng-mf-zllf blood pressure responses to the Valsalva maneuver were normal.                                                         TEST DETAIL                                    QUANTITATIVE SUDOMOTOR AXON REFLEX TEST (QSART)                                     Test                             Normal      Sweat              Normal   Site            Current      Duration         Latency           values      Output             values                    (mA)          (min)           (min)            (min)      (uL/cm2)           (uL/cm2)     L Forearm          2             10              1.7       [0.90-2.90]         0.32      [M 0.84-5.42;F 0.12-4.39]   L Proximal Leg     2             10              1.8       [0.50-2.20]         1.33      [M 0.76-3.91;F 0.20-2.36]   L Distal Leg       2             10              1.4       [0.50-2.00]         1.84      [M 0.93-4.98;F 0.20-2.98]   L Foot             2             10              3.3       [0.80-3.90]         1.07      [M 0.70-5.39;F 0.16-3.03]                                     VALSALVA AND TILT                      HEART RATE RESPONSES     Test              Parameter                     Result         [Range]      Valsalva Maneuver Valsalva Ratio               2.07           [ > 1.46 ]     Deep  Breathing    Heart Rate Range (bpm)       31.90          [ > 14 ]                    BLOOD PRESSURE AND HEART RATE RESPONSES TO TILT                                               Values with subject to 70 degree tilt up                   Supine         1 min          5min           10min          min            min      BP (mmHg)     116/58         120/68         140/76         112/68         /              /      Pulse         66             91             102            109                                    -99 = missing value    Other Result Information   Conversion, [facility] Reports [id number] - 04/16/2018  2:00 PM CDT    FINAL REPORT       AUTONOMIC REFLEX SCREEN          [id number]    Referring Physician:                                                                      Test Date:5/8/2009  Autonomic Consultant: Shailesh Dos Santos M.D. ([id number])                                     Lab #: [id number]  Clinical problem: Dizziness; flushing; abdomen pain                                       Dx Code: 4620                 Age: 18 Years                 Sex: F                                                     CONCLUSION     There is no evidence of autonomic failure or of an autonomic neuropathy.  There may be a predisposition to orthostatic intolerance with excessive postural tachycardia as can be seen with deconditioning, hypovolemia, venous pooling, hyperadrenergic states (including anxiety), or restricted adrenergic neuropathy in the lower limbs.                                                        COMMENTS                                   HR RESPONSES AND QUANTITATIVE SUDOMOTOR AXON REFLEX TEST (QSART)     (1) Heart rate responses to deep breathing and the Valsalva maneuver were normal.    (2) QSART responses were normal for all sites.                                    VALSALVA AND TILT     (1)  Patient was tilted for 10 minutes. Orthostatic hypotension was not detected. Heart rate response was  excessive with blood pressure variability and symptoms of lightheadedness, headache and fatigue during head-up tilt.   (2)  Hoih-va-ertt blood pressure responses to the Valsalva maneuver were normal.                                                         TEST DETAIL                                    QUANTITATIVE SUDOMOTOR AXON REFLEX TEST (QSART)                                     Test                             Normal      Sweat              Normal   Site            Current      Duration         Latency           values      Output             values                    (mA)          (min)           (min)            (min)      (uL/cm2)           (uL/cm2)     L Forearm          2             10              1.7       [0.90-2.90]         0.32      [M 0.84-5.42;F 0.12-4.39]   L Proximal Leg     2             10              1.8       [0.50-2.20]         1.33      [M 0.76-3.91;F 0.20-2.36]   L Distal Leg       2             10              1.4       [0.50-2.00]         1.84      [M 0.93-4.98;F 0.20-2.98]   L Foot             2             10              3.3       [0.80-3.90]         1.07      [M 0.70-5.39;F 0.16-3.03]                                     VALSALVA AND TILT                      HEART RATE RESPONSES     Test              Parameter                     Result         [Range]      Valsalva Maneuver Valsalva Ratio               2.07           [ > 1.46 ]     Deep Breathing    Heart Rate Range (bpm)       31.90          [ > 14 ]                    BLOOD PRESSURE AND HEART RATE RESPONSES TO TILT                                               Values with subject to 70 degree tilt up                   Supine         1 min          5min           10min          min            min      BP (mmHg)     116/58         120/68         140/76         112/68         /              /      Pulse         66             91             102            109                     Assessment and Plan     Mihir was seen today for new  patient.    Diagnoses and all orders for this visit:    #Autonomic dysfunction  #?Postural orthostatic tachycardia syndrome  Patient with POTS like symptoms, but her symptomatology more consistent with some autonomic dysfunction; mainly orthostatic hypotension. Patient reports a wide amalgam of systemic issues that suggest perhaps an autoimmune disorder. It is unclear what work up has been done at [place], beyond what is available. At this point will warrant re-work up of POTS or her autonomic issues in tandem with inflammatory markers to explore other causes. Interestingly, a lot of her symptoms occur in the spring and has a sister with some undefined allergic disorder needing chronic steroids. Depending on the results will consider referral to Allergist/Immunologist and/or Rheumatologist.   -     Case Request EP: EP Tilt Table; Future  -     Anti Nuclear Margaret IgG by IFA with Reflex; Future  -     Catecholamines fractioned free urine; Future  -     CBC with platelets; Future  -     CRP inflammation; Future  -     Erythrocyte sedimentation rate auto; Future  -     Histamine urine; Future  -     Comprehensive metabolic panel; Future  -     TSH with free T4 reflex; Future  -     Lyme Confirm IgG by Immunoblot; Future  -     Metanephrine random or 24 hr urine; Future  -     Rheumatoid factor; Future        -     Ferritin; Future    Malcolm Quiñonez MD PhD  Cardiology Fellow  [phone]    I very much appreciated the opportunity to see and assess Mihir Waldron in the clinic with CV Fellow Dr Quiñonez.     I agree with the note above which summarizes my findings and current recommendations    Please do not hesitate to contact my office if you have any questions or concerns.      Clayton Sanders MD  Cardiac Arrhythmia Service  [facility]  [phone]

## 2020-01-17 LAB
ANA SER QL IF: NEGATIVE
RHEUMATOID FACT SER NEPH-ACNC: <20 IU/ML (ref 0–20)

## 2020-01-20 LAB — B BURGDOR IGG SER QL IB: NEGATIVE

## 2020-01-21 ENCOUNTER — MYC MEDICAL ADVICE (OUTPATIENT)
Dept: CARDIOLOGY | Facility: CLINIC | Age: 30
End: 2020-01-21

## 2020-01-22 ENCOUNTER — OFFICE VISIT (OUTPATIENT)
Dept: FAMILY MEDICINE | Facility: CLINIC | Age: 30
End: 2020-01-22

## 2020-01-22 VITALS
BODY MASS INDEX: 28.28 KG/M2 | HEART RATE: 136 BPM | WEIGHT: 186 LBS | OXYGEN SATURATION: 97 % | SYSTOLIC BLOOD PRESSURE: 118 MMHG | TEMPERATURE: 99.1 F | DIASTOLIC BLOOD PRESSURE: 72 MMHG

## 2020-01-22 DIAGNOSIS — R68.89 FLU-LIKE SYMPTOMS: Primary | ICD-10-CM

## 2020-01-22 LAB
FLUAV AG UPPER RESP QL IA.RAPID: NORMAL
FLUBV AG UPPER RESP QL IA.RAPID: NORMAL

## 2020-01-22 PROCEDURE — 87804 INFLUENZA ASSAY W/OPTIC: CPT | Performed by: PHYSICIAN ASSISTANT

## 2020-01-22 PROCEDURE — 99213 OFFICE O/P EST LOW 20 MIN: CPT | Performed by: PHYSICIAN ASSISTANT

## 2020-01-22 NOTE — PROGRESS NOTES
SUBJECTIVE:                                                    Mihir Waldron is a 29 year old female who presents to clinic today for the following health issues:    Chief Complaint   Patient presents with     Generalized Body Aches     body aches, trouble breathing, and cough since yesterday.     Cough       Here with Flu-like sx including, cough, nausea, headache, body aches.    Did not get influenza immunization    Daughter with Influenza B at [place]    Took Dayquil twice today.          ROS:  Constitutional: did have 102 temp this am and having chills  Eyes: NEGATIVE for vision changes or irritation  Ears: NEGATIVE for pain, discharge, decreased hearing  Nose: NEGATIVE for rhinorrhea, epistaxis or congestion  Mouth:  +sore throat.   R:  + productive cough or SOB  GI: NEGATIVE for nausea, abdominal pain, heartburn, or change in bowel habits  : NEGATIVE for frequency, dysuria, or hematuria  Neuro: + for weakness and headaches.         BP Readings from Last 3 Encounters:   01/22/20 118/72   01/16/20 (!) 151/88   11/12/19 120/76    Wt Readings from Last 3 Encounters:   01/22/20 84.4 kg (186 lb)   01/16/20 83.5 kg (184 lb)   11/12/19 87.8 kg (193 lb 9.6 oz)            Patient Active Problem List   Diagnosis     Sinus tachycardia     Postural orthostatic tachycardia syndrome     Health Care Home     ACP (advance care planning)     Post partum depression     CHIDI (generalized anxiety disorder)     Hx of anaphylaxis     Chest pain     Anxiety     Dietary fructose intolerance     Autonomic dysfunction     Past Surgical History:   Procedure Laterality Date     DENTAL SURGERY Bilateral        Social History     Tobacco Use     Smoking status: Never Smoker     Smokeless tobacco: Never Used   Substance Use Topics     Alcohol use: No     Comment: None     Family History   Problem Relation Age of Onset     Coronary Artery Disease Mother      Depression Mother      Anxiety Disorder Mother      Hypertension Father       Anxiety Disorder Father      Depression Father      Depression Sister      Anxiety Disorder Sister      Diabetes Maternal Grandmother      Diabetes Paternal Grandmother      Cerebrovascular Disease No family hx of      Breast Cancer No family hx of      Colon Cancer No family hx of          Current Outpatient Medications   Medication Sig Dispense Refill     citalopram (CELEXA) 10 MG tablet Take 1 tablet (10 mg) by mouth daily 30 tablet 1     EPINEPHrine (EPIPEN/ADRENACLICK/OR ANY BX GENERIC EQUIV) 0.3 MG/0.3ML injection 2-pack Inject 0.3 mLs (0.3 mg) into the muscle once as needed 0.3 mL 0     fludrocortisone (FLORINEF) 0.1 MG tablet Take 0.5 tablets (0.05 mg) by mouth daily       rizatriptan (MAXALT) 5 MG tablet Take 1-2 tablets (5-10 mg) by mouth at onset of headache for migraine May repeat in 2 hours. Max 6 tablets/24 hours. 18 tablet 1       Allergies   Allergen Reactions     Benadryl [Diphenhydramine] Other (See Comments)     Pt got warm and passed out with IV benadryl     Codeine Sulfate GI Disturbance     Contrast Dye Hives     Unsure of name of contrast     Latex Hives     Plum Pulp Nausea and Vomiting and Itching     Itching in throat and throat swelling       OBJECTIVE:                                                    /72 (BP Location: Right arm, Patient Position: Sitting, Cuff Size: Adult Large)   Pulse 136   Temp 99.1  F (37.3  C) (Oral)   Wt 84.4 kg (186 lb)   SpO2 97%   BMI 28.28 kg/m   Body mass index is 28.28 kg/m .     GENERAL: alert and no distress  HEAD: Normocephalic, atraumatic  EYES: Eyes grossly normal to inspection, extraocular movements - intact  EARS:   Right: External ear and canal normal, TM normal  Left: External ear and canal normal, TM normal  NOSE: No discharge noted  MOUTH/THROAT: no ulcers, no lesions, pharynx non-erythematous, no exudates present, tonsils without hypertrophy, mucous membranes moist.   NECK: no tenderness, no adenopathy, no asymmetry, no masses, no  stiffness; thyroid- normal to palpation  RESP: lungs clear to auscultation - no crackles, no rhonchi, no wheezes  CV: regular rates and rhythm, normal S1 S2, no S3 or S4 and no murmur, no click or rub -    Labs Resulted Today:   Results for orders placed or performed in visit on 01/22/20   Influenza A and B (BFP)     Status: None   Result Value Ref Range    Influenza A NEG neg    Influenza B NEG neg              ASSESSMENT/PLAN:                                                        ICD-10-CM    1. Flu-like symptoms R68.89 Influenza A and B (BFP)       Symptomatic therapy suggested: rest, increase fluids, OTC acetaminophen, ibuprofen. The patient is advised to Return to the clinic with any new/worsening symptoms including persistent fevers, worsening cough, and shortness of breath.   Tamiflu was not prescribed.  If Tamiflu was prescribed, AE including vomiting, nausea, abdominal pain, diarrhea, conjunctivitis, and epistaxis were advised.         Christine Ryan PA-C  [facility]

## 2020-01-22 NOTE — NURSING NOTE
Mihir is here for body aches, trouble breathing, and cough since yesterday.        Pre-visit Screening:  Immunizations:  up to date  Colonoscopy:  NA  Mammogram: NA  Asthma Action Test/Plan:  NA  PHQ9:  NA to today's visit  GAD7:  NA to today's visit  Questioned patient about current smoking habits Pt. has never smoked.  Ok to leave detailed message on voice mail for today's visit only Yes, phone # [phone]

## 2020-01-24 ENCOUNTER — OFFICE VISIT (OUTPATIENT)
Dept: FAMILY MEDICINE | Facility: CLINIC | Age: 30
End: 2020-01-24

## 2020-01-24 ENCOUNTER — HOSPITAL ENCOUNTER (OUTPATIENT)
Dept: LAB | Facility: CLINIC | Age: 30
Discharge: HOME | End: 2020-01-24
Attending: PHYSICIAN ASSISTANT | Admitting: PHYSICIAN ASSISTANT
Payer: COMMERCIAL

## 2020-01-24 VITALS
DIASTOLIC BLOOD PRESSURE: 88 MMHG | OXYGEN SATURATION: 98 % | SYSTOLIC BLOOD PRESSURE: 122 MMHG | HEART RATE: 84 BPM | BODY MASS INDEX: 28.19 KG/M2 | HEIGHT: 68 IN | WEIGHT: 186 LBS | TEMPERATURE: 97.9 F | RESPIRATION RATE: 20 BRPM

## 2020-01-24 DIAGNOSIS — R05.9 COUGH: Primary | ICD-10-CM

## 2020-01-24 DIAGNOSIS — R06.02 SOB (SHORTNESS OF BREATH): ICD-10-CM

## 2020-01-24 LAB
% GRANULOCYTES: 54.8 %
D DIMER PPP FEU-MCNC: 0.5 UG/ML FEU (ref 0–0.5)
HCT VFR BLD AUTO: 47 % (ref 35–47)
HEMOGLOBIN: 15.3 G/DL (ref 11.7–15.7)
LYMPHOCYTES NFR BLD AUTO: 35.9 %
MCH RBC QN AUTO: 28.9 PG (ref 26–33)
MCHC RBC AUTO-ENTMCNC: 32.6 G/DL (ref 31–36)
MCV RBC AUTO: 88.7 FL (ref 78–100)
MONOCYTES NFR BLD AUTO: 9.3 %
PLATELET COUNT - QUEST: 160 10^9/L (ref 150–375)
RBC # BLD AUTO: 5.3 10*12/L (ref 3.8–5.2)
WBC # BLD AUTO: 4.9 10*9/L (ref 4–11)

## 2020-01-24 PROCEDURE — 99213 OFFICE O/P EST LOW 20 MIN: CPT | Performed by: PHYSICIAN ASSISTANT

## 2020-01-24 PROCEDURE — 36415 COLL VENOUS BLD VENIPUNCTURE: CPT | Performed by: PHYSICIAN ASSISTANT

## 2020-01-24 PROCEDURE — 85379 FIBRIN DEGRADATION QUANT: CPT | Performed by: PHYSICIAN ASSISTANT

## 2020-01-24 PROCEDURE — 71046 X-RAY EXAM CHEST 2 VIEWS: CPT | Performed by: PHYSICIAN ASSISTANT

## 2020-01-24 PROCEDURE — 85025 COMPLETE CBC W/AUTO DIFF WBC: CPT | Performed by: PHYSICIAN ASSISTANT

## 2020-01-24 RX ORDER — ALBUTEROL SULFATE 90 UG/1
2 AEROSOL, METERED RESPIRATORY (INHALATION) EVERY 6 HOURS
Qty: 1 INHALER | Refills: 0 | Status: SHIPPED | OUTPATIENT
Start: 2020-01-24 | End: 2020-07-01

## 2020-01-24 ASSESSMENT — MIFFLIN-ST. JEOR: SCORE: 1622.19

## 2020-01-24 NOTE — PROGRESS NOTES
SUBJECTIVE:                                                    Mihir Waldron is a 29 year old female who presents to clinic today for the following health issues:    Chief Complaint   Patient presents with     Cough     Breathing Problem         Here with continued flu-like sx. Feeling more SOB. Cough kept her up all night. Hx of pneumonia in the past.  Pt denies family hx of blood clots, no recent car ride/plane, no prolonged sitting.  No estrogen use.    Continues to have some intermittent CP with coughing as well.       BP Readings from Last 3 Encounters:   01/24/20 122/88   01/22/20 118/72   01/16/20 (!) 151/88    Wt Readings from Last 3 Encounters:   01/24/20 84.4 kg (186 lb)   01/22/20 84.4 kg (186 lb)   01/16/20 83.5 kg (184 lb)            Patient Active Problem List   Diagnosis     Sinus tachycardia     Postural orthostatic tachycardia syndrome     Health Care Home     ACP (advance care planning)     Post partum depression     CHIDI (generalized anxiety disorder)     Hx of anaphylaxis     Chest pain     Anxiety     Dietary fructose intolerance     Autonomic dysfunction     Past Surgical History:   Procedure Laterality Date     DENTAL SURGERY Bilateral        Social History     Tobacco Use     Smoking status: Never Smoker     Smokeless tobacco: Never Used   Substance Use Topics     Alcohol use: No     Comment: None     Family History   Problem Relation Age of Onset     Coronary Artery Disease Mother      Depression Mother      Anxiety Disorder Mother      Hypertension Father      Anxiety Disorder Father      Depression Father      Depression Sister      Anxiety Disorder Sister      Diabetes Maternal Grandmother      Diabetes Paternal Grandmother      Cerebrovascular Disease No family hx of      Breast Cancer No family hx of      Colon Cancer No family hx of          Current Outpatient Medications   Medication Sig Dispense Refill     citalopram (CELEXA) 10 MG tablet Take 1 tablet (10 mg) by mouth daily 30  "tablet 1     EPINEPHrine (EPIPEN/ADRENACLICK/OR ANY BX GENERIC EQUIV) 0.3 MG/0.3ML injection 2-pack Inject 0.3 mLs (0.3 mg) into the muscle once as needed 0.3 mL 0     fludrocortisone (FLORINEF) 0.1 MG tablet Take 0.5 tablets (0.05 mg) by mouth daily       rizatriptan (MAXALT) 5 MG tablet Take 1-2 tablets (5-10 mg) by mouth at onset of headache for migraine May repeat in 2 hours. Max 6 tablets/24 hours. 18 tablet 1       Allergies   Allergen Reactions     Benadryl [Diphenhydramine] Other (See Comments)     Pt got warm and passed out with IV benadryl     Codeine Sulfate GI Disturbance     Contrast Dye Hives     Unsure of name of contrast     Latex Hives     Plum Pulp Nausea and Vomiting and Itching     Itching in throat and throat swelling       OBJECTIVE:                                                    /88 (BP Location: Right arm, Patient Position: Chair, Cuff Size: Adult Regular)   Pulse 84   Temp 97.9  F (36.6  C) (Oral)   Resp 20   Ht 1.727 m (5' 8\")   Wt 84.4 kg (186 lb)   LMP 01/24/2020   SpO2 98%   BMI 28.28 kg/m   Body mass index is 28.28 kg/m .     GENERAL: alert, slightly diaphoretic, no distress  HEAD: Normocephalic, atraumatic  EYES: Eyes grossly normal to inspection, extraocular movements - intact  EARS:   Right: External ear and canal normal, TM normal  Left: External ear and canal normal, TM normal  NOSE: No discharge noted  MOUTH/THROAT: no ulcers, no lesions, pharynx non-erythematous, no exudates present, tonsils without hypertrophy, mucous membranes moist.   NECK: no tenderness, no adenopathy, no asymmetry, no masses, no stiffness; thyroid- normal to palpation  RESP: lungs clear to auscultation - no crackles, no rhonchi, no wheezes  CV: regular rates and rhythm, normal S1 S2, no S3 or S4 and no murmur, no click or rub -    Labs Resulted Today:   Results for orders placed or performed during the hospital encounter of 01/24/20   D dimer, quantitative     Status: None   Result Value " Ref Range    D Dimer 0.5 0.0 - 0.50 ug/ml FEU   Results for orders placed or performed in visit on 01/24/20   HEMOGRAM/PLATE/DIFF     Status: Abnormal   Result Value Ref Range    WBC 4.9 4.0 - 11 10*9/L    RBC Count 5.30 (A) 3.8 - 5.2 10*12/L    Hemoglobin 15.3 11.7 - 15.7 g/dL    Hematocrit 47.0 35.0 - 47.0 %    MCV 88.7 78 - 100 fL    MCH 28.9 26 - 33 pg    MCHC 32.6 31 - 36 g/dL    Platelet Count 160 150 - 375 10^9/L    % Granulocytes 54.8 %    % Lymphocytes 35.9 %    % Monocytes 9.3 %     My review of the chest x-ray shows normal lung volume, no infiltrates, no pleural effusion, no masses and normal heart size. Discussed with patient that radiologist will read the images and if abnormality is seen we will notify patient.           ASSESSMENT/PLAN:                                                      1. Cough    - HEMOGRAM/PLATE/DIFF  - VENOUS COLLECTION  - HC XRAY CHEST, 2 VIEWS  - albuterol (PROAIR HFA/PROVENTIL HFA/VENTOLIN HFA) 108 (90 Base) MCG/ACT inhaler; Inhale 2 puffs into the lungs every 6 hours  Dispense: 1 Inhaler; Refill: 0    2. SOB (shortness of breath)    - HEMOGRAM/PLATE/DIFF  - VENOUS COLLECTION  - HC XRAY CHEST, 2 VIEWS  - albuterol (PROAIR HFA/PROVENTIL HFA/VENTOLIN HFA) 108 (90 Base) MCG/ACT inhaler; Inhale 2 puffs into the lungs every 6 hours  Dispense: 1 Inhaler; Refill: 0    I reviewed case with Dr. Gambino who agrees with my recommendations of continued supportive cares at home. She is to have a very low threshold to be seen in ED if her sx worsen over night. She will be seen by MD in clinic tomorrow for recheck. She has also been advised to use albuterol 2 puffs every 4-6 hours.       Christine Ryan PA-C  [facility]

## 2020-01-24 NOTE — NURSING NOTE
Mihir MILLER Vanita is here for a recheck of her cough. Not doing any better.    Questioned patient about current smoking habits.  Pt. has never smoked.  PULSE regular  My Chart: active  CLASSIFICATION OF OVERWEIGHT AND OBESITY BY BMI                        Obesity Class           BMI(kg/m2)  Underweight                                    < 18.5  Normal                                         18.5-24.9  Overweight                                     25.0-29.9  OBESITY                     I                  30.0-34.9                             II                 35.0-39.9  EXTREME OBESITY             III                >40                            Patient's  BMI Body mass index is 28.28 kg/m .  http://hin.nhlbi.nih.gov/menuplanner/menu.cgi  Pre-visit planning  Immunizations - up to date  Colonoscopy -   Mammogram -   Asthma -   PHQ9 -    CHIDI-7 -

## 2020-01-25 ENCOUNTER — OFFICE VISIT (OUTPATIENT)
Dept: FAMILY MEDICINE | Facility: CLINIC | Age: 30
End: 2020-01-25

## 2020-01-25 VITALS
HEIGHT: 68 IN | WEIGHT: 186 LBS | HEART RATE: 103 BPM | DIASTOLIC BLOOD PRESSURE: 78 MMHG | BODY MASS INDEX: 28.19 KG/M2 | OXYGEN SATURATION: 98 % | TEMPERATURE: 98.1 F | SYSTOLIC BLOOD PRESSURE: 120 MMHG

## 2020-01-25 DIAGNOSIS — R05.9 COUGH: Primary | ICD-10-CM

## 2020-01-25 DIAGNOSIS — J98.01 BRONCHOSPASM: ICD-10-CM

## 2020-01-25 PROCEDURE — 99213 OFFICE O/P EST LOW 20 MIN: CPT | Performed by: FAMILY MEDICINE

## 2020-01-25 ASSESSMENT — MIFFLIN-ST. JEOR: SCORE: 1622.19

## 2020-01-25 NOTE — NURSING NOTE
Mihir is here to recheck cough by MD alex King, pt has a heart condition.        Pre-visit Screening:  Immunizations:  up to date  Colonoscopy:  NA  Mammogram: NA  Asthma Action Test/Plan:  NA  PHQ9:  NA to today's visit  GAD7:  NA  Questioned patient about current smoking habits Pt. has never smoked.  Ok to leave detailed message on voice mail for today's visit only Yes, phone # cell

## 2020-01-25 NOTE — PROGRESS NOTES
SUBJECTIVE:   Mihir Waldron is a 29 year old female who complains of nasal congestion, post nasal drip, sore throat, productive cough, chest tightness, myalgias, hoarseness and fatigue for 3+ days. She denies a history of sweats, chills, shortness of breath, vomiting and chest pain and denies a history of asthma. Patient does not smoke cigarettes.    Pt was seen here 1/22-had neg flu, then seen again 1/24-normal CBC and neg CXR-treated with albuterol MDI after visit yesterday-states this has helped her breathing and chest tightness a lot.      Cough more productive today, breathing better, nasal congestion worse but blowing out more-states losing voice today    NO fevers    Pt did go to hospital lab yesterday and had neg D Dimer    Patient Active Problem List   Diagnosis     Sinus tachycardia     Postural orthostatic tachycardia syndrome     Health Care Home     ACP (advance care planning)     Post partum depression     CHIDI (generalized anxiety disorder)     Hx of anaphylaxis     Chest pain     Anxiety     Dietary fructose intolerance     Autonomic dysfunction     Past Medical History:   Diagnosis Date     Anxiety      Chest pain      Depressive disorder      Postpartum depression      POTS (postural orthostatic tachycardia syndrome)      Sinus tachycardia      Family History   Problem Relation Age of Onset     Coronary Artery Disease Mother      Depression Mother      Anxiety Disorder Mother      Hypertension Father      Anxiety Disorder Father      Depression Father      Depression Sister      Anxiety Disorder Sister      Diabetes Maternal Grandmother      Diabetes Paternal Grandmother      Cerebrovascular Disease No family hx of      Breast Cancer No family hx of      Colon Cancer No family hx of      Social History     Socioeconomic History     Marital status:      Spouse name: Tee     Number of children: 2     Years of education: Not on file     Highest education level: Not on file   Occupational  History     Not on file   Social Needs     Financial resource strain: Not on file     Food insecurity:     Worry: Not on file     Inability: Not on file     Transportation needs:     Medical: Not on file     Non-medical: Not on file   Tobacco Use     Smoking status: Never Smoker     Smokeless tobacco: Never Used   Substance and Sexual Activity     Alcohol use: No     Comment: None     Drug use: No     Sexual activity: Not Currently     Partners: Male     Birth control/protection: Surgical   Lifestyle     Physical activity:     Days per week: Not on file     Minutes per session: Not on file     Stress: Not on file   Relationships     Social connections:     Talks on phone: Not on file     Gets together: Not on file     Attends [employer] service: Not on file     Active member of club or organization: Not on file     Attends meetings of clubs or organizations: Not on file     Relationship status: Not on file     Intimate partner violence:     Fear of current or ex partner: Not on file     Emotionally abused: Not on file     Physically abused: Not on file     Forced sexual activity: Not on file   Other Topics Concern      Service Not Asked     Blood Transfusions Not Asked     Caffeine Concern No     Occupational Exposure Not Asked     Hobby Hazards Not Asked     Sleep Concern Not Asked     Stress Concern Not Asked     Weight Concern Not Asked     Special Diet No     Back Care Not Asked     Exercise No     Bike Helmet Not Asked     Seat Belt Yes     Self-Exams Not Asked     Parent/sibling w/ CABG, MI or angioplasty before 65F 55M? No   Social History Narrative    ** Merged History Encounter **          Past Surgical History:   Procedure Laterality Date     DENTAL SURGERY Bilateral      albuterol (PROAIR HFA/PROVENTIL HFA/VENTOLIN HFA) 108 (90 Base) MCG/ACT inhaler, Inhale 2 puffs into the lungs every 6 hours  citalopram (CELEXA) 10 MG tablet, Take 1 tablet (10 mg) by mouth daily  EPINEPHrine  "(EPIPEN/ADRENACLICK/OR ANY BX GENERIC EQUIV) 0.3 MG/0.3ML injection 2-pack, Inject 0.3 mLs (0.3 mg) into the muscle once as needed  fludrocortisone (FLORINEF) 0.1 MG tablet, Take 0.5 tablets (0.05 mg) by mouth daily  rizatriptan (MAXALT) 5 MG tablet, Take 1-2 tablets (5-10 mg) by mouth at onset of headache for migraine May repeat in 2 hours. Max 6 tablets/24 hours.    No current facility-administered medications on file prior to visit.        Allergies: Benadryl [diphenhydramine]; Codeine sulfate; Contrast dye; Latex; and Plum pulp    Immunization History   Administered Date(s) Administered     Influenza (IIV3) PF 11/21/2014     Rhogam 06/26/2015, 10/12/2016     TDAP Vaccine (Boostrix) 05/11/2015, 08/08/2016         OBJECTIVE:/78 (BP Location: Right arm, Patient Position: Sitting, Cuff Size: Adult Large)   Pulse 103   Temp 98.1  F (36.7  C) (Oral)   Ht 1.727 m (5' 8\")   Wt 84.4 kg (186 lb)   LMP 01/24/2020   SpO2 98%   BMI 28.28 kg/m     She appears well, vital signs are as noted by the nurse. Ears normal.  Throat and pharynx normal.  Neck supple. No adenopathy in the neck. Nose is congested. Sinuses non tender. The chest is clear, without wheezes or rales.    ASSESSMENT:   Bronchitis-viral and Bronchospasm-appears to be improving somewhat, expect gradual resolution over next several days-possibly this was false neg flu swab but no indication Tamiflu at this point    PLAN:conitnue albuterol prn  Symptomatic therapy suggested: push fluids, rest and use acetaminophen, ibuprofen as needed. Call or return to clinic prn if these symptoms worsen or fail to improve as anticipated.     patient given instructions to go to emergency department immediately if worsening of symptoms and verbalizes this understanding   "

## 2020-01-27 ENCOUNTER — HOSPITAL ENCOUNTER (EMERGENCY)
Facility: CLINIC | Age: 30
Discharge: HOME | End: 2020-01-27
Attending: EMERGENCY MEDICINE | Admitting: EMERGENCY MEDICINE
Payer: COMMERCIAL

## 2020-01-27 VITALS
HEART RATE: 131 BPM | RESPIRATION RATE: 28 BRPM | TEMPERATURE: 99.8 F | DIASTOLIC BLOOD PRESSURE: 83 MMHG | OXYGEN SATURATION: 98 % | SYSTOLIC BLOOD PRESSURE: 120 MMHG

## 2020-01-27 DIAGNOSIS — J20.9 ACUTE BRONCHITIS, UNSPECIFIED ORGANISM: ICD-10-CM

## 2020-01-27 DIAGNOSIS — R11.10 POST-TUSSIVE EMESIS: ICD-10-CM

## 2020-01-27 LAB
ANION GAP SERPL CALCULATED.3IONS-SCNC: 5 MMOL/L (ref 3–14)
BASOPHILS # BLD AUTO: 0 10E9/L (ref 0–0.2)
BASOPHILS NFR BLD AUTO: 0 %
BUN SERPL-MCNC: 5 MG/DL (ref 7–30)
CALCIUM SERPL-MCNC: 8.7 MG/DL (ref 8.5–10.1)
CHLORIDE SERPL-SCNC: 112 MMOL/L (ref 94–109)
CO2 SERPL-SCNC: 24 MMOL/L (ref 20–32)
CREAT SERPL-MCNC: 0.49 MG/DL (ref 0.52–1.04)
DIFFERENTIAL METHOD BLD: ABNORMAL
EOSINOPHIL # BLD AUTO: 0 10E9/L (ref 0–0.7)
EOSINOPHIL NFR BLD AUTO: 1 %
ERYTHROCYTE [DISTWIDTH] IN BLOOD BY AUTOMATED COUNT: 12.8 % (ref 10–15)
GFR SERPL CREATININE-BSD FRML MDRD: >90 ML/MIN/{1.73_M2}
GLUCOSE SERPL-MCNC: 94 MG/DL (ref 70–99)
HCT VFR BLD AUTO: 42.7 % (ref 35–47)
HGB BLD-MCNC: 14 G/DL (ref 11.7–15.7)
LYMPHOCYTES # BLD AUTO: 1.4 10E9/L (ref 0.8–5.3)
LYMPHOCYTES NFR BLD AUTO: 39 %
MCH RBC QN AUTO: 28.7 PG (ref 26.5–33)
MCHC RBC AUTO-ENTMCNC: 32.8 G/DL (ref 31.5–36.5)
MCV RBC AUTO: 88 FL (ref 78–100)
METAMYELOCYTES # BLD: 0.1 10E9/L
METAMYELOCYTES NFR BLD MANUAL: 3 %
MONOCYTES # BLD AUTO: 0.3 10E9/L (ref 0–1.3)
MONOCYTES NFR BLD AUTO: 9 %
NEUTROPHILS # BLD AUTO: 1.7 10E9/L (ref 1.6–8.3)
NEUTROPHILS NFR BLD AUTO: 48 %
PLATELET # BLD AUTO: 144 10E9/L (ref 150–450)
PLATELET # BLD EST: ABNORMAL 10*3/UL
POTASSIUM SERPL-SCNC: 3.8 MMOL/L (ref 3.4–5.3)
RBC # BLD AUTO: 4.88 10E12/L (ref 3.8–5.2)
RBC MORPH BLD: ABNORMAL
SODIUM SERPL-SCNC: 141 MMOL/L (ref 133–144)
VARIANT LYMPHS BLD QL SMEAR: PRESENT
WBC # BLD AUTO: 3.5 10E9/L (ref 4–11)

## 2020-01-27 PROCEDURE — 96361 HYDRATE IV INFUSION ADD-ON: CPT

## 2020-01-27 PROCEDURE — 25800030 ZZH RX IP 258 OP 636: Performed by: EMERGENCY MEDICINE

## 2020-01-27 PROCEDURE — 85025 COMPLETE CBC W/AUTO DIFF WBC: CPT | Performed by: EMERGENCY MEDICINE

## 2020-01-27 PROCEDURE — 99284 EMERGENCY DEPT VISIT MOD MDM: CPT | Mod: 25

## 2020-01-27 PROCEDURE — 80048 BASIC METABOLIC PNL TOTAL CA: CPT | Performed by: EMERGENCY MEDICINE

## 2020-01-27 PROCEDURE — 25000128 H RX IP 250 OP 636: Performed by: EMERGENCY MEDICINE

## 2020-01-27 PROCEDURE — 96374 THER/PROPH/DIAG INJ IV PUSH: CPT

## 2020-01-27 RX ORDER — ONDANSETRON 2 MG/ML
4 INJECTION INTRAMUSCULAR; INTRAVENOUS ONCE
Status: COMPLETED | OUTPATIENT
Start: 2020-01-27 | End: 2020-01-27

## 2020-01-27 RX ORDER — AZITHROMYCIN 250 MG/1
TABLET, FILM COATED ORAL
Qty: 6 TABLET | Refills: 0 | Status: ON HOLD | OUTPATIENT
Start: 2020-01-27 | End: 2020-02-07

## 2020-01-27 RX ORDER — ONDANSETRON 4 MG/1
4 TABLET, ORALLY DISINTEGRATING ORAL EVERY 8 HOURS PRN
Qty: 10 TABLET | Refills: 0 | Status: SHIPPED | OUTPATIENT
Start: 2020-01-27 | End: 2020-07-01

## 2020-01-27 RX ADMIN — SODIUM CHLORIDE 1000 ML: 9 INJECTION, SOLUTION INTRAVENOUS at 11:01

## 2020-01-27 RX ADMIN — ONDANSETRON HYDROCHLORIDE 4 MG: 2 INJECTION, SOLUTION INTRAMUSCULAR; INTRAVENOUS at 11:56

## 2020-01-27 ASSESSMENT — ENCOUNTER SYMPTOMS
COUGH: 1
DIAPHORESIS: 1
SORE THROAT: 1
DIZZINESS: 1
VOMITING: 1
FEVER: 1
SHORTNESS OF BREATH: 1
WEAKNESS: 1
DIARRHEA: 1

## 2020-01-27 NOTE — ED TRIAGE NOTES
Patient presents to the ED reporting SOB, lightheadedness, vomiting and diarrhea. Reports ongoing issues with SOB and coughing for the past 6 days. Seen in clinic x 2 for symptoms. Reports SOB is now worse and is having vomiting and diarrhea since last night.

## 2020-01-27 NOTE — DISCHARGE INSTRUCTIONS
Discharge Instructions  Bronchitis    You were seen today for a chest infection or inflammation. If your provider decided this was due to a bacterial infection, you may need an antibiotic. Sometimes these are caused by a virus, and then an antibiotic will not help.     Generally, every Emergency Department visit should have a follow-up clinic visit with either a primary or a specialty clinic/provider. Please follow-up as instructed by your emergency provider today.    Return to the Emergency Department if:  Your breathing gets much worse.  You are very weak, or feel much more ill.  You develop new symptoms, such as chest pain.  You cough up blood.  You are vomiting (throwing up) enough that you cannot keep fluids or your medicine down.    What can I do to help myself?  Fill any prescriptions the provider gave you and take them right away--especially antibiotics. Be sure to finish the whole antibiotic prescription.  You may be given a prescription for an inhaler, which can help loosen tight air passages.  Use this as needed, but not more often than directed. Inhalers work much better when used with a spacer.   You may be given a prescription for a steroid to reduce inflammation. Used long-term, these can have side effects, but for short-term use they are safe. You may notice restlessness or increased appetite.      You may use non-prescription cough or cold medicines. Cough medicines may help, but don t make the cough go away completely.   Avoid smoke, because this can make your symptoms worse. If you smoke, this may be a good time to quit! Consider using nicotine lozenges, gum, or patches to reduce cravings.   If you have a fever, Tylenol  (acetaminophen), Motrin  (ibuprofen), or Advil  (ibuprofen) may help bring fever down and may help you feel more comfortable. Be sure to read and follow the package directions, and ask your provider if you have questions.  Be sure to get your flu shot each year.  For certain ages,  the pneumonia shot can help prevent pneumonia.  If you were given a prescription for medicine here today, be sure to read all of the information (including the package insert) that comes with your prescription.  This will include important information about the medicine, its side effects, and any warnings that you need to know about.  The pharmacist who fills the prescription can provide more information and answer questions you may have about the medicine.  If you have questions or concerns that the pharmacist cannot address, please call or return to the Emergency Department.     Remember that you can always come back to the Emergency Department if you are not able to see your regular provider in the amount of time listed above, if you get any new symptoms, or if there is anything that worries you.

## 2020-01-27 NOTE — ED PROVIDER NOTES
"  History     Chief Complaint:  Shortness of Breath, Cough     The history is provided by the patient.      Mihir Waldron is a 29 year old female who presents with one week of shortness of breath and a cough. The patient has been seen for this multiple times in the past several days, where an influenza test and chest x-ray were obtained which were negative. She presents today stating that she has not felt improved since she was seen three days ago, when the chest x-ray was obtained. She states that she has treated with Tylenol and Ibuprofen as well as the prescribed nebulizer treatments.     She states that since she was last seen, she has had vomiting, diarrhea and persistent coughing and shortness of breath, but notes that the shortness of breath improves after the nebulizer treatments. She also notes she has become dizzy, weak with diaphoresis and low grade fevers. She also endorses a sore throat due to her persistent cough. She notes that the cough was initially productive but is now dry. To note, the patient states that she has not yet treated with prednisone, stating that she \"does not do well\" on steroids and that they \"impact [her] mental status.\" She presents today concerned she is not yet improving.     Imaging Results from 1/24/2020:  XR Chest 2 views: No acute airspace disease.      Allergies:  Benadryl [Diphenhydramine]  Codeine Sulfate  Contrast Dye  Latex  Plum Pulp     Medications:    Celexa  Florinef  Maxalt  Proair inhaler  Epipen    Past Medical History:    Anxiety  Depressive disorder  POTS    Past Surgical History:    Dental surgery, bilateral    Family History:    CAD  Depression  Anxiety  Hypertension    Social History:  The patient presents to the ED alone.  Smoking Status: Never Smoker  Smokeless Tobacco: Never Used  Alcohol Use: No  Drug Use: No  PCP: Fuentes Green     Review of Systems   Constitutional: Positive for diaphoresis and fever (low grade).   HENT: Positive for " sore throat.    Respiratory: Positive for cough and shortness of breath.    Gastrointestinal: Positive for diarrhea and vomiting.   Neurological: Positive for dizziness and weakness.   All other systems reviewed and are negative.      Physical Exam     Patient Vitals for the past 24 hrs:   BP Temp Temp src Pulse Resp SpO2   01/27/20 1215 -- -- -- -- -- 98 %   01/27/20 1200 120/83 -- -- -- -- 96 %   01/27/20 1145 -- -- -- -- -- 99 %   01/27/20 1130 -- -- -- -- -- 99 %   01/27/20 1115 -- -- -- -- -- 98 %   01/27/20 1100 -- -- -- -- -- 98 %   01/27/20 1005 (!) 141/98 99.8  F (37.7  C) Temporal 131 28 99 %       Physical Exam  Constitutional: Alert, attentive  HENT:     Nose: Nose normal.   Mouth/Throat: Oropharynx is clear, mucous membranes are moist   Ears: Normal external ears. TMs clear bilaterally, normal external canals    bilaterally.  Eyes: EOM are normal.    CV: tachycardic (HR = 110s), regular rhythm, no murmurs, rubs or gallups.  Chest: Effort normal and breath sounds normal.   GI: No distension. There is no tenderness.  MSK: Normal range of motion.   Neurological: Alert, attentive  Skin: Skin is warm and dry.      Emergency Department Course     Laboratory:  Laboratory findings were communicated with the patient who voiced understanding of the findings.    CBC: WBC: 3.5 (low), HGB: 14.0, PLT: 144 (low)    BMP: Urea Nitrogen 5 (low), Chloride 112 (high), Creatinine 0.49 (low) o/w WNL ()     Interventions:  1101 NS 1L IV  1156 Zofran 4mg IV    Emergency Department Course:  Past medical records, nursing notes, and vitals reviewed.    1042 I performed an exam of the patient as documented above.     IV was inserted and blood was drawn for laboratory testing, results above.    1245 I rechecked the patient and discussed the results of her workup thus far. Recheck HR in the 80s.    Findings and plan explained to the patient. Patient discharged home with instructions regarding supportive care, medications, and  reasons to return. The importance of close follow-up was reviewed. The patient was prescribed Zithromax and Zofran.    I personally reviewed the laboratory results with the patient and answered all related questions prior to discharge.     Impression & Plan     Medical Decision Making:  This is a well-appearing 29-year-old female who presents after several visits for persistent URI symptoms with recent onset posttussive emesis.  Of note, she has had a negative chest x-ray and d-dimer in the last several days.  Strongly doubt underlying pneumonia or PE given this.  She has unlabored breathing, normal oxygen saturations, normal cardiopulmonary exam, with exception of mild tachycardia resolved with fluids.  Screening labs may suggest a viral process.  Symptoms are improved after supportive cares with Zofran and fluids.  Given extended duration of symptoms we had a shared decision-making conversation regarding possible antibiotics for bacterial bronchitis.  She would like to pursue this therapy and I believe it is reasonable given duration.  Plan continue supportive cares, primary care follow-up in 2 to 3 days, and strict return precautions for difficulty breathing, intractable vomiting, or any other concerns.    Diagnosis:    ICD-10-CM    1. Acute bronchitis, unspecified organism J20.9    2. Post-tussive emesis R11.10        Disposition:  Discharged to home.    Discharge Medications:  Discharge Medication List as of 1/27/2020 12:50 PM      START taking these medications    Details   azithromycin (ZITHROMAX Z-ALESSIO) 250 MG tablet Two tablets on the first day, then one tablet daily for the next 4 days, Disp-6 tablet, R-0, Local Print      ondansetron (ZOFRAN ODT) 4 MG ODT tab Take 1 tablet (4 mg) by mouth every 8 hours as needed for nausea, Disp-10 tablet, R-0, Local Print             Scribe Disclosure:  I, Armani Ansari, am serving as a scribe at 10:37 AM on 1/27/2020 to document services personally performed by  Juvencio Castaneda MD based on my observations and the provider's statements to me.      Juvencio Castaneda MD  01/27/20 2102

## 2020-02-03 NOTE — TELEPHONE ENCOUNTER
EP Scheduling called the patient to schedule Tilt Table Study with Dr. Sanders (patient had called in an cancelled the original study that had been booked).  The number [phone] was left for the patient to return the call and schedule the procedure.    Lindy Gann  Periop Electrophysiology   [phone]

## 2020-02-03 NOTE — TELEPHONE ENCOUNTER
----- Message from Steph Lopes sent at 1/31/2020  8:54 AM CST -----  Good Morning,    The call center called this rupan stating pt called to cancel Tilt. She left a message on your voice mail at work.    J

## 2020-02-04 DIAGNOSIS — G90.A POSTURAL ORTHOSTATIC TACHYCARDIA SYNDROME: ICD-10-CM

## 2020-02-04 LAB
COLLECT DURATION TIME UR: 24 H
SPECIMEN VOL UR: 896 ML

## 2020-02-07 ENCOUNTER — APPOINTMENT (OUTPATIENT)
Dept: MEDSURG UNIT | Facility: CLINIC | Age: 30
End: 2020-02-07
Attending: INTERNAL MEDICINE
Payer: COMMERCIAL

## 2020-02-07 ENCOUNTER — HOSPITAL ENCOUNTER (OUTPATIENT)
Facility: CLINIC | Age: 30
Discharge: HOME | End: 2020-02-07
Attending: INTERNAL MEDICINE | Admitting: INTERNAL MEDICINE
Payer: COMMERCIAL

## 2020-02-07 VITALS
TEMPERATURE: 98.6 F | BODY MASS INDEX: 28.04 KG/M2 | RESPIRATION RATE: 16 BRPM | HEART RATE: 74 BPM | SYSTOLIC BLOOD PRESSURE: 131 MMHG | HEIGHT: 68 IN | DIASTOLIC BLOOD PRESSURE: 74 MMHG | OXYGEN SATURATION: 98 % | WEIGHT: 185 LBS

## 2020-02-07 DIAGNOSIS — G90.9 AUTONOMIC DYSFUNCTION: ICD-10-CM

## 2020-02-07 DIAGNOSIS — G90.A POSTURAL ORTHOSTATIC TACHYCARDIA SYNDROME: ICD-10-CM

## 2020-02-07 LAB
CATECHOLS UR-IMP: NORMAL
COLLECT DURATION TIME SPEC: 24 H
CREAT 24H UR-MRATE: 1452 MG/D (ref 700–1600)
CREAT UR-MCNC: 162 MG/DL
DOPAMINE 24H UR-MRATE: 338 UG/D (ref 71–485)
DOPAMINE UR-MCNC: 3 UG/L
DOPAMINE/CREAT UR: 233 UG/G CRT (ref 0–250)
EPINEPH 24H UR-MRATE: 3 UG/D (ref 1–14)
EPINEPH UR-MCNC: 377 UG/L
EPINEPH/CREAT UR: 2 UG/G CRT (ref 0–20)
NOREPINEPH 24H UR-MRATE: 30 UG/D (ref 14–120)
NOREPINEPH UR-MCNC: 33 UG/L
NOREPINEPH/CREAT UR: 20 UG/G CRT (ref 0–45)
SPECIMEN VOL ?TM UR: 896 ML

## 2020-02-07 PROCEDURE — 93660 TILT TABLE EVALUATION: CPT | Mod: 26 | Performed by: INTERNAL MEDICINE

## 2020-02-07 PROCEDURE — 95921 AUTONOMIC NRV PARASYM INERVJ: CPT | Performed by: INTERNAL MEDICINE

## 2020-02-07 PROCEDURE — 27210794 ZZH OR GENERAL SUPPLY STERILE: Performed by: INTERNAL MEDICINE

## 2020-02-07 PROCEDURE — 93660 TILT TABLE EVALUATION: CPT | Performed by: INTERNAL MEDICINE

## 2020-02-07 PROCEDURE — 40000166 ZZH STATISTIC PP CARE STAGE 1

## 2020-02-07 PROCEDURE — 25800030 ZZH RX IP 258 OP 636: Performed by: INTERNAL MEDICINE

## 2020-02-07 PROCEDURE — 95921 AUTONOMIC NRV PARASYM INERVJ: CPT | Mod: 26 | Performed by: INTERNAL MEDICINE

## 2020-02-07 RX ORDER — FLUDROCORTISONE ACETATE 0.1 MG/1
0.2 TABLET ORAL DAILY
Qty: 180 TABLET | Refills: 3 | Status: SHIPPED | OUTPATIENT
Start: 2020-02-07 | End: 2020-03-02

## 2020-02-07 RX ORDER — SODIUM CHLORIDE 9 MG/ML
INJECTION, SOLUTION INTRAVENOUS CONTINUOUS
Status: DISCONTINUED | OUTPATIENT
Start: 2020-02-07 | End: 2020-02-12 | Stop reason: HOSPADM

## 2020-02-07 RX ORDER — LIDOCAINE 40 MG/G
CREAM TOPICAL
Status: DISCONTINUED | OUTPATIENT
Start: 2020-02-07 | End: 2020-02-12 | Stop reason: HOSPADM

## 2020-02-07 RX ADMIN — SODIUM CHLORIDE 500 ML: 9 INJECTION, SOLUTION INTRAVENOUS at 10:51

## 2020-02-07 ASSESSMENT — MIFFLIN-ST. JEOR: SCORE: 1617.65

## 2020-02-07 NOTE — IP AVS SNAPSHOT
[facility], [facility],  Heart Cath Lab  [address] [address]  Phone:  [phone]                                    After Visit Summary   2/7/2020    Mihir Waldron    MRN: [id number]           After Visit Summary Signature Page    I have received my discharge instructions, and my questions have been answered. I have discussed any challenges I see with this plan with the nurse or doctor.    ..........................................................................................................................................  Patient/Patient Representative Signature      ..........................................................................................................................................  Patient Representative Print Name and Relationship to Patient    ..................................................               ................................................  Date                                   Time    ..........................................................................................................................................  Reviewed by Signature/Title    ...................................................              ..............................................  Date                                               Time          22EPIC Rev 08/18

## 2020-02-07 NOTE — DISCHARGE INSTRUCTIONS
[facility]  DISCHARGE INSTRUCTIONS FOR   TILT TABLE STUDY      Date:  02/07/2020    Plan:  - Use compression shorts (athletic compression shorts).  - Increase hydration with goal to take in 64 oz of water/electrolyte fluids per day.     Recommend drinking 8-10 oz. Try to avoid high carbohydrate electrolyte drinks.  - Eat six small meals per day with focus on foods low in carbohydrates and low in gluten.  - Liberalize salt intake.  - Change positions carefully and slowly and maintain safe environment.  - Standing training and supine isometric exercises.    Cardiovascular Clinic:  [address] [place] [address], [id number]    Your Care Team:        EP Cardiology      Telephone Number         Dr. Clayton Alonso                [phone]         Nilda Pagan, RN    Renae Rodríguez RN              [phone]         For Scheduling Appointments or              Procedures:      Lindy Estrada               [phone]       As always, Thank you for trusting us with your health care needs!

## 2020-02-07 NOTE — PROGRESS NOTES
Patient tolerated recovery stage well. VSS,  denies pain. Patient tolerated PO food and fluids. Teaching was done and discharge instructions were given. Patient ambulated, voided, and PIV was removed. Patient discharged from the hospital via wheel chair to home with her .

## 2020-02-07 NOTE — PROGRESS NOTES
Patient prepped for tilt table study.  Denies pain.   brought her to hospital & will provide her transportation post-procedure.  H & P current.  No labs ordered.  Needs consent.

## 2020-02-07 NOTE — PROGRESS NOTES
"Mihir Waldron is a 29 year old female patient.  1. Autonomic dysfunction    2. Autonomic dysfunction      Past Medical History:   Diagnosis Date     Anxiety      Chest pain      Depressive disorder      Postpartum depression      POTS (postural orthostatic tachycardia syndrome)      Sinus tachycardia      No current outpatient medications on file.     Allergies   Allergen Reactions     Benadryl [Diphenhydramine] Other (See Comments)     Pt got warm and passed out with IV benadryl     Codeine Sulfate GI Disturbance     Contrast Dye Hives     Unsure of name of contrast     Latex Hives     Plum Pulp Nausea and Vomiting and Itching     Itching in throat and throat swelling     Principal Problem:    Autonomic dysfunction    Blood pressure 125/70, pulse 77, temperature 98.6  F (37  C), temperature source Oral, resp. rate 16, height 1.727 m (5' 8\"), weight 83.9 kg (185 lb), last menstrual period 01/24/2020, SpO2 98 %, not currently breastfeeding.    Subjective     Post Tilt test. Feeling well. No complaints  Objective   Normal exam   Assessment & Plan   Discussed resultrs and arranged Rx to her pharmacy and will schedule clinic in approx 2-3 months    I very much appreciated the opportunity to see and assess Mihir Waldron in the hospital outpatient Station 2A . The note above summarizes my findings and current recommendations.  Please do not hesitate to contact my office if you have any questions or concerns.      Clayton Sanders MD  Cardiac Arrhythmia Service  [facility]  [phone]      Clayton Sanders MD  2/7/2020    "

## 2020-02-07 NOTE — PROGRESS NOTES
Returned from CCL to 2A.  Feels fatigued but denies pain.  Provided with po fluids & food.  Awaiting Dr. Sanders for plan of care.  Has notified  for post-procedure transport to home.

## 2020-02-25 ENCOUNTER — OFFICE VISIT (OUTPATIENT)
Dept: FAMILY MEDICINE | Facility: CLINIC | Age: 30
End: 2020-02-25

## 2020-02-25 VITALS
OXYGEN SATURATION: 98 % | HEART RATE: 82 BPM | WEIGHT: 190 LBS | BODY MASS INDEX: 28.89 KG/M2 | DIASTOLIC BLOOD PRESSURE: 62 MMHG | SYSTOLIC BLOOD PRESSURE: 118 MMHG | TEMPERATURE: 98.4 F

## 2020-02-25 DIAGNOSIS — J34.89 NOSE PAIN: Primary | ICD-10-CM

## 2020-02-25 PROCEDURE — 99213 OFFICE O/P EST LOW 20 MIN: CPT | Performed by: PHYSICIAN ASSISTANT

## 2020-02-25 NOTE — NURSING NOTE
Mihir is here for playing with her daughter 3 days ago and her daughter accidently kicked her nose and she is still in a lot of pain.          Pre-visit Screening:  Immunizations:  up to date  Colonoscopy:  NA  Mammogram: NA  Asthma Action Test/Plan:  None  PHQ9:  Up to date  GAD7:  Up to date  Questioned patient about current smoking habits Pt. has never smoked.  Ok to leave detailed message on voice mail for today's visit only Yes, phone # [phone]

## 2020-02-25 NOTE — PROGRESS NOTES
CC: Nose fracture?    History:  Mihir is here today with ongoing nasal pain after her 3 y/o daughter kicked her upwards at tip of nose 3-4 days ago on Friday. Pain is more so off to the right of nose. Pain does not seem to be improving. Cannot touch nose, blow nose, without significant pain.     Can't pass as much air through left nostril. No vision, diego, facial changes. Did not have any bleeding. Complicating this is that Mihir has a known deviated septum for which she has already been recommended to have surgery.     Has been taking ibuprofen and icing frequently without any relief of pain.     PMH, MEDICATIONS, ALLERGIES, SOCIAL AND FAMILY HISTORY in [facility] and reviewed by me personally.    ROS negative other than the symptoms noted above in the HPI    Examination   /62 (BP Location: Right arm, Patient Position: Sitting, Cuff Size: Adult Large)   Pulse 82   Temp 98.4  F (36.9  C) (Oral)   Wt 86.2 kg (190 lb)   SpO2 98%   BMI 28.89 kg/m       Constitutional: Sitting comfortably, in no acute distress. Vital signs noted  Eyes: pupils equal round reactive to light and accomodation, extra ocular movements intact  Ears: external canals and TMs free of abnormalities  Nose: patent, without mucosal abnormalities. Deviated septum to left noted. No hematoma. No tenderness to palpation over bridge of nose. Tender to palpation over right nose. No edema.  Mouth and throat: without erythema or lesions of the mucosa  Neck:  no adenopathy, trachea midline and normal to palpation  Cardiovascular:  regular rate and rhythm, no murmurs, clicks, or gallops  Respiratory:  normal respiratory rate and rhythm, lungs clear to auscultation  SKIN: No jaundice/pallor/rash.   Psychiatric: mentation appears normal and affect normal/bright    A/P    ICD-10-CM    1. Nose pain J34.89        DISCUSSION:  Mihir's symptoms are consistent with a nose injury, but my suspicion for fracture is very low. Suspect she did strain the cartilage  triggering pain and inflammation. Recommended continue ice and ibuprofen. She is already established with ENT so recommended follow-up with them within 1 week especially if not significant improved. Did offer stronger pain medication, but she declines as she does not tolerates these well. She plans to try Benadryl tonight to help sleep, but will contact me tomorrow if this is not effective. Can also alternate Tylenol in with ibuprofen every 2-3 hours.     follow up visit: As needed    Lillian Zambrano PA-C  [facility]

## 2020-02-26 ENCOUNTER — MYC MEDICAL ADVICE (OUTPATIENT)
Dept: FAMILY MEDICINE | Facility: CLINIC | Age: 30
End: 2020-02-26

## 2020-02-26 DIAGNOSIS — J34.2 DEVIATED SEPTUM: Primary | ICD-10-CM

## 2020-03-02 ENCOUNTER — HEALTH MAINTENANCE LETTER (OUTPATIENT)
Age: 30
End: 2020-03-02

## 2020-03-02 ENCOUNTER — TELEPHONE (OUTPATIENT)
Dept: CARDIOLOGY | Facility: CLINIC | Age: 30
End: 2020-03-02

## 2020-03-02 DIAGNOSIS — G90.A POSTURAL ORTHOSTATIC TACHYCARDIA SYNDROME: ICD-10-CM

## 2020-03-02 LAB
COLLECT DURATION TIME SPEC: 24 H
CREAT 24H UR-MRATE: 1434 MG/D (ref 700–1600)
CREAT UR-MCNC: 160 MG/DL
METANEPH 24H UR-MCNC: 90 UG/L
METANEPH 24H UR-MRATE: 81 UG/D (ref 36–229)
METANEPH+NORMETANEPH UR-IMP: NORMAL
METANEPH/CREAT 24H UR: 56 UG/G CRT (ref 0–300)
NORMETANEPHRINE 24H UR-MCNC: 192 UG/L
NORMETANEPHRINE 24H UR-MRATE: 172 UG/D (ref 95–650)
NORMETANEPHRINE/CREAT 24H UR: 120 UG/G CRT (ref 0–400)
SPECIMEN VOL ?TM UR: 896 ML

## 2020-03-02 RX ORDER — FLUDROCORTISONE ACETATE 0.1 MG/1
0.1 TABLET ORAL DAILY
Qty: 180 TABLET | Refills: 3 | COMMUNITY
Start: 2020-03-02 | End: 2020-07-01

## 2020-03-02 NOTE — TELEPHONE ENCOUNTER
"Patient underwent autonomic testing 2/7/2020 by Dr. Sanders. Recommendation was to increase fludrocortisone from 0.05mg daily to 0.2mg daily.     Patient calls to report she has not felt well since the medication increase.    Called and spoke to patient. Mihir says that since she increased her fludrocortisone, she has noted palpitations, nausea, numbness/tingling in her extremities and an \"overall feeling of being unwell.\" She says these symptoms do not start until she takes her dose of Fludrocortisone. Patient says she usually takes medication at night, but she took her dose this morning to assess how she felt afterwards. Patient reports that after she took her dose this morning, she experienced the palpitations, nausea, and N/T, while prior to taking the medication she felt fine. Patient wondering if she should change the dose.    Advised Mihir that she can decrease the fludrocortisone to 0.1mg daily and see if this helps with her symptoms. Patient will re-assess in 3 days and reach out to me if her symptoms do not improve.    Patient verbalized understanding and agreed to plan.    Claire Johnson, MIKYN, RN, PHN  Electrophysiology Nurse Coordinator      "

## 2020-03-02 NOTE — TELEPHONE ENCOUNTER
Dr. Sanders quadrupled her meds post tilt and she's feeling really sick. Please call cell voicemail ok per patient.     Lindy Gann  Periop Electrophysiology   [phone]

## 2020-03-04 LAB
COLLECT DURATION TIME SPEC: 24 H
CREAT UR-MCNC: 159 MG/DL
HISTAMINE 24H UR-MRATE: 35 UG/D (ref 0–60)
HISTAMINE UR-SCNC: 349 NMOL/L
HISTAMINE/CREAT 24H UR-SRTO: 219 NMOL/G CRT (ref 0–450)
SPECIMEN VOL ?TM UR: 896 ML

## 2020-04-14 ENCOUNTER — MYC MEDICAL ADVICE (OUTPATIENT)
Dept: FAMILY MEDICINE | Facility: CLINIC | Age: 30
End: 2020-04-14

## 2020-04-14 DIAGNOSIS — F32.81 PMDD (PREMENSTRUAL DYSPHORIC DISORDER): ICD-10-CM

## 2020-04-14 RX ORDER — CITALOPRAM HYDROBROMIDE 10 MG/1
TABLET ORAL
Qty: 30 TABLET | Refills: 1 | COMMUNITY
Start: 2020-04-14

## 2020-04-14 NOTE — TELEPHONE ENCOUNTER
Mihir Waldron is requesting a refill of:    Refused Prescriptions:                       Disp   Refills    citalopram (CELEXA) 10 MG tablet [Pharmacy*30 tab*1        Sig: TAKE 1 TABLET(10 MG) BY MOUTH DAILY  Refused By: OBDULIA OLGUNI  Reason for Refusal: Patient needs appointment    Last discussed 11/12/19. Pt should have been out of meds a few months ago. Pt due for OV

## 2020-04-15 ENCOUNTER — OFFICE VISIT (OUTPATIENT)
Dept: FAMILY MEDICINE | Facility: CLINIC | Age: 30
End: 2020-04-15

## 2020-04-15 DIAGNOSIS — F41.1 GAD (GENERALIZED ANXIETY DISORDER): Primary | ICD-10-CM

## 2020-04-15 DIAGNOSIS — F33.1 MODERATE EPISODE OF RECURRENT MAJOR DEPRESSIVE DISORDER (H): ICD-10-CM

## 2020-04-15 DIAGNOSIS — F32.81 PMDD (PREMENSTRUAL DYSPHORIC DISORDER): ICD-10-CM

## 2020-04-15 PROCEDURE — 99213 OFFICE O/P EST LOW 20 MIN: CPT | Mod: 95 | Performed by: FAMILY MEDICINE

## 2020-04-15 RX ORDER — CITALOPRAM HYDROBROMIDE 10 MG/1
10 TABLET ORAL DAILY
Qty: 90 TABLET | Refills: 1 | Status: SHIPPED | OUTPATIENT
Start: 2020-04-15 | End: 2020-10-28

## 2020-04-15 ASSESSMENT — ANXIETY QUESTIONNAIRES
5. BEING SO RESTLESS THAT IT IS HARD TO SIT STILL: NEARLY EVERY DAY
GAD7 TOTAL SCORE: 14
2. NOT BEING ABLE TO STOP OR CONTROL WORRYING: MORE THAN HALF THE DAYS
1. FEELING NERVOUS, ANXIOUS, OR ON EDGE: NEARLY EVERY DAY
IF YOU CHECKED OFF ANY PROBLEMS ON THIS QUESTIONNAIRE, HOW DIFFICULT HAVE THESE PROBLEMS MADE IT FOR YOU TO DO YOUR WORK, TAKE CARE OF THINGS AT HOME, OR GET ALONG WITH OTHER PEOPLE: SOMEWHAT DIFFICULT
7. FEELING AFRAID AS IF SOMETHING AWFUL MIGHT HAPPEN: NOT AT ALL
3. WORRYING TOO MUCH ABOUT DIFFERENT THINGS: SEVERAL DAYS
6. BECOMING EASILY ANNOYED OR IRRITABLE: MORE THAN HALF THE DAYS

## 2020-04-15 ASSESSMENT — PATIENT HEALTH QUESTIONNAIRE - PHQ9
5. POOR APPETITE OR OVEREATING: NEARLY EVERY DAY
SUM OF ALL RESPONSES TO PHQ QUESTIONS 1-9: 8

## 2020-04-15 NOTE — PROGRESS NOTES
"Subjective       This visit is being conducted as a virtual visit due to the emphasis on mitigation of the COVID-19 virus pandemic. The clinician has decided that the risk of an in-office visit outweighs the benefit for this patient.      The patient has been notified of following:   \"This telemed visit will be conducted via a virtual communication between you and your physician/provider using Zoom. We have found that certain health care needs can be provided without the need for a physical exam.  This service lets us provide the care you need with a virtual visit  If a prescription is necessary we can send it directly to your pharmacy.  If lab work is needed we can place an order for that and you can then stop by our lab to have the test done at a later time.    Mihir Waldron is a 29 year old female who presents to clinic today for the following health issues:    HPI   Depression and Anxiety Ecjutq-Ue-hjipposyy citalopram recently on daily basis, previously just during cycle    How are you doing with your depression since your last visit? Worsened due to home quarantine    How are you doing with your anxiety since your last visit?  Worsened due to home quarantine    Are you having other symptoms that might be associated with depression or anxiety? No    Have you had a significant life event? covid 19 home order     Do you have any concerns with your use of alcohol or other drugs? No    Social History     Tobacco Use     Smoking status: Never Smoker     Smokeless tobacco: Never Used   Substance Use Topics     Alcohol use: No     Comment: None     Drug use: No     PHQ 12/13/2017 8/27/2018 11/21/2018   PHQ-9 Total Score 4 4 18   Q9: Thoughts of better off dead/self-harm past 2 weeks Not at all Not at all Not at all     CHIDI-7 SCORE 12/13/2017 8/27/2018 11/21/2018   Total Score 10 10 20     Last PHQ-9 4/15/2020   1.  Little interest or pleasure in doing things 1   2.  Feeling down, depressed, or hopeless 0   3.  " Trouble falling or staying asleep, or sleeping too much 3   4.  Feeling tired or having little energy 3   5.  Poor appetite or overeating 0   6.  Feeling bad about yourself 0   7.  Trouble concentrating 1   8.  Moving slowly or restless 0   Q9: Thoughts of better off dead/self-harm past 2 weeks 0   PHQ-9 Total Score 8   Difficulty at work, home, or with people Somewhat difficult     CHIDI-7  4/15/2020   1. Feeling nervous, anxious, or on edge 3   2. Not being able to stop or control worrying 2   3. Worrying too much about different things 1   4. Trouble relaxing 3   5. Being so restless that it is hard to sit still 3   6. Becoming easily annoyed or irritable 2   7. Feeling afraid, as if something awful might happen 0   CHIDI-7 Total Score 14   If you checked any problems, how difficult have they made it for you to do your work, take care of things at home, or get along with other people? Somewhat difficult         Suicide Assessment Five-step Evaluation and Treatment (SAFE-T)      How many servings of fruits and vegetables do you eat daily?  2-3    On average, how many sweetened beverages do you drink each day (Examples: soda, juice, sweet tea, etc.  Do NOT count diet or artificially sweetened beverages)?   1    How many days per week do you exercise enough to make your heart beat faster? 3 or less    How many minutes a day do you exercise enough to make your heart beat faster? 20 - 29    How many days per week do you miss taking your medication? 0        Patient Active Problem List   Diagnosis     Sinus tachycardia     Postural orthostatic tachycardia syndrome     Health Care Home     ACP (advance care planning)     Post partum depression     CHIDI (generalized anxiety disorder)     Hx of anaphylaxis     Chest pain     Anxiety     Dietary fructose intolerance     Autonomic dysfunction     Past Surgical History:   Procedure Laterality Date     DENTAL SURGERY Bilateral      EP STUDY TILT TABLE N/A 2/7/2020    Procedure: EP  TILT TABLE;  Surgeon: Clayton Sanders MD;  [facility]       Social History     Tobacco Use     Smoking status: Never Smoker     Smokeless tobacco: Never Used   Substance Use Topics     Alcohol use: No     Comment: None     Family History   Problem Relation Age of Onset     Coronary Artery Disease Mother      Depression Mother      Anxiety Disorder Mother      Hypertension Father      Anxiety Disorder Father      Depression Father      Depression Sister      Anxiety Disorder Sister      Diabetes Maternal Grandmother      Diabetes Paternal Grandmother      Cerebrovascular Disease No family hx of      Breast Cancer No family hx of      Colon Cancer No family hx of          Current Outpatient Medications   Medication Sig Dispense Refill     citalopram (CELEXA) 10 MG tablet Take 1 tablet (10 mg) by mouth daily 90 tablet 1     albuterol (PROAIR HFA/PROVENTIL HFA/VENTOLIN HFA) 108 (90 Base) MCG/ACT inhaler Inhale 2 puffs into the lungs every 6 hours 1 Inhaler 0     EPINEPHrine (EPIPEN/ADRENACLICK/OR ANY BX GENERIC EQUIV) 0.3 MG/0.3ML injection 2-pack Inject 0.3 mLs (0.3 mg) into the muscle once as needed 0.3 mL 0     fludrocortisone (FLORINEF) 0.1 MG tablet Take 1 tablet (0.1 mg) by mouth daily 180 tablet 3     rizatriptan (MAXALT) 5 MG tablet Take 1-2 tablets (5-10 mg) by mouth at onset of headache for migraine May repeat in 2 hours. Max 6 tablets/24 hours. 18 tablet 1     Allergies   Allergen Reactions     Benadryl [Diphenhydramine] Other (See Comments)     Pt got warm and passed out with IV benadryl     Codeine Sulfate GI Disturbance     Contrast Dye Hives     Unsure of name of contrast     Latex Hives     Plum Pulp Nausea and Vomiting and Itching     Itching in throat and throat swelling     Recent Labs   Lab Test 01/27/20  1100 01/16/20  1725 03/13/19  2106  11/21/18  1902  10/19/17  1800   ALT  --  20 18  --   --   --  17   CR 0.49* 0.56 0.59   < >  --    < > 0.58   GFRESTIMATED >90 >90  >90   < >  --    < > >90   GFRESTBLACK >90 >90 >90   < >  --    < > >90   POTASSIUM 3.8 3.2* 3.4   < >  --    < > 3.4   TSH  --  2.50  --   --  1.94   < >  --     < > = values in this interval not displayed.      BP Readings from Last 3 Encounters:   02/25/20 118/62   02/07/20 131/74   01/27/20 120/83    Wt Readings from Last 3 Encounters:   02/25/20 86.2 kg (190 lb)   02/07/20 83.9 kg (185 lb)   01/25/20 84.4 kg (186 lb)                      Reviewed and updated as needed this visit by Provider         Review of Systems   ROS COMP: Constitutional, HEENT, cardiovascular, pulmonary, gi and gu systems are negative, except as otherwise noted.      Objective    There were no vitals taken for this visit.  There is no height or weight on file to calculate BMI.  Physical Exam   Gen- alert, NAD, cheerful, mentation , judgement and insight intact.  Well groomed.      Diagnostic Test Results:  Labs reviewed in Epic        Assessment & Plan   Assessment      Plan  (F41.1) CHIDI (generalized anxiety disorder)  (primary encounter diagnosis)  Comment: pt with heightened symptoms since stay at home order- stress of special needs child-appropriately started daily citalopram instead of her previous use at cycle only-expect to see improvement in symptoms from this  Plan: citalopram (CELEXA) 10 MG tablet        continue current medications at current doses     We discussed the new findings of possibly increased suicidal risk in teens and young adults beginning treatment with SSRI's. Pt is aware of risk and will seek help immediately if develops any suicidal thoughts.     (F33.1) Moderate episode of recurrent major depressive disorder (H)  Comment: as above  Plan: citalopram (CELEXA) 10 MG tablet            (F32.81) PMDD (premenstrual dysphoric disorder)  Comment:   Plan: citalopram (CELEXA) 10 MG tablet                FUTURE APPOINTMENTS:       - Follow-up visit in 6 mo  Work on weight loss  Regular exercise    No follow-ups on  file.    Fuentes Green MD  [facility]

## 2020-04-16 ASSESSMENT — ANXIETY QUESTIONNAIRES: GAD7 TOTAL SCORE: 14

## 2020-04-23 ENCOUNTER — APPOINTMENT (OUTPATIENT)
Dept: CARDIOLOGY | Facility: CLINIC | Age: 30
End: 2020-04-23
Attending: INTERNAL MEDICINE
Payer: COMMERCIAL

## 2020-07-01 ENCOUNTER — OFFICE VISIT (OUTPATIENT)
Dept: FAMILY MEDICINE | Facility: CLINIC | Age: 30
End: 2020-07-01

## 2020-07-01 VITALS
TEMPERATURE: 98.2 F | OXYGEN SATURATION: 98 % | SYSTOLIC BLOOD PRESSURE: 108 MMHG | BODY MASS INDEX: 29.04 KG/M2 | DIASTOLIC BLOOD PRESSURE: 78 MMHG | WEIGHT: 191 LBS | HEART RATE: 94 BPM

## 2020-07-01 DIAGNOSIS — T14.8XXA BRUISING: ICD-10-CM

## 2020-07-01 DIAGNOSIS — M25.50 MULTIPLE JOINT PAIN: Primary | ICD-10-CM

## 2020-07-01 LAB
ERYTHROCYTE [DISTWIDTH] IN BLOOD BY AUTOMATED COUNT: 12.2 %
ERYTHROCYTE [SEDIMENTATION RATE] IN BLOOD: 14 MM/HR (ref 0–20)
HCT VFR BLD AUTO: 40.9 % (ref 35–47)
HEMOGLOBIN: 14.1 G/DL (ref 11.7–15.7)
MCH RBC QN AUTO: 30.6 PG (ref 26–33)
MCHC RBC AUTO-ENTMCNC: 34.5 G/DL (ref 31–36)
MCV RBC AUTO: 88.8 FL (ref 78–100)
PLATELET COUNT - QUEST: 254 10^9/L (ref 150–375)
RBC # BLD AUTO: 4.61 10*12/L (ref 3.8–5.2)
WBC # BLD AUTO: 7 10*9/L (ref 4–11)

## 2020-07-01 PROCEDURE — 85651 RBC SED RATE NONAUTOMATED: CPT | Performed by: FAMILY MEDICINE

## 2020-07-01 PROCEDURE — 36415 COLL VENOUS BLD VENIPUNCTURE: CPT | Performed by: FAMILY MEDICINE

## 2020-07-01 PROCEDURE — 99214 OFFICE O/P EST MOD 30 MIN: CPT | Performed by: FAMILY MEDICINE

## 2020-07-01 PROCEDURE — 85027 COMPLETE CBC AUTOMATED: CPT | Performed by: FAMILY MEDICINE

## 2020-07-01 PROCEDURE — 86140 C-REACTIVE PROTEIN: CPT | Mod: 90 | Performed by: FAMILY MEDICINE

## 2020-07-01 RX ORDER — MULTIVIT WITH MINERALS/LUTEIN
1000 TABLET ORAL DAILY
COMMUNITY
End: 2021-04-19

## 2020-07-01 RX ORDER — PREDNISONE 20 MG/1
TABLET ORAL
Qty: 10 TABLET | Refills: 0 | Status: SHIPPED | OUTPATIENT
Start: 2020-07-01 | End: 2020-08-25

## 2020-07-01 NOTE — NURSING NOTE
Mihir is here for knee and ankle issues after taking up jogging    Pre-visit Screening:  Immunizations:  up to date  Colonoscopy:  NA  Mammogram: NA  Asthma Action Test/Plan:  NA  PHQ9:  NA  GAD7:  NA  Questioned patient about current smoking habits Pt. has never smoked.  Ok to leave detailed message on voice mail for today's visit only Yes, phone # [phone]

## 2020-07-01 NOTE — PROGRESS NOTES
Dione Waldron is a 29 year old female who presents to clinic today for the following health issues:    HPI   Joint or Musculoskeletal Pain  Duration of complaint: 10 days  Description:   Location: left knee, right knee and right ankle  Character: Sharp  Intensity: severe, at night  Progression of Symptoms: same  Accompanying Signs & Symptoms: Other symptoms: swelling and bruising  History: Previous similar pain: no    Precipitating factors: Trauma or overuse: YES this occurred after 2 days of running increase amounts  Alleviating factors: Improved by: ice, NSAID -  Only mildly  Therapies Tried and outcome:       Patient Active Problem List   Diagnosis     Sinus tachycardia     Postural orthostatic tachycardia syndrome     Health Care Home     ACP (advance care planning)     Post partum depression     CHIDI (generalized anxiety disorder)     Hx of anaphylaxis     Chest pain     Anxiety     Dietary fructose intolerance     Autonomic dysfunction     Past Surgical History:   Procedure Laterality Date     DENTAL SURGERY Bilateral      EP STUDY TILT TABLE N/A 2/7/2020    Procedure: EP TILT TABLE;  Surgeon: Clayton Sanders MD;  Location:  [facility]       Social History     Tobacco Use     Smoking status: Never Smoker     Smokeless tobacco: Never Used   Substance Use Topics     Alcohol use: No     Comment: None     Family History   Problem Relation Age of Onset     Coronary Artery Disease Mother      Depression Mother      Anxiety Disorder Mother      Hypertension Father      Anxiety Disorder Father      Depression Father      Depression Sister      Anxiety Disorder Sister      Diabetes Maternal Grandmother      Diabetes Paternal Grandmother      Cerebrovascular Disease No family hx of      Breast Cancer No family hx of      Colon Cancer No family hx of          Current Outpatient Medications   Medication Sig Dispense Refill     citalopram (CELEXA) 10 MG tablet Take 1 tablet (10 mg) by  mouth daily 90 tablet 1     EPINEPHrine (EPIPEN/ADRENACLICK/OR ANY BX GENERIC EQUIV) 0.3 MG/0.3ML injection 2-pack Inject 0.3 mLs (0.3 mg) into the muscle once as needed 0.3 mL 0     rizatriptan (MAXALT) 5 MG tablet Take 1-2 tablets (5-10 mg) by mouth at onset of headache for migraine May repeat in 2 hours. Max 6 tablets/24 hours. 18 tablet 1     vitamin C (ASCORBIC ACID) 1000 MG TABS Take 1,000 mg by mouth daily       Allergies   Allergen Reactions     Benadryl [Diphenhydramine] Other (See Comments)     Pt got warm and passed out with IV benadryl     Codeine Sulfate GI Disturbance     Contrast Dye Hives     Unsure of name of contrast     Latex Hives     Plum Pulp Nausea and Vomiting and Itching     Itching in throat and throat swelling         Reviewed and updated as needed this visit by Provider         Review of Systems   Constitutional, HEENT, cardiovascular, pulmonary, gi and gu systems are negative, except as otherwise noted.      Objective    /78 (BP Location: Right arm, Patient Position: Sitting, Cuff Size: Adult Large)   Pulse 94   Temp 98.2  F (36.8  C) (Oral)   Wt 86.6 kg (191 lb)   SpO2 98%   BMI 29.04 kg/m    Body mass index is 29.04 kg/m .  Physical Exam   GENERAL: healthy, alert and no distress  NECK: no adenopathy, no asymmetry, masses, or scars and thyroid normal to palpation  RESP: lungs clear to auscultation - no rales, rhonchi or wheezes  CV: regular rate and rhythm, normal S1 S2, no S3 or S4, no murmur, click or rub, no peripheral edema and peripheral pulses strong  MS: 1+ edema to right ankle and marked tenderness to palpation both knees and right ankle  SKIN: mild bruising present    Diagnostic Test Results:  Labs reviewed in Epic  Results for orders placed or performed in visit on 07/01/20 (from the past 24 hour(s))   HEMOGRAM/PLATELET ([facility])   Result Value Ref Range    WBC 7.0 4.0 - 11 10*9/L    RBC Count 4.61 3.8 - 5.2 10*12/L    Hemoglobin 14.1 11.7 - 15.7 g/dL    Hematocrit 40.9  "35.0 - 47.0 %    MCV 88.8 78 - 100 fL    MCH 30.6 26 - 33 pg    MCHC 34.5 31 - 36 g/dL    RDW 12.2 %    Platelet Count 254 150 - 375 10^9/L           Assessment & Plan     1. Multiple joint pain  Course of steroids  - HEMOGRAM/PLATELET ([facility])  - ESR, WESTERGREN ([facility])  - C-Reactive Protein CRP ([facility])    2. Bruising  CBC nml  - HEMOGRAM/PLATELET ([facility])     BMI:   Estimated body mass index is 29.04 kg/m  as calculated from the following:    Height as of 2/7/20: 1.727 m (5' 8\").    Weight as of this encounter: 86.6 kg (191 lb).   Weight management plan: Discussed healthy diet and exercise guidelines        MEDICATIONS:  Continue current medications without change    No follow-ups on file.    Henry Agarwal MD  [facility]        "

## 2020-07-02 LAB — CRP SERPL-MCNC: 4.6 MG/L (ref 0–0.8)

## 2020-08-25 ENCOUNTER — OFFICE VISIT (OUTPATIENT)
Dept: FAMILY MEDICINE | Facility: CLINIC | Age: 30
End: 2020-08-25

## 2020-08-25 VITALS
DIASTOLIC BLOOD PRESSURE: 62 MMHG | SYSTOLIC BLOOD PRESSURE: 110 MMHG | OXYGEN SATURATION: 99 % | HEART RATE: 81 BPM | BODY MASS INDEX: 29.04 KG/M2 | TEMPERATURE: 97.9 F | WEIGHT: 191 LBS

## 2020-08-25 DIAGNOSIS — E74.10 DIETARY FRUCTOSE INTOLERANCE: ICD-10-CM

## 2020-08-25 DIAGNOSIS — F33.1 MODERATE EPISODE OF RECURRENT MAJOR DEPRESSIVE DISORDER (H): Primary | ICD-10-CM

## 2020-08-25 DIAGNOSIS — F41.1 GAD (GENERALIZED ANXIETY DISORDER): ICD-10-CM

## 2020-08-25 DIAGNOSIS — K58.0 IRRITABLE BOWEL SYNDROME WITH DIARRHEA: ICD-10-CM

## 2020-08-25 PROCEDURE — 99213 OFFICE O/P EST LOW 20 MIN: CPT | Performed by: PHYSICIAN ASSISTANT

## 2020-08-25 RX ORDER — VENLAFAXINE HYDROCHLORIDE 37.5 MG/1
CAPSULE, EXTENDED RELEASE ORAL
Qty: 60 CAPSULE | Refills: 1 | Status: SHIPPED | OUTPATIENT
Start: 2020-08-25 | End: 2020-10-28

## 2020-08-25 ASSESSMENT — ANXIETY QUESTIONNAIRES
GAD7 TOTAL SCORE: 21
3. WORRYING TOO MUCH ABOUT DIFFERENT THINGS: NEARLY EVERY DAY
IF YOU CHECKED OFF ANY PROBLEMS ON THIS QUESTIONNAIRE, HOW DIFFICULT HAVE THESE PROBLEMS MADE IT FOR YOU TO DO YOUR WORK, TAKE CARE OF THINGS AT HOME, OR GET ALONG WITH OTHER PEOPLE: EXTREMELY DIFFICULT
7. FEELING AFRAID AS IF SOMETHING AWFUL MIGHT HAPPEN: NEARLY EVERY DAY
6. BECOMING EASILY ANNOYED OR IRRITABLE: NEARLY EVERY DAY
2. NOT BEING ABLE TO STOP OR CONTROL WORRYING: NEARLY EVERY DAY
1. FEELING NERVOUS, ANXIOUS, OR ON EDGE: NEARLY EVERY DAY
5. BEING SO RESTLESS THAT IT IS HARD TO SIT STILL: NEARLY EVERY DAY

## 2020-08-25 ASSESSMENT — PATIENT HEALTH QUESTIONNAIRE - PHQ9
SUM OF ALL RESPONSES TO PHQ QUESTIONS 1-9: 24
5. POOR APPETITE OR OVEREATING: NEARLY EVERY DAY

## 2020-08-25 NOTE — NURSING NOTE
Mihir is here for a med check.        Pre-visit Screening:  Immunizations:  up to date  Colonoscopy:  NA  Mammogram: NA  Asthma Action Test/Plan:  NA  PHQ9:  Done today  GAD7:  Done today  Questioned patient about current smoking habits Pt. has never smoked.  Ok to leave detailed message on voice mail for today's visit only Yes, phone # cell

## 2020-08-25 NOTE — PROGRESS NOTES
CC: Medication Check    History:  Anxiety and depression:  Mihir is here today with worsening depression. Has always had anxiety, but developed post partum depression after each child as recently as 4 years ago. Did have some depression as teenager, but this seemed to resolve for years prior to first pregnancy. Youngest daughter has severe autism, and is her sole caretaker with her  working all the time.     Mihir does not feel that she is at any risk of hurting herself or others. Seeing therapist 2 times monthly through her daughters school. Has restarted her citalopram 10 mg this past spring as she could tell her anxiety and depression worsening, but this causes stomach upset despite taking this with food. She has not noticed much benefit from this. Continues to feel sad, hopeless, and tearful almost constantly.     Has tried Prozac in the past but this was ineffective. Has avoided trial of sertraline due to her chronic diarrhea with IBS.Takes vitamin D only in the winter.    PMH, MEDICATIONS, ALLERGIES, SOCIAL AND FAMILY HISTORY in [facility] and reviewed by me personally.    ROS negative other than the symptoms noted above in the HPI.    Examination   /62 (BP Location: Left arm, Patient Position: Sitting, Cuff Size: Adult Large)   Pulse 81   Temp 97.9  F (36.6  C) (Oral)   Wt 86.6 kg (191 lb)   SpO2 99%   BMI 29.04 kg/m       Constitutional: Sitting comfortably, in no acute distress. Vital signs noted  Neck:  no adenopathy, trachea midline and normal to palpation, thyroid normal to palpation  Cardiovascular:  regular rate and rhythm, no murmurs, clicks, or gallops  Respiratory:  normal respiratory rate and rhythm, lungs clear to auscultation  SKIN: No jaundice/pallor/rash.   Psychiatric: mentation appears normal and affect normal/bright    A/P    ICD-10-CM    1. Moderate episode of recurrent major depressive disorder (H)  F33.1 venlafaxine (EFFEXOR-XR) 37.5 MG 24 hr capsule   2. CHIDI (generalized  anxiety disorder)  F41.1 venlafaxine (EFFEXOR-XR) 37.5 MG 24 hr capsule       DISCUSSION:  CHIDI and PHQ today both significantly elevated. Reassured that Mihir is feeling safe today with no thoughts, plans, or intent to hurt herself or others. Continue to see therapist. Given that she is having upset stomach on low dose citalopram, and has chronic loose stool where she would be unlikely to tolerate sertraline, advised trial of Effexor 37.5 mg daily then increase to 2 tablets daily after 2 weeks. Discussed side effects, take with food. Reminded her to minimize alcohol use when taking, and ideally avoid all together. If this trial is not successful, may try Wellbutrin, but also would start coordinating psych referral.     follow up visit:  2 months, sooner if worsening    [facility]

## 2020-08-26 ENCOUNTER — MYC MEDICAL ADVICE (OUTPATIENT)
Dept: FAMILY MEDICINE | Facility: CLINIC | Age: 30
End: 2020-08-26

## 2020-08-26 DIAGNOSIS — F41.1 GAD (GENERALIZED ANXIETY DISORDER): ICD-10-CM

## 2020-08-26 DIAGNOSIS — F33.1 MODERATE EPISODE OF RECURRENT MAJOR DEPRESSIVE DISORDER (H): Primary | ICD-10-CM

## 2020-08-26 ASSESSMENT — ANXIETY QUESTIONNAIRES: GAD7 TOTAL SCORE: 21

## 2020-08-27 RX ORDER — BUPROPION HYDROCHLORIDE 150 MG/1
150 TABLET ORAL EVERY MORNING
Qty: 30 TABLET | Refills: 1 | Status: SHIPPED | OUTPATIENT
Start: 2020-08-27 | End: 2020-10-28

## 2020-09-22 ENCOUNTER — APPOINTMENT (OUTPATIENT)
Dept: CT IMAGING | Facility: CLINIC | Age: 30
End: 2020-09-22
Attending: INTERNAL MEDICINE
Payer: COMMERCIAL

## 2020-09-22 ENCOUNTER — HOSPITAL ENCOUNTER (EMERGENCY)
Facility: CLINIC | Age: 30
Discharge: HOME | End: 2020-09-22
Attending: INTERNAL MEDICINE | Admitting: INTERNAL MEDICINE
Payer: COMMERCIAL

## 2020-09-22 ENCOUNTER — TELEPHONE (OUTPATIENT)
Dept: FAMILY MEDICINE | Facility: CLINIC | Age: 30
End: 2020-09-22

## 2020-09-22 VITALS
HEART RATE: 90 BPM | WEIGHT: 192.02 LBS | TEMPERATURE: 99.1 F | BODY MASS INDEX: 29.2 KG/M2 | OXYGEN SATURATION: 99 % | SYSTOLIC BLOOD PRESSURE: 115 MMHG | RESPIRATION RATE: 16 BRPM | DIASTOLIC BLOOD PRESSURE: 70 MMHG

## 2020-09-22 DIAGNOSIS — S09.11XA SPRAIN AND STRAIN OF TEMPOROMANDIBULAR JOINT: ICD-10-CM

## 2020-09-22 DIAGNOSIS — S03.40XA SPRAIN AND STRAIN OF TEMPOROMANDIBULAR JOINT: ICD-10-CM

## 2020-09-22 PROCEDURE — 96374 THER/PROPH/DIAG INJ IV PUSH: CPT

## 2020-09-22 PROCEDURE — 25000128 H RX IP 250 OP 636: Performed by: INTERNAL MEDICINE

## 2020-09-22 PROCEDURE — 99285 EMERGENCY DEPT VISIT HI MDM: CPT | Mod: 25

## 2020-09-22 PROCEDURE — 96376 TX/PRO/DX INJ SAME DRUG ADON: CPT

## 2020-09-22 PROCEDURE — 70486 CT MAXILLOFACIAL W/O DYE: CPT

## 2020-09-22 PROCEDURE — 96375 TX/PRO/DX INJ NEW DRUG ADDON: CPT

## 2020-09-22 RX ORDER — HYDROMORPHONE HYDROCHLORIDE 1 MG/ML
0.5 INJECTION, SOLUTION INTRAMUSCULAR; INTRAVENOUS; SUBCUTANEOUS
Status: DISCONTINUED | OUTPATIENT
Start: 2020-09-22 | End: 2020-09-22 | Stop reason: HOSPADM

## 2020-09-22 RX ORDER — ONDANSETRON 2 MG/ML
4 INJECTION INTRAMUSCULAR; INTRAVENOUS EVERY 30 MIN PRN
Status: DISCONTINUED | OUTPATIENT
Start: 2020-09-22 | End: 2020-09-22 | Stop reason: HOSPADM

## 2020-09-22 RX ORDER — HYDROCODONE BITARTRATE AND ACETAMINOPHEN 5; 325 MG/1; MG/1
1-2 TABLET ORAL EVERY 6 HOURS PRN
Qty: 12 TABLET | Refills: 0 | Status: SHIPPED | OUTPATIENT
Start: 2020-09-22 | End: 2020-10-28

## 2020-09-22 RX ORDER — ONDANSETRON 4 MG/1
4 TABLET, ORALLY DISINTEGRATING ORAL EVERY 8 HOURS PRN
Qty: 10 TABLET | Refills: 0 | Status: SHIPPED | OUTPATIENT
Start: 2020-09-22 | End: 2020-09-25

## 2020-09-22 RX ADMIN — ONDANSETRON 4 MG: 2 INJECTION INTRAMUSCULAR; INTRAVENOUS at 16:12

## 2020-09-22 RX ADMIN — ONDANSETRON 4 MG: 2 INJECTION INTRAMUSCULAR; INTRAVENOUS at 15:38

## 2020-09-22 RX ADMIN — HYDROMORPHONE HYDROCHLORIDE 0.5 MG: 1 INJECTION, SOLUTION INTRAMUSCULAR; INTRAVENOUS; SUBCUTANEOUS at 15:38

## 2020-09-22 ASSESSMENT — ENCOUNTER SYMPTOMS
HEADACHES: 0
TROUBLE SWALLOWING: 0
FACIAL SWELLING: 0

## 2020-09-22 NOTE — ED TRIAGE NOTES
"1415 patient's daughter popped up and struck patient under the chin with her head.  \"Jaw moved over to right side and then locked up\" Severe pain to right jaw.    "

## 2020-09-22 NOTE — ED PROVIDER NOTES
History     Chief Complaint:  Facial Injury    HPI  Mihir Waldron is a 30 year old female with a history of anxiety who presents for evaluation of jaw pain. 1 hour ago the patient's daughter popped up and hit the bottom of the patient's jaw with the top of her head. The patient felt her jaw snap to the right and then locked up causing continued jaw pain on her right side. She feels nauseous from the pain but has not vomited. Her nose began bleeding at that time as well but has since resolved. She initially could open her jaw after the incident but this has begun to stiffen up further. The patient denies any previous jaw issues or TMJ syndrome.       Allergies:  Benadryl [Diphenhydramine]  Codeine Sulfate  Contrast Dye  Latex    Medications:    Wellbutrin  Celexa  Epipen  Rizatriptan   Effexor    Past Medical History:    POTS  Post partum depression  CHIDI  Anxiety  IBS    Past Surgical History:    Dental surgery (Bilateral)   Electrophysiology Tilt Table     Family History:    Anxiety Disorder in her father, mother, and sister  Coronary Artery Disease in her mother  Depression in her father, mother, and sister  Hypertension in her father    Social History:  The patient arrives alone  Smoking: never  Alcohol use: no  PCP: Fuentes Green  Marital Status:  [2]      Review of Systems   HENT: Negative for dental problem, facial swelling, nosebleeds and trouble swallowing.         Jaw pain   Neurological: Negative for headaches.   All other systems reviewed and are negative.      Physical Exam     Patient Vitals for the past 24 hrs:   BP Temp Temp src Pulse Resp SpO2 Weight   09/22/20 1639 115/70 -- -- 90 16 99 % --   09/22/20 1610 116/75 -- -- 90 -- 98 % --   09/22/20 1600 -- -- -- -- -- 96 % --   09/22/20 1545 116/82 -- -- 101 -- 100 % --   09/22/20 1458 (!) 147/102 99.1  F (37.3  C) Oral 109 16 99 % 87.1 kg (192 lb 0.3 oz)     Physical Exam  HENT:      Head:      Jaw: Trismus and tenderness present.       Right Ear: Tympanic membrane normal.      Left Ear: Tympanic membrane normal.      Nose:      Right Nostril: No epistaxis or septal hematoma.      Left Nostril: No epistaxis or septal hematoma.      Mouth/Throat:      Pharynx: No uvula swelling.      Tonsils: No tonsillar abscesses.   Neck:      Trachea: Phonation normal.   Cardiovascular:      Heart sounds: Normal heart sounds.   Pulmonary:      Breath sounds: Normal breath sounds. No stridor. No wheezing or rhonchi.   Lymphadenopathy:      Cervical: No cervical adenopathy.   Neurological:      Mental Status: She is alert.   Psychiatric:         Behavior: Behavior is cooperative.         Emergency Department Course     Imaging:  Radiology findings were communicated with the patient who voiced understanding of the findings.    CT Facial Bones wo contrast   IMPRESSION: No evidence for any facial fractures or any acute process  Reading per radiology    Interventions:  1538 Zofran 4mg IV injection   1538 Dilaudid 0.5mg IV injection  1612 Zofran 4mg IV injection     Emergency Department Course:  Past medical records, nursing notes, and vitals reviewed.  1516: I performed an exam of the patient and obtained history, as documented above.     The patient was sent for a CT while in the emergency department, findings above    1628: I rechecked the patient. Explained findings to patient.    I personally reviewed the imaging results with the Patient and answered all related questions prior to discharge.     Findings and plan explained to the Patient. Patient discharged home with instructions regarding supportive care, medications, and reasons to return. The importance of close follow-up was reviewed.   Impression & Plan      Medical Decision Making:  Mihir Waldron is a 30 year old female who presents to the emergency department today for evaluation of right jaw pain and trismus after an injury.  CT of the facial bones shows no evidence of fracture or dislocation.   "Her history of a \"pop\" could suggest subluxation of the TMJ or cartilaginous injury.  We will manage conservatively.  I will have her on a soft diet, ice, Norco for pain, follow-up with primary care within about 3 days, return if problems.    Diagnosis:    ICD-10-CM   1. Sprain and strain of temporomandibular joint  S03.40XA    S09.11XA       Disposition:   Discharged to home.    Discharge Medications:     Medication List      Started    HYDROcodone-acetaminophen 5-325 MG tablet  Commonly known as:  NORCO  1-2 tablets, Oral, EVERY 6 HOURS PRN     ondansetron 4 MG ODT tab  Commonly known as:  Zofran ODT  4 mg, Oral, EVERY 8 HOURS PRN            Scribe Disclosure:  I, Roxanne Trujillo, am serving as a scribe at 3:16 PM on 9/22/2020 to document services personally performed by Carey Rivera MD based on my observations and the provider's statements to me.    [facility] EMERGENCY DEPARTMENT     Carey Rivera MD  09/22/20 1642    "

## 2020-09-22 NOTE — ED AVS SNAPSHOT
[facility] Emergency Department  [address]  [facility] [address]  Phone:  [phone]  Fax:  [phone]                                    Mihir Waldron   MRN: [id number]    Department:  [facility] Emergency Department   Date of Visit:  9/22/2020           After Visit Summary Signature Page    I have received my discharge instructions, and my questions have been answered. I have discussed any challenges I see with this plan with the nurse or doctor.    ..........................................................................................................................................  Patient/Patient Representative Signature      ..........................................................................................................................................  Patient Representative Print Name and Relationship to Patient    ..................................................               ................................................  Date                                   Time    ..........................................................................................................................................  Reviewed by Signature/Title    ...................................................              ..............................................  Date                                               Time          22EPIC Rev 08/18

## 2020-09-22 NOTE — TELEPHONE ENCOUNTER
Mihir called the nurse line stating that her young daughter fell onto her face while they were playing. Her jaw is in extreme pain and having trouble moving it. She had a nose bleed that has subsided.     She wanted to know where to go for this.  [facility] clinic advised to go to the ER because we do not do jaw xrays.

## 2020-10-02 ENCOUNTER — OFFICE VISIT - HEALTHEAST (OUTPATIENT)
Dept: BEHAVIORAL HEALTH | Facility: HOSPITAL | Age: 30
End: 2020-10-02

## 2020-10-02 DIAGNOSIS — F32.81 PMDD (PREMENSTRUAL DYSPHORIC DISORDER): ICD-10-CM

## 2020-10-02 DIAGNOSIS — F41.1 GAD (GENERALIZED ANXIETY DISORDER): ICD-10-CM

## 2020-10-02 DIAGNOSIS — F33.1 MODERATE EPISODE OF RECURRENT MAJOR DEPRESSIVE DISORDER (H): ICD-10-CM

## 2020-10-02 ASSESSMENT — PATIENT HEALTH QUESTIONNAIRE - PHQ9: SUM OF ALL RESPONSES TO PHQ QUESTIONS 1-9: 15

## 2020-10-02 ASSESSMENT — ANXIETY QUESTIONNAIRES
4. TROUBLE RELAXING: NEARLY EVERY DAY
IF YOU CHECKED OFF ANY PROBLEMS ON THIS QUESTIONNAIRE, HOW DIFFICULT HAVE THESE PROBLEMS MADE IT FOR YOU TO DO YOUR WORK, TAKE CARE OF THINGS AT HOME, OR GET ALONG WITH OTHER PEOPLE: NOT DIFFICULT AT ALL
5. BEING SO RESTLESS THAT IT IS HARD TO SIT STILL: NEARLY EVERY DAY
6. BECOMING EASILY ANNOYED OR IRRITABLE: MORE THAN HALF THE DAYS
1. FEELING NERVOUS, ANXIOUS, OR ON EDGE: NEARLY EVERY DAY
GAD7 TOTAL SCORE: 18
3. WORRYING TOO MUCH ABOUT DIFFERENT THINGS: NEARLY EVERY DAY
2. NOT BEING ABLE TO STOP OR CONTROL WORRYING: NEARLY EVERY DAY
7. FEELING AFRAID AS IF SOMETHING AWFUL MIGHT HAPPEN: SEVERAL DAYS

## 2020-10-05 ENCOUNTER — COMMUNICATION - HEALTHEAST (OUTPATIENT)
Dept: BEHAVIORAL HEALTH | Facility: CLINIC | Age: 30
End: 2020-10-05

## 2020-10-28 ENCOUNTER — OFFICE VISIT (OUTPATIENT)
Dept: FAMILY MEDICINE | Facility: CLINIC | Age: 30
End: 2020-10-28

## 2020-10-28 ENCOUNTER — MYC MEDICAL ADVICE (OUTPATIENT)
Dept: FAMILY MEDICINE | Facility: CLINIC | Age: 30
End: 2020-10-28

## 2020-10-28 VITALS
TEMPERATURE: 98.5 F | HEIGHT: 68 IN | DIASTOLIC BLOOD PRESSURE: 70 MMHG | OXYGEN SATURATION: 98 % | SYSTOLIC BLOOD PRESSURE: 102 MMHG | HEART RATE: 83 BPM | BODY MASS INDEX: 27.89 KG/M2 | WEIGHT: 184 LBS

## 2020-10-28 DIAGNOSIS — A08.4 VIRAL GASTROENTERITIS: Primary | ICD-10-CM

## 2020-10-28 DIAGNOSIS — R11.0 NAUSEA: ICD-10-CM

## 2020-10-28 DIAGNOSIS — R30.0 DYSURIA: ICD-10-CM

## 2020-10-28 LAB
% GRANULOCYTES: 60.9 %
ALBUMIN (URINE): ABNORMAL MG/DL
ALBUMIN SERPL-MCNC: 4.6 G/DL (ref 3.6–5.1)
ALBUMIN/GLOB SERPL: 2.1 {RATIO} (ref 1–2.5)
ALP SERPL-CCNC: 96 U/L (ref 33–130)
ALT 1742-6: 0 U/L (ref 0–32)
APPEARANCE UR: ABNORMAL
AST 1920-8: 2 U/L (ref 0–35)
BACTERIA, UR: ABNORMAL
BILIRUB SERPL-MCNC: 0.8 MG/DL (ref 0.2–1.2)
BILIRUB UR QL: ABNORMAL
BUN SERPL-MCNC: 7 MG/DL (ref 7–25)
BUN/CREATININE RATIO: 12.5 (ref 6–22)
CALCIUM SERPL-MCNC: 9.5 MG/DL (ref 8.6–10.3)
CASTS/LPF: 0
CHLORIDE SERPLBLD-SCNC: 107.9 MMOL/L (ref 98–110)
CO2 SERPL-SCNC: 24.7 MMOL/L (ref 20–32)
COLOR UR: YELLOW
CREAT SERPL-MCNC: 0.56 MG/DL (ref 0.7–1.18)
EP/HPF: ABNORMAL
GLOBULIN, CALCULATED - QUEST: 2.2 (ref 1.9–3.7)
GLUCOSE SERPL-MCNC: 91 MG/DL (ref 60–99)
GLUCOSE URINE: ABNORMAL MG/DL
HCT VFR BLD AUTO: 45.5 % (ref 35–47)
HEMOGLOBIN: 14.9 G/DL (ref 11.7–15.7)
HGB UR QL: ABNORMAL
KETONES UR QL: ABNORMAL MG/DL
LEUKOCYTE ESTERASE - QUEST: ABNORMAL
LYMPHOCYTES NFR BLD AUTO: 31.6 %
MCH RBC QN AUTO: 29.3 PG (ref 26–33)
MCHC RBC AUTO-ENTMCNC: 32.7 G/DL (ref 31–36)
MCV RBC AUTO: 89.4 FL (ref 78–100)
MISC.: ABNORMAL
MONOCYTES NFR BLD AUTO: 7.5 %
NITRITE UR QL STRIP: ABNORMAL
PH UR STRIP: 7.5 PH (ref 5–7)
PLATELET COUNT - QUEST: 228 10^9/L (ref 150–375)
POTASSIUM SERPL-SCNC: 3.66 MMOL/L (ref 3.5–5.3)
PREG. TEST: NORMAL
PROT SERPL-MCNC: 6.8 G/DL (ref 6.1–8.1)
RBC # BLD AUTO: 5.09 10*12/L (ref 3.8–5.2)
RBC, UR MICRO: ABNORMAL (ref ?–2)
SODIUM SERPL-SCNC: 142.1 MMOL/L (ref 135–146)
SP. GRAVITY: 1.02
UROBILINOGEN UR QL STRIP: 0.2 EU/DL (ref 0.2–1)
WBC # BLD AUTO: 6.8 10*9/L (ref 4–11)
WBC, UR MICRO: ABNORMAL (ref ?–2)

## 2020-10-28 PROCEDURE — 85025 COMPLETE CBC W/AUTO DIFF WBC: CPT | Performed by: PHYSICIAN ASSISTANT

## 2020-10-28 PROCEDURE — 81001 URINALYSIS AUTO W/SCOPE: CPT | Performed by: PHYSICIAN ASSISTANT

## 2020-10-28 PROCEDURE — 36415 COLL VENOUS BLD VENIPUNCTURE: CPT | Performed by: PHYSICIAN ASSISTANT

## 2020-10-28 PROCEDURE — 81025 URINE PREGNANCY TEST: CPT | Performed by: PHYSICIAN ASSISTANT

## 2020-10-28 PROCEDURE — 80053 COMPREHEN METABOLIC PANEL: CPT | Performed by: PHYSICIAN ASSISTANT

## 2020-10-28 PROCEDURE — 99213 OFFICE O/P EST LOW 20 MIN: CPT | Performed by: PHYSICIAN ASSISTANT

## 2020-10-28 PROCEDURE — 87086 URINE CULTURE/COLONY COUNT: CPT | Mod: 90 | Performed by: PHYSICIAN ASSISTANT

## 2020-10-28 RX ORDER — ONDANSETRON 4 MG/1
4 TABLET, ORALLY DISINTEGRATING ORAL EVERY 8 HOURS PRN
Qty: 15 TABLET | Refills: 0 | Status: SHIPPED | OUTPATIENT
Start: 2020-10-28 | End: 2021-04-19

## 2020-10-28 RX ORDER — HYDROXYZINE PAMOATE 25 MG/1
CAPSULE ORAL
COMMUNITY
Start: 2020-10-02 | End: 2021-04-19

## 2020-10-28 ASSESSMENT — MIFFLIN-ST. JEOR: SCORE: 1608.12

## 2020-10-28 NOTE — PROGRESS NOTES
"CC: Nausea, urinary symptoms    History:  Mihir is here today with symptoms that started 2 weeks ago. Has been getting nausea and vomiting. Vomiting has slightly lessened to where it is more of a drive heave. Comes in sudden waves where she has been uncomfortable to drive, and hard to do usual life activities. Then in the past 2 days has been getting increase urinary frequency and pressure in pelvic. No pain with urination. No vaginal discharge, pain.  No sore throat, fever, sweats. Feels very fatigued, and dizzy. Has not passed out. No abdominal pain. No stool changes.     PMH, MEDICATIONS, ALLERGIES, SOCIAL AND FAMILY HISTORY in [facility] and reviewed by me personally.    ROS negative other than the symptoms noted above in the HPI.    Examination   /70   Pulse 83   Temp 98.5  F (36.9  C) (Oral)   Ht 1.727 m (5' 8\")   Wt 83.5 kg (184 lb)   LMP 10/10/2020   SpO2 98%   BMI 27.98 kg/m       Constitutional: Sitting comfortably, in no acute distress. Vital signs noted  Neck:  no adenopathy, trachea midline and normal to palpation, thyroid normal to palpation  Cardiovascular:  regular rate and rhythm, no murmurs, clicks, or gallops  Respiratory:  normal respiratory rate and rhythm, lungs clear to auscultation  Abdomen: Abdomen soft, mildly tender to palpation throughout abdomen. BS normal. No masses, organomegaly. Rosving's sign positive. Psoas sign positive.   SKIN: No jaundice/pallor/rash.   Psychiatric: mentation appears normal and affect normal/bright      A/P    ICD-10-CM    1. Viral gastroenteritis  A08.4    2. Nausea  R11.0 HCL Urine Pregnancy Test (BFP)     Urine Culture Aerobic Bacterial     VENOUS COLLECTION     Comprehensive Metobolic Panel (BFP)     HEMOGRAM PLATELET DIFF (BFP)     ondansetron (ZOFRAN-ODT) 4 MG ODT tab   3. Dysuria  R30.0 HCL  Urinalysis, Routine (BFP)     Urine Culture Aerobic Bacterial       DISCUSSION:  UPT today negative. CBC, CMP WNL. UA shows bacteria, but otherwise WNL, will " culture to confirm negative. Suspect this is gastroenteritis leading to dehydration, and lack of nutrition, which she has been unable to recover from due to lingering nausea. Recommended trial of Zofran, which she has tolerated in the past.     Did discuss possibility this would be a slow onset appendicitis. Encouraged her to proceed to ER with worsening symptoms especially fever, worsening RLQ pain. I will contact her later this week with UC results. She can contact me sooner with concerns.     follow up visit: As needed    Lillian Zambrano PA-C  [facility]

## 2020-10-28 NOTE — NURSING NOTE
Mihir is here for severe nausea for the past week and for pelvic pain and bladder feels full all of the time.        Pre-visit Screening:  Immunizations:  up to date  Colonoscopy:  NA  Mammogram: NA  Asthma Action Test/Plan:  NA  PHQ9:  Sees psyh  GAD7:  Sees psych  Questioned patient about current smoking habits Pt. has never smoked.  Ok to leave detailed message on voice mail for today's visit only Yes, phone # cell

## 2020-10-30 LAB — URINE VOIDED CULTURE: NORMAL

## 2020-12-20 ENCOUNTER — HEALTH MAINTENANCE LETTER (OUTPATIENT)
Age: 30
End: 2020-12-20

## 2021-01-18 ENCOUNTER — TRANSFERRED RECORDS (OUTPATIENT)
Dept: FAMILY MEDICINE | Facility: CLINIC | Age: 31
End: 2021-01-18

## 2021-02-24 ENCOUNTER — OFFICE VISIT (OUTPATIENT)
Dept: FAMILY MEDICINE | Facility: CLINIC | Age: 31
End: 2021-02-24

## 2021-02-24 VITALS
OXYGEN SATURATION: 98 % | DIASTOLIC BLOOD PRESSURE: 68 MMHG | WEIGHT: 195 LBS | TEMPERATURE: 98.1 F | BODY MASS INDEX: 29.55 KG/M2 | HEART RATE: 80 BPM | HEIGHT: 68 IN | SYSTOLIC BLOOD PRESSURE: 104 MMHG

## 2021-02-24 DIAGNOSIS — N76.0 BACTERIAL VAGINOSIS: Primary | ICD-10-CM

## 2021-02-24 DIAGNOSIS — G43.119 INTRACTABLE MIGRAINE WITH AURA WITHOUT STATUS MIGRAINOSUS: ICD-10-CM

## 2021-02-24 DIAGNOSIS — Z87.892 HX OF ANAPHYLAXIS: ICD-10-CM

## 2021-02-24 DIAGNOSIS — R30.0 DYSURIA: ICD-10-CM

## 2021-02-24 DIAGNOSIS — B96.89 BACTERIAL VAGINOSIS: Primary | ICD-10-CM

## 2021-02-24 LAB
ALBUMIN (URINE): ABNORMAL MG/DL
APPEARANCE UR: ABNORMAL
BACTERIA URINE: ABNORMAL
BACTERIA, UR: ABNORMAL
BILIRUB UR QL: ABNORMAL
CASTS/LPF: ABNORMAL
CLUE CELLS: POSITIVE
COLOR UR: ABNORMAL
EP/HPF: ABNORMAL
GLUCOSE URINE: ABNORMAL MG/DL
HGB UR QL: ABNORMAL
KETONES UR QL: ABNORMAL MG/DL
LEUKOCYTE ESTERASE - QUEST: ABNORMAL
MISC.: ABNORMAL
NITRITE UR QL STRIP: ABNORMAL
PH BLDA: 4 [PH] (ref 5–7)
PH UR STRIP: 5.5 PH (ref 5–7)
PMNS (WET PREP): ABNORMAL
RBC, UR MICRO: ABNORMAL (ref ?–2)
SP. GRAVITY: >=1.03
TRICHOMONAS VAGINALS: ABNORMAL
UROBILINOGEN UR QL STRIP: 0.2 EU/DL (ref 0.2–1)
WBC, UR MICRO: ABNORMAL (ref ?–2)
YEAST CELLS: ABNORMAL

## 2021-02-24 PROCEDURE — 81001 URINALYSIS AUTO W/SCOPE: CPT | Performed by: PHYSICIAN ASSISTANT

## 2021-02-24 PROCEDURE — 87210 SMEAR WET MOUNT SALINE/INK: CPT | Performed by: PHYSICIAN ASSISTANT

## 2021-02-24 PROCEDURE — 99213 OFFICE O/P EST LOW 20 MIN: CPT | Performed by: PHYSICIAN ASSISTANT

## 2021-02-24 RX ORDER — RIZATRIPTAN BENZOATE 5 MG/1
5-10 TABLET ORAL
Qty: 18 TABLET | Refills: 2 | Status: SHIPPED | OUTPATIENT
Start: 2021-02-24 | End: 2021-09-10

## 2021-02-24 RX ORDER — EPINEPHRINE 0.3 MG/.3ML
0.3 INJECTION SUBCUTANEOUS
Qty: 0.6 ML | Refills: 0 | Status: SHIPPED | OUTPATIENT
Start: 2021-02-24 | End: 2021-05-10

## 2021-02-24 RX ORDER — CITALOPRAM HYDROBROMIDE 20 MG/1
20 TABLET ORAL DAILY
COMMUNITY
End: 2021-04-19

## 2021-02-24 RX ORDER — METRONIDAZOLE 500 MG/1
500 TABLET ORAL 2 TIMES DAILY
Qty: 14 TABLET | Refills: 0 | Status: SHIPPED | OUTPATIENT
Start: 2021-02-24 | End: 2021-03-03

## 2021-02-24 ASSESSMENT — MIFFLIN-ST. JEOR: SCORE: 1658.01

## 2021-02-24 NOTE — NURSING NOTE
Mihir is here for a possible UTI-- Frequent urination started 4 days ago, urgency has intesified since yesterday.        Pre-visit Screening:  Immunizations:  up to date  Colonoscopy:  NA  Mammogram: NA  Asthma Action Test/Plan:  NA  PHQ9:  UTD  GAD7:  UTD  Questioned patient about current smoking habits Pt. has never smoked.  Ok to leave detailed message on voice mail for today's visit only Yes, phone # [phone]

## 2021-02-24 NOTE — PROGRESS NOTES
"CC: UTI? Med check    History:  UTI:  4 days ago started noticing urinary frequency. Then more recently started to have urgency, and some vaginal irritation and some yellow discharge. No new exposures, and not sexually active. No fever, sweats, chills, back pain, abdominal pain.     Does have a history of BV. Had virtual visit 4/2020 where she was treated for possible pyelonephritis with back pain. Not sure what abx she took.    Migraines:  Takes rizatripan as needed for migraines. Tends to happen at beginning and end of menstrual cycle. Works well. Takes 1-3 tablets per cycle. No side effects.     Spontaneous anaphylaxis:  Had anaphylactic reaction to unknown substance. Has multiple food allergies. Carries an Epipen.    PMH, MEDICATIONS, ALLERGIES, SOCIAL AND FAMILY HISTORY in [facility] and reviewed by me personally.    ROS negative other than the symptoms noted above in the HPI.    Examination   /68   Pulse 80   Temp 98.1  F (36.7  C) (Oral)   Ht 1.727 m (5' 8\")   Wt 88.5 kg (195 lb)   SpO2 98%   BMI 29.65 kg/m         Constitutional: Sitting comfortably, in no acute distress. Vital signs noted  Neck:  no adenopathy, trachea midline and normal to palpation, thyroid normal to palpation  Cardiovascular:  regular rate and rhythm, no murmurs, clicks, or gallops  Respiratory:  normal respiratory rate and rhythm, lungs clear to auscultation  M/S: No CVA tenderness  : External genitalia without abnormality. Vaginal canal with moderate amount of thin white/yellow discharge. Cervix intact without abnormality.   SKIN: No jaundice/pallor/rash.   Psychiatric: mentation appears normal and affect normal/bright        A/P    ICD-10-CM    1. Bacterial vaginosis  N76.0 metroNIDAZOLE (FLAGYL) 500 MG tablet    B96.89    2. Dysuria  R30.0 HCL  Urinalysis, Routine (BFP)     Urine Culture Aerobic Bacterial     WET PREP (BFP)   3. Hx of anaphylaxis  Z87.892 EPINEPHrine (ANY BX GENERIC EQUIV) 0.3 MG/0.3ML injection 2-pack   4. " Intractable migraine with aura without status migrainosus  G43.119 rizatriptan (MAXALT) 5 MG tablet       DISCUSSION:  BV:  Urine sample today does show bacterial, but also evidence of possible contamination. Wet prep shows clue cells, bacteria, consistent with diagnosis of bacterial vaginosis. Agreed to order urine culture to confirm negative, and start on treatment for BV. Explained to pt that this tends to affect people who are sexually active. Advised frequent hand washing and good general hygeine, consider rinsing vaginal area with warm water after sexual activity, wear breathable/cotton underwear, and go without underwear when possible. Will treat with 7 day course of metronidazole 1 tablet twice daily. Take with food. Warned of side effects.  Advised of absolutely no alcohol consumption while taking this medication. Should refrain from sexual activity for 2 weeks while infection resolves. Contact me in 1 week if not significantly improved, or in 2 weeks if not back to normal.     Migraines:  Working well. Will refill rizatriptan for 1 year.     Spontaneous anaphylaxis:  Refilled Epipen.     follow up visit: 1 year    Lillian Dunlap PA-C  [facility]

## 2021-02-26 ENCOUNTER — MYC MEDICAL ADVICE (OUTPATIENT)
Dept: FAMILY MEDICINE | Facility: CLINIC | Age: 31
End: 2021-02-26

## 2021-02-26 LAB — URINE VOIDED CULTURE: NORMAL

## 2021-02-27 ENCOUNTER — MYC MEDICAL ADVICE (OUTPATIENT)
Dept: FAMILY MEDICINE | Facility: CLINIC | Age: 31
End: 2021-02-27

## 2021-02-27 DIAGNOSIS — B96.89 BACTERIAL VAGINOSIS: Primary | ICD-10-CM

## 2021-02-27 DIAGNOSIS — N76.0 BACTERIAL VAGINOSIS: Primary | ICD-10-CM

## 2021-03-01 RX ORDER — METRONIDAZOLE 7.5 MG/G
GEL TOPICAL 2 TIMES DAILY
Status: CANCELLED | OUTPATIENT
Start: 2021-03-01

## 2021-03-01 RX ORDER — METRONIDAZOLE 7.5 MG/G
1 GEL VAGINAL AT BEDTIME
Qty: 70 G | Refills: 0 | Status: SHIPPED | OUTPATIENT
Start: 2021-03-01 | End: 2021-04-19

## 2021-03-01 NOTE — TELEPHONE ENCOUNTER
I sent the vaginal once daily gel. The medication ordered by clinical support was for twice daily skin application for rosacea. Please review that this is correct.

## 2021-03-01 NOTE — TELEPHONE ENCOUNTER
Routing to Dr. Green for review due to Lillian being out of office. Are you able to send in the gel for the patient instead since she was unable to take the oral antibiotic?

## 2021-03-12 ENCOUNTER — HOSPITAL ENCOUNTER (OUTPATIENT)
Dept: NUCLEAR MEDICINE | Facility: CLINIC | Age: 31
Setting detail: NUCLEAR MEDICINE
Discharge: HOME | End: 2021-03-12
Attending: INTERNAL MEDICINE | Admitting: INTERNAL MEDICINE
Payer: COMMERCIAL

## 2021-03-12 DIAGNOSIS — R10.9 ABDOMINAL CRAMPING: ICD-10-CM

## 2021-03-12 DIAGNOSIS — R11.0 NAUSEA: ICD-10-CM

## 2021-03-12 PROCEDURE — A9541 TC99M SULFUR COLLOID: HCPCS

## 2021-03-12 PROCEDURE — 343N000001 HC RX 343

## 2021-03-12 PROCEDURE — 78264 GASTRIC EMPTYING IMG STUDY: CPT

## 2021-03-12 RX ADMIN — Medication 2 MILLICURIE: at 08:15

## 2021-03-15 ENCOUNTER — TRANSFERRED RECORDS (OUTPATIENT)
Dept: FAMILY MEDICINE | Facility: CLINIC | Age: 31
End: 2021-03-15

## 2021-04-13 ENCOUNTER — MYC REFILL (OUTPATIENT)
Dept: FAMILY MEDICINE | Facility: CLINIC | Age: 31
End: 2021-04-13

## 2021-04-13 RX ORDER — CITALOPRAM HYDROBROMIDE 20 MG/1
20 TABLET ORAL DAILY
Status: CANCELLED | OUTPATIENT
Start: 2021-04-13

## 2021-04-19 ENCOUNTER — OFFICE VISIT (OUTPATIENT)
Dept: FAMILY MEDICINE | Facility: CLINIC | Age: 31
End: 2021-04-19

## 2021-04-19 VITALS
TEMPERATURE: 98 F | HEIGHT: 68 IN | WEIGHT: 178.5 LBS | SYSTOLIC BLOOD PRESSURE: 100 MMHG | OXYGEN SATURATION: 99 % | DIASTOLIC BLOOD PRESSURE: 68 MMHG | BODY MASS INDEX: 27.05 KG/M2 | HEART RATE: 69 BPM

## 2021-04-19 DIAGNOSIS — F41.1 GAD (GENERALIZED ANXIETY DISORDER): Primary | ICD-10-CM

## 2021-04-19 PROBLEM — R07.9 CHEST PAIN: Status: RESOLVED | Noted: 2019-03-14 | Resolved: 2021-04-19

## 2021-04-19 PROCEDURE — 99213 OFFICE O/P EST LOW 20 MIN: CPT | Performed by: FAMILY MEDICINE

## 2021-04-19 RX ORDER — CITALOPRAM HYDROBROMIDE 20 MG/1
20 TABLET ORAL DAILY
Qty: 90 TABLET | Refills: 1 | Status: SHIPPED | OUTPATIENT
Start: 2021-04-19 | End: 2021-10-15

## 2021-04-19 ASSESSMENT — ANXIETY QUESTIONNAIRES
2. NOT BEING ABLE TO STOP OR CONTROL WORRYING: MORE THAN HALF THE DAYS
1. FEELING NERVOUS, ANXIOUS, OR ON EDGE: NEARLY EVERY DAY
3. WORRYING TOO MUCH ABOUT DIFFERENT THINGS: MORE THAN HALF THE DAYS
GAD7 TOTAL SCORE: 14
6. BECOMING EASILY ANNOYED OR IRRITABLE: MORE THAN HALF THE DAYS
5. BEING SO RESTLESS THAT IT IS HARD TO SIT STILL: SEVERAL DAYS
IF YOU CHECKED OFF ANY PROBLEMS ON THIS QUESTIONNAIRE, HOW DIFFICULT HAVE THESE PROBLEMS MADE IT FOR YOU TO DO YOUR WORK, TAKE CARE OF THINGS AT HOME, OR GET ALONG WITH OTHER PEOPLE: SOMEWHAT DIFFICULT
7. FEELING AFRAID AS IF SOMETHING AWFUL MIGHT HAPPEN: SEVERAL DAYS

## 2021-04-19 ASSESSMENT — MIFFLIN-ST. JEOR: SCORE: 1583.17

## 2021-04-19 ASSESSMENT — PATIENT HEALTH QUESTIONNAIRE - PHQ9
5. POOR APPETITE OR OVEREATING: NEARLY EVERY DAY
SUM OF ALL RESPONSES TO PHQ QUESTIONS 1-9: 5

## 2021-04-19 NOTE — PROGRESS NOTES
"Assessment & Plan   Problem List Items Addressed This Visit        Behavioral    CHIDI (generalized anxiety disorder) - Primary    Relevant Medications    citalopram (CELEXA) 20 MG tablet         1. CHIDI (generalized anxiety disorder)  She is doing well on the citalopram, refilled.  - citalopram (CELEXA) 20 MG tablet; Take 1 tablet (20 mg) by mouth daily  Dispense: 90 tablet; Refill: 1           BMI:   Estimated body mass index is 27.14 kg/m  as calculated from the following:    Height as of this encounter: 1.727 m (5' 8\").    Weight as of this encounter: 81 kg (178 lb 8 oz).         FUTURE APPOINTMENTS:       - Follow-up visit in 6 months    No follow-ups on file.    Onelia Krishnan MD  [facility]    Subjective     Nursing Notes:   Cris Viera, [facility]  4/19/2021 10:44 AM  Signed  Mihir is here for non fasting med check    Pre-visit Screening:  Immunizations:  up to date  Colonoscopy:  NA  Mammogram: NA  Asthma Action Test/Plan:  NA  PHQ9:  Done today  GAD7:  Done today  Questioned patient about current smoking habits Pt. has never smoked.  Ok to leave detailed message on voice mail for today's visit only Yes, phone # [phone]           Mihir Waldron is a 30 year old female who presents to clinic today for the following health issues   HPI     Here for followup on her anxiety. Has been on citalopram for a few years, has tried other things and this has worked well. Daughter with disability.  Would like a refill.   Is also in therapy. Ran out of the medication    Review of Systems   Constitutional, HEENT, cardiovascular, pulmonary, gi and gu systems are negative, except as otherwise noted.  Except fatigue,anxiety, weight loss, headaches      Objective    /68 (BP Location: Left arm, Patient Position: Sitting, Cuff Size: Adult Large)   Pulse 69   Temp 98  F (36.7  C) (Oral)   Ht 1.727 m (5' 8\")   Wt 81 kg (178 lb 8 oz)   LMP 04/10/2021   SpO2 99%   BMI 27.14 kg/m    Body mass index " is 27.14 kg/m .  Physical Exam   GENERAL: healthy, alert and no distress  MS: no gross musculoskeletal defects noted, no edema  PSYCH: mentation appears normal, affect normal/bright

## 2021-04-19 NOTE — PATIENT INSTRUCTIONS
"Assessment & Plan   Problem List Items Addressed This Visit        Behavioral    CHIDI (generalized anxiety disorder) - Primary    Relevant Medications    citalopram (CELEXA) 20 MG tablet         1. CHIDI (generalized anxiety disorder)  She is doing well on the citalopram, refilled.  - citalopram (CELEXA) 20 MG tablet; Take 1 tablet (20 mg) by mouth daily  Dispense: 90 tablet; Refill: 1           BMI:   Estimated body mass index is 27.14 kg/m  as calculated from the following:    Height as of this encounter: 1.727 m (5' 8\").    Weight as of this encounter: 81 kg (178 lb 8 oz).         FUTURE APPOINTMENTS:       - Follow-up visit in 6 months    No follow-ups on file.    Onelia Krishnan MD  [facility]  "

## 2021-04-19 NOTE — NURSING NOTE
Miihr is here for non fasting med check    Pre-visit Screening:  Immunizations:  up to date  Colonoscopy:  NA  Mammogram: NA  Asthma Action Test/Plan:  NA  PHQ9:  Done today  GAD7:  Done today  Questioned patient about current smoking habits Pt. has never smoked.  Ok to leave detailed message on voice mail for today's visit only Yes, phone # [phone]

## 2021-04-20 ASSESSMENT — ANXIETY QUESTIONNAIRES: GAD7 TOTAL SCORE: 14

## 2021-05-05 ENCOUNTER — HOSPITAL ENCOUNTER (OUTPATIENT)
Dept: ULTRASOUND IMAGING | Facility: CLINIC | Age: 31
End: 2021-05-05
Attending: MIDWIFE
Payer: COMMERCIAL

## 2021-05-05 DIAGNOSIS — N64.4 MASTODYNIA: ICD-10-CM

## 2021-05-05 PROCEDURE — 76642 ULTRASOUND BREAST LIMITED: CPT | Mod: RT

## 2021-05-10 ENCOUNTER — HOSPITAL ENCOUNTER (EMERGENCY)
Facility: CLINIC | Age: 31
Discharge: HOME | End: 2021-05-10
Attending: EMERGENCY MEDICINE | Admitting: EMERGENCY MEDICINE
Payer: COMMERCIAL

## 2021-05-10 VITALS
BODY MASS INDEX: 26.52 KG/M2 | DIASTOLIC BLOOD PRESSURE: 60 MMHG | WEIGHT: 175 LBS | HEART RATE: 108 BPM | RESPIRATION RATE: 15 BRPM | OXYGEN SATURATION: 97 % | TEMPERATURE: 99.5 F | SYSTOLIC BLOOD PRESSURE: 110 MMHG | HEIGHT: 68 IN

## 2021-05-10 DIAGNOSIS — T78.2XXA ANAPHYLAXIS, INITIAL ENCOUNTER: ICD-10-CM

## 2021-05-10 LAB — INTERPRETATION ECG - MUSE: NORMAL

## 2021-05-10 PROCEDURE — 250N000009 HC RX 250: Performed by: EMERGENCY MEDICINE

## 2021-05-10 PROCEDURE — 96374 THER/PROPH/DIAG INJ IV PUSH: CPT

## 2021-05-10 PROCEDURE — 99284 EMERGENCY DEPT VISIT MOD MDM: CPT | Mod: 25

## 2021-05-10 PROCEDURE — 93005 ELECTROCARDIOGRAM TRACING: CPT

## 2021-05-10 RX ORDER — PREDNISONE 20 MG/1
TABLET ORAL
Qty: 10 TABLET | Refills: 0 | Status: SHIPPED | OUTPATIENT
Start: 2021-05-11 | End: 2021-10-15

## 2021-05-10 RX ORDER — EPINEPHRINE 0.3 MG/.3ML
0.3 INJECTION SUBCUTANEOUS
Qty: 2 EACH | Refills: 0 | Status: SHIPPED | OUTPATIENT
Start: 2021-05-10 | End: 2024-05-24

## 2021-05-10 RX ADMIN — FAMOTIDINE 20 MG: 10 INJECTION, SOLUTION INTRAVENOUS at 16:36

## 2021-05-10 ASSESSMENT — ENCOUNTER SYMPTOMS
SHORTNESS OF BREATH: 0
TROUBLE SWALLOWING: 0
COUGH: 0
NAUSEA: 1
CHILLS: 0
SORE THROAT: 0
VOMITING: 0
FEVER: 0

## 2021-05-10 ASSESSMENT — MIFFLIN-ST. JEOR: SCORE: 1567.29

## 2021-05-10 NOTE — ED TRIAGE NOTES
Pt had a dragon fruit beverage at [facility] around 3pm today - pt has severe reaction to stone fruit. She had gotten hives, flushed, and then throat felt like it was closing - pt used her own epi pen - EMS was called - hives started to come back pt was given 25mg benadryl, 0.5 epi x 2 125 solumedrol, initially low bp in the 70's but then she was in the 130's- bg 100 . NSR

## 2021-05-10 NOTE — ED NOTES
Bed: ST02  Expected date: 5/10/21  Expected time: 4:14 PM  Means of arrival: Ambulance  Comments:  593 30f anaphlaxis, epi, benadryl ETA 7236

## 2021-05-10 NOTE — ED PROVIDER NOTES
History   Chief Complaint:  Anaphylaxis     HPI   Mihir Waldron is a 30 year old female with history of anaphylaxis to various items who presents with anaphylaxis. She states she is allergic to stone fruits, apples, amongst other foods. She was non aware she was allergic to dragon fruit until today. She states she drank a new dragon fruit drink at [facility] today with her children at 1500. She began to feel short of breath at 1515 as well as had a few hives across her anterior chest and felt her throat was closing, she then took her own Epi IM dose and a 25mg tablet of Benadryl as she was aware she was having an allergic reaction. She called EMS and en route they administered two doses of 0.5 mg IM Epi and an additional 50 mg of Benadryl as well as Solu-Medrol 125 mg IV.  Symptoms seem to resolve quickly after the administration of these medications.  Here, she feels tachycardic but improved. She denies difficulty breathing or shortness of breath. She reports nausea but no vomiting. Denies sore throat, fevers, chills, cough, or COVID exposures. Denies recent travel.     Review of Systems   Constitutional: Negative for chills and fever.   HENT: Negative for sore throat and trouble swallowing.    Respiratory: Negative for cough and shortness of breath.    Cardiovascular:        Tachycardic   Gastrointestinal: Positive for nausea. Negative for vomiting.   Allergic/Immunologic: Positive for food allergies.   All other systems reviewed and are negative.      Allergies:  Benadryl [Diphenhydramine]  Codeine Sulfate  Contrast Dye  Dairy Digestive  Latex  Plum Pulp    Medications:  Celexa  Epinephrine  Maxalt     Past Medical History:    CHIDI   Depressive Disorder  Postpartum Depression   POTS  IBS  Anaphylaxis     Past Surgical History:    Dental Surgery    Social History:  Arrives via EMS from home.   Mother of two.     Physical Exam     Patient Vitals for the past 24 hrs:   BP Temp Temp src Pulse Resp SpO2 Height  "Weight   05/10/21 1830 117/55 -- -- 115 12 98 % -- --   05/10/21 1730 -- -- -- 106 11 -- -- --   05/10/21 1700 109/53 -- -- 108 17 97 % -- --   05/10/21 1646 -- 99.5  F (37.5  C) Temporal -- 16 -- -- --   05/10/21 1633 (!) 140/91 -- -- 142 30 99 % 1.727 m (5' 8\") 79.4 kg (175 lb)       Physical Exam  Physical Exam   Constitutional:  Patient is oriented to person, place, and time. They appear well-developed and well-nourished. Mild distress secondary to anaphylaxis.   HENT:   Mouth/Throat:   Oropharynx is clear and moist. No intraoral swelling no swelling of the floor the mouth  Eyes:    Conjunctivae normal and EOM are normal. Pupils are equal, round, and reactive to light.   Neck:    Normal range of motion.   Cardiovascular: Tachycardic, regular rhythm and normal heart sounds.  Exam reveals no gallop and no friction rub.  No murmur heard.  Pulmonary/Chest:  Effort normal and breath sounds normal. Patient has no wheezes. Patient has no rales.   Abdominal:   Soft. Bowel sounds are normal. Patient exhibits no mass. There is no tenderness. There is no rebound and no guarding.   Musculoskeletal:  Normal range of motion. Patient exhibits no edema.   Neurological:   Patient is alert and oriented to person, place, and time. Patient has normal strength. No cranial nerve deficit or sensory deficit. GCS 15  Skin:   Skin is warm and dry. No rash noted. No erythema or hives.   Psychiatric:   Patient has a normal mood and affect. Patient's behavior is normal. Judgment and thought content normal.     Emergency Department Course   ECG  ECG taken at 1641, ECG read at 1652  Sinus tachycardia   Prolonged QT  Abnormal ECG    Rate 115 bpm. IL interval * ms. QRS duration 96 ms. QT/QTc 466/644 ms. P-R-T axes * 65 61.     Emergency Department Course:    Reviewed:  I reviewed nursing notes and vitals    Assessments:  1630 I obtained history and examined the patient as noted above.   1734 I rechecked the patient. She continues to do well. "   1840 I rechecked the patient. She will be discharged home as she is completely asymptomatic.     Interventions:  1636 Pepcid 20 mg IV     Disposition:  The patient was discharged to home.     Impression & Plan     Medical Decision Making:  Mihir Waldron is a 30 year old female who presents for evaluation of anaphylaxis.  Signs and symptoms are consistent with anaphylaxis. She was treated here with medications as noted above. She looks well at discharge and symptoms have stabilized and improved.  We did watch here for 2.5 hours prior to considering discharge.  Frequent recheck showed that she continued to be resolve her symptoms no rebound.   Will send home with epipen, steroids, antihistamines.  Return of anaphylactic symptoms were discussed with patient and they were instructed to inject epi-pen and call 911 should these symptoms occur.  Given the rapidity of resolution, lack of serious systemic symptoms, lack of respiratory difficulty and no oral or pharyngeal swelling, would not admit at this time for anaphylaxis. There is no signs of anaphylactic shock.      Diagnosis:    ICD-10-CM    1. Anaphylaxis, initial encounter  T78.2XXA        Discharge Medications:  New Prescriptions    EPINEPHRINE (ANY BX GENERIC EQUIV) 0.3 MG/0.3ML INJECTION 2-PACK    Inject 0.3 mLs (0.3 mg) into the muscle once as needed for anaphylaxis    PREDNISONE (DELTASONE) 20 MG TABLET    Take two tablets (= 40mg) each day for 5 (five) days       Scribe Disclosure:  I, Eulalio Cosme, am serving as a scribe at 4:34 PM on 5/10/2021 to document services personally performed by Carlene Dior MD based on my observations and the provider's statements to me.            Carlene Dior MD  05/10/21 9751

## 2021-05-10 NOTE — DISCHARGE INSTRUCTIONS
Take 1 to 2 tablets of Benadryl every 6-8 hours as needed for symptoms.  Take Pepcid 10 mg once a day for 5 days.  Start your prednisone tomorrow.  Return to the emergency department immediately if you have any recurrent symptoms.  If you have any intraoral swelling difficulty breathing administer an EpiPen immediately and call 911

## 2021-05-12 ENCOUNTER — OFFICE VISIT (OUTPATIENT)
Dept: FAMILY MEDICINE | Facility: CLINIC | Age: 31
End: 2021-05-12

## 2021-05-12 VITALS
BODY MASS INDEX: 27.13 KG/M2 | HEART RATE: 72 BPM | OXYGEN SATURATION: 98 % | TEMPERATURE: 98.2 F | WEIGHT: 179 LBS | HEIGHT: 68 IN | DIASTOLIC BLOOD PRESSURE: 68 MMHG | SYSTOLIC BLOOD PRESSURE: 104 MMHG

## 2021-05-12 DIAGNOSIS — T78.2XXD ANAPHYLAXIS, SUBSEQUENT ENCOUNTER: Primary | ICD-10-CM

## 2021-05-12 DIAGNOSIS — G43.119 INTRACTABLE MIGRAINE WITH AURA WITHOUT STATUS MIGRAINOSUS: ICD-10-CM

## 2021-05-12 PROCEDURE — 99213 OFFICE O/P EST LOW 20 MIN: CPT | Performed by: PHYSICIAN ASSISTANT

## 2021-05-12 RX ORDER — OMEPRAZOLE 10 MG/1
20 CAPSULE, DELAYED RELEASE ORAL DAILY
COMMUNITY
End: 2021-10-15

## 2021-05-12 RX ORDER — RIZATRIPTAN BENZOATE 5 MG/1
5-10 TABLET ORAL
Qty: 18 TABLET | Refills: 2 | Status: CANCELLED | OUTPATIENT
Start: 2021-05-12

## 2021-05-12 ASSESSMENT — MIFFLIN-ST. JEOR: SCORE: 1585.44

## 2021-05-12 NOTE — PROGRESS NOTES
"CC: Anaphylaxis, migraines    History:  Anaphylaxis:  Mihir had anaphylactic reaction to a dragonfruit drink from [employer]. She was home when she started to feel throat tightness. Had to use Epipen, and then EMT came to transport and had to administer 2 more doses of epinephrine in ambulance. Was seen and evaluated in ER, and stabilized. By the time she had gotten to the ER was already drastically better. Reviewed note from ER. Discharged with instructions to continue prednisone 5 days, Prilosec for 5 days, and benadryl as needed for hives.     Today woke up with some hives around mouth, but took Benadryl and these resolved    Mihir does have an allergist through [facility] where she saw them 11/2019, but only saw once. Did have allergies confirmed at that time, and was told allergy shots would take 3 years, and she found out it wasn't covered by insurance. Is getting new insurance, so may have better coverage. Had not ever had to use Epipen until this episode as she has always been to make it to ER, and concerned symptoms are worsening/more severe.     Migraines:  Has been needed more especially with seasonal allergies and tends to correlate with period, stressful days with child with autism. Tries to stay hydrated, Dr. Pepper, and rizatriptan and this works well. Daily antihistamine, Flonase, showers at end of day. Does not use Netipot.    PMH, MEDICATIONS, ALLERGIES, SOCIAL AND FAMILY HISTORY in [facility] and reviewed by me personally.    ROS negative other than the symptoms noted above in the HPI.    Examination   /68 (BP Location: Left arm, Patient Position: Sitting, Cuff Size: Adult Large)   Pulse 72   Temp 98.2  F (36.8  C) (Temporal)   Ht 1.727 m (5' 8\")   Wt 81.2 kg (179 lb)   SpO2 98%   BMI 27.22 kg/m       Constitutional: Sitting comfortably, in no acute distress. Vital signs noted  Mouth and throat: without erythema or lesions of the mucosa  Neck:  no adenopathy, trachea midline and " normal to palpation, thyroid normal to palpation  Cardiovascular:  regular rate and rhythm, no murmurs, clicks, or gallops  Respiratory:  normal respiratory rate and rhythm, lungs clear to auscultation  SKIN: No jaundice/pallor/rash.   Psychiatric: mentation appears normal and affect normal/bright      A/P    ICD-10-CM    1. Anaphylaxis, subsequent encounter  T78.2XXD    2. Intractable migraine with aura without status migrainosus  G43.119        DISCUSSION:  Anaphylaxis:  Recovering well. No residual symptoms at this time after benadryl this morning for hives. Recommended close monitoring, and careful avoidance of possible triggers. Offered referral back to allergist, but she would like to wait at this time. Advised saline rinses at night. Gave sample from clinic.    Migraines:  Rizatriptan refills are still available at pharmacy. She will call to refill. Hopefully these will improve as allergies improve.     follow up visit: As needed    Lillian Dunlap PA-C  [facility]

## 2021-05-12 NOTE — NURSING NOTE
Mihir is here for an ER f/u-- Pt had anaphylaxis after drinking a dragonfruit drink from [employer] 2 days ago. Went to [facility] ER.        Pre-visit Screening:  Immunizations:  up to date  Colonoscopy:  NA  Mammogram: NA  Asthma Action Test/Plan:  NA  PHQ9:  UTD  GAD7:  UTD  Questioned patient about current smoking habits Pt. has never smoked.  Ok to leave detailed message on voice mail for today's visit only Yes, phone # [phone]

## 2021-05-27 ASSESSMENT — PATIENT HEALTH QUESTIONNAIRE - PHQ9: SUM OF ALL RESPONSES TO PHQ QUESTIONS 1-9: 15

## 2021-05-28 ASSESSMENT — ANXIETY QUESTIONNAIRES: GAD7 TOTAL SCORE: 18
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
## 2021-06-12 NOTE — PROGRESS NOTES
________________________________________  Medications Phoned  to Pharmacy [] yes [x]no  Name of Pharmacist:  List Medications, including dose, quantity and instructions    Medications ordered this visit were e-scribed.  Verified by order class [x] yes  [] no   hydroxyzine pamoate 25 mg; Prozac 10 mg   Medication changes or discontinuations were communicated to patient's pharmacy: [] yes  [x] no    Dictation completed at time of chart check: [] yes  [x] no    I have checked the documentation for today s encounters and the above information has been reviewed and completed.

## 2021-06-12 NOTE — TELEPHONE ENCOUNTER
Pharmacy contacted clinic and is requesting the script to be changed to Prozac 20 mg capsules taking one daily as insurance will only allow 1 capsule daily.      Spoke to Wilian the pharmacist who took the verbal order to change the capsule to 20 mg taking one daily.  Patient also notified.

## 2021-06-12 NOTE — TELEPHONE ENCOUNTER
Pt called RE:   FLUoxetine (PROZAC) 10 MG capsule    Pharmacy:   [id number] [id number] AT [facility] [address]    Per client pharm needs clarification of order. Ins denied due to wording issue.

## 2021-06-12 NOTE — PATIENT INSTRUCTIONS - HE
Continue medications as prescribed  Depression : Take Fluoxetine 10 mg daily for 3-4 days then increase dose to 20 mg daily if tolerated   Anxiety  Take hydroxyzine 25 mg upto  three times daily as needed  for anxiety   Have your pharmacy contact us for a refill if you are running low on medications (We may ask you to come into clinic to get a refill from the nurse  No Alcohol or drug use  No driving if sedated  Call the clinic with any questions or concerns   Reach out for help if you feel like hurting yourself or others ([facility] [phone]: [address] [facility], [address] or [facility] Suicide Hotline 461-582-0001 , call 051 or go to nearest Emergency room    Follow up as directed, for your appointments, per your After Visit Summary Form.

## 2021-06-29 NOTE — PROGRESS NOTES
Progress Notes by Karla Thompson CMA at 10/2/2020  2:00 PM     Author: Karla Thompson CMA Service: -- Author Type: Certified Medical Assistant    Filed: 10/6/2020 10:20 AM Encounter Date: 10/2/2020 Status: Signed    : Karla Thompson CMA (Certified Medical Assistant)       This video/telephone visit will be conducted via a call between you and your physician/provider. We have found that certain health care needs can be provided without the need for an in-person physical exam. This service lets us provide the care you need with a video /telephone conversation. If a prescription is necessary we can send it directly to your pharmacy. If lab work is needed we can place an order for that and you can then stop by our lab to have the test done at a later time.   Just as we bill insurance for in-person visits, we also bill insurance for video/telephone visits. If you have questions about your insurance coverage, we recommend that you speak with your insurance company.   Patient has given verbal consent for video/Telephone visit? Yes   Patient would like the video visit invitation sent by: [employer] drops call [phone]  Karla LAIRD CMA   AVS-Mail preferred   Referral by Dr. Green   Patient verified allergies, medications and pharmacy via phone. PHQ: 15 ;CHIDI: 18 and Mood-Current: 01 done verbally with writer. Patient states she is ready for visit.  [place]  was reviewed prior to seeing patient today. See below for embedded report.

## 2021-09-09 ENCOUNTER — MYC MEDICAL ADVICE (OUTPATIENT)
Dept: FAMILY MEDICINE | Facility: CLINIC | Age: 31
End: 2021-09-09

## 2021-09-09 DIAGNOSIS — G43.119 INTRACTABLE MIGRAINE WITH AURA WITHOUT STATUS MIGRAINOSUS: ICD-10-CM

## 2021-09-10 ENCOUNTER — MYC MEDICAL ADVICE (OUTPATIENT)
Dept: FAMILY MEDICINE | Facility: CLINIC | Age: 31
End: 2021-09-10

## 2021-09-10 RX ORDER — RIZATRIPTAN BENZOATE 5 MG/1
5-10 TABLET ORAL
Qty: 18 TABLET | Refills: 1 | Status: SHIPPED | OUTPATIENT
Start: 2021-09-10 | End: 2022-05-02

## 2021-09-10 NOTE — TELEPHONE ENCOUNTER
Mihir Waldron is requesting a refill of:    Pending Prescriptions:                       Disp   Refills    rizatriptan (MAXALT) 5 MG tablet          18 tab*1            Sig: Take 1-2 tablets (5-10 mg) by mouth at onset of           headache for migraine May repeat in 2 hours. Max           6 tablets/24 hours.    Please close encounter if RX was sent. Thanks, Chiquita

## 2021-10-03 ENCOUNTER — HEALTH MAINTENANCE LETTER (OUTPATIENT)
Age: 31
End: 2021-10-03

## 2021-10-15 ENCOUNTER — OFFICE VISIT (OUTPATIENT)
Dept: FAMILY MEDICINE | Facility: CLINIC | Age: 31
End: 2021-10-15

## 2021-10-15 VITALS
DIASTOLIC BLOOD PRESSURE: 62 MMHG | SYSTOLIC BLOOD PRESSURE: 100 MMHG | OXYGEN SATURATION: 98 % | HEART RATE: 72 BPM | BODY MASS INDEX: 28.49 KG/M2 | HEIGHT: 68 IN | WEIGHT: 188 LBS | TEMPERATURE: 97.7 F

## 2021-10-15 DIAGNOSIS — F41.1 GAD (GENERALIZED ANXIETY DISORDER): ICD-10-CM

## 2021-10-15 DIAGNOSIS — F33.1 MODERATE EPISODE OF RECURRENT MAJOR DEPRESSIVE DISORDER (H): Primary | ICD-10-CM

## 2021-10-15 PROCEDURE — 99213 OFFICE O/P EST LOW 20 MIN: CPT | Performed by: PHYSICIAN ASSISTANT

## 2021-10-15 RX ORDER — CITALOPRAM HYDROBROMIDE 20 MG/1
20 TABLET ORAL DAILY
Qty: 90 TABLET | Refills: 3 | Status: SHIPPED | OUTPATIENT
Start: 2021-10-15 | End: 2022-05-02

## 2021-10-15 ASSESSMENT — ANXIETY QUESTIONNAIRES
7. FEELING AFRAID AS IF SOMETHING AWFUL MIGHT HAPPEN: NOT AT ALL
3. WORRYING TOO MUCH ABOUT DIFFERENT THINGS: MORE THAN HALF THE DAYS
2. NOT BEING ABLE TO STOP OR CONTROL WORRYING: MORE THAN HALF THE DAYS
GAD7 TOTAL SCORE: 11
5. BEING SO RESTLESS THAT IT IS HARD TO SIT STILL: SEVERAL DAYS
1. FEELING NERVOUS, ANXIOUS, OR ON EDGE: NEARLY EVERY DAY
6. BECOMING EASILY ANNOYED OR IRRITABLE: SEVERAL DAYS
IF YOU CHECKED OFF ANY PROBLEMS ON THIS QUESTIONNAIRE, HOW DIFFICULT HAVE THESE PROBLEMS MADE IT FOR YOU TO DO YOUR WORK, TAKE CARE OF THINGS AT HOME, OR GET ALONG WITH OTHER PEOPLE: SOMEWHAT DIFFICULT

## 2021-10-15 ASSESSMENT — PATIENT HEALTH QUESTIONNAIRE - PHQ9
5. POOR APPETITE OR OVEREATING: MORE THAN HALF THE DAYS
SUM OF ALL RESPONSES TO PHQ QUESTIONS 1-9: 6

## 2021-10-15 ASSESSMENT — MIFFLIN-ST. JEOR: SCORE: 1621.26

## 2021-10-15 NOTE — LETTER
My Depression Action Plan  Name: Mihir Waldron   Date of Birth 1990  Date: 10/15/2021    My doctor: Fuentes Green   My clinic: [facility]  [address] [place] [address]  [address]  [facility] [id number] [phone]          GREEN    ZONE   Good Control    What it looks like:     Things are going generally well. You have normal ups and downs. You may even feel depressed from time to time, but bad moods usually last less than a day.   What you need to do:  1. Continue to care for yourself (see self care plan)  2. Check your depression survival kit and update it as needed  3. Follow your physician s recommendations including any medication.  4. Do not stop taking medication unless you consult with your physician first.           YELLOW         ZONE Getting Worse    What it looks like:     Depression is starting to interfere with your life.     It may be hard to get out of bed; you may be starting to isolate yourself from others.    Symptoms of depression are starting to last most all day and this has happened for several days.     You may have suicidal thoughts but they are not constant.   What you need to do:     1. Call your care team. Your response to treatment will improve if you keep your care team informed of your progress. Yellow periods are signs an adjustment may need to be made.     2. Continue your self-care.  Just get dressed and ready for the day.  Don't give yourself time to talk yourself out of it.    3. Talk to someone in your support network.    4. Open up your Depression Self-Care Plan/Wellness Kit.           RED    ZONE Medical Alert - Get Help    What it looks like:     Depression is seriously interfering with your life.     You may experience these or other symptoms: You can t get out of bed most days, can t work or engage in other necessary activities, you have trouble taking care of basic hygiene, or basic responsibilities, thoughts of suicide or  death that will not go away, self-injurious behavior.     What you need to do:  1. Call your care team and request a same-day appointment. If they are not available (weekends or after hours) call your local crisis line, emergency room or 911.          Depression Self-Care Plan / Wellness Kit    Many people find that medication and therapy are helpful treatments for managing depression. In addition, making small changes to your everyday life can help to boost your mood and improve your wellbeing. Below are some tips for you to consider. Be sure to talk with your medical provider and/or behavioral health consultant if your symptoms are worsening or not improving.     Sleep   Sleep hygiene  means all of the habits that support good, restful sleep. It includes maintaining a consistent bedtime and wake time, using your bedroom only for sleeping or sex, and keeping the bedroom dark and free of distractions like a computer, smartphone, or television.     Develop a Healthy Routine  Maintain good hygiene. Get out of bed in the morning, make your bed, brush your teeth, take a shower, and get dressed. Don t spend too much time viewing media that makes you feel stressed. Find time to relax each day.    Exercise  Get some form of exercise every day. This will help reduce pain and release endorphins, the  feel good  chemicals in your brain. It can be as simple as just going for a walk or doing some gardening, anything that will get you moving.      Diet  Strive to eat healthy foods, including fruits and vegetables. Drink plenty of water. Avoid excessive sugar, caffeine, alcohol, and other mood-altering substances.     Stay Connected with Others  Stay in touch with friends and family members.    Manage Your Mood  Try deep breathing, massage therapy, biofeedback, or meditation. Take part in fun activities when you can. Try to find something to smile about each day.     Psychotherapy  Be open to working with a therapist if your  provider recommends it.     Medication  Be sure to take your medication as prescribed. Most anti-depressants need to be taken every day. It usually takes several weeks for medications to work. Not all medicines work for all people. It is important to follow-up with your provider to make sure you have a treatment plan that is working for you. Do not stop your medication abruptly without first discussing it with your provider.    Crisis Resources   These hotlines are for both adults and children. They and are open 24 hours a day, 7 days a week unless noted otherwise.      National Suicide Prevention Lifeline   0-708-049-ETNA (1995)      Crisis Text Line    www.crisistextline.org  Text HOME to 209136 from anywhere in the [place], anytime, about any type of crisis. A live, trained crisis counselor will receive the text and respond quickly.      [facility] Lifeline for LGBTQ Youth  A national crisis intervention and suicide lifeline for LGBTQ youth under 25. Provides a safe place to talk without judgement. Call 1-161.583.6960; text START to 486634 or visit www.thetrevorproject.org to talk to a trained counselor.      For [facility] crisis numbers, visit the [facility] website at:  https://[place].gov/dhs/people-we-serve/adults/health-care/mental-health/resources/crisis-contacts.jsp

## 2021-10-15 NOTE — PROGRESS NOTES
"CC: Mediation Check    History:  CHIDI:  Mihir takes citalopram 20 mg daily. Has been taking this daily. Doing relatively well, but does still have some ongoing anxiety and depression symptoms. However, symptoms are manageable, and takes the edge off. Feels like it preserves her personality, where previous medications have made her foggy.  Denies any side effects. Has also working support group through [employer], support person.    PMH, MEDICATIONS, ALLERGIES, SOCIAL AND FAMILY HISTORY in [facility] and reviewed by me personally.    ROS negative other than the symptoms noted above in the HPI.      Examination   /62 (BP Location: Left arm, Patient Position: Sitting, Cuff Size: Adult Large)   Pulse 72   Temp 97.7  F (36.5  C) (Temporal)   Ht 1.727 m (5' 8\")   Wt 85.3 kg (188 lb)   LMP 10/01/2021   SpO2 98%   BMI 28.59 kg/m         Constitutional: Sitting comfortably, in no acute distress. Vital signs noted  Neck:  no adenopathy, trachea midline and normal to palpation, thyroid normal to palpation  Cardiovascular:  regular rate and rhythm, no murmurs, clicks, or gallops  Respiratory:  normal respiratory rate and rhythm, lungs clear to auscultation  SKIN: No jaundice/pallor/rash.   Psychiatric: mentation appears normal and affect normal/bright        A/P    ICD-10-CM    1. Moderate episode of recurrent major depressive disorder (H)  F33.1 DEPRESSION ACTION PLAN (DAP)   2. CHIDI (generalized anxiety disorder)  F41.1 citalopram (CELEXA) 20 MG tablet     DEPRESSION ACTION PLAN (DAP)       DISCUSSION:  CHIDI/Depression:  Mihir is doing well today. Symptoms are controlled. PHQ/CHIDI show relatively food scores. No SI/HI. Agreed to refill medication without change for 1 year, but asked her to contact me sooner if concerns.     follow up visit: 1 year    Lillian Dunlap PA-C  [facility]    "

## 2021-10-16 ASSESSMENT — ANXIETY QUESTIONNAIRES: GAD7 TOTAL SCORE: 11

## 2021-11-08 ENCOUNTER — TELEPHONE (OUTPATIENT)
Dept: FAMILY MEDICINE | Facility: CLINIC | Age: 31
End: 2021-11-08

## 2021-11-08 NOTE — TELEPHONE ENCOUNTER
Mihir Waldron called the clinic support line with the following:    States that her daughter has been in the hospital for the past couple weeks. She has been having some anxiety attacks and is wondering if she could get a small prescription for xanax. It is very hard for her to come in.    Called her back and offered her virtual appointment. She will call back in a couple days

## 2021-11-09 ENCOUNTER — OFFICE VISIT (OUTPATIENT)
Dept: FAMILY MEDICINE | Facility: CLINIC | Age: 31
End: 2021-11-09

## 2021-11-09 VITALS
HEIGHT: 68 IN | SYSTOLIC BLOOD PRESSURE: 108 MMHG | TEMPERATURE: 97.2 F | WEIGHT: 183 LBS | BODY MASS INDEX: 27.74 KG/M2 | DIASTOLIC BLOOD PRESSURE: 64 MMHG | OXYGEN SATURATION: 98 % | HEART RATE: 73 BPM

## 2021-11-09 DIAGNOSIS — F41.0 PANIC ATTACK: Primary | ICD-10-CM

## 2021-11-09 PROCEDURE — 99212 OFFICE O/P EST SF 10 MIN: CPT | Performed by: PHYSICIAN ASSISTANT

## 2021-11-09 RX ORDER — ALPRAZOLAM 0.5 MG
0.5 TABLET ORAL EVERY 8 HOURS PRN
Qty: 12 TABLET | Refills: 0 | Status: SHIPPED | OUTPATIENT
Start: 2021-11-09

## 2021-11-09 ASSESSMENT — MIFFLIN-ST. JEOR: SCORE: 1598.58

## 2021-11-09 NOTE — NURSING NOTE
Chief Complaint   Patient presents with     Recheck Medication     Anxiety     Mihir's daughter has been in the hospital and has extra stress recently         Pre-visit Screening:  Immunizations:  up to date  Colonoscopy:  NA  Mammogram: NA  Asthma Action Test/Plan:  NA  PHQ9:  UTD  GAD7:  UTD  Questioned patient about current smoking habits Pt. has never smoked.  Ok to leave detailed message on voice mail for today's visit only Yes, phone # [phone]

## 2021-11-09 NOTE — PROGRESS NOTES
"CC: Increased Anxiety    History:  Anxiety:  Mihir had been doing well overall with anxiety on citalopram. Unfortunately, her daughter had been admitted to the hospital for the past 9 days, and may be getting a G tube placed. This has lead to some panic attacks especially at night, with hyperventilating, vomiting, feeling dizzy, having to sit down. Had used Xanax in the past to use as needed, and this has worked well.     PMH, MEDICATIONS, ALLERGIES, SOCIAL AND FAMILY HISTORY in [facility] and reviewed by me personally.    ROS negative other than the symptoms noted above in the HPI.      Examination   /64 (BP Location: Right arm, Patient Position: Sitting, Cuff Size: Adult Large)   Pulse 73   Temp 97.2  F (36.2  C) (Temporal)   Ht 1.727 m (5' 8\")   Wt 83 kg (183 lb)   SpO2 98%   BMI 27.83 kg/m       Constitutional: Sitting comfortably, in no acute distress. Vital signs noted  Neck:  no adenopathy, trachea midline and normal to palpation, thyroid normal to palpation  Cardiovascular:  regular rate and rhythm, no murmurs, clicks, or gallops  Respiratory:  normal respiratory rate and rhythm, lungs clear to auscultation  SKIN: No jaundice/pallor/rash.   Psychiatric: mentation appears normal and affect normal/bright    A/P    ICD-10-CM    1. Panic attack  F41.0 ALPRAZolam (XANAX) 0.5 MG tablet       DISCUSSION:  Anxiety, Panic Attacks:  Anxiety is worsening to point of panic attacks. Situational with her daughter's health. Agreed to trial of alprazolam (Xanax), start with 1/2 tablet. Warned pt of the possible side effects of benzodiazepine medications like Xanax, including drowsiness. Warned of addictive potential, and urged pt to use sparingly. No alcohol or driving while taking. Filled #12 tablets, which will hopefully last her 6 months, but would consider refill sooner if situation with daughter's health continues.    follow up visit: As needed    Lillian Dunlap PA-C  [facility]    "

## 2022-01-13 ENCOUNTER — HOSPITAL ENCOUNTER (EMERGENCY)
Facility: CLINIC | Age: 32
Discharge: HOME | End: 2022-01-13
Attending: EMERGENCY MEDICINE | Admitting: EMERGENCY MEDICINE
Payer: COMMERCIAL

## 2022-01-13 ENCOUNTER — APPOINTMENT (OUTPATIENT)
Dept: GENERAL RADIOLOGY | Facility: CLINIC | Age: 32
End: 2022-01-13
Attending: EMERGENCY MEDICINE
Payer: COMMERCIAL

## 2022-01-13 VITALS
OXYGEN SATURATION: 98 % | DIASTOLIC BLOOD PRESSURE: 93 MMHG | HEART RATE: 109 BPM | RESPIRATION RATE: 16 BRPM | TEMPERATURE: 100 F | SYSTOLIC BLOOD PRESSURE: 133 MMHG

## 2022-01-13 DIAGNOSIS — G44.209 TENSION HEADACHE: ICD-10-CM

## 2022-01-13 DIAGNOSIS — U07.1 COVID-19: ICD-10-CM

## 2022-01-13 LAB
ATRIAL RATE - MUSE: 104 BPM
DIASTOLIC BLOOD PRESSURE - MUSE: NORMAL MMHG
FLUAV RNA SPEC QL NAA+PROBE: NEGATIVE
FLUBV RNA RESP QL NAA+PROBE: NEGATIVE
INTERPRETATION ECG - MUSE: NORMAL
P AXIS - MUSE: 54 DEGREES
PR INTERVAL - MUSE: 152 MS
QRS DURATION - MUSE: 88 MS
QT - MUSE: 332 MS
QTC - MUSE: 436 MS
R AXIS - MUSE: 32 DEGREES
SARS-COV-2 RNA RESP QL NAA+PROBE: POSITIVE
SYSTOLIC BLOOD PRESSURE - MUSE: NORMAL MMHG
T AXIS - MUSE: -2 DEGREES
VENTRICULAR RATE- MUSE: 104 BPM

## 2022-01-13 PROCEDURE — 71045 X-RAY EXAM CHEST 1 VIEW: CPT

## 2022-01-13 PROCEDURE — 87636 SARSCOV2 & INF A&B AMP PRB: CPT | Performed by: EMERGENCY MEDICINE

## 2022-01-13 PROCEDURE — 99285 EMERGENCY DEPT VISIT HI MDM: CPT | Mod: 25

## 2022-01-13 PROCEDURE — 250N000011 HC RX IP 250 OP 636: Performed by: EMERGENCY MEDICINE

## 2022-01-13 PROCEDURE — 96374 THER/PROPH/DIAG INJ IV PUSH: CPT

## 2022-01-13 PROCEDURE — C9803 HOPD COVID-19 SPEC COLLECT: HCPCS

## 2022-01-13 PROCEDURE — 93005 ELECTROCARDIOGRAM TRACING: CPT

## 2022-01-13 PROCEDURE — 258N000003 HC RX IP 258 OP 636: Performed by: EMERGENCY MEDICINE

## 2022-01-13 RX ORDER — METOCLOPRAMIDE 10 MG/1
10 TABLET ORAL 3 TIMES DAILY PRN
Qty: 20 TABLET | Refills: 0 | Status: SHIPPED | OUTPATIENT
Start: 2022-01-13 | End: 2022-05-02

## 2022-01-13 RX ORDER — SODIUM CHLORIDE 9 MG/ML
INJECTION, SOLUTION INTRAVENOUS CONTINUOUS
Status: DISCONTINUED | OUTPATIENT
Start: 2022-01-13 | End: 2022-01-13 | Stop reason: HOSPADM

## 2022-01-13 RX ORDER — METOCLOPRAMIDE HYDROCHLORIDE 5 MG/ML
10 INJECTION INTRAMUSCULAR; INTRAVENOUS ONCE
Status: COMPLETED | OUTPATIENT
Start: 2022-01-13 | End: 2022-01-13

## 2022-01-13 RX ADMIN — METOCLOPRAMIDE HYDROCHLORIDE 10 MG: 5 INJECTION INTRAMUSCULAR; INTRAVENOUS at 09:56

## 2022-01-13 RX ADMIN — SODIUM CHLORIDE 1000 ML: 9 INJECTION, SOLUTION INTRAVENOUS at 09:56

## 2022-01-13 NOTE — ED PROVIDER NOTES
History   Chief Complaint:  Headache     HPI   Mihir Waldron is a 31 year old female with history of POTS who presents for evaluation of a headache. The patient's  recently tested positive for COVID-19 and yesterday she started to develop a low-grade fever, a headache, chest pressure, a sore throat, a cough, and some nausea with dry heaving. Since then her headache has been progressively worsening, and she has been taking tylenol, ibuprofen, and her migraine medications without significant improvement of her headache. She has also developed some lightheadedness that is worse when standing and improves when laying down. Due to her ongoing symptoms this morning she came into the ED for evaluation. She feels that her headache is consistent with her regular migraines and is not the worse headache of her life.      Review of Systems   All other systems reviewed and are negative.      Allergies:  Codeine Sulfate  Contrast Dye  Diphenhydramine  Latex    Medications:  Xanax  Celexa  Maxalt     Past Medical History:     Anxiety  Depression  POTS   IBS       Past Surgical History:    Dental surgery  EP study tile table      Family History:    CAD - Mother   Depression - Mother, father, and sister   Anxiety - Mother, father, and sister     Social History:  The patient presents to the ED alone.   Marital status:      Physical Exam     Patient Vitals for the past 24 hrs:   BP Temp Temp src Pulse Resp SpO2   01/13/22 1000 121/84 -- -- 99 -- 100 %   01/13/22 0945 -- -- -- -- -- 99 %   01/13/22 0721 116/83 100  F (37.8  C) Temporal 110 16 96 %       Physical Exam  General: Resting on the bed.  Head: No obvious trauma to head.  Ears, Nose, Throat:  External ears normal.  Nose normal.  No pharyngeal erythema, swelling or exudate.  Midline uvula.     Eyes:  Conjunctivae clear.  Pupils are equal, round, and reactive.   Neck: Normal range of motion.  Neck supple.  No nuchal rigidity   CV: Regular rate and rhythm.   No murmurs.      Respiratory: Effort normal and breath sounds normal.  No wheezing or crackles.   Gastrointestinal: Soft.  No distension. There is no tenderness.    Neuro: Alert. Moving all extremities appropriately.  Normal speech.  CN II-XII grossly intact.  Gross muscle strength intact of the proximal and distal bilateral upper and lower extremities.  Sensation intact to light touch in all 4 extremities.  Normal gait.    Skin: Skin is warm and dry.  No rash noted.       Emergency Department Course   ECG  ECG obtained at 10:01:16, ECG read at 1020  Sinus tachycardia, Otherwise normal ECG    No significant change as compared to prior, dated 5/10/21.  Rate 104 bpm. AZ interval 152 ms. QRS duration 88 ms. QT/QTc 332/436 ms. P-R-T axes 54 32 -2.     Laboratory:  Symptomatic Influenza A/B & SARS-CoV2 (COVID19) Virus PCR Multiplex: COVID-19 positive, o/w Negative       Emergency Department Course:  Reviewed:  I reviewed nursing notes, vitals and past medical history    Assessments:  0945: I obtained history and examined the patient as noted above.     1041: I updated and reassessed the patient.      Interventions:  0956 Reglan 10 mg IV   0956 NS 1,000 mL IV     Disposition:  The patient was discharged to home.     Impression & Plan       Medical Decision Making:  Mihir Waldron is a 31 year old female who presents for evaluation of URI symptoms, a headache, and a positive COVID-19 test here. Overall She looks well and I see no evidence of bacterial pneumonia, myocarditis or acute CHF nor impending respiratory failure. Doubt pulmonary embolism or ACS. Doubt serious bacterial infection and I would not do basic labs nor blood cultures.  Chest x-ray is clear without evidence of acute pneumonia, pneumothorax or effusion.  EKG was reassuring with sinus rhythm.  No signs of myocarditis, pericarditis.  Do not suspect ACS.  Patient is tachycardic but not hypoxic, she is chronically tachycardic, I do not suspect PE at this  time.  She has no shortness of breath or chest pain.  Initially concerned about meningitis or encephalitis but moving her neck freely, no neck stiffness, no signs of meningismus.  Headache improved with Reglan alone.  She has history of migraine and this is likely exacerbated a migraine with her COVID-19 infection.  I do not see indication for imaging or LP.  She has a nonfocal neurologic exam.  No thunderclap headache to indicate subarachnoid.  No focal neuro changes to indicate stroke.  No respiratory changes noted  on detailed exam and I am reassured by their lung exam; they understand this may rapidly change and to return here if they develop fevers more than 102 or progressive respiratory distress.  Discussed self-isolation and close follow up of primary by phone if possible due to outbreak.       Covid-19  Mihir Waldron was evaluated during a global COVID-19 pandemic, which necessitated consideration that the patient might be at risk for infection with the SARS-CoV-2 virus that causes COVID-19.   Applicable protocols for evaluation were followed during the patient's care.   COVID-19 was considered as part of the patient's evaluation. The plan for testing is:  a test was obtained during this visit.     Diagnosis:    ICD-10-CM    1. COVID-19  U07.1 COVID-19 GetWell Loop Referral     Care Coordination Referral   2. Tension headache  G44.209       Discharge Medications:  New Prescriptions    METOCLOPRAMIDE (REGLAN) 10 MG TABLET    Take 1 tablet (10 mg) by mouth 3 times daily as needed (headache)       Scribe Disclosure:  I, Larry Mishra, am serving as a scribe at 9:45 AM on 1/13/2022 to document services personally performed by Sandy Agustin MD, based on my observations and the provider's statements to me.           Sandy Agustin MD  01/13/22 8848

## 2022-01-13 NOTE — DISCHARGE INSTRUCTIONS
Covid get well loop was prescribed, they will continue to follow with you and help monitor your symptoms.    Continue good supportive cares including fluids, Tylenol ibuprofen as needed, rest and continue to monitor symptoms.    If shortness of breath, chest pain, difficulty breathing or worsening symptoms develop you may return to the ER at any point for reevaluation.        Discharge Instructions  COVID-19    COVID-19 is the disease caused by a new coronavirus. The virus spreads from person-to-person primarily by droplets when an infected person coughs or sneezes and the droplets are then breathed in by another person.    Symptoms of COVID-19  Many people have no symptoms or mild symptoms.  Symptoms usually appear within a few days, but up to 14-days, after contact with a person with COVID-19.    A mild COVID-19 illness is like a cold and can have fever, cough, sneezing, sore throat, tiredness, headache, and muscle pain.    A moderate COVID-19 illness might include shortness of breath or pneumonia on a chest x-ray.    A severe COVID-19 illness causes significant breathing problems such as low oxygen levels or more serious pneumonia.  Some patients experience loss of taste or smell which is somewhat unique to COVID-19.      Isolation and Quarantine  Testing is recommended for any person with symptoms that could be COVID-19 and often for those exposed to COVID-19. The best way to stop the spread of the virus is to avoid contact with others.    A close contact exposure is being within 6 feet of someone with COVID for 15 minutes.    Isolation refers to sick people staying away from people who are not sick.    A person in quarantine is limiting activity because they were exposed and are waiting to see if they might become sick.    If you test positive for COVID and have no symptoms, you should stay home (isolation) for 5 full days after the day of the test. You should then wear a mask when around others for another 5  days.    If you test positive for COVID and have mild symptoms, you should stay home (isolation) for at least 5 days after your symptoms began. You can return to normal activities at that time, wearing a mask when around others, for another 5 days as long as your symptoms are improving/resolving and you have been without a fever for 24 hours (without using fever-reducing medicine).    If you test positive for COVID and have more than mild symptoms, you should stay home (isolation) for at least 10 days after your symptoms began. You can return to normal activities at that time as long as your symptoms are improving and you have been without a fever for 24 hours (without using fever-reducing medicine).  For example, if you have a fever and cough for 6 days, you need to stay home 4 more days with no fever for a total of 10 days. Or, if you have a fever and cough for 10 days, you need to stay home one more day with no fever for a total of 11 days.    If you were exposed to COVID and are not vaccinated (or it has been more than six months from your Pfizer or Moderna vaccine or two months from J&J vaccine), you should stay home (quarantine) for 5 days and then wear a mask around others for 5 additional days. A COVID test at day 5 is recommended.    If you were exposed to COVID and are vaccinated (had a booster, had two shots of Pfizer or Moderna vaccine in the last five months, or had J&J vaccine within two months), you do not need to quarantine but should wear a mask around others for 10 days and get a COVID test on day 5.    If you have symptoms but a negative test, you should stay at home until you have mild/improving symptoms and are without fever for 24 hours, using the same judgment you would for when it is safe to return to work/school from strep throat, influenza, or the common cold. If you worsen, you should consider being re-evaluated.    If you are being tested for COVID because of symptoms and your test is  pending, you should stay home until you know your test result.  More details on isolation and quarantine can be found on this website from the CDC:  https://www.cdc.gov/coronavirus/2019-ncov/your-health/quarantine-isolation.html    If I have COVID, how should I protect myself and others?    Do not go to work or school. Have a friend or relative do your shopping. Do not use public transportation (bus, train) or ridesharing (Lyft, Uber).    Separate yourself from other people in your home. As much as possible, you should stay in one room and away from other people in your home. Also, use a separate bathroom, if possible. Avoid handling pets or other animals while sick.     Wear a facemask if you need to be around other people and cover your mouth and nose with a tissue when you cough or sneeze.     Avoid sharing personal household items. You should not share dishes, drinking glasses, forks/knives/spoons, towels, or bedding with other people in your home. After using these items, they should be washed with soap and water. Clean parts of your home that are touched often (doorknobs, faucets, countertops, etc.) daily.     Wash your hands often with soap and water for at least 20 seconds or use an alcohol-based hand  containing at least 60% alcohol.     Avoid touching your face.    Treat your symptoms. You can take Acetaminophen (Tylenol) to treat body aches and fever as needed for comfort. Ibuprofen (Advil or Motrin) can be used as well if you still have symptoms after taking Tylenol. Drink fluids. Rest.    Watch for worsening symptoms such as shortness of breath/difficulty breathing or very severe weakness.    Employers/workplaces are being asked by the Centers for Disease Control (CDC) to not request notes/documentation for you to return to work or prove that you were ill. You may choose to show your employer this paperwork. Also, repeat testing should not be required to return to work.    Exercise/Sports in  rare cases, COVID could affect your heart in a way that makes exercise or participation in sports dangerous.  If you have a mild COVID illness (fever, cough, sore throat, and similar symptoms but no difficulty breathing or abnormalities of the lung): After your COVID symptoms have resolved, wait 14-days before returning to activity.  If you have more than a mild illness (meaning that you have problems with your breathing or lungs) or if you participate in competitive or strenuous activity or have a history of heart disease: Please see your primary doctor/provider prior to return to activity/competition.    Antibody treatments are available for patients with mild to moderate COVID illness in order to prevent severe illness. In general, only patients with risk factors for severe illness are eligible for treatment. For more information, to see if you are eligible, and to find treatment, go to the [facility]:    [email or web address].[place]/diseases/coronavirus/mnrap.html     Return to the Emergency Department if:    If you are developing worsening breathing, shortness of breath, or feel worse you should seek medical attention.  If you are uncertain, contact your health care provider/clinic. If you need emergency medical attention, call 911 and tell them you have been ill.

## 2022-01-14 ENCOUNTER — PATIENT OUTREACH (OUTPATIENT)
Dept: CARE COORDINATION | Facility: CLINIC | Age: 32
End: 2022-01-14

## 2022-01-14 NOTE — PROGRESS NOTES
Clinic Care Coordination Contact    Care Coordination ED Discharge Follow up Note    Patient referred for Virtual Home Monitoring Program for COVID-19 following recent [facility] ED visit.    RN has confirmed a GetWell Loop referral is in place.    Criteria for Virtual Home Monitoring telephone outreach is not met after review of ED encounter/ED provider note because:    1) ED provider note indicates assessment was negative for respiratory distress. O2 sats were stable throughout course of ED visit per chart review.      2) Patient was not discharged with new supplemental oxygen.    Per notes, ED provider and/or ED team discussed appropriate follow up guidelines with patient prior to discharge, or reflected these instructions on AVS.       GetWell Loop team remains available to support patient via GetWell Loop account once activated by patient.     Kathy Dubon RN  [facility]  - [facility] Care Coordinator

## 2022-05-02 ENCOUNTER — HOSPITAL ENCOUNTER (EMERGENCY)
Facility: CLINIC | Age: 32
Discharge: HOME | End: 2022-05-02
Attending: EMERGENCY MEDICINE | Admitting: EMERGENCY MEDICINE
Payer: COMMERCIAL

## 2022-05-02 ENCOUNTER — OFFICE VISIT (OUTPATIENT)
Dept: FAMILY MEDICINE | Facility: CLINIC | Age: 32
End: 2022-05-02

## 2022-05-02 VITALS
HEART RATE: 124 BPM | BODY MASS INDEX: 20.43 KG/M2 | TEMPERATURE: 98.6 F | SYSTOLIC BLOOD PRESSURE: 132 MMHG | HEIGHT: 68 IN | WEIGHT: 134.8 LBS | DIASTOLIC BLOOD PRESSURE: 84 MMHG

## 2022-05-02 VITALS
HEART RATE: 110 BPM | HEIGHT: 68 IN | RESPIRATION RATE: 16 BRPM | TEMPERATURE: 97.9 F | BODY MASS INDEX: 28.34 KG/M2 | DIASTOLIC BLOOD PRESSURE: 92 MMHG | WEIGHT: 187 LBS | SYSTOLIC BLOOD PRESSURE: 139 MMHG | OXYGEN SATURATION: 99 %

## 2022-05-02 DIAGNOSIS — F41.1 GAD (GENERALIZED ANXIETY DISORDER): Primary | ICD-10-CM

## 2022-05-02 DIAGNOSIS — F41.1 GAD (GENERALIZED ANXIETY DISORDER): ICD-10-CM

## 2022-05-02 DIAGNOSIS — F33.9 RECURRENT MAJOR DEPRESSIVE DISORDER, REMISSION STATUS UNSPECIFIED (H): ICD-10-CM

## 2022-05-02 DIAGNOSIS — R53.83 CAREGIVER WITH FATIGUE: ICD-10-CM

## 2022-05-02 LAB — SARS-COV-2 RNA RESP QL NAA+PROBE: NEGATIVE

## 2022-05-02 PROCEDURE — 99215 OFFICE O/P EST HI 40 MIN: CPT | Performed by: FAMILY MEDICINE

## 2022-05-02 PROCEDURE — 250N000013 HC RX MED GY IP 250 OP 250 PS 637: Performed by: NURSE PRACTITIONER

## 2022-05-02 PROCEDURE — 99204 OFFICE O/P NEW MOD 45 MIN: CPT | Performed by: NURSE PRACTITIONER

## 2022-05-02 PROCEDURE — C9803 HOPD COVID-19 SPEC COLLECT: HCPCS

## 2022-05-02 PROCEDURE — 90791 PSYCH DIAGNOSTIC EVALUATION: CPT

## 2022-05-02 PROCEDURE — 99285 EMERGENCY DEPT VISIT HI MDM: CPT | Mod: 25

## 2022-05-02 PROCEDURE — U0003 INFECTIOUS AGENT DETECTION BY NUCLEIC ACID (DNA OR RNA); SEVERE ACUTE RESPIRATORY SYNDROME CORONAVIRUS 2 (SARS-COV-2) (CORONAVIRUS DISEASE [COVID-19]), AMPLIFIED PROBE TECHNIQUE, MAKING USE OF HIGH THROUGHPUT TECHNOLOGIES AS DESCRIBED BY CMS-2020-01-R: HCPCS | Performed by: EMERGENCY MEDICINE

## 2022-05-02 RX ORDER — CITALOPRAM HYDROBROMIDE 40 MG/1
40 TABLET ORAL DAILY
Qty: 30 TABLET | Refills: 0 | Status: SHIPPED | OUTPATIENT
Start: 2022-05-02 | End: 2022-10-27 | Stop reason: DRUGHIGH

## 2022-05-02 RX ORDER — HYDROXYZINE HYDROCHLORIDE 25 MG/1
25-50 TABLET, FILM COATED ORAL 3 TIMES DAILY PRN
Qty: 30 TABLET | Refills: 0 | Status: SHIPPED | OUTPATIENT
Start: 2022-05-02 | End: 2022-11-18

## 2022-05-02 RX ORDER — ACETAMINOPHEN 325 MG/1
650 TABLET ORAL ONCE
Status: COMPLETED | OUTPATIENT
Start: 2022-05-02 | End: 2022-05-02

## 2022-05-02 RX ORDER — CITALOPRAM HYDROBROMIDE 20 MG/1
20 TABLET ORAL DAILY
Qty: 90 TABLET | Refills: 3 | COMMUNITY
Start: 2022-05-02 | End: 2022-11-18

## 2022-05-02 RX ADMIN — ACETAMINOPHEN 650 MG: 325 TABLET, FILM COATED ORAL at 20:58

## 2022-05-02 ASSESSMENT — ANXIETY QUESTIONNAIRES
6. BECOMING EASILY ANNOYED OR IRRITABLE: NEARLY EVERY DAY
3. WORRYING TOO MUCH ABOUT DIFFERENT THINGS: NEARLY EVERY DAY
IF YOU CHECKED OFF ANY PROBLEMS ON THIS QUESTIONNAIRE, HOW DIFFICULT HAVE THESE PROBLEMS MADE IT FOR YOU TO DO YOUR WORK, TAKE CARE OF THINGS AT HOME, OR GET ALONG WITH OTHER PEOPLE: VERY DIFFICULT
7. FEELING AFRAID AS IF SOMETHING AWFUL MIGHT HAPPEN: NEARLY EVERY DAY
1. FEELING NERVOUS, ANXIOUS, OR ON EDGE: NEARLY EVERY DAY
5. BEING SO RESTLESS THAT IT IS HARD TO SIT STILL: NEARLY EVERY DAY
2. NOT BEING ABLE TO STOP OR CONTROL WORRYING: NEARLY EVERY DAY
GAD7 TOTAL SCORE: 21

## 2022-05-02 ASSESSMENT — PATIENT HEALTH QUESTIONNAIRE - PHQ9
5. POOR APPETITE OR OVEREATING: NEARLY EVERY DAY
SUM OF ALL RESPONSES TO PHQ QUESTIONS 1-9: 24

## 2022-05-02 NOTE — ED TRIAGE NOTES
Pt reports decreased self worth. 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      Triage Assessment     Row Name 05/02/22 [id number]       Triage Assessment (Adult)    Airway WDL WDL       Respiratory WDL    Respiratory WDL WDL       Skin Circulation/Temperature WDL    Skin Circulation/Temperature WDL WDL       Cardiac WDL    Cardiac WDL WDL       Peripheral/Neurovascular WDL    Peripheral Neurovascular WDL WDL       Cognitive/Neuro/Behavioral WDL    Cognitive/Neuro/Behavioral WDL WDL

## 2022-05-02 NOTE — PROGRESS NOTES
"Assessment & Plan   Problem List Items Addressed This Visit        Behavioral    CHIDI (generalized anxiety disorder) - Primary         1. CHIDI (generalized anxiety disorder)  She reassured me that she is safe, is not wanting to hurt herself. Her kids are at home with her  and she knows she needs to take care of herself. I referred her now to the [facility] ER for evaluation and possibly to the [facility] program.           BMI:   Estimated body mass index is 20.5 kg/m  as calculated from the following:    Height as of this encounter: 1.727 m (5' 8\").    Weight as of this encounter: 61.1 kg (134 lb 12.8 oz).         FUTURE APPOINTMENTS:       - Follow-up visit in ER today    No follow-ups on file.    Onelia Krishnan MD  [facility]    Subjective     Nursing Notes:   Kavita Enciso CMA  5/2/2022  1:25 PM  Signed  Chief Complaint   Patient presents with     Mental Health Problem     Would like referral for mental health locations, feels like she is at an all time low, cannot control it anymore     Pre-visit Screening:  Immunizations:  up to date  Colonoscopy:  NA  Mammogram: NA  Asthma Action Test/Plan:  NA  PHQ9:  Done today  GAD7:  Done today  Questioned patient about current smoking habits Pt. has never smoked.  Ok to leave detailed message on voice mail for today's visit only Yes, phone # [phone]             Mihir Waldron is a 31 year old female who presents to clinic today for the following health issues   HPI     Is struggling with her mental health. Took her xanax today. Usually is fine with riding through this. Over the past couple of months, having  A lot of high highs and low lows. Now more frequent and very exhausting. Denies suicidality. But doesn't want to be alive and doesn't want to feel this way anymore. 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 But can't keep feeling like this.  A couple years ago was going through therapy and there would be testing for bipolar, but she wasn't able to do it " "at this time.         Review of Systems   Constitutional, HEENT, cardiovascular, pulmonary, gi and gu systems are negative, except as otherwise noted.      Objective    /84 (BP Location: Left arm, Patient Position: Sitting, Cuff Size: Adult Large)   Pulse (!) 124   Temp 98.6  F (37  C) (Temporal)   Ht 1.727 m (5' 8\")   Wt 61.1 kg (134 lb 12.8 oz)   BMI 20.50 kg/m    Body mass index is 20.5 kg/m .  Physical Exam   GENERAL: healthy, alert and no distress  MS: no gross musculoskeletal defects noted, no edema  NEURO: Normal strength and tone, mentation intact and speech normal  PSYCH: mentation appears normal, affect normal/bright  Sad, tearful          "

## 2022-05-02 NOTE — ED PROVIDER NOTES
"  History   Chief Complaint:  Psychiatric Evaluation     The history is provided by the patient.      Mihir Waldron is a 31 year old female who presents with worsening anxiety and depression.  The patient reports she has increased stressors at home and is the primary caregiver for her disabled child.  She notes that she does not have a psychiatrist to get psychiatric medications from her primary care doctor and had a therapist in the past but has not seen one in several years.  She felt as though she does not have time to see a therapist and that she was doing okay enough on her own.  Additionally she was concerned about having mental health diagnoses and not being allowed to care for her daughter.  She reports that she now feels as though she is feeling so depressed under so much pressure that she is concerned that she \"may not survive\" she contacted her friends and family.  She has parents coming in to assist with childcare and her  is additionally able to help at home.  She has friends who are helping as well.  She denies any physical complaints today.  She has not done anything to harm her self.  She reports she will be able to be safe while in the emergency department.    ROS:  Review of Systems  10 systems reviewed and negative except as above and in HPI.    Allergies:  Codeine  Codeine Sulfate  Contrast Dye  Dairy Digestive  Diphenhydramine  Latex  Plum Pulp     Medications:    Maxalt  Xanax  Celexa  Epinephrine  Reglan    Past Medical History:    Anxiety  Depression  POTS  Anaphylaxis  IBS    Past Surgical History:    EP study tilt table  Bilateral dental surgery    Family History:    family history includes Anxiety Disorder in her father, mother, and sister; Coronary Artery Disease in her mother; Depression in her father, mother, and sister; Diabetes in her maternal grandmother and paternal grandmother; Hypertension in her father.    Social History:   reports that she has never smoked. She has " "never used smokeless tobacco. She reports that she does not drink alcohol and does not use drugs.  PCP: Fuentes Green     Physical Exam     Patient Vitals for the past 24 hrs:   BP Temp Temp src Pulse Resp SpO2 Height Weight   05/02/22 1632 (!) 139/92 97.9  F (36.6  C) Oral 110 16 99 % 1.727 m (5' 8\") 84.8 kg (187 lb)   05/02/22 1606 130/84 -- -- -- 18 98 % -- --   05/02/22 1605 130/84 -- -- 96 -- -- -- --   05/02/22 1418 (!) 143/85 98.8  F (37.1  C) Temporal 104 18 97 % 1.727 m (5' 8\") --        Physical Exam  General: Resting on the gurney, appears comfortable.    Head:  The scalp, face, and head appear normal  Mouth/Throat: Mucus membranes are moist  CV:  Regular rate    Normal S1 and S2  No pathological murmur   Resp:  Breath sounds clear and equal bilaterally    Non-labored, no retractions or accessory muscle use    No coarseness    No wheezing   GI:  Abdomen is soft, no rigidity    No tenderness to palpation  MS:  No evidence of self injury, no lacerations.  No evidence of trauma.  Skin:   No lacerations  Neuro:  Speech is normal    No apparent focal deficit.      Uses all extremities equally.  Psych:  Awake. Alert.  Flat affect. Occasionally tearful consistent with current mood.     Appropriate interactions.    No Suicidal thoughts.     No thoughts of harming others.    Denies hallucinations, does not appear to be responding to internal stimuli.    Emergency Department Course   Laboratory:  Labs Ordered and Resulted from Time of ED Arrival to Time of ED Departure   COVID-19 VIRUS (CORONAVIRUS) BY PCR - Normal       Result Value    SARS CoV2 PCR Negative        Emergency Department Course:    Mental Health Risk Assessment      PSS-3    Date and Time Over the past 2 weeks have you felt down, depressed, or hopeless? Over the past 2 weeks have you had thoughts of killing yourself? Have you ever attempted to kill yourself? When did this last happen? User   05/02/22 1428 yes no yes more than 6 months ago " SR      C-SSRS ([place])    Date and Time Q1 Wished to be Dead (Past Month) Q2 Suicidal Thoughts (Past Month) Q3 Suicidal Thought Method Q4 Suicidal Intent without Specific Plan Q5 Suicide Intent with Specific Plan Q6 Suicide Behavior (Lifetime) Within the Past 3 Months? RETIRED: Level of Risk per Screen Screening Not Complete User   05/02/22 6406 no no no no no no -- -- -- DELPHINE              Suicide assessment completed by mental health (D.E.C., LCSW, etc.)    Reviewed:  I reviewed nursing notes, vitals, past medical history and Care Everywhere    Assessments:   I obtained history and examined the patient as noted above.    I rechecked the patient and explained findings.    Interventions:  Medications   acetaminophen (TYLENOL) tablet 650 mg (650 mg Oral Given 5/2/22 2058)        Disposition:  The patient was transferred to [facility].     Impression & Plan      Covid-19  Mihir Waldron was evaluated during a global COVID-19 pandemic, which necessitated consideration that the patient might be at risk for infection with the SARS-CoV-2 virus that causes COVID-19.   Applicable protocols for evaluation were followed during the patient's care.   COVID-19 was considered as part of the patient's evaluation. The plan for testing is:  a test was obtained during this visit.     Medical Decision Making:  Mihir Waldron is a 31 year old female who presents for evaluation of incresed depression and anxiety.  They have  a history of previous psychiatric illness and at this point appear decompensated psychiatrically.  A 72 hour hold was not placed.   The patient has had increasing depression and anxiety and passive thoughts of self harm.     Patient will be transferred to [facility] for ongoing mental health assessment and treatment.    Diagnosis:    ICD-10-CM    1. CHIDI (generalized anxiety disorder)  F41.1 citalopram (CELEXA) 20 MG tablet   2. Recurrent major depressive disorder, remission status unspecified (H)  F33.9    3.  Caregiver with fatigue  R53.83         5/2/2022   Sury Mayen MD Debroux, Karah M, MD  05/02/22 3399

## 2022-05-02 NOTE — NURSING NOTE
Chief Complaint   Patient presents with     Mental Health Problem     Would like referral for mental health locations, feels like she is at an all time low, cannot control it anymore     Pre-visit Screening:  Immunizations:  up to date  Colonoscopy:  NA  Mammogram: NA  Asthma Action Test/Plan:  NA  PHQ9:  Done today  GAD7:  Done today  Questioned patient about current smoking habits Pt. has never smoked.  Ok to leave detailed message on voice mail for today's visit only Yes, phone # [phone]

## 2022-05-02 NOTE — ED NOTES
"31 year old female received from ED due to worsening depression and anxiety. Reports that she as been experiencing \"extreme highs and lows\" for awhile now. However, she reports she has been feeling more \"low\" lately and recognized that she should get help before she hits \"rock bottom\". She identifies a big stressor in her life is caring for her 6 year old disabled daughter. She states that she wants to get help so she can function better at home and provide the best care for her. Denies SI/HI and hallucinations, but states, \"I don't want to kill myself, but I wouldn't care if something happened to me. I am just exhausted.\" Patient presents with a tearful affect and is depressed, sad, and anxious in mood. Patient currently takes Celexa 20 mg daily and has a PRN of Xanax that she utilizes when she experiences a panic attack. Does not currently see a therapist or a psychiatrist. She was referred to [facility] by her PCP. Awaiting assessment with LMHP. Pleasant and cooperative at this time.     Nursing and risk assessments completed. Assessments reviewed with LMHP and physician. Video monitoring in progress, patient informed.  Admission information reviewed with patient. Patient given a tour of [facility] and instructions on using the facility. Questions regarding [facility] addressed. Pt search completed and belongings inventoried.  "

## 2022-05-03 ASSESSMENT — ANXIETY QUESTIONNAIRES: GAD7 TOTAL SCORE: 21

## 2022-05-03 NOTE — ED NOTES
Patient agreeable to discharge plan. Discharge instructions reviewed with patient including follow-up care plan. Medications: celexa 40 mg and PRN Hydroxyzine sent to patient's pharmacy. Reviewed safety plan and outpatient resources. Denies SI and HI. All belongings that were brought into the hospital have been returned to patient. Escorted off the unit at 2200 accompanied by [facility] staff. Discharged to home via self.

## 2022-05-03 NOTE — ED PROVIDER NOTES
"[facility] - Psychiatric Consultation  [facility] Emergency Department    Mihir Waldron MRN: [id number]   Age: 31 year old YOB: 1990     History     Chief Complaint   Patient presents with     Psychiatric Evaluation     HPI  Mihir Waldron is a 31 year old female with history notable for generalized anxiety disorder, eating disorder, past trauma and major depressive disorder who presents to the ED at the advice of her PCP for worsening anxiety and depressive symptoms.  Patient was evaluated by the ED provider, who medically cleared patient to transfer to [facility] for psychiatric assessment, this is reviewed along with all pertinent labs and tests performed.    Per chart review, patient has a well documented course of anxiety and depression starting in her teens. She has been on past medications such as celexa, xanax, atarax, prozac. wellbutrin and Effexor.  She was restarted on Celexa in October 2021. It appears she was experiencing increased stress related to the health of her daughter who is disabled and required inpatient hospitalization for medical issues. In November, she experienced a panic attack which was thought to be related to external stress. She was prescribed Xanax 0.5 mg as needed by her PCP.  She was seen in clinic today by Dr. Krishnan for worsening symptoms of depression, describing mood swings and feeling overwhemlmed and lacking energy to want to get out of bed in the morning.    On examination, she identifies feeling anxious and low mood. She identifies having \"mood swings\" where she will have one day where she is feeling hopeful and bright and then the next day wake up feeling like a failure, having low-self esteem, feeling like a \"bad mom, bad wife.\"  She denies overt symptoms of salvador or psychosis. She does feel she makes impulsive decisions at time such as deciding to color her hair or shopping on-line, but nothing excessive in nature. She describes a normal sleep " "pattern. States she will sometimes have difficulty falling asleep, yet makes attempts to.  She feels her anxiety is more of a concern than depression currently.  She notes some inconsistency in medication compliance in regards to the Celexa. Yet when she does take it on a regular basis, she does feel it is helpful.  She started current dose in October, she does feel the effect may be wearing off. She does identify current stressors such as her  traveling for work and she was left alone with her disabled child. She also is recalling past childhood trauma, which she has not had time to process.  She does not have a therapist or psychiatrist. She is seeking to find outpatient resources to manage her anxiety and mood symptoms as well as explore medication options.       Past Medical History  Past Medical History:   Diagnosis Date     Anxiety      Chest pain      Depressive disorder      Postpartum depression      POTS (postural orthostatic tachycardia syndrome)      Sinus tachycardia      Past Surgical History:   Procedure Laterality Date     DENTAL SURGERY Bilateral      EP STUDY TILT TABLE N/A 2/7/2020    Procedure: EP TILT TABLE;  Surgeon: Clayton Sanders MD;  Location:  [facility]     ALPRAZolam (XANAX) 0.5 MG tablet  citalopram (CELEXA) 20 MG tablet  citalopram (CELEXA) 40 MG tablet  hydrOXYzine (ATARAX) 25 MG tablet  EPINEPHrine (ANY BX GENERIC EQUIV) 0.3 MG/0.3ML injection 2-pack      Allergies   Allergen Reactions     Codeine      Codeine Sulfate GI Disturbance     Contrast Dye Hives     Unsure of name of contrast     Dairy Digestive Cramps, Diarrhea, Fatigue, GI Disturbance, Headache, Nausea and Nausea and Vomiting     Diphenhydramine Other (See Comments)     Pt got warm and passed out with IV benadryl     Latex Hives     Plum Pulp Nausea and Vomiting and Itching     All \"stone fruit\",Itching in throat and throat swelling     Family History  Family History   Problem Relation Age of " "Onset     Coronary Artery Disease Mother      Depression Mother      Anxiety Disorder Mother      Hypertension Father      Anxiety Disorder Father      Depression Father      Depression Sister      Anxiety Disorder Sister      Diabetes Maternal Grandmother      Diabetes Paternal Grandmother      Cerebrovascular Disease No family hx of      Breast Cancer No family hx of      Colon Cancer No family hx of      Social History   Social History     Tobacco Use     Smoking status: Never Smoker     Smokeless tobacco: Never Used   Substance Use Topics     Alcohol use: No     Comment: None     Drug use: No      Past medical history, past surgical history, medications, allergies, family history, and social history were reviewed with the patient. No additional pertinent items.       Review of Systems  A complete review of systems was performed with pertinent positives and negatives noted in the HPI, and all other systems negative.    Physical Examination   BP: (!) 143/85  Pulse: 104  Temp: 98.8  F (37.1  C)  Resp: 18  Height: 172.7 cm (5' 8\")  Weight: 84.8 kg (187 lb)  SpO2: 97 %    Physical Exam  General: Appears stated age.   Neuro: Alert and fully oriented. Extremities appear to demonstrate normal strength on visual inspection.   Integumentary/Skin: no rash visualized, normal color    Psychiatric Examination   Appearance: awake, alert and no apparent distress  Attitude:  cooperative  Eye Contact:  good  Mood:  anxious and depressed  Affect:  pleasant and intensity is normal  Speech:  clear, coherent  Psychomotor Behavior:  no evidence of tardive dyskinesia, dystonia, or tics  Thought Process:  logical, linear and goal oriented  Associations:  no loose associations  Thought Content:  no evidence of suicidal ideation or homicidal ideation and no evidence of psychotic thought  Insight:  good  Judgement:  intact  Oriented to:  time, person, and place  Attention Span and Concentration:  intact  Recent and Remote Memory:  " intact  Language: able to name/identify objects without impairment  Fund of Knowledge: intact with awareness of current and past events    ED Course        Labs Ordered and Resulted from Time of ED Arrival to Time of ED Departure   COVID-19 VIRUS (CORONAVIRUS) BY PCR - Normal       Result Value    SARS CoV2 PCR Negative         Assessments & Plan (with Medical Decision Making)   Patient presenting with worsening anxiety and depression in the context of MDD and CHIDI further complicated by primary caregiver to disabled child and remembering past childhood trauma.  Nursing notes reviewed noting no acute issues.     I have reviewed the assessment completed by the [facility].     The Prescription Drug Monitoring Program (PDMP) database was accessed to verify accuracy of patients prescribed controlled substances.    After a period of working with the treatment team on the [facility] unit, the patient's mental state improved to allow a safe transition to outpatient care. After counseling on the diagnosis, work-up, and treatment plan, the patient was discharged. Close follow-up with a psychiatrist and/or therapist was recommended and community psychiatric resources were provided. Patient is to return to the ED if any urgent or potentially life-threatening concerns.     At the time of discharge, the patient's acute suicide risk was determined to be low due to the following factors: Reduction in the intensity of mood/anxiety symptoms that preceded the admission, denial of suicidal thoughts, denies feeling helpless or helpless, not currently under the influence of alcohol or illicit substances, denies experiencing command hallucinations, no immediate access to firearms. The patient's acute risk could be higher if noncompliant with their treatment plan, medications, follow-up appointments or using illicit substances or alcohol. Protective factors include: social supports, children, stable housing, employment, [employer]  beliefs.    Preliminary diagnosis:    ICD-10-CM    1. CHIDI (generalized anxiety disorder)  F41.1    2. Recurrent major depressive disorder, remission status unspecified (H)  F33.9    3. Caregiver with fatigue  R53.83         Treatment Plan:  -Discharge home with safety plan in place.  -Increase Celexa from 20 mg to 40 mg daily to optimize dosing and target anxiety and depression.  -Atarax 25-50 mg TID PRN anxiety. Instructed to utilize while waiting for Celexa to reach a therapeutic level.  -Continue to use PRN xanax sparingly for panic episodes.  -Medication education provided this visit includes, rationale for medication, importance of compliance, medication interactions, and common side effects. Patient agreeable.  -Problem focused, supportive therapy provided around patients stressors and symptoms related to their diagnosis.  Validated patients emotions and problems solved with patient around stress management and self care strategies. Patient was receptive.   -Referrals for outpatient therapy and psychiatry.    --  ALICE Dove CNP   [facility] EMERGENCY DEPT  [facility] Unit  5/2/2022      Tena Baugh APRN CNP  05/02/22 6562

## 2022-05-03 NOTE — DISCHARGE INSTRUCTIONS
If I am feeling unsafe or I am in a crisis, I will:   Contact my established care providers   Call the National Suicide Prevention Lifeline: 785.554.2084   Go to the nearest emergency room   Call 448          Warning signs that I or other people might notice when a crisis is developing for me: I experience panic attacks, I cannot stop, I can't settle, go without sleep, irritable.    Things I am able to do on my own to cope or help me feel better: Self-care: calming down techniques, deep breathing, yoga, take a bath. Be by myself.    Things that I am able to do with others to cope or help me better: I like to snuggle my  and watch movies. Be with friends, get together and talk, go out to eat.     Things I can use or do for distraction: Focus on what is right in front of me, stay in the present moment.      Changes I can make to support my mental health and wellness: Attend newly scheduled appointments.     People in my life that I can ask for help: My , and my group of friends.    Your [facility] has a mental health crisis team you can call 24/7: [facility] Crisis Line Number: [phone]     Other things that are important when I m in crisis: Show up and take over everything, take the kids, make me stop everything. Do all of the things so I can focus on myself and my emotions.    Additional resources and appointment information:     5/5/2022 10:00 am - TELETHERAPY APPOINTMENT  Malgorzata Herron    [address]   [phone]     5/17/2022 1:00pm - TELEPSYCHIATRY APPOINTMENT  Ashley CORLEY  [facility] [address], [facility] [address]   [phone]     Crisis Lines  Crisis Text Line  Text 307293  You will be connected with a trained live crisis counselor to provide support.    Gambling Hotline  1.800.333.hope [4673]    National Hope Line  1.800.SUICIDE [3911686]    National Suicide Prevention Lifeline  Free and confidential support   "1.800.212.TALK [5001]  http://suicidepreventionlifeline.org    The [employer] (LGBTQ Youth Crisis Line)  7.350.879.9548  text START to 652-584    [employer] Crisis Line  8.665.785.9763 (Press 1)  or text 560950    Community Resources  Fast Tracker  Linking people to mental health and substance use disorder resources  [employer]     [place] Mental Health Warm Line  Peer to peer support  Monday thru Saturday, 12 pm to 10 pm  643.790.1583 or 4.380.214.0530  Text \"Support\" to 61102    National Waucoma on Mental Illness (NORMAN)  618.946.7565 or 1.968.NORMAN.HELPS    Walk-in Counseling Center  Free mental health counseling  [address] [facility]  701.427.0334    Mental Health Apps  My3  https://[employer]/    VirtualHopeBox  https://Wattvision/apps/virtual-hope-box/    Suicide Safety Plan ([employer])    Calm Harm      Additional information:  Today you were seen by a licensed mental health professional through Triage and Transition services, [facility] ([facility])  for a crisis assessment in the Emergency Department at [facility].  It is recommended that you follow up with your established providers (psychiatrist, mental health therapist, and/or primary care doctor - as relevant) as soon as possible. Coordinators from [facility] will be calling you in the next 24-48 hours to ensure that you have the resources you need.  You can also contact [facility] coordinators directly at [phone]. You may have been scheduled for or offered an appointment with a mental health provider. [facility] maintains an extensive network of licensed behavioral health providers to connect patients with the services they need.  We do not charge providers a fee to participate in our referral network.  We match patients with providers based on a patient's specific needs, insurance coverage, and location.  Our first effort will be to refer you to a provider within your care system, and will " utilize providers outside your care system as needed.
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
## 2022-05-15 ENCOUNTER — HEALTH MAINTENANCE LETTER (OUTPATIENT)
Age: 32
End: 2022-05-15

## 2022-07-11 NOTE — ED PROVIDER NOTES
History     Chief Complaint:  Ankle injury     HPI:   The history is provided by the patient.      Mihir Waldron is a 28 year old female with a history of spontaneous anaphylaxis who presents to the emergency department for evaluation of an ankle injury. The patient reports that just prior to arrival she was playing with her daughter when she stepped on a blanket and the right foot slipped under her couch. At the same time, the patient fell over while the foot was still under the couch, resulting in 3rd and 4th toe pain, anterior ankle pain, and shin pain just proximal to the ankle. She could not get up on her own and needed assistance from her . Here, the patient rates her pain an 8/10 in severity and says that there is some tingling in the extremity.     Allergies:  Benadryl [Diphenhydramine] - got warm and passed out   Codeine Sulfate - GI disturbance  Contrast Dye - hives      Medications:    Xanax   Epipen   Maximum      Past Medical History:    Anxiety   Depressive disorder  Spontaneous anaphylaxis     Past Surgical History:    Dental surgery bilateral     Family History:    CAD  Depression  Anxiety   Hypertension   Diabetes mellitus     Social History:  She was dropped off by her spouse who had to leave to tend to her children   Tobacco use: Never smoker   Alcohol use: No   PCP: Fuentes Green    Marital Status:        Review of Systems   Musculoskeletal: Positive for arthralgias.   Neurological:        Tingling    Tingling in her dorsal foot where the abrasions are    Physical Exam     Patient Vitals for the past 24 hrs:   BP Temp Temp src Heart Rate Resp SpO2   12/02/18 1245 - - - - - 98 %   12/02/18 1231 130/89 98.1  F (36.7  C) Oral 109 24 99 %        Physical Exam  Constitutional:  Appears well-developed and well-nourished. Alert. Conversant. Non toxic.       HENT:   Head: Atraumatic.   Nose: Nose unremarkable   Mouth/Throat: Oropharynx is clear and moist.   Eyes:  Conjunctivae normal. EOM are grossly normal. Pupils are equal, round, and reactive to light. No scleral icterus.   Neck: Normal range of motion. No tracheal deviation present.   Cardiovascular: Normal rate, regular rhythm. Symmetric dorsalis pedis and posterior tibial  artery pulses.  Pulmonary/Chest: Effort normal. No stridor. No respiratory distress.  Musculoskeletal: Unremarkable except for right ankle and foot. No other abnormality noted.   Right lower extremity: Pelvis stable.  Hip nontender.  Thigh nontender.  Knee normal inspection and nontender.  Normal range of motion.  No tenderness over the patella, proximal fibula, proximal tibia.  She has mild tenderness over the distal one third of the shin with no swelling or ecchymosis there.  Also mild tenderness over the distal calf.  Mild tenderness of the Achilles but intact ankle flexion and no Achilles tendon defect.  She has superficial abrasions over the anterior ankle.  She has tenderness over the medial and lateral malleoli but no obvious bony deformity or tenderness.  She also has tenderness over the dorsal foot and over the dorsum of toes 3 and 4 on the right foot.  Although she reports tingling in her foot she has intact sensory function distally including the sole of the foot, deep peroneal nerve, medial lateral foot.  She has intact toe wiggling and intact plantar and dorsiflexion of the ankle, with limitation by pain.  Neurological: Alert and oriented to person, place, and time. Normal strength. CN II-VII intact. No sensory deficit. GCS eye subscore is 4. GCS verbal subscore is 5. GCS motor subscore is 6. Normal coordination . Intact distal sensory and motor function.  Skin: Skin is warm and dry. No rash noted. No pallor. Normal capillary refill.  Psychiatric:  normal mood and affect.    Emergency Department Course     Imaging:  Radiographic findings were communicated with the patient who voiced understanding of the findings.     XR Foot, 3 views,  Right:  IMPRESSION: Normal.    XR Ankle, 3 views, Right:  IMPRESSION: Normal.    Imaging independently reviewed and agree with radiologist interpretation.        Interventions:  1248: Zofran 4 mg IV   1248: Dilaudid 0.5 mg IV      The patient's symptoms were improved with parenteral narcotics.      Emergency Department Course:  Past medical records, nursing notes, and vitals reviewed.  1238: I performed an exam of the patient and obtained history, as documented above.    An IV was inserted to administer the above interventions.      The patient was sent for a XR x 2 while in the emergency department, findings above.     1413: I rechecked the patient. Explained findings to patient.    Patient passed road test with crutches.     1508: I rechecked the patient. Findings and plan explained to the Patient. Patient discharged home with instructions regarding supportive care, medications, and reasons to return. The importance of close follow-up was reviewed.       Impression & Plan      Medical Decision Making:  Mihir Waldron is a 28 year old female who presents for evaluation of right ankle and foot pain after she got her foot stuck under the couch today and fell over, twisting her ankle..  Signs and symptoms are consistent with an ankle sprain.  She also may have a contusion to her foot and has some superficial abrasions over the anterior ankle.  There are no signs of fracture on radiograph.  The patients neurovascular status is normal. Knee exam is normal. I don't think this is a Maisonneuve injury or a intraosseous ligament injury based on the location of tenderness.     She also does have some pain in her forefoot and involving the right third and fourth toes.  X-rays of the foot are negative for fracture in the tarsal bones, metatarsals, or phalanges of the toes.  Suspect this may be a foot sprain. A head to toe trauma exam is otherwise negative; the likelihood of other serious sequelae of trauma (spine, head,  chest, abdomen, other extremities, pelvis) is low.      Plan is for protected weightbearing, RICE treatment with ice 15 minutes every 3 hours, and an Aircast.  Patient will advance weightbearing and follow-up in 2-3 days.  They will begin gentle ROM exercises of the ankle including PF,DF, alphabet exercises      Diagnosis:    ICD-10-CM    1. Pain in joint involving ankle and foot, right M25.571    2. Abrasion of right ankle without infection, initial encounter S90.511A    3. Pain of toe of right foot M79.674        Disposition:  Discharged to home with plan as outlined.     Discharge Medications:  New Prescriptions    HYDROCODONE-ACETAMINOPHEN (NORCO) 5-325 MG TABLET    Take 1 tablet by mouth every 6 hours as needed for severe pain     Scribe Disclosure:   Jose Manuel TANG, am serving as a scribe at 12:38 PM on 12/2/2018 to document services personally performed by Liam White MD based on my observations and the provider's statements to me.       Liam White MD  12/2/2018   [facility] EMERGENCY DEPARTMENT       Liam White MD  12/02/18 6433     Anesthesia Type: 1% lidocaine with 1:200,000 epinephrine and a 1:10 solution of 8.4% sodium bicarbonate

## 2022-09-04 ENCOUNTER — HEALTH MAINTENANCE LETTER (OUTPATIENT)
Age: 32
End: 2022-09-04

## 2022-09-14 ENCOUNTER — OFFICE VISIT (OUTPATIENT)
Dept: FAMILY MEDICINE | Facility: CLINIC | Age: 32
End: 2022-09-14

## 2022-09-14 VITALS
HEART RATE: 106 BPM | BODY MASS INDEX: 29.64 KG/M2 | OXYGEN SATURATION: 98 % | SYSTOLIC BLOOD PRESSURE: 128 MMHG | TEMPERATURE: 98.1 F | HEIGHT: 68 IN | DIASTOLIC BLOOD PRESSURE: 82 MMHG | WEIGHT: 195.6 LBS

## 2022-09-14 DIAGNOSIS — G43.109 MIGRAINE WITH AURA AND WITHOUT STATUS MIGRAINOSUS, NOT INTRACTABLE: Primary | ICD-10-CM

## 2022-09-14 DIAGNOSIS — R11.0 NAUSEA: ICD-10-CM

## 2022-09-14 DIAGNOSIS — F41.0 PANIC ATTACK: ICD-10-CM

## 2022-09-14 PROCEDURE — 99214 OFFICE O/P EST MOD 30 MIN: CPT | Performed by: PHYSICIAN ASSISTANT

## 2022-09-14 RX ORDER — RIZATRIPTAN BENZOATE 5 MG/1
5 TABLET ORAL
Qty: 30 TABLET | Refills: 1 | Status: SHIPPED | OUTPATIENT
Start: 2022-09-14

## 2022-09-14 RX ORDER — RIZATRIPTAN BENZOATE 5 MG/1
TABLET ORAL
Status: CANCELLED | OUTPATIENT
Start: 2022-09-14

## 2022-09-14 RX ORDER — RIZATRIPTAN BENZOATE 5 MG/1
TABLET ORAL
COMMUNITY
Start: 2021-01-18 | End: 2022-10-27

## 2022-09-14 RX ORDER — ONDANSETRON 4 MG/1
4 TABLET, ORALLY DISINTEGRATING ORAL EVERY 8 HOURS PRN
Qty: 90 TABLET | Refills: 0 | Status: SHIPPED | OUTPATIENT
Start: 2022-09-14 | End: 2023-03-30

## 2022-09-14 NOTE — NURSING NOTE
Chief Complaint   Patient presents with     Recheck Medication     Discuss getting zofran, when having panick attacks gets nausea, refill medications      Pre-visit Screening:  Immunizations:  up to date  Colonoscopy:  NA  Mammogram: NA  Asthma Action Test/Plan:  NA  PHQ9:  NA  GAD7:  NA  Questioned patient about current smoking habits Pt. has never smoked.  Ok to leave detailed message on voice mail for today's visit only Yes, phone # [phone]

## 2022-09-14 NOTE — PROGRESS NOTES
Assessment & Plan     Panic attack-see below  - ondansetron (ZOFRAN ODT) 4 MG ODT tab  Dispense: 90 tablet; Refill: 0    Nausea-based on panic attack symptoms and psychiatry request for Zofran will fill Rx with understanding that she will make appt if patient using medication more frequently  Call with worsening symptoms or if you are using medication more frequently than discussed at OV today  - ondansetron (ZOFRAN ODT) 4 MG ODT tab  Dispense: 90 tablet; Refill: 0    Migraine with aura and without status migrainosus, not intractable-migraines feel similar to patient but are more frequent now due to increased stress/panic attacks. Will refill for 6 months with understanding that she will make appt if patient using medication more frequently  Call with worsening or new migraine symptoms  - rizatriptan (MAXALT) 5 MG tablet  Dispense: 30 tablet; Refill: 1        Follow up 6 months OV for further refills    No follow-ups on file.    Alvin Mancuso, ÁLVARO  [facility]    Subjective   Mihir is a 32 year old, presenting for the following health issues:  Recheck Medication (Discuss getting zofran, when having panick attacks gets nausea, refill medications )      HPI     Migraines: worsen with her panic attacks. Using Maxalt several times a month-at most 10x a month but usually less frequent than this. Has aura with her migraines, worsen with menses. Light and sound sensitivity. Medication works well for her.     Zofran: needed due to panic attacks-gets lots of nausea/vomiting with her panic attacks. Her psychiatrist requests this Rx per patient. Vomits 2x a month but nausea is more frequent. Estimates she will need this 3x a week but this is on the high end. More stress recently with children back in school.     Social History     Tobacco Use     Smoking status: Never Smoker     Smokeless tobacco: Never Used   Substance Use Topics     Alcohol use: No     Comment: None     Drug use: No     PHQ 4/19/2021  "10/15/2021 5/2/2022   PHQ-9 Total Score 5 6 24   Q9: Thoughts of better off dead/self-harm past 2 weeks Not at all Not at all Several days   Some encounter information is confidential and restricted. Go to Review Flowsheets activity to see all data.     CHIDI-7 SCORE 4/19/2021 10/15/2021 5/2/2022   Total Score 14 11 21   Some encounter information is confidential and restricted. Go to Review Flowsheets activity to see all data.       Review of Systems   Constitutional, HEENT, cardiovascular, pulmonary, gi and gu systems are negative, except as otherwise noted.      Objective    /82 (BP Location: Right arm, Patient Position: Sitting, Cuff Size: Adult Large)   Pulse 106   Temp 98.1  F (36.7  C) (Temporal)   Ht 1.727 m (5' 8\")   Wt 88.7 kg (195 lb 9.6 oz)   SpO2 98%   BMI 29.74 kg/m    Body mass index is 29.74 kg/m .  Physical Exam   GENERAL: healthy, alert and no distress  NECK: no adenopathy, no asymmetry, masses, or scars and thyroid normal to palpation  RESP: lungs clear to auscultation - no rales, rhonchi or wheezes  CV: regular rate and rhythm, normal S1 S2, no S3 or S4, no murmur, click or rub, no peripheral edema and peripheral pulses strong  ABDOMEN: soft, nontender, no hepatosplenomegaly, no masses and bowel sounds normal  MS: no gross musculoskeletal defects noted, no edema  NEURO: Normal strength and tone, mentation intact and speech normal                      "

## 2022-10-27 ENCOUNTER — OFFICE VISIT (OUTPATIENT)
Dept: FAMILY MEDICINE | Facility: CLINIC | Age: 32
End: 2022-10-27

## 2022-10-27 VITALS
HEIGHT: 68 IN | TEMPERATURE: 97.9 F | SYSTOLIC BLOOD PRESSURE: 118 MMHG | RESPIRATION RATE: 16 BRPM | OXYGEN SATURATION: 97 % | HEART RATE: 90 BPM | WEIGHT: 195 LBS | DIASTOLIC BLOOD PRESSURE: 80 MMHG | BODY MASS INDEX: 29.55 KG/M2

## 2022-10-27 DIAGNOSIS — J06.9 VIRAL URI: ICD-10-CM

## 2022-10-27 DIAGNOSIS — J02.9 SORE THROAT: ICD-10-CM

## 2022-10-27 DIAGNOSIS — J01.90 ACUTE SINUSITIS WITH SYMPTOMS > 10 DAYS: Primary | ICD-10-CM

## 2022-10-27 LAB
% GRANULOCYTES: 68.7 %
COVID-19: NEGATIVE
FLUAV AG UPPER RESP QL IA.RAPID: NORMAL
FLUBV AG UPPER RESP QL IA.RAPID: NORMAL
HCT VFR BLD AUTO: 43.4 % (ref 35–47)
HEMOGLOBIN: 14.6 G/DL (ref 11.7–15.7)
LYMPHOCYTES NFR BLD AUTO: 20.6 %
MCH RBC QN AUTO: 30.7 PG (ref 26–33)
MCHC RBC AUTO-ENTMCNC: 33.6 G/DL (ref 31–36)
MCV RBC AUTO: 91.2 FL (ref 78–100)
MONOCYTES NFR BLD AUTO: 10.7 %
PLATELET COUNT - QUEST: 193 10^9/L (ref 150–375)
RBC # BLD AUTO: 4.76 10*12/L (ref 3.8–5.2)
WBC # BLD AUTO: 8.3 10*9/L (ref 4–11)

## 2022-10-27 PROCEDURE — 36415 COLL VENOUS BLD VENIPUNCTURE: CPT | Performed by: PHYSICIAN ASSISTANT

## 2022-10-27 PROCEDURE — 99213 OFFICE O/P EST LOW 20 MIN: CPT | Performed by: PHYSICIAN ASSISTANT

## 2022-10-27 PROCEDURE — G2023 SPECIMEN COLLECT COVID-19: HCPCS | Performed by: PHYSICIAN ASSISTANT

## 2022-10-27 PROCEDURE — 87635 SARS-COV-2 COVID-19 AMP PRB: CPT | Performed by: PHYSICIAN ASSISTANT

## 2022-10-27 PROCEDURE — 85025 COMPLETE CBC W/AUTO DIFF WBC: CPT | Performed by: PHYSICIAN ASSISTANT

## 2022-10-27 PROCEDURE — 87804 INFLUENZA ASSAY W/OPTIC: CPT | Performed by: PHYSICIAN ASSISTANT

## 2022-10-27 RX ORDER — PROPRANOLOL HYDROCHLORIDE 10 MG/1
TABLET ORAL
COMMUNITY
Start: 2022-10-18 | End: 2023-03-30

## 2022-10-27 RX ORDER — BUPROPION HYDROCHLORIDE 150 MG/1
150 TABLET ORAL DAILY
COMMUNITY
Start: 2022-06-08

## 2022-10-27 NOTE — PROGRESS NOTES
"Assessment & Plan     1. Acute sinusitis with symptoms > 10 days    2. Viral URI    3. Sore throat        Sx most likely viral  Use Jean Carlos Cook Delsym  Gave Rx for Augmentin to be filled if sx not improving at 10-14 day heath or if fevers develop  Advised probiotic if taking Augmentin          BEAU Lamas  [facility]    Subjective     Nursing Notes:   Preeti Palma CMA  10/27/2022  8:16 AM  Signed  Chief Complaint   Patient presents with     Pharyngitis     Pt state she has been sick since Sunday - running  nose , headache, more fatigue - Pt state that her kids were sick - pt did a Covid-19 test at home it came back negative    Pre-visit Screening:  Immunizations:  not up to date - Pt decline   Colonoscopy:  N/A  Mammogram: N/A  Asthma Action Test/Plan: N/A  PHQ9:N/A  GAD7: N/A  Questioned patient about current smoking habits Pt. has never smoked.  Ok to leave detailed message on voice mail for today's visit only yes, phone #   [phone]           Mihir Waldron is a 32 year old female who presents to clinic today for the following health issues:     HPI       Pt here with URI sx since Sunday  Child with Pneumonia, RSV - Kids have been ill for 3 weeks    Here today with very sore throat, post nasal drainage, left maxillary sinus pressure.  No diarrhea  Headache/body aches/fatigue  Slight heaviness in lungs  Sleep has been bad  Oxygen at 99 home.     No cough    No history of asthma    OTC: Nyquil, ibuprofen during the day    COVID - July 2022 Monday COVID test negative              Objective    /80 (BP Location: Right arm, Patient Position: Sitting, Cuff Size: Adult Large)   Pulse 90   Temp 97.9  F (36.6  C) (Tympanic)   Resp 16   Ht 1.727 m (5' 8\")   Wt 88.5 kg (195 lb)   LMP 10/04/2022   SpO2 97%   BMI 29.65 kg/m    Body mass index is 29.65 kg/m .  Physical Exam   GENERAL: healthy, alert and no distress  EYES: Eyes grossly normal to inspection, " PERRL and conjunctivae and sclerae normal  HENT: ear canals and TM's normal, nose and mouth without ulcers or lesions  NECK: no adenopathy, no asymmetry, masses, or scars and thyroid normal to palpation  RESP: lungs clear to auscultation - no rales, rhonchi or wheezes  CV: regular rate and rhythm, normal S1 S2, no S3 or S4, no murmur, click or rub, no peripheral edema and peripheral pulses strong  MS: no gross musculoskeletal defects noted, no edema      Labs Resulted Today:   Results for orders placed or performed in visit on 10/27/22   COVID-19 ([facility])     Status: None   Result Value Ref Range    COVID-19 Negative    Influenza A and B ([facility])     Status: None   Result Value Ref Range    Influenza A NEG neg    Influenza B NEG neg   HEMOGRAM PLATELET DIFF ([facility])     Status: None   Result Value Ref Range    WBC 8.3 4.0 - 11 10*9/L    RBC Count 4.76 3.8 - 5.2 10*12/L    Hemoglobin 14.6 11.7 - 15.7 g/dL    Hematocrit 43.4 35.0 - 47.0 %    MCV 91.2 78 - 100 fL    MCH 30.7 26 - 33 pg    MCHC 33.6 31 - 36 g/dL    Platelet Count 193 150 - 375 10^9/L    % Granulocytes 68.7 %    % Lymphocytes 20.6 %    % Monocytes 10.7 %

## 2022-10-27 NOTE — NURSING NOTE
Chief Complaint   Patient presents with     Pharyngitis     Pt state she has been sick since Sunday - running  nose , headache, more fatigue - Pt state that her kids were sick - pt did a Covid-19 test at home it came back negative    Pre-visit Screening:  Immunizations:  not up to date - Pt decline   Colonoscopy:  N/A  Mammogram: N/A  Asthma Action Test/Plan: N/A  PHQ9:N/A  GAD7: N/A  Questioned patient about current smoking habits Pt. has never smoked.  Ok to leave detailed message on voice mail for today's visit only yes, phone #   [phone]

## 2022-11-17 ENCOUNTER — HOSPITAL ENCOUNTER (OUTPATIENT)
Facility: CLINIC | Age: 32
Setting detail: OBSERVATION
Discharge: HOME | End: 2022-11-18
Attending: EMERGENCY MEDICINE | Admitting: NURSE PRACTITIONER
Payer: COMMERCIAL

## 2022-11-17 DIAGNOSIS — F33.0 MAJOR DEPRESSIVE DISORDER, RECURRENT EPISODE, MILD (H): ICD-10-CM

## 2022-11-17 DIAGNOSIS — R53.83 CAREGIVER WITH FATIGUE: ICD-10-CM

## 2022-11-17 DIAGNOSIS — F41.1 GENERALIZED ANXIETY DISORDER: ICD-10-CM

## 2022-11-17 DIAGNOSIS — F41.1 GAD (GENERALIZED ANXIETY DISORDER): ICD-10-CM

## 2022-11-17 DIAGNOSIS — G90.A POTS (POSTURAL ORTHOSTATIC TACHYCARDIA SYNDROME): ICD-10-CM

## 2022-11-17 LAB
ANION GAP SERPL CALCULATED.3IONS-SCNC: 3 MMOL/L (ref 3–14)
ATRIAL RATE - MUSE: 83 BPM
BASOPHILS # BLD AUTO: 0 10E3/UL (ref 0–0.2)
BASOPHILS NFR BLD AUTO: 0 %
BUN SERPL-MCNC: 8 MG/DL (ref 7–30)
CALCIUM SERPL-MCNC: 9.2 MG/DL (ref 8.5–10.1)
CHLORIDE BLD-SCNC: 109 MMOL/L (ref 94–109)
CO2 SERPL-SCNC: 26 MMOL/L (ref 20–32)
CREAT SERPL-MCNC: 0.62 MG/DL (ref 0.52–1.04)
DIASTOLIC BLOOD PRESSURE - MUSE: NORMAL MMHG
EOSINOPHIL # BLD AUTO: 0 10E3/UL (ref 0–0.7)
EOSINOPHIL NFR BLD AUTO: 0 %
ERYTHROCYTE [DISTWIDTH] IN BLOOD BY AUTOMATED COUNT: 12.2 % (ref 10–15)
GFR SERPL CREATININE-BSD FRML MDRD: >90 ML/MIN/1.73M2
GLUCOSE BLD-MCNC: 89 MG/DL (ref 70–99)
HCT VFR BLD AUTO: 42.6 % (ref 35–47)
HGB BLD-MCNC: 14.6 G/DL (ref 11.7–15.7)
IMM GRANULOCYTES # BLD: 0 10E3/UL
IMM GRANULOCYTES NFR BLD: 0 %
INTERPRETATION ECG - MUSE: NORMAL
LYMPHOCYTES # BLD AUTO: 1.9 10E3/UL (ref 0.8–5.3)
LYMPHOCYTES NFR BLD AUTO: 23 %
MCH RBC QN AUTO: 30.6 PG (ref 26.5–33)
MCHC RBC AUTO-ENTMCNC: 34.3 G/DL (ref 31.5–36.5)
MCV RBC AUTO: 89 FL (ref 78–100)
MONOCYTES # BLD AUTO: 0.4 10E3/UL (ref 0–1.3)
MONOCYTES NFR BLD AUTO: 4 %
NEUTROPHILS # BLD AUTO: 6.1 10E3/UL (ref 1.6–8.3)
NEUTROPHILS NFR BLD AUTO: 73 %
NRBC # BLD AUTO: 0 10E3/UL
NRBC BLD AUTO-RTO: 0 /100
P AXIS - MUSE: 73 DEGREES
PLATELET # BLD AUTO: 302 10E3/UL (ref 150–450)
POTASSIUM BLD-SCNC: 3.9 MMOL/L (ref 3.4–5.3)
PR INTERVAL - MUSE: 140 MS
QRS DURATION - MUSE: 88 MS
QT - MUSE: 374 MS
QTC - MUSE: 439 MS
R AXIS - MUSE: 40 DEGREES
RBC # BLD AUTO: 4.77 10E6/UL (ref 3.8–5.2)
SARS-COV-2 RNA RESP QL NAA+PROBE: NEGATIVE
SODIUM SERPL-SCNC: 138 MMOL/L (ref 133–144)
SYSTOLIC BLOOD PRESSURE - MUSE: NORMAL MMHG
T AXIS - MUSE: 35 DEGREES
VENTRICULAR RATE- MUSE: 83 BPM
WBC # BLD AUTO: 8.4 10E3/UL (ref 4–11)

## 2022-11-17 PROCEDURE — U0003 INFECTIOUS AGENT DETECTION BY NUCLEIC ACID (DNA OR RNA); SEVERE ACUTE RESPIRATORY SYNDROME CORONAVIRUS 2 (SARS-COV-2) (CORONAVIRUS DISEASE [COVID-19]), AMPLIFIED PROBE TECHNIQUE, MAKING USE OF HIGH THROUGHPUT TECHNOLOGIES AS DESCRIBED BY CMS-2020-01-R: HCPCS | Performed by: EMERGENCY MEDICINE

## 2022-11-17 PROCEDURE — 250N000013 HC RX MED GY IP 250 OP 250 PS 637: Performed by: EMERGENCY MEDICINE

## 2022-11-17 PROCEDURE — 250N000013 HC RX MED GY IP 250 OP 250 PS 637: Performed by: NURSE PRACTITIONER

## 2022-11-17 PROCEDURE — G0378 HOSPITAL OBSERVATION PER HR: HCPCS

## 2022-11-17 PROCEDURE — 99285 EMERGENCY DEPT VISIT HI MDM: CPT | Mod: 25

## 2022-11-17 PROCEDURE — 36415 COLL VENOUS BLD VENIPUNCTURE: CPT | Performed by: EMERGENCY MEDICINE

## 2022-11-17 PROCEDURE — 80051 ELECTROLYTE PANEL: CPT | Performed by: EMERGENCY MEDICINE

## 2022-11-17 PROCEDURE — 250N000013 HC RX MED GY IP 250 OP 250 PS 637: Performed by: PSYCHIATRY & NEUROLOGY

## 2022-11-17 PROCEDURE — 93005 ELECTROCARDIOGRAM TRACING: CPT

## 2022-11-17 PROCEDURE — 90791 PSYCH DIAGNOSTIC EVALUATION: CPT

## 2022-11-17 PROCEDURE — 99220 PR INITIAL OBSERVATION CARE,LEVEL III: CPT | Performed by: NURSE PRACTITIONER

## 2022-11-17 PROCEDURE — 85025 COMPLETE CBC W/AUTO DIFF WBC: CPT | Performed by: EMERGENCY MEDICINE

## 2022-11-17 PROCEDURE — C9803 HOPD COVID-19 SPEC COLLECT: HCPCS

## 2022-11-17 RX ORDER — ACETAMINOPHEN 500 MG
500 TABLET ORAL EVERY 6 HOURS PRN
Status: DISCONTINUED | OUTPATIENT
Start: 2022-11-17 | End: 2022-11-17

## 2022-11-17 RX ORDER — BUPROPION HYDROCHLORIDE 150 MG/1
150 TABLET ORAL DAILY
Status: DISCONTINUED | OUTPATIENT
Start: 2022-11-18 | End: 2022-11-18 | Stop reason: HOSPADM

## 2022-11-17 RX ORDER — ALPRAZOLAM 0.25 MG
0.25 TABLET ORAL EVERY 8 HOURS PRN
Status: DISCONTINUED | OUTPATIENT
Start: 2022-11-17 | End: 2022-11-18 | Stop reason: HOSPADM

## 2022-11-17 RX ORDER — ACETAMINOPHEN 325 MG/1
650 TABLET ORAL EVERY 6 HOURS PRN
Status: DISCONTINUED | OUTPATIENT
Start: 2022-11-17 | End: 2022-11-18 | Stop reason: HOSPADM

## 2022-11-17 RX ORDER — RIZATRIPTAN BENZOATE 5 MG/1
5 TABLET ORAL 3 TIMES DAILY PRN
Status: DISCONTINUED | OUTPATIENT
Start: 2022-11-17 | End: 2022-11-18 | Stop reason: HOSPADM

## 2022-11-17 RX ORDER — LORAZEPAM 0.5 MG/1
1 TABLET ORAL ONCE
Status: COMPLETED | OUTPATIENT
Start: 2022-11-17 | End: 2022-11-17

## 2022-11-17 RX ADMIN — ACETAMINOPHEN 650 MG: 325 TABLET, FILM COATED ORAL at 16:39

## 2022-11-17 RX ADMIN — LORAZEPAM 1 MG: 0.5 TABLET ORAL at 13:30

## 2022-11-17 RX ADMIN — QUETIAPINE 12.5 MG: 25 TABLET, FILM COATED ORAL at 23:34

## 2022-11-17 ASSESSMENT — COLUMBIA-SUICIDE SEVERITY RATING SCALE - C-SSRS
TOTAL  NUMBER OF INTERRUPTED ATTEMPTS PAST 3 MONTHS: NO
TOTAL  NUMBER OF ABORTED OR SELF INTERRUPTED ATTEMPTS LIFETIME: YES
5. HAVE YOU STARTED TO WORK OUT OR WORKED OUT THE DETAILS OF HOW TO KILL YOURSELF? DO YOU INTEND TO CARRY OUT THIS PLAN?: YES
REASONS FOR IDEATION PAST MONTH: COMPLETELY TO END OR STOP THE PAIN (YOU COULDN'T GO ON LIVING WITH THE PAIN OR HOW YOU WERE FEELING)
TOTAL  NUMBER OF INTERRUPTED ATTEMPTS LIFETIME: YES
ATTEMPT PAST THREE MONTHS: NO
4. HAVE YOU HAD THESE THOUGHTS AND HAD SOME INTENTION OF ACTING ON THEM?: NO
6. HAVE YOU EVER DONE ANYTHING, STARTED TO DO ANYTHING, OR PREPARED TO DO ANYTHING TO END YOUR LIFE?: NO
TOTAL  NUMBER OF ABORTED OR SELF INTERRUPTED ATTEMPTS LIFETIME: 2
5. HAVE YOU STARTED TO WORK OUT OR WORKED OUT THE DETAILS OF HOW TO KILL YOURSELF? DO YOU INTEND TO CARRY OUT THIS PLAN?: NO
TOTAL  NUMBER OF INTERRUPTED ATTEMPTS LIFETIME: 1
1. HAVE YOU WISHED YOU WERE DEAD OR WISHED YOU COULD GO TO SLEEP AND NOT WAKE UP?: YES
ATTEMPT LIFETIME: YES
1. IN THE PAST MONTH, HAVE YOU WISHED YOU WERE DEAD OR WISHED YOU COULD GO TO SLEEP AND NOT WAKE UP?: YES
LETHALITY/MEDICAL DAMAGE CODE MOST RECENT POTENTIAL ATTEMPT: BEHAVIOR LIKELY TO RESULT IN INJURY BUT NOT LIKELY TO CAUSE DEATH
LETHALITY/MEDICAL DAMAGE CODE MOST LETHAL POTENTIAL ATTEMPT: BEHAVIOR LIKELY TO RESULT IN INJURY BUT NOT LIKELY TO CAUSE DEATH
LETHALITY/MEDICAL DAMAGE CODE MOST LETHAL ACTUAL ATTEMPT: MINOR PHYSICAL DAMAGE
4. HAVE YOU HAD THESE THOUGHTS AND HAD SOME INTENTION OF ACTING ON THEM?: YES
LETHALITY/MEDICAL DAMAGE CODE MOST RECENT ACTUAL ATTEMPT: MINOR PHYSICAL DAMAGE
TOTAL  NUMBER OF ACTUAL ATTEMPTS LIFETIME: 3
TOTAL  NUMBER OF ABORTED OR SELF INTERRUPTED ATTEMPTS PAST 3 MONTHS: NO
2. HAVE YOU ACTUALLY HAD ANY THOUGHTS OF KILLING YOURSELF?: YES
2. HAVE YOU ACTUALLY HAD ANY THOUGHTS OF KILLING YOURSELF?: NO
REASONS FOR IDEATION LIFETIME: EQUALLY TO GET ATTENTION, REVENGE, OR A REACTION FROM OTHERS AND TO END/STOP THE PAIN
3. HAVE YOU BEEN THINKING ABOUT HOW YOU MIGHT KILL YOURSELF?: YES

## 2022-11-17 ASSESSMENT — ACTIVITIES OF DAILY LIVING (ADL)
ADLS_ACUITY_SCORE: 35

## 2022-11-17 NOTE — CONSULTS
Diagnostic Evaluation Consultation  Crisis Assessment    Patient was assessed: In Person  Patient location: [facility]  Was a release of information signed: Yes. Providers included on the release:  Tee      Referral Data and Chief Complaint  Mihir is a 32 year old, who uses she/her pronouns, and presents to the ED via EMS. Patient is referred to the ED by self. Patient is presenting to the ED for the following concerns: panic, anxiety, passing out.      Informed Consent and Assessment Methods     Patient is her own guardian. Writer met with patient and explained the crisis assessment process, including applicable information disclosures and limits to confidentiality, assessed understanding of the process, and obtained consent to proceed with the assessment. Patient was observed to be able to participate in the assessment as evidenced by verbal understanding of the assessment process. Assessment methods included conducting a formal interview with patient, review of medical records, collaboration with medical staff, and obtaining relevant collateral information from family and community providers when available..     Over the course of this crisis assessment provided reassurance, offered validation, engaged patient in problem solving and disposition planning and provided psychoeducation. Patient's response to interventions was engaged     Summary of Patient Situation  Pt reports that she called her psychiatrist office and due to the severity of her mental health and panic attacks that have resulted in passing out, they suggested coming to the ED.  PT reports that she passed out twice today.    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  Pt reports a highly supportive  whom is employed full time.  Pt reports a large support network to include friends, family, professionals.    Significant Clinical History  Pt reports a hx  of anxiety/panic, depression and PTSD.  Pt reports that she felt like her sx and mood swings were well managed and under control since her last visit to [facility] in May.  Pt reports that she became aware of a change and increase in her sx starting in Sept where she felt like her anxiety and panic were causing a shrinking of her world were she didn't feel comfortable driving or leaving the house.  Pt reports that recently she no longer feels safe in her body due to episodes where she has been passing out in the house, grocery store, etc.  Pt denies any overt changes or depressive sx.  Pt denies any psychosis, salvador, delusional thinking or paranoia.  Pt presents as insightful, calm, cooperative, alert and oriented.    Pt acknowledges signicant stressors recently where her children were hospitalized 5 times due to illness including RSV and influenza.  Pt reports this is additionally difficult as her daughter is nonverbal and has signficant medical needs.  Pt notes that this time last year her child was hospitalized with catanoia.  PT reports that she has begun addressing trauma, specifically her sheldon trauma in therapy.     Pt currently endorses passive suicidal ideation without specific planning or intent.  PT denies any self harm or homicidal ideation.  Pt reports 3 previous attempts via cutting all as a teenager with the most recent/severe at age 17.  PT presents as future and goal oriented.  Pt is highly engaged in treatment    Pt reports hospitalization as a teenager.  Pt reports an active therapist, psychiatrist, and couples therapist to treat her MH.  Pt reports med compliance with recent changes including the addition of propranolol.  PT reports additional engagement in yoga, oils, friends, respite care etc.      PT denies any chemical health concerns.  Pt reports POTS and migraines hx.     Collateral Information  The following information was received from Tee whose relationship to the patient is .  Information was obtained via phone. Their phone number is [phone] and they last had contact with patient on today.    What happened today: He reports that she has been passing out due to her anxiety.  He is aware that she called 911 today.    What is different about patient's functioning: He reports that they have been dealing with increased stressors which resulted in increased panic attacks and passing out.  He reports compliance with medications and providers.  He reports concerns of passing out related also to her POTS dx.     Concern about alcohol/drug use: No    What do you think the patient needs: medication changes    Has patient made comments about wanting to kill themselves/others:  No    If d/c is recommended, can they take part in safety/aftercare planning: No    Other information:     Risk Assessment  [place] Suicide Severity Rating Scale Full Clinical Version, Completed 11/17  Suicidal Ideation  1. Wish to be Dead (Lifetime): Yes  1. Wish to be Dead (Past 1 Month): Yes  2. Non-Specific Active Suicidal Thoughts (Lifetime): Yes  2. Non-Specific Active Suicidal Thoughts (Past 1 Month): No  3. Active Suicidal Ideation with any Methods (Not Plan) Without Intent to Act (Lifetime): Yes  3. Active Suicidal Ideation with any Methods (Not Plan) Without Intent to Act (Past 1 Month): No  4. Active Suicidal Ideation with Some Intent to Act, Without Specific Plan (Lifetime): Yes  4. Active Suicidal Ideation with Some Intent to Act, Without Specific Plan (Past 1 Month): No  5. Active Suicidal Ideation with Specific Plan and Intent (Lifetime): Yes  5. Active Suicidal Ideation with Specific Plan and Intent (Past 1 Month): No  Intensity of Ideation  Most Severe Ideation Rating (Lifetime): 5  Most Severe Ideation Rating (Past 1 Month): 2  Frequency (Lifetime): Many times each day  Frequency (Past 1 Month): Less than once a week  Duration (Lifetime): More than 8 hours/persistent or continuous  Duration (Past 1  Month): Fleeting, few seconds or minutes  Controllability (Lifetime): Does not attempt to control thoughts  Controllability (Past 1 Month): Easily able to control thoughts  Deterrents (Lifetime): Deterrents definitely did not stop you  Deterrents (Past 1 Month): Deterrents definitely stopped you from attempting suicide  Reasons for Ideation (Lifetime): Equally to get attention, revenge, or a reaction from others and to end/stop the pain  Reasons for Ideation (Past 1 Month): Completely to end or stop the pain (You couldn't go on living with the pain or how you were feeling)  Suicidal Behavior  Actual Attempt (Lifetime): Yes  Total Number of Actual Attempts (Lifetime): 3  Actual Attempt (Past 3 Months): No  Has subject engaged in non-suicidal self-injurious behavior? (Lifetime): No  Interrupted Attempts (Lifetime): Yes  Total Number of Interrupted Attempts (Lifetime): 1  Interrupted Attempts (Past 3 Months): No  Aborted or Self-Interrupted Attempt (Lifetime): Yes  Total Number of Aborted or Self-Interrupted Attempts (Lifetime): 2  Aborted or Self-Interrupted Attempt (Past 3 Months): No  Preparatory Acts or Behavior (Lifetime): No           C-SSRS Risk (Lifetime/Recent)  Calculated C-SSRS Risk Score (Lifetime/Recent): Moderate Risk    Validity of evaluation is not impacted by presenting factors during interview.   Comments regarding subjective versus objective responses to [place] tool: n/a  Environmental or Psychosocial Events: challenging interpersonal relationships, worsening chronic illness and recent life events: sick kiddos  Chronic Risk Factors: history of suicide attempts (3), history of psychiatric hospitalization, history of abuse or neglect and chronic health problems   Warning Signs: rage, anger, seeking revenge, acting reckless or engaging in risky activities, dramatic changes in mood and engaging in self-destructive behavior  Protective Factors: strong bond to family unit, community support, or  employment, intact marriage or domestic partnership, responsibilities and duties to others, including pets and children, lives in a responsibly safe and stable environment, good treatment engagement, sense of importance of health and wellness, able to access care without barriers, supportive ongoing medical and mental health care relationships, help seeking, good problem-solving, coping, and conflict resolution skills, sense of belonging, sense of self-efficacy and/or positive self-esteem, optimistic outlook - identification of future goals and constructive use of leisure time, enjoyable activities, resilience          Does the patient have access to lethal means? Yes - describe firearms in the home, locked      Is the patient engaging in sexually inappropriate behavior?  no        Current Substance Abuse     Is there recent substance abuse? no     Was a urine drug screen or blood alcohol level obtained: No       Mental Status Exam     Affect: Appropriate   Appearance: Appropriate    Attention Span/Concentration: Attentive  Eye Contact: Engaged   Fund of Knowledge: Appropriate    Language /Speech Content: Fluent   Language /Speech Volume: Normal    Language /Speech Rate/Productions: Articulate    Recent Memory: Intact   Remote Memory: Intact   Mood: Anxious    Orientation to Person: Yes    Orientation to Place: Yes   Orientation to Time of Day: Yes    Orientation to Date: Yes    Situation (Do they understand why they are here?): Yes    Psychomotor Behavior: Normal    Thought Content: Clear   Thought Form: Intact      History of commitment: No       Medication    Psychotropic medications: Yes. Pt is currently taking see MAR. Medication compliant: Yes. Recent medication changes: Yes added properalol 2 weeks ago  Medication changes made in the last two weeks: Yes       Current Care Team    Primary Care Provider: [facility]  Psychiatrist: Ashley Patterson  Therapist: [facility], also couples  therapist at [facility]  : No     CTSS or [facility]: No  ACT Team: No  Other: No      Diagnosis    309.81 (F43.10) Posttraumatic Stress Disorder (includes Posttraumatic Stress Disorder for Children 6 Years and Younger)  With dissociative symptoms   300.02 (F41.1) Generalized Anxiety Disorder - primary   311 (F32.9) Unspecified Depressive Disorder  - by history     Clinical Summary and Substantiation of Recommendations    Writer at the time of assessment recommends that Pt enter observation status for further therapeutic support and med mgmt.  Pt does appear to be in need of additional providers at this time as she is established with therapy, psychiatry, and marriage counseling.  Disposition    Recommended disposition: Individual Therapy and Medication Management       Reviewed case and recommendations with attending provider. Attending Name: Jean Pierre MARTINO       Attending concurs with disposition: Yes       Patient concurs with disposition: Yes       Guardian concurs with disposition: NA      Final disposition: Other: Obs.       Assessment Details    Patient interview started at: 1600 and completed at: 1700.     Total duration spent on the patient case in minutes: 1.0 hrs      CPT code(s) utilized: 58996 - Psychotherapy for Crisis - 60 (30-74*) min       KAIDEN Bella, Psychotherapist  [facility] - Triage [facility] Transition Services  Callback: [phone]

## 2022-11-17 NOTE — ED PROVIDER NOTES
History     Chief Complaint:    Near syncope, anxiety    HPI   Mihir Waldron is a 32 year old female who presents with concern of increasing anxiety.  The patient states that her anxiety is increased in the last week or so and she thinks this may been triggered by her children becoming ill.  She is treated for her mental health and states she has been medically compliant.  No distinct treatment changes as of late in that regard.  Denies suicidal or homicidal ideation.  Denies hallucinations.  Denies substance abuse.  She states today she suffered 3 near syncopal episodes which she attributes possibly to her increasing anxiety and panic attacks.  She denies chest pain or dyspnea.  No cough.  Does not feel a sense of arrhythmia.  Currently does not feel lightheaded or near faint.  She has had nausea for about 2 months which has been attributed to her underlying anxiety.  Denies recent bowel movement changes.  No black or bloody stool.  Denies abdominal pain.  Has not been vaccinated for COVID-19 and has contracted COVID 3 times most recently in July 2022.  She states she is recovered fine from these episodes of COVID-19.  Denies any pulmonary embolism risk factors such as taking exogenous hormone treatments, recent long distance travel, recent surgery or history of coagulopathy.  No other complaints.    Allergies:  Codeine  Codeine Sulfate  Contrast Dye  Dairy Digestive  Diphenhydramine  Latex  Plum Pulp     Medications:    ALPRAZolam (XANAX) 0.5 MG tablet  buPROPion (WELLBUTRIN XL) 150 MG 24 hr tablet  citalopram (CELEXA) 20 MG tablet  EPINEPHrine (ANY BX GENERIC EQUIV) 0.3 MG/0.3ML injection 2-pack  ondansetron (ZOFRAN ODT) 4 MG ODT tab  propranolol (INDERAL) 10 MG tablet  rizatriptan (MAXALT) 5 MG tablet  amoxicillin-clavulanate (AUGMENTIN) 875-125 MG tablet  hydrOXYzine (ATARAX) 25 MG tablet        Past Medical History:    Past Medical History:   Diagnosis Date     Anxiety      Chest pain      Depressive  disorder      Postpartum depression      POTS (postural orthostatic tachycardia syndrome)      Sinus tachycardia        Patient Active Problem List    Diagnosis Date Noted     Irritable bowel syndrome with diarrhea 08/25/2020     Priority: Medium     Autonomic dysfunction 01/16/2020     Priority: Medium     Added automatically from request for surgery [id number]       Dietary fructose intolerance 01/07/2020     Priority: Medium     Per [facility] breath testing 2019       Hx of anaphylaxis 04/20/2018     Priority: Medium     Post partum depression 12/13/2017     Priority: Medium     Resolved with prozac       CHIDI (generalized anxiety disorder) 12/13/2017     Priority: Medium     Sinus tachycardia 06/14/2016     Priority: Medium     Postural orthostatic tachycardia syndrome 06/14/2016     Priority: Medium     Health Care Home 12/13/2017     Priority: Low     ACP (advance care planning) 12/13/2017     Priority: Low     Advance Care Planning 12/13/2017: ACP Review of Chart / Resources Provided:  Reviewed chart for advance care plan.  Mihir MILLER Walterbebejose has no plan or code status on file. Discussed available resources and provided with information.   Added by Cris Viera              Past Surgical History:    Past Surgical History:   Procedure Laterality Date     DENTAL SURGERY Bilateral      EP STUDY TILT TABLE N/A 2/7/2020    Procedure: EP TILT TABLE;  Surgeon: Clayton Sanders MD;  Location:  [facility]        Family History:    family history includes Anxiety Disorder in her father, mother, and sister; Coronary Artery Disease in her mother; Depression in her father, mother, and sister; Diabetes in her maternal grandmother and paternal grandmother; Hypertension in her father.    Social History:   reports that she has never smoked. She has never used smokeless tobacco. She reports that she does not drink alcohol and does not use drugs.    PCP: Fuentes Green     Review of Systems  All systems  "otherwise negative or per HPI    Physical Exam     Patient Vitals for the past 24 hrs:   BP Temp Temp src Pulse Resp SpO2 Height Weight   11/17/22 1435 132/86 98.2  F (36.8  C) Oral 78 16 98 % 1.702 m (5' 7\") 88 kg (194 lb)   11/17/22 1242 116/84 98.1  F (36.7  C) Oral 85 16 98 % -- --        Physical Exam  SKIN:  Warm, dry.  HEMATOLOGIC/IMMUNOLOGIC/LYMPHATIC:  No pallor.  EYES:  Conjunctivae normal.  CARDIOVASCULAR:  Regular rate and rhythm.  No murmur.  No rub.  RESPIRATORY:  No respiratory distress, breath sounds equal and normal.  GASTROINTESTINAL:  Soft, nontender abdomen.  MUSCULOSKELETAL: Normal body habitus.  NEUROLOGIC:  Alert, conversant.  PSYCHIATRIC:  Normal mood and affect.  No psychotic features.  Calm and cooperative.    Emergency Department Course     ECG  ECG taken at 1246, ECG read at 1255  Normal sinus rhythm with sinus arrhythmia  Cannot rule out anterior infarct, age undetermined  Abnormal ECG    No significant change as compared to prior, dated 1/13/22.  Rate 83 bpm. KS interval 140 ms. QRS duration 88 ms. QT/QTc 374/439 ms. P-R-T axes 73 40 35.     Imaging:    No orders to display        Laboratory:    Labs Ordered and Resulted from Time of ED Arrival to Time of ED Departure   COVID-19 VIRUS (CORONAVIRUS) BY PCR - Normal       Result Value    SARS CoV2 PCR Negative     BASIC METABOLIC PANEL - Normal    Sodium 138      Potassium 3.9      Chloride 109      Carbon Dioxide (CO2) 26      Anion Gap 3      Urea Nitrogen 8      Creatinine 0.62      Calcium 9.2      Glucose 89      GFR Estimate >90     CBC WITH PLATELETS AND DIFFERENTIAL    WBC Count 8.4      RBC Count 4.77      Hemoglobin 14.6      Hematocrit 42.6      MCV 89      MCH 30.6      MCHC 34.3      RDW 12.2      Platelet Count 302      % Neutrophils 73      % Lymphocytes 23      % Monocytes 4      % Eosinophils 0      % Basophils 0      % Immature Granulocytes 0      NRBCs per 100 WBC 0      Absolute Neutrophils 6.1      Absolute " Lymphocytes 1.9      Absolute Monocytes 0.4      Absolute Eosinophils 0.0      Absolute Basophils 0.0      Absolute Immature Granulocytes 0.0      Absolute NRBCs 0.0          Procedures:  None     Interventions:    Medications   acetaminophen (TYLENOL) tablet 650 mg (650 mg Oral Given 11/17/22 1639)   ALPRAZolam (XANAX) tablet 0.25 mg (has no administration in time range)   citalopram (celeXA) tablet 30 mg (has no administration in time range)   QUEtiapine (SEROquel) half-tab 12.5-25 mg (has no administration in time range)   buPROPion (WELLBUTRIN XL) 24 hr tablet 150 mg (has no administration in time range)   rizatriptan (MAXALT) tablet 5 mg (has no administration in time range)   LORazepam (ATIVAN) tablet 1 mg (1 mg Oral Given 11/17/22 1330)        Emergency Department Course:  Past medical records, nursing notes, and vitals reviewed.  I performed an exam of the patient and obtained history, as documented above.  I rechecked the patient. Findings and plan explained to the patient. Patient was transferred to [facility].    Impression & Plan      Medical Decision Making:  This patient presents with increasing anxiety over time which likely is generating her physical symptoms.  Given report of several episodes of near syncope the above evaluation was performed.  Reassuring results.  She is PERC negative so pulmonary exam was not further pursued.  She was administered dose of Ativan which was helpful.  Ultimately transferred to [facility] for further mental health care.    Diagnosis:  1.  Generalized anxiety disorder  2.  Near syncope.    Discharge Medications:  New Prescriptions    No medications on file        11/17/2022   Fuentes Joiner MD Moe, James Thomas, MD  11/17/22 8369

## 2022-11-17 NOTE — ED NOTES
Pt brought over to [facility] with increase anxiety and panic. Pt states she has been dealing with anxiety for a long time and lately it has been increasing. Pt describes her symptoms as not being able to leave the house not being pey[place] to drive feeling very nauseated and fainting. 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 Pt states both her children has recently had RSV and she feels her mental health has taken a toll since taking care of them. Pt does see a psychiatrist and does see a therapist regularly which have been helpful but she feels her medications may not be optimized and she may need a medication change. Pt has tried many different alliterative ways to mange her anxiety but feels that it has not been helpful at this time.

## 2022-11-17 NOTE — ED PROVIDER NOTES
[facility] - Initial Psychiatric Observation Note  [facility] Emergency Department  Observation Initiation Date: Nov 17, 2022    Mihir Waldron MRN: [id number]   Age: 32 year old YOB: 1990     History     Chief Complaint   Patient presents with     [facility]  Mihir Waldron is a 32 year old female with a past history notable for generalized anxiety disorder, major depressive disorder, PTSD, and POTS. Patient presented to the emergency department after experiencing 3 near syncopal episodes earlier today in the setting of worsening anxiety symptoms. Patient was medically evaluated in the emergency department. Labs and ECG reviewed. She was medically cleared for transfer to [facility] for further assessment. Here at [facility], patient describes that after she had been to [facility] in May, she had been feeling much better. Engaging in outpatient therapy and regularly taking her medications. Around September, she noticed that her anxiety symptoms began to worsen. She began to experience more panic attacks with tachycardia. Recently, patient's outpatient psychiatric provider prescribed propranolol to help with panic symptoms. Patient has not noticed much improvement with propranolol and also has not noticed side effects, such as dizziness or lightheadedness. Has been taking about once per day in the morning. She perceives that her physical anxiety symptoms tend to worsen prior to her syncopal episodes. She has been requiring more doses of alprazolam recently. Previously, one dose of alprazolam would mitigate anxiety symptoms throughout the day, but now it does not seem to be as effective. She trialed the 40 mg dose of citalopram after leaving [facility] in May, and noted that the dose may have been too high as she began to feel numb. Her outpatient psychiatric provider decreased the dose to 20 mg and added bupropion. This change was initially helpful. There have been a number of stressors, including her  "children becoming ill with RSV recently. 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 She would like to stay overnight tonight and is looking for a medication change.       Past Medical History  Past Medical History:   Diagnosis Date     Anxiety      Chest pain      Depressive disorder      Postpartum depression      POTS (postural orthostatic tachycardia syndrome)      Sinus tachycardia      Past Surgical History:   Procedure Laterality Date     DENTAL SURGERY Bilateral      EP STUDY TILT TABLE N/A 2/7/2020    Procedure: EP TILT TABLE;  Surgeon: Clayton Sanders MD;  Location:  [facility]     ALPRAZolam (XANAX) 0.5 MG tablet  buPROPion (WELLBUTRIN XL) 150 MG 24 hr tablet  citalopram (CELEXA) 20 MG tablet  EPINEPHrine (ANY BX GENERIC EQUIV) 0.3 MG/0.3ML injection 2-pack  ondansetron (ZOFRAN ODT) 4 MG ODT tab  propranolol (INDERAL) 10 MG tablet  rizatriptan (MAXALT) 5 MG tablet  amoxicillin-clavulanate (AUGMENTIN) 875-125 MG tablet  hydrOXYzine (ATARAX) 25 MG tablet      Allergies   Allergen Reactions     Codeine      Codeine Sulfate GI Disturbance     Contrast Dye Hives     Unsure of name of contrast     Dairy Digestive Cramps, Diarrhea, Fatigue, GI Disturbance, Headache, Nausea and Nausea and Vomiting     Diphenhydramine Other (See Comments)     Pt got warm and passed out with IV benadryl     Latex Hives     Plum Pulp Nausea and Vomiting and Itching     All \"stone fruit\",Itching in throat and throat swelling     Family History  Family History   Problem Relation Age of Onset     Coronary Artery Disease Mother      Depression Mother      Anxiety Disorder Mother      Hypertension Father      Anxiety Disorder Father      Depression Father      Depression Sister      Anxiety Disorder Sister      Diabetes Maternal Grandmother      Diabetes Paternal Grandmother      Cerebrovascular Disease No family hx of      Breast Cancer No family hx of      " "Colon Cancer No family hx of      Social History   Social History     Tobacco Use     Smoking status: Never     Smokeless tobacco: Never   Substance Use Topics     Alcohol use: No     Comment: None     Drug use: No      Past medical history, past surgical history, medications, allergies, family history, and social history were reviewed with the patient. No additional pertinent items.       Review of Systems  A complete review of systems was performed with pertinent positives and negatives noted in the HPI, and all other systems negative.    Physical Examination   BP: 116/84  Pulse: 85  Temp: 98.1  F (36.7  C)  Resp: 16  Height: 170.2 cm (5' 7\")  Weight: 88 kg (194 lb)  SpO2: 98 %    Physical Exam  General: Appears stated age.   Neuro: Alert and fully oriented. Extremities appear to demonstrate normal strength on visual inspection.   Integumentary/Skin: no rash visualized, normal color    Psychiatric Examination   Appearance: awake, alert, adequately groomed, appeared as age stated and casually dressed  Attitude:  cooperative  Eye Contact:  good  Mood:  anxious  Affect:  mood congruent, intensity is normal and constricted mobility  Speech:  clear, coherent and normal prosody  Psychomotor Behavior:  no evidence of tardive dyskinesia, dystonia, or tics  Thought Process:  linear and goal oriented  Associations:  no loose associations  Thought Content:  no evidence of suicidal ideation or homicidal ideation and no evidence of psychotic thought  Insight:  good  Judgement:  intact  Oriented to:  time, person, and place  Attention Span and Concentration:  intact  Recent and Remote Memory:  intact  Language: able to name/identify objects without impairment  Fund of Knowledge: intact with awareness of current and past events    ED Course        Labs Ordered and Resulted from Time of ED Arrival to Time of ED Departure   COVID-19 VIRUS (CORONAVIRUS) BY PCR - Normal       Result Value    SARS CoV2 PCR Negative     BASIC " METABOLIC PANEL - Normal    Sodium 138      Potassium 3.9      Chloride 109      Carbon Dioxide (CO2) 26      Anion Gap 3      Urea Nitrogen 8      Creatinine 0.62      Calcium 9.2      Glucose 89      GFR Estimate >90     CBC WITH PLATELETS AND DIFFERENTIAL    WBC Count 8.4      RBC Count 4.77      Hemoglobin 14.6      Hematocrit 42.6      MCV 89      MCH 30.6      MCHC 34.3      RDW 12.2      Platelet Count 302      % Neutrophils 73      % Lymphocytes 23      % Monocytes 4      % Eosinophils 0      % Basophils 0      % Immature Granulocytes 0      NRBCs per 100 WBC 0      Absolute Neutrophils 6.1      Absolute Lymphocytes 1.9      Absolute Monocytes 0.4      Absolute Eosinophils 0.0      Absolute Basophils 0.0      Absolute Immature Granulocytes 0.0      Absolute NRBCs 0.0         Assessments & Plan (with Medical Decision Making)   Patient presenting with worsening anxiety symptoms, in addition to several near syncopal episodes which appear to be related to her anxiety. Nursing notes reviewed noting no acute issues.     I have reviewed the assessment completed by the [facility].     During the observation period, the patient did not require medications for agitation, and did not require restraints/seclusion for patient and/or provider safety.     The patient was found to have a psychiatric condition that would benefit from an observation stay in the emergency department for further psychiatric stabilization and/or coordination of a safe disposition. The observation plan includes serial assessments of psychiatric condition, potential administration of medications if indicated, further disposition pending the patient's psychiatric course during the monitoring period.     Preliminary diagnosis:    ICD-10-CM    1. Caregiver with fatigue  R53.83       2. Generalized anxiety disorder  F41.1       3. Major depressive disorder, recurrent episode, mild (H)  F33.0       4. POTS (postural orthostatic tachycardia syndrome)  G90.A             Treatment Plan:  - Increase citalopram from 20 mg to 30 mg daily to target anxiety symptoms  - Trial quetiapine 12.5 mg to 25 mg q6h PRN for acute anxiety  - Will hold propranolol for now  - Continue alprazolam 0.25 mg q8h prn for panic symptoms - consider working with outpatient provider to taper and discontinue this medication due to tolerance and lack of efficacy  - Continue outpatient individual therapy and medication management with established providers  - Patient will be registered to observation status overnight tonight. Plan for reevaluation tomorrow.     --  Lev Barriga CNP   [facility] EMERGENCY DEPT  [facility]  11/17/2022        Lev Barriga CNP  11/17/22 1515

## 2022-11-17 NOTE — PLAN OF CARE
Mihir Waldron  November 17, 2022  Plan of Care Hand-off Note     Patient Care Path: Observation    Plan for Care:   Pt reports that she called her psychiatrist office and due to the severity of her mental health and panic attacks that have resulted in passing out, they suggested coming to the ED.  PT reports that she passed out twice today.  Writer at the time of assessment recommends that Pt enter observation status for further therapeutic support and med mgmt.  Pt does appear to be in need of additional providers at this time as she is established with therapy, psychiatry, and marriage counseling.      Critical Safety Issues: passive suicidal ideation without planning or intent    Overview:  This patient is a child/adolescent: No    This patient has additional special visitor precautions: No    Legal Status: Voluntary    Contacts:   [facility]ee [phone]    Psychiatry Consult:  [facility]     Updated Consulting Psychiatric Provider regarding plan of care.    Jeane Stone [facility]

## 2022-11-18 VITALS
WEIGHT: 194 LBS | TEMPERATURE: 98.2 F | DIASTOLIC BLOOD PRESSURE: 79 MMHG | OXYGEN SATURATION: 97 % | SYSTOLIC BLOOD PRESSURE: 127 MMHG | HEART RATE: 111 BPM | HEIGHT: 67 IN | BODY MASS INDEX: 30.45 KG/M2 | RESPIRATION RATE: 18 BRPM

## 2022-11-18 PROCEDURE — 250N000013 HC RX MED GY IP 250 OP 250 PS 637: Performed by: PSYCHIATRY & NEUROLOGY

## 2022-11-18 PROCEDURE — G0378 HOSPITAL OBSERVATION PER HR: HCPCS

## 2022-11-18 PROCEDURE — 99217 PR OBSERVATION CARE DISCHARGE: CPT | Performed by: PSYCHIATRY & NEUROLOGY

## 2022-11-18 PROCEDURE — 250N000013 HC RX MED GY IP 250 OP 250 PS 637: Performed by: NURSE PRACTITIONER

## 2022-11-18 RX ORDER — QUETIAPINE FUMARATE 25 MG/1
25-50 TABLET, FILM COATED ORAL 3 TIMES DAILY PRN
Qty: 45 TABLET | Refills: 0 | Status: SHIPPED | OUTPATIENT
Start: 2022-11-18 | End: 2023-03-30

## 2022-11-18 RX ORDER — CITALOPRAM HYDROBROMIDE 10 MG/1
30 TABLET ORAL DAILY
Qty: 90 TABLET | Refills: 0 | Status: SHIPPED | OUTPATIENT
Start: 2022-11-18 | End: 2023-03-30

## 2022-11-18 RX ADMIN — QUETIAPINE 12.5 MG: 25 TABLET, FILM COATED ORAL at 08:39

## 2022-11-18 RX ADMIN — ACETAMINOPHEN 650 MG: 325 TABLET, FILM COATED ORAL at 12:22

## 2022-11-18 RX ADMIN — CITALOPRAM HYDROBROMIDE 30 MG: 20 TABLET ORAL at 08:40

## 2022-11-18 RX ADMIN — ALPRAZOLAM 0.25 MG: 0.25 TABLET ORAL at 16:36

## 2022-11-18 RX ADMIN — QUETIAPINE 25 MG: 25 TABLET, FILM COATED ORAL at 14:15

## 2022-11-18 RX ADMIN — BUPROPION HYDROCHLORIDE 150 MG: 150 TABLET, FILM COATED, EXTENDED RELEASE ORAL at 08:40

## 2022-11-18 ASSESSMENT — ACTIVITIES OF DAILY LIVING (ADL)
ADLS_ACUITY_SCORE: 35

## 2022-11-18 ASSESSMENT — COLUMBIA-SUICIDE SEVERITY RATING SCALE - C-SSRS
SUICIDE, SINCE LAST CONTACT: NO
6. HAVE YOU EVER DONE ANYTHING, STARTED TO DO ANYTHING, OR PREPARED TO DO ANYTHING TO END YOUR LIFE?: NO
ATTEMPT SINCE LAST CONTACT: NO
1. SINCE LAST CONTACT, HAVE YOU WISHED YOU WERE DEAD OR WISHED YOU COULD GO TO SLEEP AND NOT WAKE UP?: NO
TOTAL  NUMBER OF INTERRUPTED ATTEMPTS SINCE LAST CONTACT: NO
2. HAVE YOU ACTUALLY HAD ANY THOUGHTS OF KILLING YOURSELF?: NO
TOTAL  NUMBER OF ABORTED OR SELF INTERRUPTED ATTEMPTS SINCE LAST CONTACT: NO

## 2022-11-18 NOTE — ED NOTES
Patient agreeable to discharge plan. Discharge instructions reviewed with patient including follow-up care plan. Medications: Seroquel and Citalopram were sent home with patient. Before discharge, patient was experiencing some anxiety and requested PRN Xanax. Writer administered medication. Patient appeared more calm at the time of discharge. Reviewed safety plan and outpatient resources. Denies SI and HI. All belongings that were brought into the hospital have been returned to patient. Escorted off the unit at 1645 accompanied by [facility] staff. Discharged to home via .

## 2022-11-18 NOTE — ED NOTES
[facility] Crisis Reassessment      Mihir Waldron was reassessed at the request of PT's boarding status while on [facility]. Pt was first seen on 11/17; see the initial assessment note for details    Patient Presentation    Initial ED presentation details: Pt reports that she called her psychiatrist office and due to the severity of her mental health and panic attacks that have resulted in passing out, they suggested coming to the ED.  PT reports that she passed out twice today.    Current patient presentation: PT currently presents as calm and cooperative.  PT is alert and oriented.  Pt reports that she slept okay and has been utalizing the new PRN at the lowest doses for a trial.  Pt states that its been helpful and is open to trying the higher doses as needed.  PT reports that she feels ready to discharge home today.  PT denies the need for additional resources such as IOP/PHP.  Pt reports that her anxiety is overall lower today.  Pt states that she has been sleeping and tired.  Writer discussed that she likely is tired due to feeling so overwhelmed with constant panic of late.  Pt states that she recognizes that her mind has been struggling with increased racing thoughts so she has been working on using her coping and ground skills.  Pt denies any safety concerns including suicidal ideation, self harm or homicidal ideation.    Changes observed since initial assessment: Improved      Risk of Harm  [place] Suicide Severity Rating Scale Full Clinical Version: 11/17  Suicidal Ideation  1. Wish to be Dead (Lifetime): Yes  1. Wish to be Dead (Past 1 Month): Yes  2. Non-Specific Active Suicidal Thoughts (Lifetime): Yes  2. Non-Specific Active Suicidal Thoughts (Past 1 Month): No  3. Active Suicidal Ideation with any Methods (Not Plan) Without Intent to Act (Lifetime): Yes  3. Active Suicidal Ideation with any Methods (Not Plan) Without Intent to Act (Past 1 Month): No  4. Active Suicidal Ideation with Some Intent to Act,  Without Specific Plan (Lifetime): Yes  4. Active Suicidal Ideation with Some Intent to Act, Without Specific Plan (Past 1 Month): No  5. Active Suicidal Ideation with Specific Plan and Intent (Lifetime): Yes  5. Active Suicidal Ideation with Specific Plan and Intent (Past 1 Month): No  Intensity of Ideation  Most Severe Ideation Rating (Lifetime): 5  Most Severe Ideation Rating (Past 1 Month): 2  Frequency (Lifetime): Many times each day  Frequency (Past 1 Month): Less than once a week  Duration (Lifetime): More than 8 hours/persistent or continuous  Duration (Past 1 Month): Fleeting, few seconds or minutes  Controllability (Lifetime): Does not attempt to control thoughts  Controllability (Past 1 Month): Easily able to control thoughts  Deterrents (Lifetime): Deterrents definitely did not stop you  Deterrents (Past 1 Month): Deterrents definitely stopped you from attempting suicide  Reasons for Ideation (Lifetime): Equally to get attention, revenge, or a reaction from others and to end/stop the pain  Reasons for Ideation (Past 1 Month): Completely to end or stop the pain (You couldn't go on living with the pain or how you were feeling)  Suicidal Behavior  Actual Attempt (Lifetime): Yes  Total Number of Actual Attempts (Lifetime): 3  Actual Attempt (Past 3 Months): No  Has subject engaged in non-suicidal self-injurious behavior? (Lifetime): No  Interrupted Attempts (Lifetime): Yes  Total Number of Interrupted Attempts (Lifetime): 1  Interrupted Attempts (Past 3 Months): No  Aborted or Self-Interrupted Attempt (Lifetime): Yes  Total Number of Aborted or Self-Interrupted Attempts (Lifetime): 2  Aborted or Self-Interrupted Attempt (Past 3 Months): No  Preparatory Acts or Behavior (Lifetime): No  C-SSRS Risk (Lifetime/Recent)  Calculated C-SSRS Risk Score (Lifetime/Recent): Moderate Risk    [place] Suicide Severity Rating Scale Since Last Contact: 11/18  Suicidal Ideation (Since Last Contact)  1. Wish to be Dead (Since  Last Contact): No  2. Non-Specific Active Suicidal Thoughts (Since Last Contact): No  Suicidal Behavior (Since Last Contact)  Actual Attempt (Since Last Contact): No  Has subject engaged in non-suicidal self-injurious behavior? (Since Last Contact): No  Interrupted Attempts (Since Last Contact): No  Aborted or Self-Interrupted Attempt (Since Last Contact): No  Preparatory Acts or Behavior (Since Last Contact): No  Suicide (Since Last Contact): No  Actual/Potential Lethality (Most Lethal Attempt)  Most Lethal Attempt Date:  (17 y ears old)  Actual Lethality/Medical Damage Code (Most Lethal Attempt): Minor physical damage  Potential Lethality Code (Most Lethal Attempt): Behavior likely to result in injury but not likely to cause death  C-SSRS Risk (Since Last Contact)  Calculated C-SSRS Risk Score (Since Last Contact): No Risk Indicated    Validity of evaluation is impacted by presenting factors during interview. n/a  Comments regarding subjective versus objective responses to Fleming tool: n/a  Environmental or Psychosocial Events: other life stressors  Chronic Risk Factors: chronic health problems   Warning Signs: anxiety, agitation, unable to sleep, sleeping all the time and no reason for living, no sense of purpose in life  Protective Factors: strong bond to family unit, community support, or employment, intact marriage or domestic partnership, responsibilities and duties to others, including pets and children, lives in a responsibly safe and stable environment, good treatment engagement, supportive ongoing medical and mental health care relationships, help seeking, good problem-solving, coping, and conflict resolution skills and sense of belonging  Interpretation of Risk Scoring, Risk Mitigation Interventions and Safety Plan:  n/a      Is the patient experiencing current suicidal ideation: No    Does the patient have thoughts of harming others? No      Does the patient have access to lethal means? Yes - describe  locked in the home     Does the patient engage in non-suicidal self-injurious behavior (NSSI/SIB)? no     Does the patient have thoughts of harming others? No    Mental Status Exam   Affect: Appropriate   Appearance: Appropriate    Attention Span/Concentration: Attentive?    Eye Contact: Engaged   Fund of Knowledge: Appropriate    Language /Speech Content: Fluent   Language /Speech Volume: Normal    Language /Speech Rate/Productions: Normal    Recent Memory: Intact   Remote Memory: Intact   Mood: Anxious    Orientation to Person: Yes    Orientation to Place: Yes   Orientation to Time of Day: Yes    Orientation to Date: Yes    Situation (Do they understand why they are here?): Yes    Psychomotor Behavior: Normal    Thought Content: Clear   Thought Form: Intact       Additional Collateral Information   n/a      Therapeutic Intervention  The following therapeutic methodologies were employed when working with the patient: Establishing rapport, Active listening, Assess dimensions of crisis, Apply solution-focused therapy to address current crisis and Motivational Interviewing. Patient response to intervention: engaged    Diagnosis:   Diagnosis     309.81 (F43.10) Posttraumatic Stress Disorder (includes Posttraumatic Stress Disorder for Children 6 Years and Younger)  With dissociative symptoms   300.02 (F41.1) Generalized Anxiety Disorder - primary   311 (F32.9) Unspecified Depressive Disorder  - by history     Clinical Substantiation of Recommendations  PT denies any current suicidal ideation, intent or planning.  PT denies any self harm. Pt denies any current homicidal ideation, intent or planning.  PT is able to engage in safety planning. Pt appears future and goal oriented.  PT is able to engage in a discussion about their protective factors.  PT does not currently appear to be in need of acute stabilization for overt psychosis, salvador, delusional thinking or paranoia.  PT is appropriate to transition into outpatient  community services at this time.     At the time of discharge, the patient's acute suicide risk was determined to be low due to the following factors: Reduction in the intensity of mood/anxiety symptoms that preceded the admission, denial of suicidal thoughts, denies feeling helpless or helpless, not currently under the influence of alcohol or illicit substances, denies experiencing command hallucinations, no immediate access to firearms. The patient's acute risk could be higher if noncompliant with their treatment plan, medications, follow-up appointments or using illicit substances or alcohol. Protective factors include: social supports, stable housing      Plan:    Disposition  Recommended disposition: Individual Therapy and Medication Management      Reviewed case and recommendations with attending provider. Attending Name: Andish      Attending concurs with disposition: Yes      Patient concurs with disposition: Yes      Final disposition: Individual therapy  and Medication management.         Assessment Details  Total duration spent on the patient case in minutes: .50 hrs     CPT code(s) utilized: 43049 - Psychotherapy (with patient) - 30 (16-37*) min       Jeane Stone [facility], [facility]  Callback: [phone]    Follow up with your current therapist and psychiatrist as needed    Aftercare Plan    Follow up with established providers and supports as scheduled. Continue taking medications as prescribed. Abstain from drugs and alcohol. Utilize your [facility] crisis team as needed. They are available 24/7. Contact information is listed below.     If I am feeling unsafe or I am in a crisis, I will:   Contact my established care providers   Call the National Suicide Prevention Lifeline: 970.987.5759   Go to the nearest emergency room   Call 911     Warning signs that I or other people might notice when a crisis is developing for me: changes to sleep, appetite or mood, increased anger, agitation or  irritability, feeling depressed or hopeless, spending more time alone or talking less, increased crying, decreased productivity, seeing or hearing things that aren't there, thoughts of not wanting to live anymore or of actually killing myself, thoughts of hurting others    Things I am able to do on my own to cope or help me feel better: watching a favorite tv show or movie, listening to music I enjoy, going outside and breathing fresh air, going for a walk or exercising, taking a shower or bath, a cold or hot beverage, a healthy snack, drawing/coloring/painting, journaling, singing or dancing, deep breathing     I can try practicing square breathing when I begin to feel anxious - inhale through the nose for the count of 4 and the first line on the square. Exhale through the mouth for the count of 4 for the second line of the square. Repeat to complete the square. Repeat the square as many times as needed.    I can also use my five senses to practice mindfulness and grounding. What are five things I can see, four things I can hear, three things I can feel, two things I can smell, and one thing I can taste.     Things that I am able to do with others to cope or help me feel better: sometimes just talking or spending time with someone else, sharing a meal or having coffee, watching a movie or playing a game, going for a walk or exercising    I can also use community resources including mental health hotlines, [facility] crisis teams, or apps.     Things I can use or do for distraction: movies/tv, music, reading, games, drawing/coloring/painting or other art, essential oils, exercise, cleaning/organizing, puzzles, crossword puzzles, word search, Sudoku       I can also download a meditation or relaxation phil, like Calm, Headspace, or Insight Timer (all three offer a free version)    A great website resource is Change to Chill with active coping skills    Changes I can make to support my mental health and wellness: Attend  "scheduled mental health therapy and psychiatric appointments. Take my medications as prescribed. Maintain a daily schedule/routine. Abstain from all mood altering substances, including drugs, alcohol, or medications not currently prescribed to me. Implement a self-care routine.      People in my life that I can ask for help: friends or family, trusted teachers/staff/colleagues, trusted members of my community or place of Zoroastrian, mental health crisis lines, or 911    Your [facility] has a mental health crisis team you can call 24/7: [facility], [phone]    Other things that are important when I m in crisis: to remember that the feelings I am having right now are temporary, and it won't feel like this forever, and that it is okay and important to ask for help    Crisis Lines  Crisis Text Line  Text 835250  You will be connected with a trained live crisis counselor to provide support.    Por chanelle, texto  HUMAIRA a 986029 o texto a 442-AYUDAME en WhatsApp    National Hope Line  1.800.SUICIDE [3691073]      Community Resources  Fast Tracker  Linking people to mental health and substance use disorder resources  fasttrackermn.org     [place] Mental Health Warm Line  Peer to peer support  Monday thru Saturday, 12 pm to 10 pm  867.003.2210 or 0.031.599.5892  Text \"Support\" to 17432    National [place] on Mental Illness ([place]MAN)  042.927.5272 or 1.888.NORMAN.HELPS      Mental Health Apps  My3  https://myMillicanpp.org/    VirtualHopeBox  [email or web address]/virtual-hope-box/      Additional Information  Today you were seen by a licensed mental health professional through Triage and Transition services, [facility] ([facility])  for a crisis assessment in the Emergency Department at [facility].  It is recommended that you follow up with your established providers (psychiatrist, mental health therapist, and/or primary care doctor - as relevant) as soon as possible. Coordinators from [facility] " will be calling you in the next 24-48 hours to ensure that you have the resources you need.  You can also contact [facility] coordinators directly at [phone]. You may have been scheduled for or offered an appointment with a mental health provider. [facility] maintains an extensive network of licensed behavioral health providers to connect patients with the services they need.  We do not charge providers a fee to participate in our referral network.  We match patients with providers based on a patient's specific needs, insurance coverage, and location.  Our first effort will be to refer you to a provider within your care system, and will utilize providers outside your care system as needed.

## 2022-11-18 NOTE — DISCHARGE INSTRUCTIONS
Follow up with your current therapist and psychiatrist as needed    Aftercare Plan    Follow up with established providers and supports as scheduled. Continue taking medications as prescribed. Abstain from drugs and alcohol. Utilize your [facility] mental health crisis team as needed. They are available 24/7. Contact information is listed below.     If I am feeling unsafe or I am in a crisis, I will:   Contact my established care providers   Call the [place] Suicide Prevention Lifeline: [phone]   Go to the nearest emergency room   Call 913     Warning signs that I or other people might notice when a crisis is developing for me: changes to sleep, appetite or mood, increased anger, agitation or irritability, feeling depressed or hopeless, spending more time alone or talking less, increased crying, decreased productivity, seeing or hearing things that aren't there, thoughts of not wanting to live anymore or of actually killing myself, thoughts of hurting others    Things I am able to do on my own to cope or help me feel better: watching a favorite tv show or movie, listening to music I enjoy, going outside and breathing fresh air, going for a walk or exercising, taking a shower or bath, a cold or hot beverage, a healthy snack, drawing/coloring/painting, journaling, singing or dancing, deep breathing     I can try practicing square breathing when I begin to feel anxious - inhale through the nose for the count of 4 and the first line on the square. Exhale through the mouth for the count of 4 for the second line of the square. Repeat to complete the square. Repeat the square as many times as needed.    I can also use my five senses to practice mindfulness and grounding. What are five things I can see, four things I can hear, three things I can feel, two things I can smell, and one thing I can taste.     Things that I am able to do with others to cope or help me feel better: sometimes just talking or spending time with  someone else, sharing a meal or having coffee, watching a movie or playing a game, going for a walk or exercising    I can also use community resources including mental health hotlines, county crisis teams, or apps.     Things I can use or do for distraction: movies/tv, music, reading, games, drawing/coloring/painting or other art, essential oils, exercise, cleaning/organizing, puzzles, crossword puzzles, word search, Sudoku       I can also download a meditation or relaxation phil, like Calm, Headspace, or Insight Timer (all three offer a free version)    A great website resource is Change to Chill with active coping skills    Changes I can make to support my mental health and wellness: Attend scheduled mental health therapy and psychiatric appointments. Take my medications as prescribed. Maintain a daily schedule/routine. Abstain from all mood altering substances, including drugs, alcohol, or medications not currently prescribed to me. Implement a self-care routine.      People in my life that I can ask for help: friends or family, trusted teachers/staff/colleagues, trusted members of my community or place of Hoahaoism, mental health crisis lines, or 911    Your [facility] has a mental health crisis team you can call 24/7: [facility], [phone]    Other things that are important when I m in crisis: to remember that the feelings I am having right now are temporary, and it won't feel like this forever, and that it is okay and important to ask for help    Crisis Lines  Crisis Text Line  Text 987752  You will be connected with a trained live crisis counselor to provide support.    Por espanol, texto  HUMAIRA a 727978 o texto a 442-AYUDAME en [facility] Hope Line  1.800.SUICIDE [8225917]      Community Resources  Fast Tracker  Linking people to mental health and substance use disorder resources  fasttrackermn.org     [place] Mental Health Warm Line  Peer to peer support  Monday thru Saturday, 12 pm to  "10 pm  114.284.0053 or 9.732.532.7633  Text \"Support\" to 76748    National [place] on Mental Illness ([place])  816.848.8899 or 1.888.NORMAN.HELPS      Mental Health Apps  My3  [email or web address] eCommercepp.org/    VirtualHopeBox  [email or web address] [email or web address]/virtual-hope-box/      Additional Information  Today you were seen by a licensed mental health professional through Triage and Transition services, [facility] ([facility])  for a crisis assessment in the Emergency Department at [facility].  It is recommended that you follow up with your established providers (psychiatrist, mental health therapist, and/or primary care doctor - as relevant) as soon as possible. Coordinators from [facility] will be calling you in the next 24-48 hours to ensure that you have the resources you need.  You can also contact [facility] coordinators directly at [phone]. You may have been scheduled for or offered an appointment with a mental health provider. [facility] maintains an extensive network of licensed behavioral health providers to connect patients with the services they need.  We do not charge providers a fee to participate in our referral network.  We match patients with providers based on a patient's specific needs, insurance coverage, and location.  Our first effort will be to refer you to a provider within your care system, and will utilize providers outside your care system as needed.        "

## 2022-11-18 NOTE — ED NOTES
Pt this morning was able to sleep last night and feels a slightly better this morning . Pt remains anxious and was asking for some PRN Seroquel.Pt talked about how difficult it is to manage her daughter and how it is exhausting for her. Pt states she has not been taking care of herself and eating and sleeping poorly. Pt talked about how she is looking for PCA services for her daughter however she has yet to find someone that it is going to be a good fit.  Pt spoke about how she needs to take time for herself and staff stressed the importance of getting self care. Pt expressed interest in the idea of DBT to help work on some skills to build resilience and distress. Pt is worried about going back home and feeling same way but is hopeful that the medication change and some rest will help. Pt denies SI.Plan to have reassessment form [facility] and psychiatry. Nursing to continue to monitor.

## 2022-11-18 NOTE — ED PROVIDER NOTES
"EmPATH Unit - Psychiatric Observation Discharge Summary  [facility] Emergency Department  Discharge Date: 11/18/2022    Mihir Waldron MRN: [id number]   Age: 32 year old YOB: 1990     Brief HPI & Initial ED Course     Chief Complaint   Patient presents with     EMPATH     HPI  Mihir Waldron is a 32 year old female with history notable for major depressive disorder and generalized anxiety disorder with panic attacks.  She is currently under observation status on the EmPATH unit following her initial assessment with [facility] yesterday, leading to a dose increase of citalopram and the addition of Seroquel to better aid with mood and anxiety management.  Overnight, there were no acute issues.  On reassessment today, the patient notes slight reduction in symptoms of anxiety.  She slept very well last night.  She took a dose of Seroquel 12.5 mg this morning and has not noticed significant reduction of her anxiety.  She reviewed with me symptoms she has been struggling with on an outpatient basis.  She attempts to minimize her usage of Xanax as much as possible however notes an average of once a day to help mitigate her anxiety.  Despite using Xanax once a day, anxiety remains moderate to severe throughout the remainder of the day.  She is tolerating her medication adjustments without side effects.  She denied suicidal and homicidal thoughts.  She interprets readiness to transition back home today.        Physical Examination   BP: 132/86  Pulse: 78  Temp: 98.2  F (36.8  C)  Resp: 16  Height: 170.2 cm (5' 7\")  Weight: 88 kg (194 lb)  SpO2: 98 %    Physical Exam  General: Appears stated age.   Neuro: Alert and fully oriented. Extremities appear to demonstrate normal strength on visual inspection.   Integumentary/Skin: no rash visualized, normal color    Psychiatric Examination   Appearance: awake, alert  Attitude:  cooperative  Eye Contact:  fair  Mood:  anxious and better  Affect:  appropriate and in " normal range  Speech:  clear, coherent  Psychomotor Behavior:  no evidence of tardive dyskinesia, dystonia, or tics  Thought Process:  logical and linear  Associations:  no loose associations  Thought Content:  no evidence of suicidal ideation or homicidal ideation and no evidence of psychotic thought  Insight:  fair  Judgement:  intact  Oriented to:  time, person, and place  Attention Span and Concentration:  fair  Recent and Remote Memory:  fair  Language: able to name/identify objects without impairment  Fund of Knowledge: intact with awareness of current and past events    Results        Labs Ordered and Resulted from Time of ED Arrival to Time of ED Departure   COVID-19 VIRUS (CORONAVIRUS) BY PCR - Normal       Result Value    SARS CoV2 PCR Negative     BASIC METABOLIC PANEL - Normal    Sodium 138      Potassium 3.9      Chloride 109      Carbon Dioxide (CO2) 26      Anion Gap 3      Urea Nitrogen 8      Creatinine 0.62      Calcium 9.2      Glucose 89      GFR Estimate >90     CBC WITH PLATELETS AND DIFFERENTIAL    WBC Count 8.4      RBC Count 4.77      Hemoglobin 14.6      Hematocrit 42.6      MCV 89      MCH 30.6      MCHC 34.3      RDW 12.2      Platelet Count 302      % Neutrophils 73      % Lymphocytes 23      % Monocytes 4      % Eosinophils 0      % Basophils 0      % Immature Granulocytes 0      NRBCs per 100 WBC 0      Absolute Neutrophils 6.1      Absolute Lymphocytes 1.9      Absolute Monocytes 0.4      Absolute Eosinophils 0.0      Absolute Basophils 0.0      Absolute Immature Granulocytes 0.0      Absolute NRBCs 0.0         Observation Course   The patient was found to have a psychiatric condition that would benefit from an observation stay in the emergency department for further psychiatric stabilization and/or coordination of a safe disposition. The plan upon observation admission included serial assessments of psychiatric condition, potential administration of medications if indicated, further  disposition pending the patient's psychiatric course during the monitoring period.     Serial assessments of the patient's psychiatric condition were performed. Nursing notes were reviewed. During the observation period, the patient did not require medications for agitation, and did not require restraints/seclusion for patient and/or provider safety.     After a period of working with the treatment team on the EmPATH unit, the patient's mental state improved to allow a safe transition to outpatient care. After counseling on the diagnosis, work-up, and treatment plan, the patient was discharged. Close follow-up with a psychiatrist and/or therapist was recommended and community psychiatric resources were provided. Patient is to return to the ED if any urgent or potentially life-threatening concerns.     Discharge Diagnoses:   Final diagnoses:   Caregiver with fatigue   Generalized anxiety disorder   Major depressive disorder, recurrent episode, mild (H)   POTS (postural orthostatic tachycardia syndrome)       Treatment Plan:  -Continue Celexa at the higher dose of 30 mg daily targeting more effective mood and anxiety management.  Depending on her response over the next 1 to 2 weeks, optimizing the dose at 40 mg daily may be considered.  -Increase Seroquel from 12.5 mg to allow a range of 25-50 mg 3 times a day as needed for reduction of severe anxiety.  This will further help the patient minimize usage of Xanax while achieving adequate reduction of anxiety throughout the day.  Continued usage may also be advantageous in augmenting her antidepressant medication to address the targeted symptoms.  Risks and benefits of the medication were reviewed with the patient again today.  -Resume outpatient medication management appointments  -Discharge home today.      At the time of discharge, the patient's acute suicide risk was determined to be low due to the following factors: Reduction in the intensity of mood/anxiety  symptoms that preceded the admission, denial of suicidal thoughts, denies feeling helpless or helpless, not currently under the influence of alcohol or illicit substances, denies experiencing command hallucinations, no immediate access to firearms. The patient's acute risk could be higher if noncompliant with their treatment plan, medications, follow-up appointments or using illicit substances or alcohol. Protective factors include: social supports, stable housing    --  Vinnie Garza MD  [facility] EMERGENCY DEPT  EmPATH Unit  11/18/2022      Vinnie Garza MD  11/18/22 [id number]

## 2023-02-08 ENCOUNTER — HOSPITAL ENCOUNTER (EMERGENCY)
Facility: CLINIC | Age: 33
Discharge: HOME | End: 2023-02-08
Attending: EMERGENCY MEDICINE | Admitting: EMERGENCY MEDICINE
Payer: COMMERCIAL

## 2023-02-08 VITALS
HEART RATE: 77 BPM | TEMPERATURE: 97.9 F | BODY MASS INDEX: 30.54 KG/M2 | RESPIRATION RATE: 18 BRPM | DIASTOLIC BLOOD PRESSURE: 62 MMHG | SYSTOLIC BLOOD PRESSURE: 102 MMHG | OXYGEN SATURATION: 98 % | WEIGHT: 195 LBS

## 2023-02-08 DIAGNOSIS — J02.0 STREPTOCOCCAL PHARYNGITIS: ICD-10-CM

## 2023-02-08 LAB
ANION GAP SERPL CALCULATED.3IONS-SCNC: 9 MMOL/L (ref 7–15)
APPEARANCE CSF: CLEAR
BASOPHILS # BLD AUTO: 0 10E3/UL (ref 0–0.2)
BASOPHILS NFR BLD AUTO: 0 %
BUN SERPL-MCNC: 9.6 MG/DL (ref 6–20)
CALCIUM SERPL-MCNC: 8.7 MG/DL (ref 8.6–10)
CHLORIDE SERPL-SCNC: 107 MMOL/L (ref 98–107)
COLOR CSF: COLORLESS
CREAT SERPL-MCNC: 0.5 MG/DL (ref 0.51–0.95)
DEPRECATED HCO3 PLAS-SCNC: 24 MMOL/L (ref 22–29)
DEPRECATED S PYO AG THROAT QL EIA: POSITIVE
EOSINOPHIL # BLD AUTO: 0.1 10E3/UL (ref 0–0.7)
EOSINOPHIL NFR BLD AUTO: 1 %
ERYTHROCYTE [DISTWIDTH] IN BLOOD BY AUTOMATED COUNT: 12.4 % (ref 10–15)
FLUAV RNA SPEC QL NAA+PROBE: NEGATIVE
FLUBV RNA RESP QL NAA+PROBE: NEGATIVE
GFR SERPL CREATININE-BSD FRML MDRD: >90 ML/MIN/1.73M2
GLUCOSE CSF-MCNC: 55 MG/DL (ref 40–70)
GLUCOSE SERPL-MCNC: 94 MG/DL (ref 70–99)
HCT VFR BLD AUTO: 40 % (ref 35–47)
HGB BLD-MCNC: 13.3 G/DL (ref 11.7–15.7)
HOLD SPECIMEN: NORMAL
HOLD SPECIMEN: NORMAL
IMM GRANULOCYTES # BLD: 0.1 10E3/UL
IMM GRANULOCYTES NFR BLD: 1 %
LYMPHOCYTES # BLD AUTO: 1.1 10E3/UL (ref 0.8–5.3)
LYMPHOCYTES NFR BLD AUTO: 9 %
MCH RBC QN AUTO: 30.1 PG (ref 26.5–33)
MCHC RBC AUTO-ENTMCNC: 33.3 G/DL (ref 31.5–36.5)
MCV RBC AUTO: 91 FL (ref 78–100)
MONOCYTES # BLD AUTO: 0.6 10E3/UL (ref 0–1.3)
MONOCYTES NFR BLD AUTO: 5 %
NEUTROPHILS # BLD AUTO: 10.6 10E3/UL (ref 1.6–8.3)
NEUTROPHILS NFR BLD AUTO: 84 %
NRBC # BLD AUTO: 0 10E3/UL
NRBC BLD AUTO-RTO: 0 /100
PLATELET # BLD AUTO: 227 10E3/UL (ref 150–450)
POTASSIUM SERPL-SCNC: 3.5 MMOL/L (ref 3.4–5.3)
PROT CSF-MCNC: 26.4 MG/DL (ref 15–45)
RBC # BLD AUTO: 4.42 10E6/UL (ref 3.8–5.2)
RBC # CSF MANUAL: 1 /UL (ref 0–2)
RSV RNA SPEC NAA+PROBE: NEGATIVE
SARS-COV-2 RNA RESP QL NAA+PROBE: NEGATIVE
SODIUM SERPL-SCNC: 140 MMOL/L (ref 136–145)
TUBE # CSF: 4
WBC # BLD AUTO: 12.5 10E3/UL (ref 4–11)
WBC # CSF MANUAL: 1 /UL (ref 0–5)

## 2023-02-08 PROCEDURE — 80048 BASIC METABOLIC PNL TOTAL CA: CPT | Performed by: EMERGENCY MEDICINE

## 2023-02-08 PROCEDURE — 84157 ASSAY OF PROTEIN OTHER: CPT | Performed by: EMERGENCY MEDICINE

## 2023-02-08 PROCEDURE — 87637 SARSCOV2&INF A&B&RSV AMP PRB: CPT | Performed by: EMERGENCY MEDICINE

## 2023-02-08 PROCEDURE — 250N000011 HC RX IP 250 OP 636: Performed by: EMERGENCY MEDICINE

## 2023-02-08 PROCEDURE — 85025 COMPLETE CBC W/AUTO DIFF WBC: CPT | Performed by: EMERGENCY MEDICINE

## 2023-02-08 PROCEDURE — 87880 STREP A ASSAY W/OPTIC: CPT | Performed by: EMERGENCY MEDICINE

## 2023-02-08 PROCEDURE — 96375 TX/PRO/DX INJ NEW DRUG ADDON: CPT

## 2023-02-08 PROCEDURE — C9803 HOPD COVID-19 SPEC COLLECT: HCPCS

## 2023-02-08 PROCEDURE — 96361 HYDRATE IV INFUSION ADD-ON: CPT | Mod: 59

## 2023-02-08 PROCEDURE — 82945 GLUCOSE OTHER FLUID: CPT | Performed by: EMERGENCY MEDICINE

## 2023-02-08 PROCEDURE — 87070 CULTURE OTHR SPECIMN AEROBIC: CPT | Performed by: EMERGENCY MEDICINE

## 2023-02-08 PROCEDURE — 89050 BODY FLUID CELL COUNT: CPT | Performed by: EMERGENCY MEDICINE

## 2023-02-08 PROCEDURE — 99285 EMERGENCY DEPT VISIT HI MDM: CPT | Mod: CS,25

## 2023-02-08 PROCEDURE — 96365 THER/PROPH/DIAG IV INF INIT: CPT

## 2023-02-08 PROCEDURE — 62270 DX LMBR SPI PNXR: CPT

## 2023-02-08 PROCEDURE — 85004 AUTOMATED DIFF WBC COUNT: CPT | Performed by: EMERGENCY MEDICINE

## 2023-02-08 PROCEDURE — 258N000003 HC RX IP 258 OP 636: Performed by: EMERGENCY MEDICINE

## 2023-02-08 PROCEDURE — 87205 SMEAR GRAM STAIN: CPT | Performed by: EMERGENCY MEDICINE

## 2023-02-08 PROCEDURE — 36415 COLL VENOUS BLD VENIPUNCTURE: CPT | Performed by: EMERGENCY MEDICINE

## 2023-02-08 PROCEDURE — 250N000013 HC RX MED GY IP 250 OP 250 PS 637: Performed by: EMERGENCY MEDICINE

## 2023-02-08 RX ORDER — CEFTRIAXONE 2 G/1
2 INJECTION, POWDER, FOR SOLUTION INTRAMUSCULAR; INTRAVENOUS ONCE
Status: COMPLETED | OUTPATIENT
Start: 2023-02-08 | End: 2023-02-08

## 2023-02-08 RX ORDER — ACETAMINOPHEN 500 MG
1000 TABLET ORAL ONCE
Status: COMPLETED | OUTPATIENT
Start: 2023-02-08 | End: 2023-02-08

## 2023-02-08 RX ORDER — DEXAMETHASONE SODIUM PHOSPHATE 10 MG/ML
10 INJECTION, SOLUTION INTRAMUSCULAR; INTRAVENOUS ONCE
Status: COMPLETED | OUTPATIENT
Start: 2023-02-08 | End: 2023-02-08

## 2023-02-08 RX ADMIN — CEFTRIAXONE 2 G: 2 INJECTION, POWDER, FOR SOLUTION INTRAMUSCULAR; INTRAVENOUS at 14:58

## 2023-02-08 RX ADMIN — SODIUM CHLORIDE 500 ML: 9 INJECTION, SOLUTION INTRAVENOUS at 07:51

## 2023-02-08 RX ADMIN — DEXAMETHASONE SODIUM PHOSPHATE 10 MG: 10 INJECTION, SOLUTION INTRAMUSCULAR; INTRAVENOUS at 13:47

## 2023-02-08 RX ADMIN — ACETAMINOPHEN 1000 MG: 500 TABLET ORAL at 11:11

## 2023-02-08 ASSESSMENT — ACTIVITIES OF DAILY LIVING (ADL)
ADLS_ACUITY_SCORE: 35
ADLS_ACUITY_SCORE: 33

## 2023-02-08 NOTE — ED TRIAGE NOTES
Brought in by ambulance, dizzy sleeped a lot hx of pots .      Triage Assessment     Row Name 02/08/23 0704       Triage Assessment (Adult)    Airway WDL WDL       Respiratory WDL    Respiratory WDL WDL       Skin Circulation/Temperature WDL    Skin Circulation/Temperature WDL WDL       Cardiac WDL    Cardiac WDL WDL       Peripheral/Neurovascular WDL    Peripheral Neurovascular WDL WDL       Cognitive/Neuro/Behavioral WDL    Cognitive/Neuro/Behavioral WDL WDL

## 2023-02-08 NOTE — ED PROVIDER NOTES
"    History     Chief Complaint:  Dizziness       HPI   Mihir Waldron is a 32 year old female with a history of depression who presents with sore throat.  Patient states that symptoms started yesterday, around 11 PM.  She felt \"off\", and slept for about 19 hours yesterday.  She describes having the sweats on and off.  She also describes feeling as if she has \"brain fog\".  She does not have a headache, but does have stiffness and pain involving the back of her neck.  She denies generalized myalgias, but legs feel sore.  She has a history of migraines, and notices that the lights have been bothering her, but does not have her typical headache.  She has no known ill contacts.  No vomiting.  No numbness or weakness.  She has been taking over-the-counter medications for symptoms without relief.      Independent Historian:    None    Review of External Notes: visit from 11/18/22     ROS:  Review of Systems    Allergies:  Codeine  Codeine Sulfate  Contrast Dye  Dairy Digestive  Diphenhydramine  Latex  Plum Pulp     Medications:    ALPRAZolam (XANAX) 0.5 MG tablet  amoxicillin-clavulanate (AUGMENTIN) 875-125 MG tablet  buPROPion (WELLBUTRIN XL) 150 MG 24 hr tablet  citalopram (CELEXA) 10 MG tablet  EPINEPHrine (ANY BX GENERIC EQUIV) 0.3 MG/0.3ML injection 2-pack  ondansetron (ZOFRAN ODT) 4 MG ODT tab  propranolol (INDERAL) 10 MG tablet  QUEtiapine (SEROQUEL) 25 MG tablet  rizatriptan (MAXALT) 5 MG tablet        Past Medical History:    Past Medical History:   Diagnosis Date     Anxiety      Chest pain      Depressive disorder      Postpartum depression      POTS (postural orthostatic tachycardia syndrome)      Sinus tachycardia        Past Surgical History:    Past Surgical History:   Procedure Laterality Date     DENTAL SURGERY Bilateral      EP STUDY TILT TABLE N/A 2/7/2020    Procedure: EP TILT TABLE;  Surgeon: Clayton Sanders MD;  Location:  [facility]        Family History:    family history " includes Anxiety Disorder in her father, mother, and sister; Coronary Artery Disease in her mother; Depression in her father, mother, and sister; Diabetes in her maternal grandmother and paternal grandmother; Hypertension in her father.    Social History:   reports that she has never smoked. She has never used smokeless tobacco. She reports that she does not drink alcohol and does not use drugs.  PCP: Fuentes Green     Physical Exam     Patient Vitals for the past 24 hrs:   BP Temp Temp src Pulse Resp SpO2 Weight   02/08/23 0707 (!) 138/92 98.1  F (36.7  C) Oral 96 18 98 % 89.8 kg (197 lb 15.6 oz)        Physical Exam  General: Alert, seated on the chair  HENT: mucous membranes moist.  Normal-appearing posterior oropharynx, no erythema, normal tonsils.  Normal voice.  Neck: Very limited flexion and extension in the neck secondary to pain.  CV: regular rate, regular rhythm  Resp: normal effort, clear throughout, no crackles or wheezing  GI: abdomen soft and nontender, no guarding  MSK: no bony tenderness  Skin: appropriately warm and dry, no rash.  Extremities: no edema, calves non-tender  Neuro: alert, clear speech, oriented.  Pupils equal round reactive to light.  Extraocular movements intact.  Symmetric smile.  Strength 5 out of 5 bilateral upper and lower extremities.  Plantar sensation grossly intact.  Psych: normal mood and affect      Emergency Department Course       Laboratory:  Labs Ordered and Resulted from Time of ED Arrival to Time of ED Departure   BASIC METABOLIC PANEL - Abnormal       Result Value    Sodium 140      Potassium 3.5      Chloride 107      Carbon Dioxide (CO2) 24      Anion Gap 9      Urea Nitrogen 9.6      Creatinine 0.50 (*)     Calcium 8.7      Glucose 94      GFR Estimate >90     CBC WITH PLATELETS AND DIFFERENTIAL - Abnormal    WBC Count 12.5 (*)     RBC Count 4.42      Hemoglobin 13.3      Hematocrit 40.0      MCV 91      MCH 30.1      MCHC 33.3      RDW 12.4       Platelet Count 227      % Neutrophils 84      % Lymphocytes 9      % Monocytes 5      % Eosinophils 1      % Basophils 0      % Immature Granulocytes 1      NRBCs per 100 WBC 0      Absolute Neutrophils 10.6 (*)     Absolute Lymphocytes 1.1      Absolute Monocytes 0.6      Absolute Eosinophils 0.1      Absolute Basophils 0.0      Absolute Immature Granulocytes 0.1      Absolute NRBCs 0.0     STREPTOCOCCUS A RAPID SCREEN W REFELX TO PCR - Abnormal    Group A Strep antigen Positive (*)    INFLUENZA A/B & SARS-COV2 PCR MULTIPLEX - Normal    Influenza A PCR Negative      Influenza B PCR Negative      RSV PCR Negative      SARS CoV2 PCR Negative     GLUCOSE CSF   PROTEIN TOTAL CSF   CELL COUNT CSF   AEROBIC BACTERIAL CULTURE ROUTINE   CELL COUNT WITH DIFFERENTIAL CSF        [facility]    -Lumbar Puncture    Date/Time: 2/8/2023 2:38 PM  Performed by: Shannon Phelan MD  Authorized by: Shannon Phelan MD     Risks, benefits and alternatives discussed.      PRE-PROCEDURE DETAILS:     Procedure purpose:  Diagnostic    ANESTHESIA (see MAR for exact dosages):     Anesthesia method:  Local infiltration    Local anesthetic:  Lidocaine 1% w/o epi      PROCEDURE DETAILS:     Lumbar space:  L4-L5 interspace    Patient position:  L lateral decubitus    Needle gauge:  20    Needle type:  Nieves point    Needle length (in):  3.5    Ultrasound guidance: no      Number of attempts:  1    Fluid appearance:  Blood-tinged    Tubes of fluid:  1    POST-PROCEDURE:     Puncture site:  Adhesive bandage applied        PROCEDURE  Describe Procedure: We attempted to do the procedure sitting, however, patient had a syncopal event.  She had preceding symptoms including nausea and vomiting.  She felt better after lying down.  We attempted sitting up again, but again she felt too weak to do so.  Patient Tolerance:  Patient tolerated the procedure well with no immediate complications      Emergency Department Course &  Assessments:    Interventions:  Medications   0.9% sodium chloride BOLUS (0 mLs Intravenous Stopped 2/8/23 1108)   acetaminophen (TYLENOL) tablet 1,000 mg (1,000 mg Oral Given 2/8/23 1111)   dexamethasone PF (DECADRON) injection 10 mg (10 mg Intravenous Given 2/8/23 1347)   cefTRIAXone (ROCEPHIN) 2 g vial to attach to  ml bag for ADULTS or NS 50 ml bag for PEDS (0 g Intravenous Stopped 2/8/23 1520)        Social Determinants of Health affecting care:  none     Assessments:  2:55 PM  On recheck, the patient's nausea has resolved.  She is still feeling slightly weak, the lightheadedness is improved as well.    Disposition:  The patient was discharged to home.     Impression & Plan      Medical Decision Making:  Mihir Waldron is a 32 year old female who presents today with pharyngitis and neck stiffness.  On exam, she is afebrile, neurologically intact.  Upon my evaluation, strep test did already returned, and was positive.  Given symptoms of cognitive dysfunction and neck stiffness, lumbar puncture was performed.  She did have a syncopal episode with this, but now is asymptomatic.  LP results are negative.  Patient is feeling improved.  Rocephin appears to be adequate for single-dose treatment of strep pharyngitis.  Return to the ED for increase sore throat, difficulty tolerating oral fluids, persistent fever, other concerns.    Diagnosis:    ICD-10-CM    1. Streptococcal pharyngitis  J02.0              2/8/2023   MD Zhane Waldron Tracy Lynn, MD  02/09/23 5084

## 2023-02-11 ENCOUNTER — HOSPITAL ENCOUNTER (EMERGENCY)
Facility: CLINIC | Age: 33
Discharge: HOME | End: 2023-02-11
Attending: EMERGENCY MEDICINE | Admitting: EMERGENCY MEDICINE
Payer: COMMERCIAL

## 2023-02-11 VITALS
BODY MASS INDEX: 30.61 KG/M2 | RESPIRATION RATE: 16 BRPM | SYSTOLIC BLOOD PRESSURE: 115 MMHG | HEIGHT: 67 IN | OXYGEN SATURATION: 96 % | WEIGHT: 195 LBS | DIASTOLIC BLOOD PRESSURE: 65 MMHG | TEMPERATURE: 98.5 F | HEART RATE: 68 BPM

## 2023-02-11 DIAGNOSIS — J02.0 STREPTOCOCCAL PHARYNGITIS: ICD-10-CM

## 2023-02-11 DIAGNOSIS — G97.1 POST LUMBAR PUNCTURE HEADACHE: ICD-10-CM

## 2023-02-11 LAB
ANION GAP SERPL CALCULATED.3IONS-SCNC: 9 MMOL/L (ref 7–15)
ATRIAL RATE - MUSE: 62 BPM
BASOPHILS # BLD AUTO: 0 10E3/UL (ref 0–0.2)
BASOPHILS NFR BLD AUTO: 0 %
BUN SERPL-MCNC: 7 MG/DL (ref 6–20)
CALCIUM SERPL-MCNC: 8.9 MG/DL (ref 8.6–10)
CHLORIDE SERPL-SCNC: 108 MMOL/L (ref 98–107)
CREAT SERPL-MCNC: 0.5 MG/DL (ref 0.51–0.95)
DEPRECATED HCO3 PLAS-SCNC: 24 MMOL/L (ref 22–29)
DIASTOLIC BLOOD PRESSURE - MUSE: NORMAL MMHG
EOSINOPHIL # BLD AUTO: 0.2 10E3/UL (ref 0–0.7)
EOSINOPHIL NFR BLD AUTO: 2 %
ERYTHROCYTE [DISTWIDTH] IN BLOOD BY AUTOMATED COUNT: 12 % (ref 10–15)
FLUAV RNA SPEC QL NAA+PROBE: NEGATIVE
FLUBV RNA RESP QL NAA+PROBE: NEGATIVE
GFR SERPL CREATININE-BSD FRML MDRD: >90 ML/MIN/1.73M2
GLUCOSE SERPL-MCNC: 85 MG/DL (ref 70–99)
HCG SER QL IA.RAPID: NORMAL
HCT VFR BLD AUTO: 40.6 % (ref 35–47)
HGB BLD-MCNC: 13.3 G/DL (ref 11.7–15.7)
IMM GRANULOCYTES # BLD: 0 10E3/UL
IMM GRANULOCYTES NFR BLD: 0 %
INTERPRETATION ECG - MUSE: NORMAL
LYMPHOCYTES # BLD AUTO: 1.8 10E3/UL (ref 0.8–5.3)
LYMPHOCYTES NFR BLD AUTO: 18 %
MCH RBC QN AUTO: 29.5 PG (ref 26.5–33)
MCHC RBC AUTO-ENTMCNC: 32.8 G/DL (ref 31.5–36.5)
MCV RBC AUTO: 90 FL (ref 78–100)
MONOCYTES # BLD AUTO: 0.7 10E3/UL (ref 0–1.3)
MONOCYTES NFR BLD AUTO: 7 %
NEUTROPHILS # BLD AUTO: 7 10E3/UL (ref 1.6–8.3)
NEUTROPHILS NFR BLD AUTO: 73 %
NRBC # BLD AUTO: 0 10E3/UL
NRBC BLD AUTO-RTO: 0 /100
P AXIS - MUSE: 27 DEGREES
PLATELET # BLD AUTO: 252 10E3/UL (ref 150–450)
POTASSIUM SERPL-SCNC: 3.8 MMOL/L (ref 3.4–5.3)
PR INTERVAL - MUSE: 140 MS
QRS DURATION - MUSE: 92 MS
QT - MUSE: 414 MS
QTC - MUSE: 420 MS
R AXIS - MUSE: 62 DEGREES
RBC # BLD AUTO: 4.51 10E6/UL (ref 3.8–5.2)
RSV RNA SPEC NAA+PROBE: NEGATIVE
SARS-COV-2 RNA RESP QL NAA+PROBE: NEGATIVE
SODIUM SERPL-SCNC: 141 MMOL/L (ref 136–145)
SYSTOLIC BLOOD PRESSURE - MUSE: NORMAL MMHG
T AXIS - MUSE: 23 DEGREES
VENTRICULAR RATE- MUSE: 62 BPM
WBC # BLD AUTO: 9.7 10E3/UL (ref 4–11)

## 2023-02-11 PROCEDURE — 96375 TX/PRO/DX INJ NEW DRUG ADDON: CPT

## 2023-02-11 PROCEDURE — 96365 THER/PROPH/DIAG IV INF INIT: CPT

## 2023-02-11 PROCEDURE — C9803 HOPD COVID-19 SPEC COLLECT: HCPCS

## 2023-02-11 PROCEDURE — 258N000003 HC RX IP 258 OP 636: Performed by: EMERGENCY MEDICINE

## 2023-02-11 PROCEDURE — 36415 COLL VENOUS BLD VENIPUNCTURE: CPT | Performed by: EMERGENCY MEDICINE

## 2023-02-11 PROCEDURE — 99284 EMERGENCY DEPT VISIT MOD MDM: CPT | Mod: CS,25

## 2023-02-11 PROCEDURE — 84703 CHORIONIC GONADOTROPIN ASSAY: CPT

## 2023-02-11 PROCEDURE — 93005 ELECTROCARDIOGRAM TRACING: CPT

## 2023-02-11 PROCEDURE — 87637 SARSCOV2&INF A&B&RSV AMP PRB: CPT | Performed by: EMERGENCY MEDICINE

## 2023-02-11 PROCEDURE — 80048 BASIC METABOLIC PNL TOTAL CA: CPT | Performed by: EMERGENCY MEDICINE

## 2023-02-11 PROCEDURE — 96361 HYDRATE IV INFUSION ADD-ON: CPT

## 2023-02-11 PROCEDURE — 250N000011 HC RX IP 250 OP 636: Performed by: EMERGENCY MEDICINE

## 2023-02-11 PROCEDURE — 250N000009 HC RX 250: Performed by: EMERGENCY MEDICINE

## 2023-02-11 PROCEDURE — 250N000013 HC RX MED GY IP 250 OP 250 PS 637: Performed by: EMERGENCY MEDICINE

## 2023-02-11 PROCEDURE — 85014 HEMATOCRIT: CPT | Performed by: EMERGENCY MEDICINE

## 2023-02-11 RX ORDER — DIPHENHYDRAMINE HCL 25 MG
50 CAPSULE ORAL ONCE
Status: COMPLETED | OUTPATIENT
Start: 2023-02-11 | End: 2023-02-11

## 2023-02-11 RX ORDER — CAFFEINE AND SODIUM BENZOATE 125 MG/ML
500 INJECTION, SOLUTION INTRAMUSCULAR; INTRAVENOUS ONCE
Status: DISCONTINUED | OUTPATIENT
Start: 2023-02-11 | End: 2023-02-11

## 2023-02-11 RX ORDER — AMOXICILLIN 500 MG/1
500 CAPSULE ORAL 2 TIMES DAILY
Qty: 20 CAPSULE | Refills: 0 | Status: SHIPPED | OUTPATIENT
Start: 2023-02-11 | End: 2023-02-21

## 2023-02-11 RX ORDER — METOCLOPRAMIDE HYDROCHLORIDE 5 MG/ML
10 INJECTION INTRAMUSCULAR; INTRAVENOUS ONCE
Status: COMPLETED | OUTPATIENT
Start: 2023-02-11 | End: 2023-02-11

## 2023-02-11 RX ORDER — CAFFEINE CITRATE 20 MG/ML
500 SOLUTION INTRAVENOUS ONCE
Status: DISCONTINUED | OUTPATIENT
Start: 2023-02-11 | End: 2023-02-11

## 2023-02-11 RX ORDER — DEXAMETHASONE SODIUM PHOSPHATE 10 MG/ML
10 INJECTION, SOLUTION INTRAMUSCULAR; INTRAVENOUS ONCE
Status: COMPLETED | OUTPATIENT
Start: 2023-02-11 | End: 2023-02-11

## 2023-02-11 RX ORDER — KETOROLAC TROMETHAMINE 15 MG/ML
15 INJECTION, SOLUTION INTRAMUSCULAR; INTRAVENOUS ONCE
Status: COMPLETED | OUTPATIENT
Start: 2023-02-11 | End: 2023-02-11

## 2023-02-11 RX ADMIN — CAFFEINE AND SODIUM BENZOATE 500 MG: 125 INJECTION, SOLUTION INTRAMUSCULAR; INTRAVENOUS at 12:53

## 2023-02-11 RX ADMIN — KETOROLAC TROMETHAMINE 15 MG: 15 INJECTION, SOLUTION INTRAMUSCULAR; INTRAVENOUS at 11:01

## 2023-02-11 RX ADMIN — DEXAMETHASONE SODIUM PHOSPHATE 10 MG: 10 INJECTION, SOLUTION INTRAMUSCULAR; INTRAVENOUS at 11:01

## 2023-02-11 RX ADMIN — METOCLOPRAMIDE 10 MG: 5 INJECTION, SOLUTION INTRAMUSCULAR; INTRAVENOUS at 11:02

## 2023-02-11 RX ADMIN — SODIUM CHLORIDE 1000 ML: 9 INJECTION, SOLUTION INTRAVENOUS at 11:02

## 2023-02-11 RX ADMIN — SODIUM CHLORIDE 1000 ML: 9 INJECTION, SOLUTION INTRAVENOUS at 12:06

## 2023-02-11 RX ADMIN — DIPHENHYDRAMINE HYDROCHLORIDE 50 MG: 25 CAPSULE ORAL at 11:01

## 2023-02-11 ASSESSMENT — ENCOUNTER SYMPTOMS
FEVER: 1
HEADACHES: 1
BACK PAIN: 1
SHORTNESS OF BREATH: 0
PHOTOPHOBIA: 1
SORE THROAT: 1
DIZZINESS: 1
NUMBNESS: 0
DIARRHEA: 1
WEAKNESS: 0
NECK PAIN: 1
NAUSEA: 1
COUGH: 0

## 2023-02-11 ASSESSMENT — ACTIVITIES OF DAILY LIVING (ADL)
ADLS_ACUITY_SCORE: 35
ADLS_ACUITY_SCORE: 35

## 2023-02-11 NOTE — ED TRIAGE NOTES
Report from EMS : Pt was recently at another hospital for a sore throat check - she tested positive for strep - they gave her IV abx , and they then tested her for mennigitis - ( LP ) .   Since the testing for this she has had bilateral neck pain, sore throat has not improved ( she was not sent home with any abx.... ) and she did not want to return to that facility . She now also has significant back pain . VSS.      Triage Assessment     Row Name 02/11/23 0958       Triage Assessment (Adult)    Airway WDL WDL       Respiratory WDL    Respiratory WDL WDL       Skin Circulation/Temperature WDL    Skin Circulation/Temperature WDL WDL       Cardiac WDL    Cardiac WDL WDL       Peripheral/Neurovascular WDL    Peripheral Neurovascular WDL WDL       Cognitive/Neuro/Behavioral WDL    Cognitive/Neuro/Behavioral WDL WDL

## 2023-02-11 NOTE — ED NOTES
Bed: ED28  Expected date: 2/11/23  Expected time: 9:21 AM  Means of arrival: Ambulance  Comments:  592 32f HA, nausea, pain s/p LP  ETA [phone]

## 2023-02-11 NOTE — ED NOTES
In retrospect of the original EMS report - pt's neck was hurting BEFORE the LP check .   t now feels so much better .

## 2023-02-11 NOTE — DISCHARGE INSTRUCTIONS
Take acetaminophen 500 to 1000 mg by mouth every 4 to 6 hours as needed for pain or fever.  Do not take more than 4000 mg in 24 hours.  Do not take within 6 hours of another acetaminophen containing medication such as norco (vicodin) or percocet.  - Take ibuprofen 600 to 800 mg by mouth every 6 to 8 hours as needed for pain or fever    Please take a home pregnancy test in 1 week.    Please return to the ED as needed for new or worsening symptoms.

## 2023-02-11 NOTE — ED PROVIDER NOTES
History     Chief Complaint:  Back Pain, Headache, and Pharyngitis       HPI   Mihir Waldron is a 32 year old female with a history of migraines who presents with back pain and a headcahe. The patients states she was seen 3 days ago in the ED for dizziness, dehydration, and she had strep and possible meningitis. She has neck pain and a slight sore throat. She is now bed ridden and has a terrible headache, she has fevers, back pain, dizziness. She cannot stand because of the headache and she thinks her head is going to explode. She has a history of migraines but this is more severe than any migraine she has had. She has had multiple syncopal episodes the latest being this morning but did not fall or hit her head. She has nausea and diarrhea as well. She is photophobic but denies any visual problems. She dneies a cough or shortness of breath. She also denies numbness or weakness in her extremities. Her first symptoms started 5 days ago. She was told to come in if she has a fever or her symptoms persist. She has been taking Tylenol and ibuprofen for pain. She was put on antibiotics to cover her for strep.  She denies drug, alcohol, or tobacco use.      Independent Historian:   None - Patient Only    Review of External Notes: See MDM.    ROS:  Review of Systems   Constitutional: Positive for fever.   HENT: Positive for sore throat.    Eyes: Positive for photophobia. Negative for visual disturbance.   Respiratory: Negative for cough and shortness of breath.    Gastrointestinal: Positive for diarrhea and nausea.   Musculoskeletal: Positive for back pain and neck pain.   Neurological: Positive for dizziness, syncope and headaches. Negative for weakness and numbness.   All other systems reviewed and are negative.      Allergies:  Codeine  Codeine Sulfate  Contrast Dye  Dairy Digestive  Diphenhydramine  Latex  Plum Pulp     Medications:    ALPRAZolam   amoxicillin-clavulanate  buPROPion  citalopram   propranolol  "  QUEtiapine   rizatriptan     Past Medical History:    Depression  POTS  Migraines    Past Surgical History:    Dental surgery     Family History:    family history includes Anxiety Disorder in her father, mother, and sister; Coronary Artery Disease in her mother; Depression in her father, mother, and sister; Diabetes in her maternal grandmother and paternal grandmother; Hypertension in her father.    Social History:   reports that she has never smoked. She has never used smokeless tobacco. She reports that she does not drink alcohol and does not use drugs.  PCP: Fuentes Green     Physical Exam     Patient Vitals for the past 24 hrs:   BP Temp Temp src Pulse Resp SpO2 Height Weight   02/11/23 1400 -- -- -- -- -- 96 % -- --   02/11/23 1200 115/65 -- -- -- -- 99 % -- --   02/11/23 1130 -- -- -- -- -- 95 % -- --   02/11/23 1100 -- -- -- -- -- 99 % -- --   02/11/23 0954 126/79 98.5  F (36.9  C) Oral 68 16 100 % 1.702 m (5' 7\") 88.5 kg (195 lb)        Physical Exam  Constitutional: Well developed, uncomfortable, nontox appearance  Head: Atraumatic.   Mouth/Throat: Oropharynx is clear and moist.   Neck:  no stridor, no meningismus  Eyes: no scleral icterus, PERRL  Cardiovascular: RRR, 2+ bilat radial pulses  Pulmonary/Chest: nml resp effort  Abdominal: ND, soft, NT, no rebound or guarding   Back: Mild L-spine tenderness without swelling or erythema  : no CVA tenderness bilat  Ext: Warm, well perfused, no edema  Neurological: A&O, symmetric facies, moves ext x4, 5/strength throughout bilateral upper and lower extremities, sensation intact to light touch throughout  Skin: Skin is warm and dry.   Psychiatric: Behavior is normal. Thought content normal.   Nursing note and vitals reviewed.    Emergency Department Course     ECG results from 02/11/23 signed @ 1036   EKG 12-lead, tracing only     Value    Systolic Blood Pressure     Diastolic Blood Pressure     Ventricular Rate 62    Atrial Rate 62    OK Interval " 140    QRS Duration 92        QTc 420    P Axis 27    R AXIS 62    T Axis 23    Interpretation ECG      Sinus rhythm  Normal ECG  When compared with ECG of 17-NOV-2022 12:46,  No significant change was found       Laboratory:  Labs Ordered and Resulted from Time of ED Arrival to Time of ED Departure   BASIC METABOLIC PANEL - Abnormal       Result Value    Sodium 141      Potassium 3.8      Chloride 108 (*)     Carbon Dioxide (CO2) 24      Anion Gap 9      Urea Nitrogen 7.0      Creatinine 0.50 (*)     Calcium 8.9      Glucose 85      GFR Estimate >90     INFLUENZA A/B & SARS-COV2 PCR MULTIPLEX - Normal    Influenza A PCR Negative      Influenza B PCR Negative      RSV PCR Negative      SARS CoV2 PCR Negative     ISTAT HCG QUALITATIVE PREGNANCY POCT - Normal    HCG Qualitative POCT Indeterminate     CBC WITH PLATELETS AND DIFFERENTIAL    WBC Count 9.7      RBC Count 4.51      Hemoglobin 13.3      Hematocrit 40.6      MCV 90      MCH 29.5      MCHC 32.8      RDW 12.0      Platelet Count 252      % Neutrophils 73      % Lymphocytes 18      % Monocytes 7      % Eosinophils 2      % Basophils 0      % Immature Granulocytes 0      NRBCs per 100 WBC 0      Absolute Neutrophils 7.0      Absolute Lymphocytes 1.8      Absolute Monocytes 0.7      Absolute Eosinophils 0.2      Absolute Basophils 0.0      Absolute Immature Granulocytes 0.0      Absolute NRBCs 0.0            Emergency Department Course & Assessments:    Interventions:  Medications   0.9% sodium chloride BOLUS (0 mLs Intravenous Stopped 2/11/23 1201)   dexamethasone PF (DECADRON) injection 10 mg (10 mg Intravenous Given 2/11/23 1101)   ketorolac (TORADOL) injection 15 mg (15 mg Intravenous Given 2/11/23 1101)   metoclopramide (REGLAN) injection 10 mg (10 mg Intravenous Given 2/11/23 1102)   diphenhydrAMINE (BENADRYL) capsule 50 mg (50 mg Oral Given 2/11/23 1101)   0.9% sodium chloride BOLUS (0 mLs Intravenous Stopped 2/11/23 1251)   caffeine-sodium benzoate  500 mg in sodium chloride 0.9 % 550 mL intermittent infusion (0 mg Intravenous Stopped 23 1414)        Social Determinants of Health affecting care:   See MDM    Assessments:  1026 Obtained the patients history and performed initial exam  1410 Rechecked the patient    Disposition:  The patient was discharged to home.     Impression & Plan      Medical Decision Makin year old female presenting w/ headache, reported fevers, neck soreness status post lumbar puncture and positive strep test     Social determinants affecting patient's health include: No significant social determinants negatively affecting the patient's health     I reviewed medical records from previous ED visit on 2023, family practice office visit from 2022 for an overview of the patient's health     DDx includes post lumbar puncture headache, migraine, viral illness, COVID-19, influenza, strep pharyngitis.  Doubt meningitis, encephalitis given history, previous work-up and physical exam.  Labs significant for indeterminate pregnancy test otherwise unremarkable.  Doubt intracranial hemorrhage, mass or abscess therefore imaging was deferred.  The patient is given medications as noted above for treatment of potential post LP headache versus migraine.  She was hydrated vigorously in the emergency department upon recheck, the patient reports feeling significantly improved.  While ceftriaxone received at previous ED visit should have been adequate for treatment of her strep, given her continued symptoms a prescription for amoxicillin was provided as noted below.  Given the patient's improvement and reassuring work-up, at this time I feel the pt is safe for discharge.  Recommendations given regarding follow up with PCP and return to the emergency department as needed for new or worsening symptoms.  Pt counseled on all results, disposition and diagnosis.  They are understanding and agreeable to plan. Patient discharged in stable condition.       Diagnosis:    ICD-10-CM    1. Post lumbar puncture headache  G97.1       2. Streptococcal pharyngitis  J02.0            Discharge Medications:  Discharge Medication List as of 2/11/2023  2:17 PM      START taking these medications    Details   amoxicillin (AMOXIL) 500 MG capsule Take 1 capsule (500 mg) by mouth 2 times daily for 10 days, Disp-20 capsule, R-0, E-Prescribe                Scribe Disclosure:  Nik TANG, am serving as a scribe at 10:11 AM on 2/11/2023 to document services personally performed by Yuri Lainez MD based on my observations and the provider's statements to me.     2/11/2023   Yuri Lainez MD Vaughn, Christopher E, MD  02/11/23 0913

## 2023-02-13 LAB
BACTERIA CSF CULT: NO GROWTH
GRAM STAIN RESULT: NORMAL
GRAM STAIN RESULT: NORMAL

## 2023-02-14 ENCOUNTER — HOSPITAL ENCOUNTER (EMERGENCY)
Facility: CLINIC | Age: 33
Discharge: HOME | End: 2023-02-14
Attending: EMERGENCY MEDICINE | Admitting: EMERGENCY MEDICINE
Payer: COMMERCIAL

## 2023-02-14 VITALS
DIASTOLIC BLOOD PRESSURE: 81 MMHG | RESPIRATION RATE: 16 BRPM | SYSTOLIC BLOOD PRESSURE: 117 MMHG | HEIGHT: 67 IN | WEIGHT: 195 LBS | OXYGEN SATURATION: 97 % | BODY MASS INDEX: 30.61 KG/M2 | HEART RATE: 110 BPM | TEMPERATURE: 98.7 F

## 2023-02-14 DIAGNOSIS — G43.809 OTHER MIGRAINE WITHOUT STATUS MIGRAINOSUS, NOT INTRACTABLE: ICD-10-CM

## 2023-02-14 LAB
ALBUMIN SERPL BCG-MCNC: 4.4 G/DL (ref 3.5–5.2)
ALP SERPL-CCNC: 95 U/L (ref 35–104)
ALT SERPL W P-5'-P-CCNC: 14 U/L (ref 10–35)
ANION GAP SERPL CALCULATED.3IONS-SCNC: 12 MMOL/L (ref 7–15)
AST SERPL W P-5'-P-CCNC: 35 U/L (ref 10–35)
BASOPHILS # BLD AUTO: 0 10E3/UL (ref 0–0.2)
BASOPHILS NFR BLD AUTO: 0 %
BILIRUB SERPL-MCNC: 0.5 MG/DL
BUN SERPL-MCNC: 6.3 MG/DL (ref 6–20)
CALCIUM SERPL-MCNC: 9.5 MG/DL (ref 8.6–10)
CHLORIDE SERPL-SCNC: 105 MMOL/L (ref 98–107)
CREAT SERPL-MCNC: 0.52 MG/DL (ref 0.51–0.95)
CRP SERPL-MCNC: 4.51 MG/L
DEPRECATED HCO3 PLAS-SCNC: 23 MMOL/L (ref 22–29)
EOSINOPHIL # BLD AUTO: 0.3 10E3/UL (ref 0–0.7)
EOSINOPHIL NFR BLD AUTO: 2 %
ERYTHROCYTE [DISTWIDTH] IN BLOOD BY AUTOMATED COUNT: 12.2 % (ref 10–15)
ERYTHROCYTE [SEDIMENTATION RATE] IN BLOOD BY WESTERGREN METHOD: 2 MM/HR (ref 0–20)
GFR SERPL CREATININE-BSD FRML MDRD: >90 ML/MIN/1.73M2
GLUCOSE SERPL-MCNC: 85 MG/DL (ref 70–99)
HCT VFR BLD AUTO: 47.7 % (ref 35–47)
HGB BLD-MCNC: 15.7 G/DL (ref 11.7–15.7)
IMM GRANULOCYTES # BLD: 0.1 10E3/UL
IMM GRANULOCYTES NFR BLD: 1 %
LYMPHOCYTES # BLD AUTO: 2.5 10E3/UL (ref 0.8–5.3)
LYMPHOCYTES NFR BLD AUTO: 22 %
MCH RBC QN AUTO: 29.8 PG (ref 26.5–33)
MCHC RBC AUTO-ENTMCNC: 32.9 G/DL (ref 31.5–36.5)
MCV RBC AUTO: 91 FL (ref 78–100)
MONOCYTES # BLD AUTO: 0.6 10E3/UL (ref 0–1.3)
MONOCYTES NFR BLD AUTO: 6 %
MONOCYTES NFR BLD AUTO: NEGATIVE %
NEUTROPHILS # BLD AUTO: 8 10E3/UL (ref 1.6–8.3)
NEUTROPHILS NFR BLD AUTO: 69 %
NRBC # BLD AUTO: 0 10E3/UL
NRBC BLD AUTO-RTO: 0 /100
PLATELET # BLD AUTO: 277 10E3/UL (ref 150–450)
POTASSIUM SERPL-SCNC: 4.3 MMOL/L (ref 3.4–5.3)
PROT SERPL-MCNC: 7.6 G/DL (ref 6.4–8.3)
RBC # BLD AUTO: 5.27 10E6/UL (ref 3.8–5.2)
SODIUM SERPL-SCNC: 140 MMOL/L (ref 136–145)
WBC # BLD AUTO: 11.5 10E3/UL (ref 4–11)

## 2023-02-14 PROCEDURE — 99284 EMERGENCY DEPT VISIT MOD MDM: CPT | Mod: 25

## 2023-02-14 PROCEDURE — 250N000011 HC RX IP 250 OP 636: Performed by: EMERGENCY MEDICINE

## 2023-02-14 PROCEDURE — 86140 C-REACTIVE PROTEIN: CPT | Performed by: EMERGENCY MEDICINE

## 2023-02-14 PROCEDURE — 96375 TX/PRO/DX INJ NEW DRUG ADDON: CPT

## 2023-02-14 PROCEDURE — 80053 COMPREHEN METABOLIC PANEL: CPT | Performed by: EMERGENCY MEDICINE

## 2023-02-14 PROCEDURE — 258N000003 HC RX IP 258 OP 636: Performed by: EMERGENCY MEDICINE

## 2023-02-14 PROCEDURE — 85652 RBC SED RATE AUTOMATED: CPT | Performed by: EMERGENCY MEDICINE

## 2023-02-14 PROCEDURE — 85004 AUTOMATED DIFF WBC COUNT: CPT | Performed by: EMERGENCY MEDICINE

## 2023-02-14 PROCEDURE — 250N000009 HC RX 250: Performed by: EMERGENCY MEDICINE

## 2023-02-14 PROCEDURE — 36415 COLL VENOUS BLD VENIPUNCTURE: CPT | Performed by: EMERGENCY MEDICINE

## 2023-02-14 PROCEDURE — 86308 HETEROPHILE ANTIBODY SCREEN: CPT | Performed by: EMERGENCY MEDICINE

## 2023-02-14 PROCEDURE — 96365 THER/PROPH/DIAG IV INF INIT: CPT

## 2023-02-14 RX ORDER — KETOROLAC TROMETHAMINE 15 MG/ML
15 INJECTION, SOLUTION INTRAMUSCULAR; INTRAVENOUS ONCE
Status: COMPLETED | OUTPATIENT
Start: 2023-02-14 | End: 2023-02-14

## 2023-02-14 RX ORDER — CEFTRIAXONE 2 G/1
2 INJECTION, POWDER, FOR SOLUTION INTRAMUSCULAR; INTRAVENOUS ONCE
Status: DISCONTINUED | OUTPATIENT
Start: 2023-02-14 | End: 2023-02-14

## 2023-02-14 RX ORDER — PROCHLORPERAZINE MALEATE 10 MG
10 TABLET ORAL EVERY 8 HOURS PRN
Qty: 20 TABLET | Refills: 0 | Status: SHIPPED | OUTPATIENT
Start: 2023-02-14 | End: 2023-03-30

## 2023-02-14 RX ORDER — PREDNISONE 20 MG/1
40 TABLET ORAL DAILY
Qty: 10 TABLET | Refills: 0 | Status: SHIPPED | OUTPATIENT
Start: 2023-02-14 | End: 2023-02-19

## 2023-02-14 RX ADMIN — SODIUM CHLORIDE 1000 ML: 9 INJECTION, SOLUTION INTRAVENOUS at 14:14

## 2023-02-14 RX ADMIN — VALPROATE SODIUM 500 MG: 100 INJECTION, SOLUTION INTRAVENOUS at 14:16

## 2023-02-14 RX ADMIN — KETOROLAC TROMETHAMINE 15 MG: 15 INJECTION, SOLUTION INTRAMUSCULAR; INTRAVENOUS at 14:14

## 2023-02-14 RX ADMIN — PROCHLORPERAZINE EDISYLATE 10 MG: 5 INJECTION INTRAMUSCULAR; INTRAVENOUS at 14:14

## 2023-02-14 ASSESSMENT — ENCOUNTER SYMPTOMS
SHORTNESS OF BREATH: 1
FEVER: 1
MYALGIAS: 1
NAUSEA: 1
HEADACHES: 1

## 2023-02-14 NOTE — ED PROVIDER NOTES
History     Chief Complaint:  Headache       The history is provided by the patient.      Mihir Waldron is a 32 year old female with a history of migraines who presents with headache, fever. The patient was seen in the ED on 2/8 and was found to have Strep pharyngitis, and underwent an evaluation for meningitis with a lumbar puncture which was negative.  She then was seen on the 2/11 as her symptoms did not improve. She comes in today with a left-sided headache, with intermittent fevers reaching 101. She also affirms persistent nausea, and myalgia since her last hospital visits and is having trouble care for herself.  The patient denies a significant postural component to this.  A post LP spinal headache was considered during her last evaluation, and that was not felt to be the main etiology.  The patient's headache is left-sided there is some light sensitivity and sound sensitivity along with nausea and she has a frequent history of migraines and this feels similar.  She has been taking rizatriptan on a regular basis.    Independent Historian:   None - Patient Only    Review of External Notes: Patient was here  11/17/22, here for near syncope and anxiety with normal ECG and lab work. She was given lorazepam and her home medications and was sent home. She was also seen in the EMPATH unit during this visit and had her medications adjusted     On 2/8 she saw Dr. Pepper at [facility], she had blood work performed with normal metabolic panel, CBC with white count 12.5 with lab shift, she got a lumbar puncture and was given Decadron, Rocephin through IV. Outpatient antibiotics were not prescribed.     She returned on 2/11 and saw Dr. Lainez. Labs repeated with basic metabolic panel negative, viral markers were negative. CBC showed white count of 9.7. given migraine cocktail of Toradol, Reglan, Benadryl, Decadron and caffeine. She was prescribed Amoxil, 500 mg twice a day for 10 days.     ROS:  Review of Systems  "  Constitutional: Positive for fever.   Respiratory: Positive for shortness of breath.    Gastrointestinal: Positive for nausea.   Musculoskeletal: Positive for myalgias.   Neurological: Positive for headaches.   All other systems reviewed and are negative.      Allergies:  Codeine  Codeine Sulfate  Contrast Dye  Dairy Digestive  Diphenhydramine  Latex  Plum Pulp      Medications:    Xanax   Amoxil   Augmentin   Wellbutrin   Celexa   Epinephrine   Inderal   Seroquel     Past Medical History:    Anxiety   Depression   POTS  Migraines   Anaphylaxis   IBS    Past Surgical History:    Dental surgery   EP study tilt table      Family History:    family history includes Anxiety Disorder in her father, mother, and sister; Coronary Artery Disease in her mother; Depression in her father, mother, and sister; Diabetes in her maternal grandmother and paternal grandmother; Hypertension in her father.    Social History:   reports that she has never smoked. She has never used smokeless tobacco. She reports that she does not drink alcohol and does not use drugs.  PCP: Fuentes Green     Physical Exam     Patient Vitals for the past 24 hrs:   BP Temp Temp src Pulse Resp SpO2 Height Weight   02/14/23 1322 -- 98.7  F (37.1  C) Oral -- -- -- -- --   02/14/23 1319 117/81 -- -- 110 16 97 % 1.702 m (5' 7\") 88.5 kg (195 lb)        Physical Exam  General: Resting uncomfortably in a wheel chair, in triage.  Head:  The scalp, face, and head appear normal  Eyes:  The pupils are equal, round, and reactive to light    There is no nystagmus    Extraocular muscles are intact    Conjunctivae and sclerae are normal  ENT:    The nose is normal    Pinnae are normal    The oropharynx is mildly errythematus without petechiae.     Uvula is in the midline  Neck:  Normal range of motion    There is no rigidity noted    There is no midline cervical spine pain/tenderness    There is no meningismus or nuchal rigidity    Trachea is in the " midline    No mass is detected  CV:  Regular rate and underlying rhythm     Normal S1/S2, no S3/S4    No pathological murmur detected  Resp:  Lungs are clear    There is no tachypnea    Non-labored    No rales    No wheezing   GI:  Abdomen is soft, there is no rigidity    No distension    No tympani    No rebound tenderness     Non-surgical without peritoneal features  MS:  Normal muscular tone    Symmetric motor strength    No major joint effusions    No asymmetric leg swelling, no calf tenderness  Skin:  No rash or acute skin lesions noted. Skin was warm and moist  Neuro: Speech is normal and fluent    Normal ambulation    Cranial nerve  Psych:  Awake. Alert.      Normal affect.  Appropriate interactions.  Lymph: Mild anterior cervical lymphadenopathy bilaterally noted      Emergency Department Course     Due to hospital and departmental capacity constraints and prolonged wait times, this patient was evaluated in non-traditional circumstances such as in triage/waiting room, a hallway, etc. I explained the option to wait for a traditional treatment space and apologized for the inconvenience. Given the circumstances, every attempt was made to provide for the patient's comfort and privacy and to perform the most thorough evaluation possible.    Laboratory:  Labs Ordered and Resulted from Time of ED Arrival to Time of ED Departure   CBC WITH PLATELETS AND DIFFERENTIAL - Abnormal       Result Value    WBC Count 11.5 (*)     RBC Count 5.27 (*)     Hemoglobin 15.7      Hematocrit 47.7 (*)     MCV 91      MCH 29.8      MCHC 32.9      RDW 12.2      Platelet Count 277      % Neutrophils 69      % Lymphocytes 22      % Monocytes 6      % Eosinophils 2      % Basophils 0      % Immature Granulocytes 1      NRBCs per 100 WBC 0      Absolute Neutrophils 8.0      Absolute Lymphocytes 2.5      Absolute Monocytes 0.6      Absolute Eosinophils 0.3      Absolute Basophils 0.0      Absolute Immature Granulocytes 0.1      Absolute  NRBCs 0.0     COMPREHENSIVE METABOLIC PANEL - Normal    Sodium 140      Potassium 4.3      Chloride 105      Carbon Dioxide (CO2) 23      Anion Gap 12      Urea Nitrogen 6.3      Creatinine 0.52      Calcium 9.5      Glucose 85      Alkaline Phosphatase 95      AST 35      ALT 14      Protein Total 7.6      Albumin 4.4      Bilirubin Total 0.5      GFR Estimate >90     CRP INFLAMMATION - Normal    CRP Inflammation 4.51     ERYTHROCYTE SEDIMENTATION RATE AUTO - Normal    Erythrocyte Sedimentation Rate 2     MONONUCLEOSIS SCREEN - Normal    Mononucleosis Screen Negative          Emergency Department Course & Assessments:         Interventions:  Medications   0.9% sodium chloride BOLUS (1,000 mLs Intravenous New Bag 2/14/23 1414)   ketorolac (TORADOL) injection 15 mg (15 mg Intravenous Given 2/14/23 1414)   prochlorperazine (COMPAZINE) injection 10 mg (10 mg Intravenous Given 2/14/23 1414)   valproate (DEPACON) 500 mg in sodium chloride 0.9 % 50 mL intermittent infusion (500 mg Intravenous New Bag 2/14/23 1416)        Assessments:  1315 I examined the patient and obtained history   3:44 PM  I reassessed the patient and she noted that her symptoms are significantly improved.  She got up and was ambulating in the waiting room and felt a little dizzy and needed to sit down.  She did not have a syncopal spell.      Disposition:  The patient was discharged to home.     Impression & Plan    CMS Diagnoses: None    Medical Decision Making:  This patient presents to the emergency department with ongoing symptoms of intermittent migraines after recent diagnosis of streptococcal pharyngitis.  She underwent evaluation for possible streptococcal meningitis with Dr. Pepper at [facility] and this was nonacute and negative.  She then came back to the emergency department for recurrent migraine and was seen by Dr. Yuri Lainez.  She was given a migraine cocktail which was successful.  She just notes today that her  symptoms do not seem to be fully resolving.  She is still taking the amoxicillin twice daily.  She is not getting complete relief of her intermittent migraines with the rizatriptan.    The patient underwent repeat blood testing here.  Her blood work is generally within normal limits with the exception of a trace leukocytosis.  CRP and sed rate are negative.  Electrolytes are normal.  She was given 1 L of hydration in the emergency department with the medication regimen as above which was helpful to clear her headache.  It is not felt that the patient has having a true spinal headache.  She has equal amounts of headache with supine seated and upright positioning.  The headache does not completely clear with supine positioning at this time.  The patient was in the waiting room for several hours.  She requested to go home.  I offered to give the patient some additional IV fluids since she was lightheaded when she stood but she really requested going home.  I faxed her prescriptions over for both Compazine and some prednisone as a feel that a burst of this might be helpful for her.  She has taken it in the past.  Is a broad differential diagnosis considered here including meningitis, mass lesion, hydrocephalus, recurrent streptococcal pharyngitis, mononucleosis, and no life-threatening etiologies are detected.  No imaging is required at this time.        Diagnosis:    ICD-10-CM    1. Other migraine without status migrainosus, not intractable  G43.809            Discharge Medications:  New Prescriptions    PREDNISONE (DELTASONE) 20 MG TABLET    Take 2 tablets (40 mg) by mouth daily for 5 days Take two tablets (= 40mg) each day for 5 (five) days    PROCHLORPERAZINE (COMPAZINE) 10 MG TABLET    Take 1 tablet (10 mg) by mouth every 8 hours as needed for nausea, vomiting or other (migraine)          Scribe Disclosure:  I, Nadir Chow, am serving as a scribe at 2:14 PM on 2/14/2023 to document services personally  performed by Liam Grande MD based on my observations and the provider's statements to me.        2/14/2023   Liam Grande MD Rock, Michael P, MD  02/14/23 1547

## 2023-02-14 NOTE — ED TRIAGE NOTES
Patient here for headache since last Wednesday. States intermittent fevers up to 101. LP last Wednesday. A&Ox4     Triage Assessment     Row Name 02/14/23 1321       Triage Assessment (Adult)    Airway WDL WDL       Respiratory WDL    Respiratory WDL WDL       Skin Circulation/Temperature WDL    Skin Circulation/Temperature WDL WDL       Cardiac WDL    Cardiac WDL X;rhythm    Pulse Rate & Regularity tachycardic       Peripheral/Neurovascular WDL    Peripheral Neurovascular WDL WDL       Cognitive/Neuro/Behavioral WDL    Cognitive/Neuro/Behavioral WDL X

## 2023-02-14 NOTE — ED NOTES
Bed: TR02  Expected date:   Expected time:   Means of arrival:   Comments:  H443 29M fentanyl withdrawal      See letter.

## 2023-02-14 NOTE — DISCHARGE INSTRUCTIONS
Continue taking your rizatriptan as needed for migraine  Continue your amoxicillin until gone  We will add prednisone 40 mg daily for 5 days to help with refractory migraine  We will add Compazine as needed for migraine, nausea, and vomiting.

## 2023-03-30 ENCOUNTER — OFFICE VISIT (OUTPATIENT)
Dept: FAMILY MEDICINE | Facility: CLINIC | Age: 33
End: 2023-03-30

## 2023-03-30 VITALS
SYSTOLIC BLOOD PRESSURE: 110 MMHG | TEMPERATURE: 97.8 F | WEIGHT: 192.2 LBS | HEIGHT: 67 IN | DIASTOLIC BLOOD PRESSURE: 74 MMHG | RESPIRATION RATE: 20 BRPM | HEART RATE: 80 BPM | BODY MASS INDEX: 30.17 KG/M2

## 2023-03-30 DIAGNOSIS — Z71.89 ACP (ADVANCE CARE PLANNING): ICD-10-CM

## 2023-03-30 DIAGNOSIS — R07.0 THROAT PAIN: Primary | ICD-10-CM

## 2023-03-30 LAB — STREP A: NEGATIVE

## 2023-03-30 PROCEDURE — 99213 OFFICE O/P EST LOW 20 MIN: CPT | Performed by: FAMILY MEDICINE

## 2023-03-30 PROCEDURE — 87651 STREP A DNA AMP PROBE: CPT | Performed by: FAMILY MEDICINE

## 2023-03-30 RX ORDER — DESVENLAFAXINE 50 MG/1
50 TABLET, FILM COATED, EXTENDED RELEASE ORAL DAILY
COMMUNITY
Start: 2023-03-21

## 2023-03-30 NOTE — NURSING NOTE
Mihir Waldron is here for a sore throat for the past couple days. States that she is feeling like she did last month when she had strep.    Questioned patient about current smoking habits.  Pt. has never smoked.  PULSE regular  My Chart: active  CLASSIFICATION OF OVERWEIGHT AND OBESITY BY BMI                        Obesity Class           BMI(kg/m2)  Underweight                                    < 18.5  Normal                                         18.5-24.9  Overweight                                     25.0-29.9  OBESITY                     I                  30.0-34.9                             II                 35.0-39.9  EXTREME OBESITY             III                >40                            Patient's  BMI Body mass index is 30.1 kg/m .  http://hin.nhlbi.nih.gov/menuplanner/menu.cgi  Pre-visit planning  Immunizations - up to date  Colonoscopy -   Mammogram -   Asthma -   PHQ9 -    CHIDI-7 -      The patient has verbalized that it is ok to leave a detailed voice message on the patient's cell phone with results/recommendations from this visit.

## 2023-03-30 NOTE — PROGRESS NOTES
"SUBJECTIVE:  Mihir Waldron is an 32 year old female who presents for evaluation and treatment   of sore throat. Symptoms include sore throat. Onset 1   week, unchanged since that time. Known Strep exposure:   household exposure.    No fevers    Pt had strep 2/8/23- treated with  Rocephin and amoxicillin     Pt has tried claritin but throat still sore        Current Outpatient Medications   Medication     ALPRAZolam (XANAX) 0.5 MG tablet     buPROPion (WELLBUTRIN XL) 150 MG 24 hr tablet     desvenlafaxine (PRISTIQ) 50 MG 24 hr tablet     EPINEPHrine (ANY BX GENERIC EQUIV) 0.3 MG/0.3ML injection 2-pack     rizatriptan (MAXALT) 5 MG tablet     No current facility-administered medications for this visit.     Allergies   Allergen Reactions     Codeine      Codeine Sulfate GI Disturbance     Contrast Dye Hives     Unsure of name of contrast     Dairy Digestive Cramps, Diarrhea, Fatigue, GI Disturbance, Headache, Nausea and Nausea and Vomiting     Diphenhydramine Other (See Comments)     Pt got warm and passed out with IV benadryl     Latex Hives     Plum Pulp Nausea and Vomiting and Itching     All \"stone fruit\",Itching in throat and throat swelling       Social History     Tobacco Use     Smoking status: Never     Smokeless tobacco: Never   Substance Use Topics     Alcohol use: No     Comment: None       OBJECTIVE:  /74 (BP Location: Right arm, Patient Position: Chair, Cuff Size: Adult Regular)   Pulse 80   Temp 97.8  F (36.6  C) (Temporal)   Resp 20   Ht 1.702 m (5' 7\")   Wt 87.2 kg (192 lb 3.2 oz)   LMP 03/20/2023   BMI 30.10 kg/m    General appearance: alert and no distress  Ears: R TM - normal: no effusions, no erythema, and normal landmarks, L TM - normal: no effusions, no erythema, and normal landmarks  Nose: normal  Oropharynx: mild erythema  Neck: supple and small, benign anterior cervical nodes bilaterally  Lungs: normal and clear to auscultation  Heart: regular rate and rhythm and no murmurs, " clicks, or gallops    ASSESSMENT:  Acute pharyngitis - r/o Strep      RSS negative;    Dx:  Non-strep pharyngitis    Rx:  1)  Symptomatic treatment with fluids, vaporizer, acetaminophen.  2)  Recheck as needed for persistence, worsening, appearance of new   symptoms.  -trial flonase as may be allergic    PLAN:

## 2023-05-10 ENCOUNTER — LAB REQUISITION (OUTPATIENT)
Dept: LAB | Facility: CLINIC | Age: 33
End: 2023-05-10

## 2023-05-10 DIAGNOSIS — R30.0 DYSURIA: ICD-10-CM

## 2023-05-10 PROCEDURE — 87086 URINE CULTURE/COLONY COUNT: CPT

## 2023-05-12 LAB — BACTERIA UR CULT: NO GROWTH

## 2023-05-16 ENCOUNTER — LAB REQUISITION (OUTPATIENT)
Dept: LAB | Facility: CLINIC | Age: 33
End: 2023-05-16
Payer: MEDICAID

## 2023-05-16 DIAGNOSIS — N76.0 ACUTE VAGINITIS: ICD-10-CM

## 2023-05-17 ENCOUNTER — LAB REQUISITION (OUTPATIENT)
Dept: LAB | Facility: CLINIC | Age: 33
End: 2023-05-17

## 2023-05-17 DIAGNOSIS — N76.0 ACUTE VAGINITIS: ICD-10-CM

## 2023-05-17 LAB
NUGENT SCORE: 1
WHITE BLOOD CELLS: NORMAL

## 2023-05-17 PROCEDURE — 87205 SMEAR GRAM STAIN: CPT | Performed by: NURSE PRACTITIONER

## 2023-05-17 PROCEDURE — 87070 CULTURE OTHR SPECIMN AEROBIC: CPT | Performed by: NURSE PRACTITIONER

## 2023-05-19 LAB — BACTERIA SPEC CULT: NORMAL

## 2023-06-01 NOTE — ED AVS SNAPSHOT
[facility]    [address]    [facility] [address]    Phone:  [phone]    Fax:  [phone]                                       Mihir Waldron   MRN: [id number]    Department:  [facility]   Date of Visit:  12/2/2018           Patient Information     Date Of Birth          1990        Your diagnoses for this visit were:     Pain in joint involving ankle and foot, right     Abrasion of right ankle without infection, initial encounter     Pain of toe of right foot        You were seen by Liam White MD.      Follow-up Information     Follow up with Fuentes Green MD In 8 days.    Specialty:  Family Practice    Why:  Return to the ER if you have worsening pain or inability to walk using her crutches, or if you have worsening numbness or tingling in your toes.  If you are not completely improved within 7-8 days, recheck with your doctor for repeat x-rays.    Contact information:    [address]  [facility] [address]  [phone]          Discharge Instructions       Discharge Instructions  Ankle Sprain    An ankle sprain is a stretching or tearing of a ligament around your ankle joint. In most cases, we recommend resting the ankle for about 3 days, followed by return to activity. Some severe sprains need longer periods of rest, or can require a cast or boot to immobilize them.    Return to the Emergency Department if:    Your pain is much worse, or if there is pain in a new area.    Your foot or leg becomes pale, cool, blue, or numb or tingling.    There is anything concerning to you about how your ankle looks.    Any splint or device is feeling too tight, causing pain, or rubbing into your skin.    Follow-up with your doctor:    As recommended by your emergency physician.    If your ankle is not back to normal within about 1 week.    If you are involved in significant athletic  activities.        Treatment:    Apply ice your injured area for 15 minutes at a time, at least 3 times a day for the first 1-2 days. Use a cloth between the ice bag and your skin to prevent frostbite.     Do not sleep with an ice pack or heating pad on, since this can cause burns or skin injury.    Raise the injured area above the level of your heart as much as possible in the first 1-2 days.    Pain medications -- You may take a pain medication such as Tylenol  (acetaminophen), Advil , Nuprin  (ibuprofen) or Aleve  (naproxen).  If you have been given a narcotic such as Vicodin  (hydrocodone with acetaminophen), Percocet  (oxycodone with acetaminophen), or codeine, do not drive for four hours after you have taken it. If the narcotic contains Tylenol  (acetaminophen), do not take Tylenol  with it. All narcotics will cause constipation, so eat a high fiber diet.      Splint. We often give a stirrup-shaped ankle splint to support your ankle and prevent it from turning again. Wear this all the time for the first 3-5 days, and then as directed by your doctor.    Crutches. If you can t put wait on the ankle without a lot of pain, we recommend crutches. You can put as much weight on the ankle as possible without severe pain.     Compression. An elastic bandage (Ace  wrap) can help with pain and swelling. Remove this at least twice a day, and leave it off for several hours if you develop swelling of the foot.   If you were given a prescription for medicine here today, be sure to read all of the information (including the package insert) that comes with your prescription.  This will include important information about the medicine, its side effects, and any warnings that you need to know about.  The pharmacist who fills the prescription can provide more information and answer questions you may have about the medicine.  If you have questions or concerns that the pharmacist cannot address, please call or return to the  Emergency Department.  Opioid Medication Information    Pain medications are among the most commonly prescribed medicines, so we are including this information for all our patients. If you did not receive pain medication or get a prescription for pain medicine, you can ignore it.     You may have been given a prescription for an opioid (narcotic) pain medicine and/or have received a pain medicine while here in the Emergency Department. These medicines can make you drowsy or impaired. You must not drive, operate dangerous equipment, or engage in any other dangerous activities while taking these medications. If you drive while taking these medications, you could be arrested for DUI, or driving under the influence. Do not drink any alcohol while you are taking these medications.     Opioid pain medications can cause addiction. If you have a history of chemical dependency of any type, you are at a higher risk of becoming addicted to pain medications.  Only take these prescribed medications to treat your pain when all other options have been tried. Take it for as short a time and as few doses as possible. Store your pain pills in a secure place, as they are frequently stolen and provide a dangerous opportunity for children or visitors in your house to start abusing these powerful medications. We will not replace any lost or stolen medicine.  As soon as your pain is better, you should flush all your remaining medication.     Many prescription pain medications contain Tylenol  (acetaminophen), including Vicodin , Tylenol #3 , Norco , Lortab , and Percocet .  You should not take any extra pills of Tylenol  if you are using these prescription medications or you can get very sick.  Do not ever take more than 3000 mg of acetaminophen in any 24 hour period.    All opioids tend to cause constipation. Drink plenty of water and eat foods that have a lot of fiber, such as fruits, vegetables, prune juice, apple juice and high fiber  cereal.  Take a laxative if you don t move your bowels at least every other day. Miralax , Milk of Magnesia, Colace , or Senna  can be used to keep you regular.      Remember that you can always come back to the Emergency Department if you are not able to see your regular doctor in the amount of time listed above, if you get any new symptoms, or if there is anything that worries you.          Discharge References/Attachments     FOOT CONTUSION (ENGLISH)    FOOT SPRAIN (ENGLISH)      24 Hour Appointment Hotline       To make an appointment at any [facility] clinic, call [phone] ([phone]). If you don't have a family doctor or clinic, we will help you find one. [facility] clinics are conveniently located to serve the needs of you and your family.             Review of your medicines      START taking        Dose / Directions Last dose taken    HYDROcodone-acetaminophen 5-325 MG tablet   Commonly known as:  NORCO   Dose:  1 tablet   Quantity:  10 tablet        Take 1 tablet by mouth every 6 hours as needed for severe pain   Refills:  0          Our records show that you are taking the medicines listed below. If these are incorrect, please call your family doctor or clinic.        Dose / Directions Last dose taken    ALPRAZolam 0.5 MG tablet   Commonly known as:  XANAX   Dose:  0.5 mg   Quantity:  10 tablet        Take 1 tablet (0.5 mg) by mouth 3 times daily as needed for anxiety   Refills:  0        EPINEPHrine 0.3 MG/0.3ML injection 2-pack   Commonly known as:  EPIPEN/ADRENACLICK/or ANY BX GENERIC EQUIV   Dose:  0.3 mg   Quantity:  0.3 mL        Inject 0.3 mLs (0.3 mg) into the muscle once as needed   Refills:  0        MAXIMUM D3 24597 units Caps   Generic drug:  Cholecalciferol        Refills:  0                Information about OPIOIDS     PRESCRIPTION OPIOIDS: WHAT YOU NEED TO KNOW   We gave you an opioid (narcotic) pain medicine. It is important to manage your pain, but opioids are not always the best  choice. You should first try all the other options your care team gave you. Take this medicine for as short a time (and as few doses) as possible.    Some activities can increase your pain, such as bandage changes or therapy sessions. It may help to take your pain medicine 30 to 60 minutes before these activities. Reduce your stress by getting enough sleep, working on hobbies you enjoy and practicing relaxation or meditation. Talk to your care team about ways to manage your pain beyond prescription opioids.    These medicines have risks:    DO NOT drive when on new or higher doses of pain medicine. These medicines can affect your alertness and reaction times, and you could be arrested for driving under the influence (DUI). If you need to use opioids long-term, talk to your care team about driving.    DO NOT operate heavy machinery    DO NOT do any other dangerous activities while taking these medicines.    DO NOT drink any alcohol while taking these medicines.     If the opioid prescribed includes acetaminophen, DO NOT take with any other medicines that contain acetaminophen. Read all labels carefully. Look for the word  acetaminophen  or  Tylenol.  Ask your pharmacist if you have questions or are unsure.    You can get addicted to pain medicines, especially if you have a history of addiction (chemical, alcohol or substance dependence). Talk to your care team about ways to reduce this risk.    All opioids tend to cause constipation. Drink plenty of water and eat foods that have a lot of fiber, such as fruits, vegetables, prune juice, apple juice and high-fiber cereal. Take a laxative (Miralax, milk of magnesia, Colace, Senna) if you don t move your bowels at least every other day. Other side effects include upset stomach, sleepiness, dizziness, throwing up, tolerance (needing more of the medicine to have the same effect), physical dependence and slowed breathing.    Store your pills in a secure place, locked if  possible. We will not replace any lost or stolen medicine. If you don t finish your medicine, please throw away (dispose) as directed by your pharmacist. The [place] [employer] has more information about safe disposal: https://www.pca.state.[place]/living-green/managing-unwanted-medications        Prescriptions were sent or printed at these locations (1 Prescription)                   Other Prescriptions                Printed at Department/Unit printer (1 of 1)         HYDROcodone-acetaminophen (NORCO) 5-325 MG tablet                Procedures and tests performed during your visit     Ankle XR, G/E 3 views, right    XR Foot Right G/E 3 Views      Orders Needing Specimen Collection     None      Pending Results     No orders found from 11/30/2018 to 12/3/2018.            Pending Culture Results     No orders found from 11/30/2018 to 12/3/2018.            Pending Results Instructions     If you had any lab results that were not finalized at the time of your Discharge, you can call the ED Lab Result RN at [phone]. You will be contacted by this team for any positive Lab results or changes in treatment. The nurses are available 7 days a week from 10A to 6:30P.  You can leave a message 24 hours per day and they will return your call.        Test Results From Your Hospital Stay        12/2/2018  1:14 PM      Narrative     RIGHT ANKLE THREE VIEWS   12/2/2018 12:58 PM     HISTORY: Ankle pain, fall.     COMPARISON: None.        Impression     IMPRESSION: Normal.    JOLIE LEVINE MD         12/2/2018  1:14 PM      Narrative     RIGHT FOOT THREE OR MORE VIEWS   12/2/2018 12:58 PM     HISTORY: Fall, midfoot pain and toe pain toe #3 and #4.     COMPARISON: None.        Impression     IMPRESSION: Normal.    JOLIE LEVINE MD                Clinical Quality Measure: Blood Pressure Screening     Your blood pressure was checked while you were in the emergency department today. The last reading we obtained was  BP:  130/89 . Please read the guidelines below about what these numbers mean and what you should do about them.  If your systolic blood pressure (the top number) is less than 120 and your diastolic blood pressure (the bottom number) is less than 80, then your blood pressure is normal. There is nothing more that you need to do about it.  If your systolic blood pressure (the top number) is 120-139 or your diastolic blood pressure (the bottom number) is 80-89, your blood pressure may be higher than it should be. You should have your blood pressure rechecked within a year by a primary care provider.  If your systolic blood pressure (the top number) is 140 or greater or your diastolic blood pressure (the bottom number) is 90 or greater, you may have high blood pressure. High blood pressure is treatable, but if left untreated over time it can put you at risk for heart attack, stroke, or kidney failure. You should have your blood pressure rechecked by a primary care provider within the next 4 weeks.  If your provider in the emergency department today gave you specific instructions to follow-up with your doctor or provider even sooner than that, you should follow that instruction and not wait for up to 4 weeks for your follow-up visit.        Thank you for choosing [facility]       Thank you for choosing [facility] for your care. Our goal is always to provide you with excellent care. Hearing back from our patients is one way we can continue to improve our services. Please take a few minutes to complete the written survey that you may receive in the mail after you visit with us. Thank you!        [employer] Information     [employer] gives you secure access to your electronic health record. If you see a primary care provider, you can also send messages to your care team and make appointments. If you have questions, please call your primary care clinic.  If you do not have a primary care provider, please call [phone] and they will  assist you.        Care EveryWhere ID     This is your Care EveryWhere ID. This could be used by other organizations to access your [facility] medical records  [id number]        Equal Access to Services     YANDEL FOOTE : Iftikhar Winter, manuel reina, heidi buimamadelin carlton, tatiana jerome. So [facility] [phone].    ATENCIÓN: Si habla español, tiene a ventura disposición servicios gratuitos de asistencia lingüística. Llame al [phone].    We comply with applicable federal civil rights laws and [place] laws. We do not discriminate on the basis of race, color, national origin, age, disability, sex, sexual orientation, or gender identity.            After Visit Summary       This is your record. Keep this with you and show to your community pharmacist(s) and doctor(s) at your next visit.                   Hypertension

## 2023-07-07 ENCOUNTER — LAB REQUISITION (OUTPATIENT)
Dept: LAB | Facility: CLINIC | Age: 33
End: 2023-07-07

## 2023-07-07 DIAGNOSIS — R35.0 FREQUENCY OF MICTURITION: ICD-10-CM

## 2023-07-07 PROCEDURE — 87086 URINE CULTURE/COLONY COUNT: CPT | Performed by: NURSE PRACTITIONER

## 2023-07-08 LAB — BACTERIA UR CULT: ABNORMAL

## 2023-08-19 ENCOUNTER — OFFICE VISIT (OUTPATIENT)
Dept: URGENT CARE | Facility: URGENT CARE | Age: 33
End: 2023-08-19
Payer: COMMERCIAL

## 2023-08-19 VITALS
HEART RATE: 92 BPM | WEIGHT: 190.4 LBS | BODY MASS INDEX: 29.82 KG/M2 | OXYGEN SATURATION: 98 % | SYSTOLIC BLOOD PRESSURE: 127 MMHG | RESPIRATION RATE: 16 BRPM | TEMPERATURE: 99.4 F | DIASTOLIC BLOOD PRESSURE: 80 MMHG

## 2023-08-19 DIAGNOSIS — R07.0 THROAT PAIN: Primary | ICD-10-CM

## 2023-08-19 LAB
DEPRECATED S PYO AG THROAT QL EIA: NEGATIVE
GROUP A STREP BY PCR: NOT DETECTED

## 2023-08-19 PROCEDURE — 99213 OFFICE O/P EST LOW 20 MIN: CPT | Performed by: PHYSICIAN ASSISTANT

## 2023-08-19 PROCEDURE — 87651 STREP A DNA AMP PROBE: CPT | Performed by: PHYSICIAN ASSISTANT

## 2023-08-19 PROCEDURE — 87635 SARS-COV-2 COVID-19 AMP PRB: CPT | Performed by: PHYSICIAN ASSISTANT

## 2023-08-19 NOTE — PROGRESS NOTES
Assessment & Plan     Throat pain  Rapid strep is negative today.  No evidence of peritonsillar abscess.  We discussed most likely viral pharyngitis.  Throat culture is pending.  COVID PCR is pending.  Supportive care measures advised--Tylenol, Motrin, throat lozenges, salt water gargles etc... We will communicate any positive finding on the throat culture result.  Follow-up if any worsening symptoms.  Patient understands and agrees with the plan.    - Streptococcus A Rapid Screen w/Reflex to PCR - Clinic Collect  - Symptomatic COVID-19 Virus (Coronavirus) by PCR Nose  - Group A Streptococcus PCR Throat Swab       Return in about 1 week (around 8/26/2023) for Symptoms failing to improve.    Chey Alaniz PA-C  [facility] ALEJANDRA Ash is a 33 year old female who presents to clinic today for the following health issues:  Chief Complaint   Patient presents with    Throat Pain     34 yo F presents with the following complaint  body aches, throat pain progressively worsening , low grade fever, chills  tx- tylenol exposed at home to strep      HPI    Patient is presenting to urgent care today with a complaint of body aches, throat pain, fever--max temp 101 Fahrenheit, chills.  Ongoing symptoms for 4 days now.  Exposure to strep.  Treatment tried: Tylenol.  No cough. No vomiting. No CP/SOB.    Review of Systems  Constitutional, HEENT, cardiovascular, pulmonary, GI, , musculoskeletal, neuro, skin, endocrine and psych systems are negative, except as otherwise noted.      Objective    /80   Pulse 92   Temp 99.4  F (37.4  C)   Resp 16   Wt 86.4 kg (190 lb 6.4 oz)   SpO2 98%   BMI 29.82 kg/m    Physical Exam   GENERAL: healthy, alert and no distress  HENT: ear canals and TM's normal,  mouth without ulcers or lesions, mild posterior oropharynx inflammation, uvula is midline, tonsils 1+ no exudates  NECK: no adenopathy  RESP: lungs clear to auscultation - no rales, rhonchi  or wheezes  CV: regular rate and rhythm, normal S1 S2  MS: no gross musculoskeletal defects noted, no edema  SKIN: no suspicious lesions or rashes    Results for orders placed or performed in visit on 08/19/23   Streptococcus A Rapid Screen w/Reflex to PCR - Clinic Collect     Status: Normal    Specimen: Throat; Swab   Result Value Ref Range    Group A Strep antigen Negative Negative

## 2023-08-20 LAB — SARS-COV-2 RNA RESP QL NAA+PROBE: NEGATIVE

## 2023-09-29 PROCEDURE — 88302 TISSUE EXAM BY PATHOLOGIST: CPT | Mod: TC,ORL | Performed by: OBSTETRICS & GYNECOLOGY

## 2023-09-29 PROCEDURE — 88302 TISSUE EXAM BY PATHOLOGIST: CPT | Mod: 26 | Performed by: PATHOLOGY

## 2023-10-01 ENCOUNTER — HEALTH MAINTENANCE LETTER (OUTPATIENT)
Age: 33
End: 2023-10-01

## 2023-10-02 ENCOUNTER — LAB REQUISITION (OUTPATIENT)
Dept: LAB | Facility: CLINIC | Age: 33
End: 2023-10-02
Payer: COMMERCIAL

## 2023-10-03 LAB
PATH REPORT.COMMENTS IMP SPEC: NORMAL
PATH REPORT.COMMENTS IMP SPEC: NORMAL
PATH REPORT.FINAL DX SPEC: NORMAL
PATH REPORT.GROSS SPEC: NORMAL
PATH REPORT.MICROSCOPIC SPEC OTHER STN: NORMAL
PATH REPORT.RELEVANT HX SPEC: NORMAL
PHOTO IMAGE: NORMAL

## 2023-10-05 ENCOUNTER — LAB REQUISITION (OUTPATIENT)
Dept: LAB | Facility: CLINIC | Age: 33
End: 2023-10-05
Payer: COMMERCIAL

## 2023-10-05 DIAGNOSIS — R30.0 DYSURIA: ICD-10-CM

## 2023-10-05 PROCEDURE — 87086 URINE CULTURE/COLONY COUNT: CPT | Mod: ORL | Performed by: OBSTETRICS & GYNECOLOGY

## 2023-10-07 LAB — BACTERIA UR CULT: NORMAL

## 2023-11-01 ENCOUNTER — LAB REQUISITION (OUTPATIENT)
Dept: LAB | Facility: CLINIC | Age: 33
End: 2023-11-01

## 2023-11-01 DIAGNOSIS — Z11.8 ENCOUNTER FOR SCREENING FOR OTHER INFECTIOUS AND PARASITIC DISEASES: ICD-10-CM

## 2023-11-01 DIAGNOSIS — Z11.59 ENCOUNTER FOR SCREENING FOR OTHER VIRAL DISEASES: ICD-10-CM

## 2023-11-01 DIAGNOSIS — Z11.4 ENCOUNTER FOR SCREENING FOR HUMAN IMMUNODEFICIENCY VIRUS (HIV): ICD-10-CM

## 2023-11-01 PROCEDURE — 87491 CHLMYD TRACH DNA AMP PROBE: CPT | Performed by: OBSTETRICS & GYNECOLOGY

## 2023-11-01 PROCEDURE — 86780 TREPONEMA PALLIDUM: CPT | Performed by: OBSTETRICS & GYNECOLOGY

## 2023-11-01 PROCEDURE — 86803 HEPATITIS C AB TEST: CPT | Performed by: OBSTETRICS & GYNECOLOGY

## 2023-11-01 PROCEDURE — 87389 HIV-1 AG W/HIV-1&-2 AB AG IA: CPT | Performed by: OBSTETRICS & GYNECOLOGY

## 2023-11-01 PROCEDURE — 87340 HEPATITIS B SURFACE AG IA: CPT | Performed by: OBSTETRICS & GYNECOLOGY

## 2023-11-01 PROCEDURE — 87591 N.GONORRHOEAE DNA AMP PROB: CPT | Performed by: OBSTETRICS & GYNECOLOGY

## 2023-11-02 LAB
C TRACH DNA SPEC QL PROBE+SIG AMP: POSITIVE
HBV SURFACE AG SERPL QL IA: NONREACTIVE
HCV AB SERPL QL IA: NONREACTIVE
HIV 1+2 AB+HIV1 P24 AG SERPL QL IA: NONREACTIVE
N GONORRHOEA DNA SPEC QL NAA+PROBE: NEGATIVE
T PALLIDUM AB SER QL: NONREACTIVE

## 2023-11-28 ENCOUNTER — LAB REQUISITION (OUTPATIENT)
Dept: LAB | Facility: CLINIC | Age: 33
End: 2023-11-28
Payer: COMMERCIAL

## 2023-11-28 DIAGNOSIS — Z86.19 PERSONAL HISTORY OF OTHER INFECTIOUS AND PARASITIC DISEASES: ICD-10-CM

## 2023-11-28 PROCEDURE — 87491 CHLMYD TRACH DNA AMP PROBE: CPT | Mod: ORL | Performed by: OBSTETRICS & GYNECOLOGY

## 2023-11-29 LAB
C TRACH DNA SPEC QL PROBE+SIG AMP: POSITIVE
N GONORRHOEA DNA SPEC QL NAA+PROBE: NEGATIVE

## 2024-01-16 ENCOUNTER — LAB REQUISITION (OUTPATIENT)
Dept: LAB | Facility: CLINIC | Age: 34
End: 2024-01-16
Payer: COMMERCIAL

## 2024-01-16 DIAGNOSIS — Z11.3 ENCOUNTER FOR SCREENING FOR INFECTIONS WITH A PREDOMINANTLY SEXUAL MODE OF TRANSMISSION: ICD-10-CM

## 2024-01-16 PROCEDURE — 87491 CHLMYD TRACH DNA AMP PROBE: CPT | Mod: ORL | Performed by: MIDWIFE

## 2024-01-17 LAB
C TRACH DNA SPEC QL PROBE+SIG AMP: NEGATIVE
N GONORRHOEA DNA SPEC QL NAA+PROBE: NEGATIVE

## 2024-02-23 ENCOUNTER — OFFICE VISIT (OUTPATIENT)
Dept: URGENT CARE | Facility: URGENT CARE | Age: 34
End: 2024-02-23
Payer: COMMERCIAL

## 2024-02-23 VITALS
BODY MASS INDEX: 30.23 KG/M2 | HEART RATE: 121 BPM | OXYGEN SATURATION: 99 % | TEMPERATURE: 98.5 F | WEIGHT: 193 LBS | DIASTOLIC BLOOD PRESSURE: 69 MMHG | SYSTOLIC BLOOD PRESSURE: 120 MMHG

## 2024-02-23 DIAGNOSIS — J45.21 MILD INTERMITTENT REACTIVE AIRWAY DISEASE WITH ACUTE EXACERBATION: ICD-10-CM

## 2024-02-23 DIAGNOSIS — J01.90 ACUTE SINUSITIS WITH SYMPTOMS > 10 DAYS: Primary | ICD-10-CM

## 2024-02-23 PROCEDURE — 99214 OFFICE O/P EST MOD 30 MIN: CPT | Performed by: PHYSICIAN ASSISTANT

## 2024-02-23 RX ORDER — AMOXICILLIN 500 MG/1
1000 CAPSULE ORAL 2 TIMES DAILY
Qty: 28 CAPSULE | Refills: 0 | Status: SHIPPED | OUTPATIENT
Start: 2024-02-23 | End: 2024-03-01

## 2024-02-23 RX ORDER — ALBUTEROL SULFATE 90 UG/1
2 AEROSOL, METERED RESPIRATORY (INHALATION)
Qty: 18 G | Refills: 0 | Status: SHIPPED | OUTPATIENT
Start: 2024-02-23

## 2024-02-23 RX ORDER — ALBUTEROL SULFATE 0.83 MG/ML
2.5 SOLUTION RESPIRATORY (INHALATION) EVERY 6 HOURS PRN
Qty: 90 ML | Refills: 0 | Status: SHIPPED | OUTPATIENT
Start: 2024-02-23

## 2024-02-23 ASSESSMENT — ENCOUNTER SYMPTOMS
WHEEZING: 1
RHINORRHEA: 1
SINUS PAIN: 1
SINUS PRESSURE: 1
FEVER: 0
COUGH: 1

## 2024-02-23 NOTE — PROGRESS NOTES
Assessment & Plan:        ICD-10-CM    1. Acute sinusitis with symptoms > 10 days  J01.90 amoxicillin (AMOXIL) 500 MG capsule      2. Mild intermittent reactive airway disease with acute exacerbation  J45.21 albuterol (PROVENTIL) (2.5 MG/3ML) 0.083% neb solution     albuterol (PROAIR HFA/PROVENTIL HFA/VENTOLIN HFA) 108 (90 Base) MCG/ACT inhaler            Plan/Clinical Decision Making:    Patient with two weeks of URI symptoms with worsening sinus symptoms and cough with wheezing past 3 days. Fever 2 days ago, resolved now. Has maxillary sinus tenderness, lungs clear. Will treat intermittent wheezing with albuterol neb and albuterol inhaler as needed.   Will start course of Amoxicillin for sinusitis. Option to wait and watch to see if it resolves on own.       Return if symptoms worsen or fail to improve, for in 5-7 days.     At the end of the encounter, I discussed results, diagnosis, medications. Discussed red flags for immediate return to clinic/ER, as well as indications for follow up if no improvement. Patient understood and agreed to plan. Patient was stable for discharge.        Claire Rockwell PA-C on 2/23/2024 at 10:45 AM          Subjective:     HPI:    Mihir is a 33 year old female who presents to clinic today for the following health issues:  Chief Complaint   Patient presents with    Urgent Care     X12 cough, chest congestion, productive cough, covid test negative, nasal congestion, post nasal drainage, concern for bronchitis , body aches, headache, fever X2 days ago 101, sinus pressure, right ear feels clogged   Kids had virus   Tried ibuprofen, mucinex      HPI    Patient with 12 days of cold symptoms, developed fever for 1 day a couple days ago and then resolved. Has ongoing nasal symptoms, coughing and intermittent wheezing. Worsening over past 3 days.   Ongoing sinus tenderness with thick, discolored drainage.     Has a neb machine at home. Hx of pneumonia multiple times in past. Prone to  bronchial irritation.     Review of Systems   Constitutional:  Negative for fever.   HENT:  Positive for congestion, postnasal drip, rhinorrhea, sinus pressure and sinus pain.    Respiratory:  Positive for cough and wheezing.          Patient Active Problem List   Diagnosis    Sinus tachycardia    Postural orthostatic tachycardia syndrome    Health Care Home    ACP (advance care planning)    Post partum depression    CHIDI (generalized anxiety disorder)    Hx of anaphylaxis    Dietary fructose intolerance    Autonomic dysfunction    Irritable bowel syndrome with diarrhea        Past Medical History:   Diagnosis Date    Anxiety     Chest pain     Depressive disorder     Postpartum depression     POTS (postural orthostatic tachycardia syndrome)     Sinus tachycardia        Social History     Tobacco Use    Smoking status: Never    Smokeless tobacco: Never   Substance Use Topics    Alcohol use: No     Comment: None             Objective:     Vitals:    02/23/24 1013   BP: 120/69   Pulse: (!) 121   Temp: 98.5  F (36.9  C)   TempSrc: Tympanic   SpO2: 99%   Weight: 87.5 kg (193 lb)         Physical Exam   EXAM:   Pleasant, alert, appropriate appearance. NAD.  Head Exam: Normocephalic, atraumatic.  Eye Exam:  non icteric/injection.    Ear Exam: TMs grey without bulging. Normal canals.  Normal pinna.  Nose Exam: Normal external nose.  Maxillary sinus tenderness.   OroPharynx Exam:  Moist mucous membranes. No erythema, pharynx without exudate or hypertrophy.  Neck/Thyroid Exam:  No LAD.   Chest/Respiratory Exam: CTAB.  Cardiovascular Exam: RRR. No murmur or rubs.      Results:  No results found for any visits on 02/23/24.

## 2024-02-28 ENCOUNTER — OFFICE VISIT (OUTPATIENT)
Dept: URGENT CARE | Facility: URGENT CARE | Age: 34
End: 2024-02-28
Payer: COMMERCIAL

## 2024-02-28 VITALS
OXYGEN SATURATION: 99 % | HEART RATE: 77 BPM | TEMPERATURE: 98.3 F | BODY MASS INDEX: 30.23 KG/M2 | DIASTOLIC BLOOD PRESSURE: 80 MMHG | SYSTOLIC BLOOD PRESSURE: 118 MMHG | WEIGHT: 193 LBS

## 2024-02-28 DIAGNOSIS — B37.31 CANDIDIASIS OF VAGINA: Primary | ICD-10-CM

## 2024-02-28 DIAGNOSIS — R39.89 URINARY PROBLEM: ICD-10-CM

## 2024-02-28 LAB
ALBUMIN UR-MCNC: 30 MG/DL
APPEARANCE UR: CLEAR
BACTERIA #/AREA URNS HPF: ABNORMAL /HPF
BILIRUB UR QL STRIP: NEGATIVE
CLUE CELLS: ABNORMAL
COLOR UR AUTO: YELLOW
GLUCOSE UR STRIP-MCNC: NEGATIVE MG/DL
HGB UR QL STRIP: NEGATIVE
KETONES UR STRIP-MCNC: NEGATIVE MG/DL
LEUKOCYTE ESTERASE UR QL STRIP: ABNORMAL
MUCOUS THREADS #/AREA URNS LPF: PRESENT /LPF
NITRATE UR QL: NEGATIVE
PH UR STRIP: 8.5 [PH] (ref 5–7)
RBC #/AREA URNS AUTO: ABNORMAL /HPF
SP GR UR STRIP: 1.01 (ref 1–1.03)
SQUAMOUS #/AREA URNS AUTO: ABNORMAL /LPF
TRICHOMONAS, WET PREP: ABNORMAL
UROBILINOGEN UR STRIP-ACNC: 0.2 E.U./DL
WBC #/AREA URNS AUTO: ABNORMAL /HPF
WBC'S/HIGH POWER FIELD, WET PREP: ABNORMAL
YEAST #/AREA URNS HPF: ABNORMAL /HPF
YEAST, WET PREP: PRESENT

## 2024-02-28 PROCEDURE — 87210 SMEAR WET MOUNT SALINE/INK: CPT | Performed by: FAMILY MEDICINE

## 2024-02-28 PROCEDURE — 81001 URINALYSIS AUTO W/SCOPE: CPT | Performed by: FAMILY MEDICINE

## 2024-02-28 PROCEDURE — 99213 OFFICE O/P EST LOW 20 MIN: CPT | Performed by: FAMILY MEDICINE

## 2024-02-28 RX ORDER — FLUCONAZOLE 150 MG/1
TABLET ORAL
Qty: 2 TABLET | Refills: 0 | Status: SHIPPED | OUTPATIENT
Start: 2024-02-28

## 2024-02-28 NOTE — PROGRESS NOTES
ICD-10-CM    1. Candidiasis of vagina  B37.31 fluconazole (DIFLUCAN) 150 MG tablet      2. Urinary problem  R39.89 UA Macroscopic with reflex to Microscopic and Culture - Lab Collect     Wet prep - lab collect     UA Macroscopic with reflex to Microscopic and Culture - Lab Collect     Wet prep - lab collect     UA Microscopic with Reflex to Culture        Positive yeast on wet prep.  UA not suggestive of concurrent UTI.,    PLAN:  Patient Instructions   Start fluconazole 1 tablet today and repeat in 3 days if needed.    Use a probiotic, such as Culturelle or any other OTC brand, daily for the next month.    Discussed with patient that given her history of getting a yeast infection whenever she takes antibiotics, she could consider asking the prescriber of antibiotics for a fluconazole prescription in order to avoid a second visit.    SUBJECTIVE:  Mihir Waldron is a 33 year old female who presents to  today with vulvar  and vaginal discomfort and white discharge c/w prior yeast infections x 4 days.  Is finishing a course of amoxicillin today.  She notes that in the last few years, she has consistently gotten vaginal yeast infections every time she takes antibiotics.  No fevers or chills.  Has noticed increased urinary frequency and some lower abdominal pain today.    OBJECTIVE:  /80   Pulse 77   Temp 98.3  F (36.8  C) (Tympanic)   Wt 87.5 kg (193 lb)   SpO2 99%   BMI 30.23 kg/m    GEN: well-appearing, in NAD    Results for orders placed or performed in visit on 02/28/24   UA Macroscopic with reflex to Microscopic and Culture - Lab Collect     Status: Abnormal    Specimen: Urine, Clean Catch   Result Value Ref Range    Color Urine Yellow Colorless, Straw, Light Yellow, Yellow    Appearance Urine Clear Clear    Glucose Urine Negative Negative mg/dL    Bilirubin Urine Negative Negative    Ketones Urine Negative Negative mg/dL    Specific Gravity Urine 1.015 1.003 - 1.035    Blood Urine Negative  Negative    pH Urine 8.5 (H) 5.0 - 7.0    Protein Albumin Urine 30 (A) Negative mg/dL    Urobilinogen Urine 0.2 0.2, 1.0 E.U./dL    Nitrite Urine Negative Negative    Leukocyte Esterase Urine Small (A) Negative   UA Microscopic with Reflex to Culture     Status: Abnormal   Result Value Ref Range    Bacteria Urine Few (A) None Seen /HPF    RBC Urine 0-2 0-2 /HPF /HPF    WBC Urine 0-5 0-5 /HPF /HPF    Squamous Epithelials Urine Few (A) None Seen /LPF    Budding Yeast Urine Few (A) None Seen /HPF    Mucus Urine Present (A) None Seen /LPF    Narrative    Urine Culture not indicated   Wet prep - lab collect     Status: Abnormal    Specimen: Vagina; Swab   Result Value Ref Range    Trichomonas Absent Absent    Yeast Present (A) Absent    Clue Cells Absent Absent    WBCs/high power field 1+ (A) None

## 2024-02-28 NOTE — PATIENT INSTRUCTIONS
Start fluconazole 1 tablet today and repeat in 3 days if needed.    Use a probiotic, such as Culturelle or any other OTC brand, daily for the next month.

## 2024-03-04 ENCOUNTER — OFFICE VISIT (OUTPATIENT)
Dept: URGENT CARE | Facility: URGENT CARE | Age: 34
End: 2024-03-04
Payer: COMMERCIAL

## 2024-03-04 VITALS
DIASTOLIC BLOOD PRESSURE: 72 MMHG | TEMPERATURE: 98.1 F | HEART RATE: 97 BPM | SYSTOLIC BLOOD PRESSURE: 118 MMHG | WEIGHT: 193 LBS | OXYGEN SATURATION: 98 % | BODY MASS INDEX: 30.23 KG/M2 | RESPIRATION RATE: 14 BRPM

## 2024-03-04 DIAGNOSIS — L29.9 ITCHING: ICD-10-CM

## 2024-03-04 DIAGNOSIS — R30.0 DYSURIA: ICD-10-CM

## 2024-03-04 DIAGNOSIS — B37.31 YEAST VAGINITIS: Primary | ICD-10-CM

## 2024-03-04 LAB
ALBUMIN UR-MCNC: NEGATIVE MG/DL
APPEARANCE UR: CLEAR
BACTERIA #/AREA URNS HPF: ABNORMAL /HPF
BILIRUB UR QL STRIP: NEGATIVE
CLUE CELLS: ABNORMAL
COLOR UR AUTO: YELLOW
GLUCOSE UR STRIP-MCNC: NEGATIVE MG/DL
HGB UR QL STRIP: ABNORMAL
KETONES UR STRIP-MCNC: NEGATIVE MG/DL
LEUKOCYTE ESTERASE UR QL STRIP: ABNORMAL
NITRATE UR QL: NEGATIVE
PH UR STRIP: 7 [PH] (ref 5–7)
RBC #/AREA URNS AUTO: ABNORMAL /HPF
SP GR UR STRIP: 1.02 (ref 1–1.03)
SQUAMOUS #/AREA URNS AUTO: ABNORMAL /LPF
TRICHOMONAS, WET PREP: ABNORMAL
UROBILINOGEN UR STRIP-ACNC: 0.2 E.U./DL
WBC #/AREA URNS AUTO: ABNORMAL /HPF
WBC'S/HIGH POWER FIELD, WET PREP: ABNORMAL
YEAST #/AREA URNS HPF: ABNORMAL /HPF
YEAST, WET PREP: PRESENT

## 2024-03-04 PROCEDURE — 87210 SMEAR WET MOUNT SALINE/INK: CPT | Performed by: PHYSICIAN ASSISTANT

## 2024-03-04 PROCEDURE — 81001 URINALYSIS AUTO W/SCOPE: CPT | Performed by: PHYSICIAN ASSISTANT

## 2024-03-04 PROCEDURE — 99213 OFFICE O/P EST LOW 20 MIN: CPT | Performed by: PHYSICIAN ASSISTANT

## 2024-03-04 PROCEDURE — 87086 URINE CULTURE/COLONY COUNT: CPT | Performed by: PHYSICIAN ASSISTANT

## 2024-03-04 RX ORDER — FLUCONAZOLE 150 MG/1
150 TABLET ORAL
Qty: 3 TABLET | Refills: 0 | Status: SHIPPED | OUTPATIENT
Start: 2024-03-04 | End: 2024-03-11

## 2024-03-04 ASSESSMENT — ENCOUNTER SYMPTOMS
FEVER: 0
ABDOMINAL PAIN: 0

## 2024-03-04 NOTE — PROGRESS NOTES
Assessment & Plan:        ICD-10-CM    1. Yeast vaginitis  B37.31 fluconazole (DIFLUCAN) 150 MG tablet      2. Itching  L29.9 Wet prep - lab collect     Wet prep - lab collect      3. Dysuria  R30.0 UA Macroscopic with reflex to Microscopic and Culture - Clinic Collect     Urine Microscopic Exam     Urine Culture            Plan/Clinical Decision Making:    Patient with ongoing vaginal itching after taking antibiotics for sinusitis. Was treated with two tablet course of Diflucan, didn't completely resolve.   Wet prep: positive for yeast. UA done- not having urinary symptoms.   3 tablet course of Diflucan done. Education on yeast infections done.         Return if symptoms worsen or fail to improve, for in 7-10 days.     At the end of the encounter, I discussed results, diagnosis, medications. Discussed red flags for immediate return to clinic/ER, as well as indications for follow up if no improvement. Patient understood and agreed to plan. Patient was stable for discharge.        Claire Rockwell PA-C on 3/4/2024 at 10:05 AM          Subjective:     HPI:    Mihir is a 33 year old female who presents to clinic today for the following health issues:  Chief Complaint   Patient presents with    Follow Up     Follow up from yeast infection from last Tuesday -- not cleared up --still having discharge, pain, odor, ithcy, irritated  -- it started about 2 weeks ago     HPI    Patient with recent sinusitis and treated with Amoxicillin.   Was seen on 2/28/24 for yeast infection. Had two tablet course of Diflucan, helped with yeast infection, but still persisting and hasn't resolved.   Having itching of vaginal area, abnormal discharge.   Not SA denies risk for STDs.     Review of Systems   Constitutional:  Negative for fever.   Gastrointestinal:  Negative for abdominal pain.   Genitourinary:  Positive for vaginal discharge and vaginal pain (mostly itching). Negative for pelvic pain.         Patient Active Problem List    Diagnosis    Sinus tachycardia    Postural orthostatic tachycardia syndrome    Health Care Home    ACP (advance care planning)    Post partum depression    CHIDI (generalized anxiety disorder)    Hx of anaphylaxis    Dietary fructose intolerance    Autonomic dysfunction    Irritable bowel syndrome with diarrhea        Past Medical History:   Diagnosis Date    Anxiety     Chest pain     Depressive disorder     Postpartum depression     POTS (postural orthostatic tachycardia syndrome)     Sinus tachycardia        Social History     Tobacco Use    Smoking status: Never    Smokeless tobacco: Never   Substance Use Topics    Alcohol use: No     Comment: None             Objective:     Vitals:    03/04/24 1003   BP: 118/72   BP Location: Right arm   Patient Position: Chair   Cuff Size: Adult Regular   Pulse: 97   Resp: 14   Temp: 98.1  F (36.7  C)   TempSrc: Oral   SpO2: 98%   Weight: 87.5 kg (193 lb)         Physical Exam   EXAM:   Pleasant, alert, appropriate appearance. NAD.  Head Exam: Normocephalic, atraumatic.  Neuro: CN II-XII intact grossly intact.  Skin: no rash or lesion.      Results:  Results for orders placed or performed in visit on 03/04/24   UA Macroscopic with reflex to Microscopic and Culture - Clinic Collect     Status: Abnormal    Specimen: Urine, Clean Catch   Result Value Ref Range    Color Urine Yellow Colorless, Straw, Light Yellow, Yellow    Appearance Urine Clear Clear    Glucose Urine Negative Negative mg/dL    Bilirubin Urine Negative Negative    Ketones Urine Negative Negative mg/dL    Specific Gravity Urine 1.025 1.003 - 1.035    Blood Urine Trace (A) Negative    pH Urine 7.0 5.0 - 7.0    Protein Albumin Urine Negative Negative mg/dL    Urobilinogen Urine 0.2 0.2, 1.0 E.U./dL    Nitrite Urine Negative Negative    Leukocyte Esterase Urine Moderate (A) Negative   Urine Microscopic Exam     Status: Abnormal   Result Value Ref Range    Bacteria Urine Many (A) None Seen /HPF    RBC Urine 0-2 0-2  /HPF /HPF    WBC Urine 10-25 (A) 0-5 /HPF /HPF    Squamous Epithelials Urine Moderate (A) None Seen /LPF    Budding Yeast Urine Few (A) None Seen /HPF   Wet prep - lab collect     Status: Abnormal    Specimen: Vagina; Swab   Result Value Ref Range    Trichomonas Absent Absent    Yeast Present (A) Absent    Clue Cells Absent Absent    WBCs/high power field 3+ (A) None

## 2024-03-05 LAB — BACTERIA UR CULT: NO GROWTH

## 2024-03-27 ENCOUNTER — OFFICE VISIT (OUTPATIENT)
Dept: URGENT CARE | Facility: URGENT CARE | Age: 34
End: 2024-03-27
Payer: COMMERCIAL

## 2024-03-27 VITALS
WEIGHT: 193 LBS | BODY MASS INDEX: 30.23 KG/M2 | RESPIRATION RATE: 14 BRPM | SYSTOLIC BLOOD PRESSURE: 118 MMHG | HEART RATE: 124 BPM | OXYGEN SATURATION: 98 % | DIASTOLIC BLOOD PRESSURE: 74 MMHG | TEMPERATURE: 98.5 F

## 2024-03-27 DIAGNOSIS — T88.7XXA MEDICATION SIDE EFFECTS: ICD-10-CM

## 2024-03-27 DIAGNOSIS — R07.0 THROAT PAIN: ICD-10-CM

## 2024-03-27 DIAGNOSIS — N61.0 BREAST INFECTION IN FEMALE: ICD-10-CM

## 2024-03-27 DIAGNOSIS — J03.90 TONSILLITIS: Primary | ICD-10-CM

## 2024-03-27 LAB
DEPRECATED S PYO AG THROAT QL EIA: NEGATIVE
GROUP A STREP BY PCR: NOT DETECTED

## 2024-03-27 PROCEDURE — 99214 OFFICE O/P EST MOD 30 MIN: CPT | Performed by: FAMILY MEDICINE

## 2024-03-27 PROCEDURE — 87651 STREP A DNA AMP PROBE: CPT | Performed by: FAMILY MEDICINE

## 2024-03-27 RX ORDER — CEPHALEXIN 500 MG/1
500 CAPSULE ORAL 2 TIMES DAILY
Qty: 20 CAPSULE | Refills: 0 | Status: SHIPPED | OUTPATIENT
Start: 2024-03-27 | End: 2024-04-06

## 2024-03-27 RX ORDER — FLUCONAZOLE 150 MG/1
TABLET ORAL
Qty: 4 TABLET | Refills: 0 | Status: SHIPPED | OUTPATIENT
Start: 2024-03-27

## 2024-03-27 NOTE — PATIENT INSTRUCTIONS
Start cephalexin 500 mg twice daily for 10 days.  This will cover for bacterial tonsillitis as well as treat the infection in both breasts related to the piercings.

## 2024-03-27 NOTE — PROGRESS NOTES
ICD-10-CM    1. Tonsillitis  J03.90 cephALEXin (KEFLEX) 500 MG capsule      2. Throat pain  R07.0 Streptococcus A Rapid Screen w/Reflex to PCR - Clinic Collect     Group A Streptococcus PCR Throat Swab      3. Breast infection in female  N61.0 cephALEXin (KEFLEX) 500 MG capsule      4. Medication side effects  T88.7XXA fluconazole (DIFLUCAN) 150 MG tablet        Tonsillitis with negative RST.  Given concurrent breast infection, will use cephalexin to cover for typical mastitis bacteria and this should cover typical bacterial tonsillitis organisms as well.  No peritonsillar abscess formation.  No breast abscess formation noted.  Discussed that tonsillitis can also be viral and may not respond to antibiotic if this is the case.    PLAN:  Patient Instructions   Start cephalexin 500 mg twice daily for 10 days.  This will cover for bacterial tonsillitis as well as treat the infection in both breasts related to the piercings.      SUBJECTIVE:  Mihir Waldron is a 33 year old female who presents to  today for sore throat that started yesterday.  She has noticed white spots on her tonsils and has been feeling unwell.  She also mentioned that she's has some drainage from her nipple piercings and some tenderness in both breasts lately too.  Has noted some swelling of lymph nodes in both armpits and has felt feverish.    OBJECTIVE:  /74 (BP Location: Right arm, Patient Position: Chair, Cuff Size: Adult Regular)   Pulse (!) 124   Temp 98.5  F (36.9  C) (Oral)   Resp 14   Wt 87.5 kg (193 lb)   LMP 03/15/2024 (Approximate)   SpO2 98%   BMI 30.23 kg/m    GEN: mildly ill-appearing, in NAD  ENT: TMs normal, oral MMM, pharynx erythematous, white exudates on both tonsils which are 2-3+ bilaterally, uvula midline  Neck: anterior cervical LAD bilaterally  Breasts: purulent discharge can be expressed through the nipple piercing sites bilaterally R>L, areas of erythema on both breasts and a small are c/w lymphangitis  on the left breast upper portion, no fluctuance noted

## 2024-05-24 ENCOUNTER — HOSPITAL ENCOUNTER (EMERGENCY)
Facility: CLINIC | Age: 34
Discharge: HOME | End: 2024-05-24
Attending: EMERGENCY MEDICINE | Admitting: EMERGENCY MEDICINE
Payer: COMMERCIAL

## 2024-05-24 VITALS
HEIGHT: 68 IN | OXYGEN SATURATION: 95 % | HEART RATE: 108 BPM | BODY MASS INDEX: 29.55 KG/M2 | RESPIRATION RATE: 20 BRPM | SYSTOLIC BLOOD PRESSURE: 125 MMHG | WEIGHT: 195 LBS | TEMPERATURE: 98.3 F | DIASTOLIC BLOOD PRESSURE: 73 MMHG

## 2024-05-24 DIAGNOSIS — T78.00XA ANAPHYLAXIS DUE TO FOOD: ICD-10-CM

## 2024-05-24 PROCEDURE — 96361 HYDRATE IV INFUSION ADD-ON: CPT

## 2024-05-24 PROCEDURE — 96375 TX/PRO/DX INJ NEW DRUG ADDON: CPT

## 2024-05-24 PROCEDURE — 99284 EMERGENCY DEPT VISIT MOD MDM: CPT | Mod: 25

## 2024-05-24 PROCEDURE — 250N000011 HC RX IP 250 OP 636: Performed by: EMERGENCY MEDICINE

## 2024-05-24 PROCEDURE — 93005 ELECTROCARDIOGRAM TRACING: CPT

## 2024-05-24 PROCEDURE — 96374 THER/PROPH/DIAG INJ IV PUSH: CPT

## 2024-05-24 PROCEDURE — 258N000003 HC RX IP 258 OP 636: Performed by: EMERGENCY MEDICINE

## 2024-05-24 RX ORDER — METHYLPREDNISOLONE SODIUM SUCCINATE 125 MG/2ML
125 INJECTION, POWDER, LYOPHILIZED, FOR SOLUTION INTRAMUSCULAR; INTRAVENOUS ONCE
Status: COMPLETED | OUTPATIENT
Start: 2024-05-24 | End: 2024-05-24

## 2024-05-24 RX ORDER — EPINEPHRINE 0.3 MG/.3ML
0.3 INJECTION SUBCUTANEOUS
Qty: 2 EACH | Refills: 0 | Status: SHIPPED | OUTPATIENT
Start: 2024-05-24

## 2024-05-24 RX ORDER — FAMOTIDINE 20 MG/1
20 TABLET, FILM COATED ORAL 2 TIMES DAILY
Qty: 10 TABLET | Refills: 0 | Status: SHIPPED | OUTPATIENT
Start: 2024-05-24 | End: 2024-05-29

## 2024-05-24 RX ORDER — PREDNISONE 20 MG/1
TABLET ORAL
Qty: 10 TABLET | Refills: 0 | Status: SHIPPED | OUTPATIENT
Start: 2024-05-24

## 2024-05-24 RX ADMIN — FAMOTIDINE 20 MG: 10 INJECTION, SOLUTION INTRAVENOUS at 13:03

## 2024-05-24 RX ADMIN — METHYLPREDNISOLONE SODIUM SUCCINATE 125 MG: 125 INJECTION, POWDER, FOR SOLUTION INTRAMUSCULAR; INTRAVENOUS at 13:05

## 2024-05-24 RX ADMIN — SODIUM CHLORIDE, POTASSIUM CHLORIDE, SODIUM LACTATE AND CALCIUM CHLORIDE 1000 ML: 600; 310; 30; 20 INJECTION, SOLUTION INTRAVENOUS at 13:03

## 2024-05-24 RX ADMIN — SODIUM CHLORIDE 1000 ML: 9 INJECTION, SOLUTION INTRAVENOUS at 15:51

## 2024-05-24 ASSESSMENT — ACTIVITIES OF DAILY LIVING (ADL)
ADLS_ACUITY_SCORE: 36

## 2024-05-24 ASSESSMENT — COLUMBIA-SUICIDE SEVERITY RATING SCALE - C-SSRS
2. HAVE YOU ACTUALLY HAD ANY THOUGHTS OF KILLING YOURSELF IN THE PAST MONTH?: NO
6. HAVE YOU EVER DONE ANYTHING, STARTED TO DO ANYTHING, OR PREPARED TO DO ANYTHING TO END YOUR LIFE?: NO
1. IN THE PAST MONTH, HAVE YOU WISHED YOU WERE DEAD OR WISHED YOU COULD GO TO SLEEP AND NOT WAKE UP?: NO

## 2024-05-24 NOTE — ED PROVIDER NOTES
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 Mihir reports she was driving back from her daughter's school and finishing a banana when she noticed an odd sensation in her mouth. She then vomited and developed swelling to her tongue, lips, and throat. She also experienced shortness of breath and urticaria to her face around her lips. EMS administered 50 mg Benadryl and 0.5 mg epi at 1219, which improved her symptoms, but she began feeling her tongue swell again and EMS gave another dose of epi at 1243 that again improved her symptoms.  It is unclear whether patient had objective edema at that time.  Upon ED arrival, she endorses abdominal pain without nausea, shortness of breath, or rashes. She states feeling completely resolved of hives and swelling at this time.    Independent Historian  EMS as detailed above.    Review of External Notes  None    Past Medical History   Medical History and Problem List  CHIDI  MDD  POTS  Anaphylaxis  Dietary fructose intolerance  Autonomic dysfunction  IBS with diarrhea    Medications  Bupropion  Desvenlafaxine    Surgical History   Dental surgery, bilateral  EP TILT table    Physical Exam   Patient Vitals for the past 24 hrs:   BP Temp Temp src Pulse Resp SpO2 Height Weight   05/24/24 1420 -- -- -- 110 18 97 % -- --   05/24/24 1405 123/63 -- -- 114 21 98 % -- --   05/24/24 1354 -- -- -- (!) 127 15 99 % -- --   05/24/24 1351 -- -- -- 117 19 100 % -- --   05/24/24 1346 135/66 -- -- (!) 126 -- -- -- --   05/24/24 1339 -- -- -- 116 20 99 % -- --   05/24/24 1324 -- -- -- 116 18 100 % -- --   05/24/24 1309 -- -- -- (!) 124 13 100 % -- --   05/24/24 1255 -- -- -- -- -- -- 1.727 m (5' 8\") 88.5 kg (195 lb)   05/24/24 1254 (!) 142/86 98.3  F (36.8  C) Oral 119 20 100 % -- --     Physical " Exam      HEENT:    Oropharynx is moist, without lesions or trismus.     No posterior pharyngeal edema.     No pooling of secretions.  Eyes:    Conjunctiva normal  Neck:     Supple, no meningismus.     CV:     Tachycardia, regular rhythm.      No murmurs, rubs or gallops.       No  lower extremity edema.  PULM:    Clear to auscultation bilateral.       No respiratory distress.      Good air exchange.     No wheezing or stridor.  ABD:    Soft, non-tender, non-distended.      No rebound, guarding or rigidity.  MSK:     No gross deformity to all four extremities.   LYMPH:   No cervical lymphadenopathy.  NEURO:   Alert, good muscular tone, no atrophy.   Skin:    Warm, dry and intact.      No urticaria  Psych:    Mood is good and affect is appropriate.    Diagnostics   Lab Results   None    Imaging  None    Independent Interpretation  None  ED Course    Medications Administered  Medications   lactated ringers BOLUS 1,000 mL (1,000 mLs Intravenous $New Bag 5/24/24 1303)   famotidine (PEPCID) injection 20 mg (20 mg Intravenous $Given 5/24/24 1303)   methylPREDNISolone sodium succinate (solu-MEDROL) injection 125 mg (125 mg Intravenous $Given 5/24/24 1305)       Procedures  Procedures     Discussion of Management  None    Social Determinants of Health adding to complexity of care  None    ED Course  ED Course as of 05/24/24 1518   Fri May 24, 2024   1250 I obtained history and examined the patient as noted above.    1450 I rechecked the patient and explained findings. She is feeling better without any signs of anaphylaxis.     Medical Decision Making / Diagnosis   CMS Diagnoses: None    MIPS     None    MDM    Mihir Waldron is a 33 year old female presents with anaphylaxis to food today.  Prior to arrival she had received epinephrine, Benadryl for urticaria and subjective tongue swelling.  She received a second dose of epinephrine with isolated subjective sensation of mild intraoral swelling.  By the time she presented  to the ED she is asymptomatic and without signs of allergic phenomenon.  She was tachycardic.  She was given IV fluids, famotidine and Solu-Medrol.  Plan was for 3-hour observation and if no significant rebound, discharge to home.      On reexamination patient continues to have no signs of allergic phenomenon but remained tachycardic.  I initially felt that this was secondary to epinephrine alone but last epinephrine was > 3 hours prior.  When she stood up, she felt dizzy.  Heart rate increased to 140-150 when sitting upright in bed.  Patient will be given a second liter of fluids.  If she continues to have significant orthostasis or significant tachycardia, patient will require observation.  If tachycardia and orthostasis improves/resolves with second liter of IV fluids, patient safe for discharge home.  Patient changed for the oncoming ED provider for follow-up.    Disposition  The patient was discharged.     ICD-10 Codes:    ICD-10-CM    1. Anaphylaxis due to food  T78.00XA            Discharge Medications  New Prescriptions    No medications on file         Scribe Disclosure:  I, Kandace Mcclellan, am serving as a scribe at 2:29 PM on 5/24/2024 to document services personally performed by Pawan Jimenez MD based on my observations and the provider's statements to me.        Pawan Jimenez MD  05/24/24 8043

## 2024-05-24 NOTE — ED TRIAGE NOTES
Pt arrives via EMS with c/o tongue, lip, throat swelling after eating a banana. Pt states she vomited right after then felt swelling, SOB, rash and redness to face. Fire gave 0.5 mg epi at 1219. EMS gave second dose 0.5 mg epi at 1243 as she felt throat swelling and tongue swelling returned. Currently no rash/hives, no lip or tongue swelling. No stridor or drooling. Given 50 mg benadryl via 20 g L AC. Pt's never had reaction to banana in past. ABC intact.

## 2024-05-25 ENCOUNTER — HOSPITAL ENCOUNTER (EMERGENCY)
Facility: CLINIC | Age: 34
Discharge: HOME | End: 2024-05-25
Attending: EMERGENCY MEDICINE | Admitting: EMERGENCY MEDICINE
Payer: COMMERCIAL

## 2024-05-25 VITALS
TEMPERATURE: 98.8 F | WEIGHT: 195 LBS | RESPIRATION RATE: 18 BRPM | HEIGHT: 68 IN | OXYGEN SATURATION: 100 % | SYSTOLIC BLOOD PRESSURE: 127 MMHG | BODY MASS INDEX: 29.55 KG/M2 | DIASTOLIC BLOOD PRESSURE: 75 MMHG | HEART RATE: 84 BPM

## 2024-05-25 DIAGNOSIS — R00.2 PALPITATIONS: ICD-10-CM

## 2024-05-25 LAB
ANION GAP SERPL CALCULATED.3IONS-SCNC: 10 MMOL/L (ref 7–15)
BASOPHILS # BLD AUTO: 0 10E3/UL (ref 0–0.2)
BASOPHILS NFR BLD AUTO: 0 %
BUN SERPL-MCNC: 13.6 MG/DL (ref 6–20)
CALCIUM SERPL-MCNC: 9 MG/DL (ref 8.6–10)
CHLORIDE SERPL-SCNC: 109 MMOL/L (ref 98–107)
CREAT SERPL-MCNC: 0.54 MG/DL (ref 0.51–0.95)
DEPRECATED HCO3 PLAS-SCNC: 23 MMOL/L (ref 22–29)
EGFRCR SERPLBLD CKD-EPI 2021: >90 ML/MIN/1.73M2
EOSINOPHIL # BLD AUTO: 0 10E3/UL (ref 0–0.7)
EOSINOPHIL NFR BLD AUTO: 0 %
ERYTHROCYTE [DISTWIDTH] IN BLOOD BY AUTOMATED COUNT: 13.1 % (ref 10–15)
GLUCOSE SERPL-MCNC: 92 MG/DL (ref 70–99)
HCT VFR BLD AUTO: 41.8 % (ref 35–47)
HGB BLD-MCNC: 13.7 G/DL (ref 11.7–15.7)
HOLD SPECIMEN: NORMAL
IMM GRANULOCYTES # BLD: 0.1 10E3/UL
IMM GRANULOCYTES NFR BLD: 0 %
LYMPHOCYTES # BLD AUTO: 3.6 10E3/UL (ref 0.8–5.3)
LYMPHOCYTES NFR BLD AUTO: 20 %
MCH RBC QN AUTO: 29.8 PG (ref 26.5–33)
MCHC RBC AUTO-ENTMCNC: 32.8 G/DL (ref 31.5–36.5)
MCV RBC AUTO: 91 FL (ref 78–100)
MONOCYTES # BLD AUTO: 1.1 10E3/UL (ref 0–1.3)
MONOCYTES NFR BLD AUTO: 6 %
NEUTROPHILS # BLD AUTO: 13.3 10E3/UL (ref 1.6–8.3)
NEUTROPHILS NFR BLD AUTO: 74 %
NRBC # BLD AUTO: 0 10E3/UL
NRBC BLD AUTO-RTO: 0 /100
PLATELET # BLD AUTO: 269 10E3/UL (ref 150–450)
POTASSIUM SERPL-SCNC: 3.6 MMOL/L (ref 3.4–5.3)
RBC # BLD AUTO: 4.6 10E6/UL (ref 3.8–5.2)
SODIUM SERPL-SCNC: 142 MMOL/L (ref 135–145)
TROPONIN T SERPL HS-MCNC: <6 NG/L
WBC # BLD AUTO: 18.1 10E3/UL (ref 4–11)

## 2024-05-25 PROCEDURE — 80048 BASIC METABOLIC PNL TOTAL CA: CPT | Performed by: EMERGENCY MEDICINE

## 2024-05-25 PROCEDURE — 80048 BASIC METABOLIC PNL TOTAL CA: CPT | Performed by: STUDENT IN AN ORGANIZED HEALTH CARE EDUCATION/TRAINING PROGRAM

## 2024-05-25 PROCEDURE — 85025 COMPLETE CBC W/AUTO DIFF WBC: CPT | Performed by: EMERGENCY MEDICINE

## 2024-05-25 PROCEDURE — 258N000003 HC RX IP 258 OP 636: Performed by: EMERGENCY MEDICINE

## 2024-05-25 PROCEDURE — 84484 ASSAY OF TROPONIN QUANT: CPT | Performed by: STUDENT IN AN ORGANIZED HEALTH CARE EDUCATION/TRAINING PROGRAM

## 2024-05-25 PROCEDURE — 36415 COLL VENOUS BLD VENIPUNCTURE: CPT | Performed by: STUDENT IN AN ORGANIZED HEALTH CARE EDUCATION/TRAINING PROGRAM

## 2024-05-25 PROCEDURE — 250N000013 HC RX MED GY IP 250 OP 250 PS 637: Performed by: EMERGENCY MEDICINE

## 2024-05-25 PROCEDURE — 96360 HYDRATION IV INFUSION INIT: CPT

## 2024-05-25 PROCEDURE — 99284 EMERGENCY DEPT VISIT MOD MDM: CPT | Mod: 25

## 2024-05-25 PROCEDURE — 93005 ELECTROCARDIOGRAM TRACING: CPT

## 2024-05-25 PROCEDURE — 84484 ASSAY OF TROPONIN QUANT: CPT | Performed by: EMERGENCY MEDICINE

## 2024-05-25 PROCEDURE — 85025 COMPLETE CBC W/AUTO DIFF WBC: CPT | Performed by: STUDENT IN AN ORGANIZED HEALTH CARE EDUCATION/TRAINING PROGRAM

## 2024-05-25 PROCEDURE — 250N000011 HC RX IP 250 OP 636: Performed by: STUDENT IN AN ORGANIZED HEALTH CARE EDUCATION/TRAINING PROGRAM

## 2024-05-25 RX ORDER — IBUPROFEN 200 MG
200 TABLET ORAL EVERY 6 HOURS PRN
Status: DISCONTINUED | OUTPATIENT
Start: 2024-05-25 | End: 2024-05-25 | Stop reason: HOSPADM

## 2024-05-25 RX ORDER — ONDANSETRON 4 MG/1
4 TABLET, ORALLY DISINTEGRATING ORAL ONCE
Status: COMPLETED | OUTPATIENT
Start: 2024-05-25 | End: 2024-05-25

## 2024-05-25 RX ADMIN — SODIUM CHLORIDE 1000 ML: 9 INJECTION, SOLUTION INTRAVENOUS at 14:21

## 2024-05-25 RX ADMIN — ONDANSETRON 4 MG: 4 TABLET, ORALLY DISINTEGRATING ORAL at 12:59

## 2024-05-25 RX ADMIN — IBUPROFEN 200 MG: 200 TABLET, FILM COATED ORAL at 14:21

## 2024-05-25 ASSESSMENT — COLUMBIA-SUICIDE SEVERITY RATING SCALE - C-SSRS
6. HAVE YOU EVER DONE ANYTHING, STARTED TO DO ANYTHING, OR PREPARED TO DO ANYTHING TO END YOUR LIFE?: NO
1. IN THE PAST MONTH, HAVE YOU WISHED YOU WERE DEAD OR WISHED YOU COULD GO TO SLEEP AND NOT WAKE UP?: NO
2. HAVE YOU ACTUALLY HAD ANY THOUGHTS OF KILLING YOURSELF IN THE PAST MONTH?: NO

## 2024-05-25 ASSESSMENT — ACTIVITIES OF DAILY LIVING (ADL)
ADLS_ACUITY_SCORE: 36

## 2024-05-25 NOTE — ED TRIAGE NOTES
Pt was at [facility] yesterday with allergic reaction,epi x2. Pt. Today continues to feel dizziness and n/v.      Triage Assessment (Adult)       Row Name 05/25/24 1251          Triage Assessment    Airway WDL WDL        Respiratory WDL    Respiratory WDL X        Skin Circulation/Temperature WDL    Skin Circulation/Temperature WDL WDL        Cardiac WDL    Cardiac WDL WDL        Peripheral/Neurovascular WDL    Peripheral Neurovascular WDL WDL        Cognitive/Neuro/Behavioral WDL    Cognitive/Neuro/Behavioral WDL WDL

## 2024-05-25 NOTE — ED PROVIDER NOTES
"  Emergency Department Note      History of Present Illness     Chief Complaint  Dizziness and Nausea & Vomiting    HPI  Mihir Waldron is a 33 year old female who presents for evaluation of palpitations.  Patient was seen at [facility] emergency department last night after she had a reported allergic reaction to banana.  At the time she had hives and some facial swelling and received epinephrine from paramedics as well as Benadryl, Solu-Medrol and her symptoms resolved.  She was treated with 2 L of IV fluids and discharged home.  Since leaving she reports her facial swelling and throat symptoms have completely resolved as well as her hives.  No current rash, shortness of breath however she notes that she has had palpitations since then and is feeling more fatigued and wiped out than normal.  No fevers.  No vomiting or diarrhea.  No abdominal pain.  Patient states that she has \"oral allergy syndrome\" and has a history of POTS listed on her chart.  No syncope or near syncope.  She denies any other symptoms at this time.  No chest pain or pleuritic pain.    Independent Historian  None    Review of External Notes  I reviewed the emergency department note from [facility] ED yesterday.  Patient was treated for allergic reaction and had an otherwise unremarkable evaluation and workup.    Past Medical History   Medical History and Problem List  Past Medical History:   Diagnosis Date    Anxiety     Chest pain     Depressive disorder     Postpartum depression     POTS (postural orthostatic tachycardia syndrome)     Sinus tachycardia        Medications  albuterol (PROAIR HFA/PROVENTIL HFA/VENTOLIN HFA) 108 (90 Base) MCG/ACT inhaler  albuterol (PROVENTIL) (2.5 MG/3ML) 0.083% neb solution  ALPRAZolam (XANAX) 0.5 MG tablet  buPROPion (WELLBUTRIN XL) 150 MG 24 hr tablet  desvenlafaxine (PRISTIQ) 50 MG 24 hr tablet  EPINEPHrine (ANY BX GENERIC EQUIV) 0.3 MG/0.3ML injection 2-pack  famotidine (PEPCID) 20 MG tablet  fluconazole " "(DIFLUCAN) 150 MG tablet  fluconazole (DIFLUCAN) 150 MG tablet  predniSONE (DELTASONE) 20 MG tablet  rizatriptan (MAXALT) 5 MG tablet        Surgical History   Past Surgical History:   Procedure Laterality Date    DENTAL SURGERY Bilateral     EP STUDY TILT TABLE N/A 2/7/2020    Procedure: EP TILT TABLE;  Surgeon: Clayton Sanders MD;  Location:  [facility]     Physical Exam   Patient Vitals for the past 24 hrs:   BP Temp Temp src Pulse Resp SpO2 Height Weight   05/25/24 1352 -- -- -- -- -- 99 % -- --   05/25/24 1337 127/75 -- -- 93 -- 99 % -- --   05/25/24 1250 (!) 155/78 98.8  F (37.1  C) Temporal 82 16 100 % 1.727 m (5' 8\") 88.5 kg (195 lb)     Physical Exam  General: Well appearing, nontoxic. Resting comfortably  Head:  Scalp, face, and head appear normal  Eyes:  Pupils are equal, round    Conjunctivae non-injected and sclerae white  ENT:    The external nose is normal    Pinnae are normal    MMM. Posterior pharynx clear without swelling, exudates or erythema. No mucosal lesions, tongue or lip swelling. No tongue elevation. Uvula normal without deviation. Voice normal. No drooling or trismus.   Neck:  Normal range of motion    There is no rigidity noted    Trachea is in the midline  CV:  Regular rate and rhythm     Normal S1/S2, no S3/S4    No murmur or rub. Radial pulses 2+ bilaterally.  Resp:  Lungs are clear and equal bilaterally  There is no tachypnea    No increased work of breathing    No rales, wheezing, or rhonchi  GI:  No distention.  MS:  Normal muscular tone    Symmetric motor strength    No lower extremity edema  Skin:  No rash or acute skin lesions noted  Neuro:  Awake and alert  Speech is normal and fluent  Moves all extremities spontaneously  Psych: Normal affect. Appropriate interactions.      Diagnostics   Lab Results   Labs Ordered and Resulted from Time of ED Arrival to Time of ED Departure   BASIC METABOLIC PANEL - Abnormal       Result Value    Sodium 142      Potassium 3.6  "     Chloride 109 (*)     Carbon Dioxide (CO2) 23      Anion Gap 10      Urea Nitrogen 13.6      Creatinine 0.54      GFR Estimate >90      Calcium 9.0      Glucose 92     CBC WITH PLATELETS AND DIFFERENTIAL - Abnormal    WBC Count 18.1 (*)     RBC Count 4.60      Hemoglobin 13.7      Hematocrit 41.8      MCV 91      MCH 29.8      MCHC 32.8      RDW 13.1      Platelet Count 269      % Neutrophils 74      % Lymphocytes 20      % Monocytes 6      % Eosinophils 0      % Basophils 0      % Immature Granulocytes 0      NRBCs per 100 WBC 0      Absolute Neutrophils 13.3 (*)     Absolute Lymphocytes 3.6      Absolute Monocytes 1.1      Absolute Eosinophils 0.0      Absolute Basophils 0.0      Absolute Immature Granulocytes 0.1      Absolute NRBCs 0.0     TROPONIN T, HIGH SENSITIVITY - Normal    Troponin T, High Sensitivity <6         Imaging  No orders to display       EKG   ECG taken at 1302, ECG read at 1406  Normal sinus rhythm.  Intervals are normal.  No acute ischemic changes or concerning morphology.  Rate 94 bpm. VA interval 132 ms. QRS duration 88 ms. QT/QTc 376/470 ms. P-R-T axes 56 57 51.    Independent Interpretation  None  ED Course    Medications Administered  Medications   ibuprofen (ADVIL/MOTRIN) tablet 200 mg (has no administration in time range)   sodium chloride 0.9% BOLUS 1,000 mL (has no administration in time range)   ondansetron (ZOFRAN ODT) ODT tab 4 mg (4 mg Oral $Given 5/25/24 1259)       Procedures  Procedures     Discussion of Management  None    Social Determinants of Health adding to complexity of care  None    ED Course  ED Course as of 05/25/24 1421   Sat May 25, 2024   1340 Patient seen and evaluated    1340 WBC(!): 18.1  Patient received IV steroids yesterday likely causing this leukocytosis.   1340 Patient seen and evaluated      Medical Decision Making / Diagnosis   CMS Diagnoses: None    MIPS     None    MDM  Mihir Waldron is a 33 year old female who presents for evaluation of  palpitations.  Initial ECG was non-concerning and patient is hemodynamically stable.  A broad differential diagnosis was considered including SVT, atrial fibrillation, MAT, ventricular dysrhythmia, thyroid disease, acute electrolyte abnormality, drugs/medication effect or toxicity, caffeine intake or other stimulants, anemia, heart disease, PE, HOCM, WPW, Brugada, ARVC etc. ECG and Telemetry monitoring in the ED was normal and did not reveal any concerning dysrhythmia.  Patient was treated yesterday for an allergic reaction with epinephrine, IV steroids and Benadryl.  At this time there is no evidence for ongoing allergic reaction, angioedema or anaphylaxis.  Laboratory studies showed leukocytosis, likely reactive in response to IV steroids given yesterday.  Laboratory studies otherwise unremarkable.  No significant metabolic disturbance.  Electrolytes are normal.  No chest pain or findings to suggest acute cardiopulmonary process.  Patient was treated with IV fluids and offered ample reassurance. Return precautions were discussed with patient. The patient's questions were answered and the patient was agreeable with discharge. She was discharged in stable condition.  I recommend follow-up with PCP if symptoms continue.      Disposition  The patient was discharged.     ICD-10 Codes:    ICD-10-CM    1. Palpitations  R00.2            Discharge Medications  New Prescriptions    No medications on file         MD Pankaj Brito Ryan Clay, MD  05/25/24 5605

## 2024-05-26 LAB
ATRIAL RATE - MUSE: 94 BPM
DIASTOLIC BLOOD PRESSURE - MUSE: NORMAL MMHG
INTERPRETATION ECG - MUSE: NORMAL
P AXIS - MUSE: 56 DEGREES
PR INTERVAL - MUSE: 132 MS
QRS DURATION - MUSE: 88 MS
QT - MUSE: 376 MS
QTC - MUSE: 470 MS
R AXIS - MUSE: 57 DEGREES
SYSTOLIC BLOOD PRESSURE - MUSE: NORMAL MMHG
T AXIS - MUSE: 51 DEGREES
VENTRICULAR RATE- MUSE: 94 BPM

## 2024-05-28 LAB
ATRIAL RATE - MUSE: 130 BPM
DIASTOLIC BLOOD PRESSURE - MUSE: NORMAL MMHG
INTERPRETATION ECG - MUSE: NORMAL
P AXIS - MUSE: 62 DEGREES
PR INTERVAL - MUSE: 132 MS
QRS DURATION - MUSE: 88 MS
QT - MUSE: 332 MS
QTC - MUSE: 488 MS
R AXIS - MUSE: 40 DEGREES
SYSTOLIC BLOOD PRESSURE - MUSE: NORMAL MMHG
T AXIS - MUSE: 38 DEGREES
VENTRICULAR RATE- MUSE: 130 BPM

## 2024-06-17 PROBLEM — Z76.89 HEALTH CARE HOME: Status: RESOLVED | Noted: 2017-12-13 | Resolved: 2024-06-17

## 2024-06-24 ENCOUNTER — OFFICE VISIT (OUTPATIENT)
Dept: URGENT CARE | Facility: URGENT CARE | Age: 34
End: 2024-06-24
Payer: COMMERCIAL

## 2024-06-24 VITALS
BODY MASS INDEX: 29.65 KG/M2 | TEMPERATURE: 98.1 F | OXYGEN SATURATION: 98 % | DIASTOLIC BLOOD PRESSURE: 74 MMHG | WEIGHT: 195 LBS | HEART RATE: 86 BPM | RESPIRATION RATE: 14 BRPM | SYSTOLIC BLOOD PRESSURE: 118 MMHG

## 2024-06-24 DIAGNOSIS — B96.89 BACTERIAL VAGINOSIS: Primary | ICD-10-CM

## 2024-06-24 DIAGNOSIS — N76.0 BACTERIAL VAGINOSIS: Primary | ICD-10-CM

## 2024-06-24 PROBLEM — B37.31 CANDIDIASIS OF VAGINA: Status: ACTIVE | Noted: 2023-12-10

## 2024-06-24 LAB
ALBUMIN UR-MCNC: NEGATIVE MG/DL
APPEARANCE UR: CLEAR
BACTERIA #/AREA URNS HPF: ABNORMAL /HPF
BILIRUB UR QL STRIP: NEGATIVE
CLUE CELLS: PRESENT
COLOR UR AUTO: YELLOW
GLUCOSE UR STRIP-MCNC: NEGATIVE MG/DL
HGB UR QL STRIP: NEGATIVE
KETONES UR STRIP-MCNC: NEGATIVE MG/DL
LEUKOCYTE ESTERASE UR QL STRIP: ABNORMAL
MUCOUS THREADS #/AREA URNS LPF: PRESENT /LPF
NITRATE UR QL: NEGATIVE
PH UR STRIP: 6 [PH] (ref 5–7)
RBC #/AREA URNS AUTO: ABNORMAL /HPF
SP GR UR STRIP: 1.02 (ref 1–1.03)
SQUAMOUS #/AREA URNS AUTO: ABNORMAL /LPF
TRICHOMONAS, WET PREP: ABNORMAL
UROBILINOGEN UR STRIP-ACNC: 0.2 E.U./DL
WBC #/AREA URNS AUTO: ABNORMAL /HPF
WBC'S/HIGH POWER FIELD, WET PREP: ABNORMAL
YEAST, WET PREP: ABNORMAL

## 2024-06-24 PROCEDURE — 81001 URINALYSIS AUTO W/SCOPE: CPT | Performed by: STUDENT IN AN ORGANIZED HEALTH CARE EDUCATION/TRAINING PROGRAM

## 2024-06-24 PROCEDURE — 87086 URINE CULTURE/COLONY COUNT: CPT | Performed by: STUDENT IN AN ORGANIZED HEALTH CARE EDUCATION/TRAINING PROGRAM

## 2024-06-24 PROCEDURE — 87088 URINE BACTERIA CULTURE: CPT | Performed by: STUDENT IN AN ORGANIZED HEALTH CARE EDUCATION/TRAINING PROGRAM

## 2024-06-24 PROCEDURE — 99213 OFFICE O/P EST LOW 20 MIN: CPT | Performed by: STUDENT IN AN ORGANIZED HEALTH CARE EDUCATION/TRAINING PROGRAM

## 2024-06-24 PROCEDURE — 87210 SMEAR WET MOUNT SALINE/INK: CPT | Performed by: STUDENT IN AN ORGANIZED HEALTH CARE EDUCATION/TRAINING PROGRAM

## 2024-06-24 RX ORDER — METRONIDAZOLE 500 MG/1
500 TABLET ORAL 2 TIMES DAILY
Qty: 14 TABLET | Refills: 0 | Status: SHIPPED | OUTPATIENT
Start: 2024-06-24 | End: 2024-07-01

## 2024-06-24 NOTE — PROGRESS NOTES
"Assessment & Plan     Bacterial vaginosis  - UA Macroscopic with reflex to Microscopic and Culture - Clinic Collect  - Wet prep - lab collect  - Wet prep - lab collect  - Urine Microscopic Exam  - Urine Culture  - metroNIDAZOLE (FLAGYL) 500 MG tablet  Dispense: 14 tablet; Refill: 0    Wet prep + BV. Discussed common causes of BV (hers most likely 2/2 recurrent yeast infections) and treatment. Advised on genital hygiene recommendations, taking entire course of antibiotics even if symptoms resolve, and return precautions.    Return for if symptoms do not improve in 2-3 days.    Farida Morrow, DO  she/her  [facility]    Subjective     Mihir Waldron is a 33 year old female who presents to clinic today for the following health issues:    HPI    UTI  Treated for yeast 3x since Feb  In the last four weeks, odor and milky discharge  Tried monostat, boric acid suppository, hygiene changes  Not sexually active in last 2mo, no new partner  No rashes, lesions  No dysuria, fever, new lower back pain, itching  STI pane in Dec negative    Past Medical History:   Diagnosis Date    Anxiety     Chest pain     Depressive disorder     Postpartum depression     POTS (postural orthostatic tachycardia syndrome)     Sinus tachycardia      Allergies   Allergen Reactions    Codeine     Codeine Sulfate GI Disturbance    Contrast Dye Hives     Unsure of name of contrast    Dairy Digestive Cramps, Diarrhea, Fatigue, GI Disturbance, Headache, Nausea and Nausea and Vomiting    Diphenhydramine Other (See Comments)     Pt got warm and passed out with IV benadryl    Latex Hives    Plum Pulp Nausea and Vomiting and Itching     All \"stone fruit\",Itching in throat and throat swelling     Current Outpatient Medications   Medication Sig Dispense Refill    buPROPion (WELLBUTRIN XL) 150 MG 24 hr tablet Take 150 mg by mouth daily      desvenlafaxine (PRISTIQ) 50 MG 24 hr tablet Take 50 mg by mouth daily      metroNIDAZOLE (FLAGYL) " 500 MG tablet Take 1 tablet (500 mg) by mouth 2 times daily for 7 days 14 tablet 0    albuterol (PROAIR HFA/PROVENTIL HFA/VENTOLIN HFA) 108 (90 Base) MCG/ACT inhaler Inhale 2 puffs into the lungs every 2 hours as needed for shortness of breath, wheezing or cough (Patient not taking: Reported on 3/4/2024) 18 g 0    albuterol (PROVENTIL) (2.5 MG/3ML) 0.083% neb solution Take 1 vial (2.5 mg) by nebulization every 6 hours as needed (Patient not taking: Reported on 3/4/2024) 90 mL 0    ALPRAZolam (XANAX) 0.5 MG tablet Take 1 tablet (0.5 mg) by mouth every 8 hours as needed for anxiety (Patient not taking: Reported on 3/4/2024) 12 tablet 0    EPINEPHrine (ANY BX GENERIC EQUIV) 0.3 MG/0.3ML injection 2-pack Inject 0.3 mLs (0.3 mg) into the muscle once as needed for anaphylaxis May repeat one time in 5-15 minutes if response to initial dose is inadequate. (Patient not taking: Reported on 6/24/2024) 2 each 0    fluconazole (DIFLUCAN) 150 MG tablet Take 1 pill every 3 days until yeast infection symptoms are gone. (Patient not taking: Reported on 6/24/2024) 4 tablet 0    fluconazole (DIFLUCAN) 150 MG tablet Take one tablet today and repeat in 3 days if still having symptoms. (Patient not taking: Reported on 6/24/2024) 2 tablet 0    predniSONE (DELTASONE) 20 MG tablet Take two tablets (= 40mg) each day for 5 (five) days (Patient not taking: Reported on 6/24/2024) 10 tablet 0    rizatriptan (MAXALT) 5 MG tablet Take 1 tablet (5 mg) by mouth at onset of headache for migraine May repeat in 2 hours. Max 6 tablets/24 hours. (Patient not taking: Reported on 3/27/2024) 30 tablet 1     No current facility-administered medications for this visit.      Review of Systems  Constitutional, HEENT, cardiovascular, pulmonary, gi and gu systems are negative, except as otherwise noted.      Objective    /74 (BP Location: Right arm, Patient Position: Chair, Cuff Size: Adult Regular)   Pulse 86   Temp 98.1  F (36.7  C) (Oral)   Resp 14    Wt 88.5 kg (195 lb)   LMP 06/11/2024 (Approximate)   SpO2 98%   BMI 29.65 kg/m      Physical Exam   General: Alert and oriented, in no acute distress.  Skin: Warm and dry, no abnormalities noted.  Eyes: Extra-ocular muscles intact, pupils equal and reactive.  ENT: Speech intact, nasal passages open, no hearing impairment noted.  CV: No cyanosis or pallor, warm and well perfused.  Respiratory: No respiratory distress, no accessory muscle use.  Neuro: Gait and station normal, comprehension intact. Gross and fine motor skills intact.   Psychiatric: Mood and affect appear normal.   Extremities: Warm, able to move all four extremities at will.      Results for orders placed or performed in visit on 06/24/24   UA Macroscopic with reflex to Microscopic and Culture - Clinic Collect     Status: Abnormal    Specimen: Urine, Midstream   Result Value Ref Range    Color Urine Yellow Colorless, Straw, Light Yellow, Yellow    Appearance Urine Clear Clear    Glucose Urine Negative Negative mg/dL    Bilirubin Urine Negative Negative    Ketones Urine Negative Negative mg/dL    Specific Gravity Urine 1.020 1.003 - 1.035    Blood Urine Negative Negative    pH Urine 6.0 5.0 - 7.0    Protein Albumin Urine Negative Negative mg/dL    Urobilinogen Urine 0.2 0.2, 1.0 E.U./dL    Nitrite Urine Negative Negative    Leukocyte Esterase Urine Moderate (A) Negative   Urine Microscopic Exam     Status: Abnormal   Result Value Ref Range    Bacteria Urine Few (A) None Seen /HPF    RBC Urine 0-2 0-2 /HPF /HPF    WBC Urine 10-25 (A) 0-5 /HPF /HPF    Squamous Epithelials Urine Moderate (A) None Seen /LPF    Mucus Urine Present (A) None Seen /LPF   Wet prep - lab collect     Status: Abnormal    Specimen: Vagina; Swab   Result Value Ref Range    Trichomonas Absent Absent    Yeast Absent Absent    Clue Cells Present (A) Absent    WBCs/high power field 3+ (A) None           The use of Dragon/[employer] dictation services may have been used to construct the  content in this note; any grammatical or spelling errors are non-intentional. Please contact the author of this note directly if you are in need of any clarification.

## 2024-06-25 LAB
BACTERIA UR CULT: ABNORMAL
BACTERIA UR CULT: ABNORMAL

## 2024-10-07 ENCOUNTER — OFFICE VISIT (OUTPATIENT)
Dept: URGENT CARE | Facility: URGENT CARE | Age: 34
End: 2024-10-07
Payer: COMMERCIAL

## 2024-10-07 VITALS
SYSTOLIC BLOOD PRESSURE: 143 MMHG | HEART RATE: 96 BPM | OXYGEN SATURATION: 99 % | RESPIRATION RATE: 16 BRPM | BODY MASS INDEX: 29.55 KG/M2 | HEIGHT: 68 IN | TEMPERATURE: 98.2 F | WEIGHT: 195 LBS | DIASTOLIC BLOOD PRESSURE: 85 MMHG

## 2024-10-07 DIAGNOSIS — R10.2 SUPRAPUBIC DISCOMFORT: ICD-10-CM

## 2024-10-07 DIAGNOSIS — N94.89 VAGINAL BURNING: Primary | ICD-10-CM

## 2024-10-07 LAB

## 2024-10-07 PROCEDURE — 81003 URINALYSIS AUTO W/O SCOPE: CPT | Performed by: FAMILY MEDICINE

## 2024-10-07 PROCEDURE — 87210 SMEAR WET MOUNT SALINE/INK: CPT

## 2024-10-07 PROCEDURE — 87491 CHLMYD TRACH DNA AMP PROBE: CPT | Performed by: FAMILY MEDICINE

## 2024-10-07 PROCEDURE — 87591 N.GONORRHOEAE DNA AMP PROB: CPT | Performed by: FAMILY MEDICINE

## 2024-10-07 PROCEDURE — 99213 OFFICE O/P EST LOW 20 MIN: CPT | Performed by: FAMILY MEDICINE

## 2024-10-07 NOTE — PATIENT INSTRUCTIONS
Today your urine and wet prep testing are all normal.  No signs of a bladder infection or a vaginal infection causing your symptoms.    The STI testing should have results tomorrow or the next day.  If you are positive for gonorrhea and/or chlamydia, a member of our urgent care team will be in touch to discuss treatment options.    For now, continue to monitor symptoms and follow up if significantly worsening.  You can try ibuprofen or naproxen to help with the cramping pelvic discomfort.  Please sure to drink plenty of fluids as well.

## 2024-10-07 NOTE — PROGRESS NOTES
"  ICD-10-CM    1. Vaginal burning  N94.89 Chlamydia trachomatis/Neisseria gonorrhoeae by PCR - lab collect     UA Macroscopic with reflex to Microscopic and Culture - Lab Collect     Wet prep - lab collect     Chlamydia trachomatis/Neisseria gonorrhoeae by PCR - lab collect     UA Macroscopic with reflex to Microscopic and Culture - Lab Collect     Wet prep - lab collect      2. Suprapubic discomfort  R10.2         UA normal today.  Wet prep also normal.  Awaiting gonorrhea/chlamydia PCR results and will treat any positives with antibiotics.  Low suspicion for PID at this time given no systemic symptoms and minimal findings on exam.  Given mid-cycle timing of the lower abdominal pain, could be related to ovulation.    PLAN:  Patient Instructions   Today your urine and wet prep testing are all normal.  No signs of a bladder infection or a vaginal infection causing your symptoms.    The STI testing should have results tomorrow or the next day.  If you are positive for gonorrhea and/or chlamydia, a member of our urgent care team will be in touch to discuss treatment options.    For now, continue to monitor symptoms and follow up if significantly worsening.  You can try ibuprofen or naproxen to help with the cramping pelvic discomfort.  Please sure to drink plenty of fluids as well.    SUBJECTIVE:  Mihir Waldron is a 34 year old female who presents to UC today with a few days of lower abdominal cramping and suprapubic discomfort in addition to vaginal discomfort.  No fevers or chills.  No nausea, vomiting, or diarrhea.  No blood in urine or vaginal bleeding.  Had unprotected intercourse with a male partner about a week ago and wants to rule out STIs as cause for symptoms.    LMP 9/24/24.    OBJECTIVE:  BP (!) 143/85   Pulse 96   Temp 98.2  F (36.8  C) (Oral)   Resp 16   Ht 1.727 m (5' 8\")   Wt 88.5 kg (195 lb)   LMP 09/25/2024   SpO2 99%   BMI 29.65 kg/m    GEN: well-appearing, in NAD  Lungs:  CTAB  CV:  " RRR, no murmurs  Abd: soft, active bowel sounds, not distended, mild discomfort in suprapubic region, no guarding or rebound    Results for orders placed or performed in visit on 10/07/24   UA Macroscopic with reflex to Microscopic and Culture - Lab Collect     Status: Normal    Specimen: Urine, Midstream   Result Value Ref Range    Color Urine Yellow Colorless, Straw, Light Yellow, Yellow    Appearance Urine Clear Clear    Glucose Urine Negative Negative mg/dL    Bilirubin Urine Negative Negative    Ketones Urine Negative Negative mg/dL    Specific Gravity Urine >=1.030 1.003 - 1.035    Blood Urine Negative Negative    pH Urine 6.0 5.0 - 7.0    Protein Albumin Urine Negative Negative mg/dL    Urobilinogen Urine 0.2 0.2, 1.0 E.U./dL    Nitrite Urine Negative Negative    Leukocyte Esterase Urine Negative Negative    Narrative    Microscopic not indicated   Wet prep - lab collect     Status: Abnormal    Specimen: Vagina; Swab   Result Value Ref Range    Trichomonas Absent Absent    Yeast Absent Absent    Clue Cells Absent Absent    WBCs/high power field 1+ (A) None

## 2024-10-08 LAB
C TRACH DNA SPEC QL PROBE+SIG AMP: NEGATIVE
N GONORRHOEA DNA SPEC QL NAA+PROBE: NEGATIVE

## 2024-11-03 NOTE — PATIENT INSTRUCTIONS
Migraine without aura and without status migrainosus, not intractable  (primary encounter diagnosis)  Comment: handout given/ options  Plan: SUMAtriptan (IMITREX) 25 MG tablet        Potential medication side effects were discussed with the patient; let me know if any occur.  Monitor response    (S13.9XXD) Neck sprain, subsequent encounter  Comment: anatomy reviewed  Plan: cyclobenzaprine (FLEXERIL) 5 MG tablet        Potential medication side effects were discussed with the patient; let me know if any occur.  Ice. Rehab stretches/ exercises to begin immediately and given in writing with illustrations.    Let's recheck in 24-48 hours.    
English

## 2024-11-15 ENCOUNTER — OFFICE VISIT (OUTPATIENT)
Dept: URGENT CARE | Facility: URGENT CARE | Age: 34
End: 2024-11-15
Payer: COMMERCIAL

## 2024-11-15 VITALS
HEART RATE: 67 BPM | OXYGEN SATURATION: 97 % | RESPIRATION RATE: 18 BRPM | DIASTOLIC BLOOD PRESSURE: 84 MMHG | TEMPERATURE: 99.3 F | SYSTOLIC BLOOD PRESSURE: 125 MMHG

## 2024-11-15 DIAGNOSIS — R05.1 ACUTE COUGH: ICD-10-CM

## 2024-11-15 DIAGNOSIS — R07.0 THROAT PAIN: ICD-10-CM

## 2024-11-15 DIAGNOSIS — Z87.01 HISTORY OF PNEUMONIA: ICD-10-CM

## 2024-11-15 DIAGNOSIS — J06.9 VIRAL URI WITH COUGH: Primary | ICD-10-CM

## 2024-11-15 DIAGNOSIS — Z20.89 PNEUMONIA EXPOSURE: ICD-10-CM

## 2024-11-15 DIAGNOSIS — Z20.822 EXPOSURE TO 2019 NOVEL CORONAVIRUS: ICD-10-CM

## 2024-11-15 DIAGNOSIS — Z20.822 SUSPECTED 2019 NOVEL CORONAVIRUS INFECTION: ICD-10-CM

## 2024-11-15 LAB
DEPRECATED S PYO AG THROAT QL EIA: NEGATIVE
GROUP A STREP BY PCR: NOT DETECTED
SARS-COV-2 RNA RESP QL NAA+PROBE: NEGATIVE

## 2024-11-15 PROCEDURE — 87635 SARS-COV-2 COVID-19 AMP PRB: CPT | Performed by: NURSE PRACTITIONER

## 2024-11-15 PROCEDURE — 87651 STREP A DNA AMP PROBE: CPT | Performed by: NURSE PRACTITIONER

## 2024-11-15 PROCEDURE — 99213 OFFICE O/P EST LOW 20 MIN: CPT | Performed by: NURSE PRACTITIONER

## 2024-11-15 RX ORDER — LAMOTRIGINE 150 MG/1
TABLET ORAL
COMMUNITY
Start: 2024-11-01

## 2024-11-15 RX ORDER — CLONAZEPAM 0.5 MG/1
TABLET ORAL
COMMUNITY
Start: 2024-11-01

## 2024-11-15 NOTE — PROGRESS NOTES
Assessment & Plan      Diagnosis Comments   1. Viral URI with cough  Declined chest xray.   Rest, Push fluids, vaporizer.  Ibuprofen and or Tylenol for any fever or body aches.  Continue OTC NyQuil/DayQuil, Mucinex.    If symptoms worsen, recheck immediately otherwise follow up with your PCP in 1 week if symptoms are not improving.    Worrisome symptoms discussed with instructions to go to the ED.    Recommend continue self-isolation until COVID test results return.    Patient verbalized understanding and agreed with this plan.        2. Acute cough        3. Throat pain  Streptococcus A Rapid Screen w/Reflex to PCR - Clinic Collect, COVID-19 Virus (Coronavirus) by PCR Nose, Group A Streptococcus PCR Throat Swab Pending strep culture        4. Suspected 2019 novel coronavirus infection  COVID-19 Virus (Coronavirus) by PCR Nose pending        5. Exposure to 2019 novel coronavirus        6. Pneumonia exposure        7. History of pneumonia             At the end of the encounter, I discussed results, diagnosis, medications. Discussed red flags for immediate return to clinic/ER, as well as indications for follow up if no improvement. Patient understood and agreed to plan. Patient was stable for discharge      If not improving or if condition worsens, follow up with your Primary Care Provider         ALICE Rocha [facility] [place]    Dione Ash is a 34 year old female who presents to clinic today for the following health issues:  Chief Complaint   Patient presents with    Urgent Care     Pt complains of throat pain, cough with phlegm, chest feels tight and fever X 3 days.       HPI  Patient presents to clinic with symptoms of cough, fever max 102 responding well to tylenol.,  Malaise.  States she has 2 sons at home 1 who has pneumonia and 1 who has COVID both are improving however patient states her symptoms started approximately 3 days ago  Patient states that her cough  is dry.  She denies shortness of breath, she does have a history of pneumonia after COVID.      Review of Systems  Constitutional, HEENT, cardiovascular, pulmonary, gi and gu systems are negative, except as otherwise noted.      Objective    /84   Pulse 67   Temp 99.3  F (37.4  C) (Tympanic)   Resp 18   LMP 09/25/2024   SpO2 97%   Physical Exam   GENERAL: alert and no distress  EYES: Eyes grossly normal to inspection, PERRL and conjunctivae and sclerae normal  HENT: normal cephalic/atraumatic, ear canals and TM's normal, nose and mouth without ulcers or lesions, nasal mucosa edematous , rhinorrhea clear, oropharynx clear, oral mucous membranes moist, and tonsillar erythema  NECK: bilateral anterior cervical adenopathy, no asymmetry, masses, or scars, and thyroid normal to palpation  RESP: lungs clear to auscultation - no rales, rhonchi or wheezes  CV: regular rate and rhythm, normal S1 S2, no S3 or S4, no murmur, click or rub, no peripheral edema  ABDOMEN: soft, nontender, no hepatosplenomegaly, no masses and bowel sounds normal  MS: no gross musculoskeletal defects noted, no edema  Results for orders placed or performed in visit on 11/15/24   Streptococcus A Rapid Screen w/Reflex to PCR - Clinic Collect     Status: Normal    Specimen: Throat; Swab   Result Value Ref Range    Group A Strep antigen Negative Negative

## 2024-11-21 ENCOUNTER — OFFICE VISIT (OUTPATIENT)
Dept: URGENT CARE | Facility: URGENT CARE | Age: 34
End: 2024-11-21
Payer: COMMERCIAL

## 2024-11-21 VITALS
TEMPERATURE: 98.7 F | OXYGEN SATURATION: 98 % | DIASTOLIC BLOOD PRESSURE: 82 MMHG | SYSTOLIC BLOOD PRESSURE: 116 MMHG | HEART RATE: 69 BPM

## 2024-11-21 DIAGNOSIS — J22 LOWER RESPIRATORY INFECTION: Primary | ICD-10-CM

## 2024-11-21 RX ORDER — FLUCONAZOLE 150 MG/1
150 TABLET ORAL WEEKLY
Qty: 2 TABLET | Refills: 0 | Status: SHIPPED | OUTPATIENT
Start: 2024-11-21 | End: 2024-11-29

## 2024-11-21 RX ORDER — DOXYCYCLINE 100 MG/1
100 CAPSULE ORAL 2 TIMES DAILY
Qty: 20 CAPSULE | Refills: 0 | Status: SHIPPED | OUTPATIENT
Start: 2024-11-21 | End: 2024-12-01

## 2024-11-21 RX ORDER — ALBUTEROL SULFATE 90 UG/1
1-2 INHALANT RESPIRATORY (INHALATION) EVERY 4 HOURS PRN
Qty: 18 G | Refills: 0 | Status: SHIPPED | OUTPATIENT
Start: 2024-11-21

## 2024-11-21 NOTE — PROGRESS NOTES
ASSESSMENT/ PLAN:    Lower respiratory infection     - doxycycline hyclate (VIBRAMYCIN) 100 MG capsule; Take 1 capsule (100 mg) by mouth 2 times daily for 10 days.  - albuterol (PROAIR HFA/PROVENTIL HFA/VENTOLIN HFA) 108 (90 Base) MCG/ACT inhaler; Inhale 1-2 puffs into the lungs every 4 hours as needed for shortness of breath or wheezing.  - fluconazole (DIFLUCAN) 150 MG tablet; Take 1 tablet (150 mg) by mouth once a week for 2 doses. One tablet per week        We discussed that based on the patient's description of symptoms, history and physical exam, that a bacterial infection has likely developed in the chest requiring treatment with antibiotics.     The patient is advised to monitor the symptoms of the illness, and if worsening,  worse chest pain, increased productive sputum, persistent fevers/ chills, shortness of breath, then seek re-evaluation with primary care, UC or ER     Albuterol inhaler 1-2 puffs q 4-6 hours prn wheezing or short of breath       Symptomatic measures encouraged, humidified air, plenty of fluids.  Patient may consider OTC expectorant and/or cough suppressant to treat symptoms.   acetaminophen, ibuprofen for pain and fever    Return if worsening  -------------------------------------------------------------------------------------------------------------------------------    SUBJECTIVE:  Chief Complaint   Patient presents with    Urgent Care     Persistent cough and fever since 2 weeks now. Was seen on 11/15. Sym ptoms not getting better.      Mihir Waldron is a 34 year old female who presents to the clinic today with a chief complaint of cough  for 2 week(s).  Patient denies pleuritic chest pain and wheezing.  Her cough is described as persistent, daytime, nightime, and productive of yellow sputum.    The patient's symptoms are moderate and not changing over the course of time.  Associated symptoms include fever, nasal congestion and malaise. The patient's symptoms are  exacerbated by exercise  Patient has been using OTC cough suppressants  to improve symptoms.    Past Medical History:   Diagnosis Date    Anxiety     Chest pain     Depressive disorder     Postpartum depression     POTS (postural orthostatic tachycardia syndrome)     Sinus tachycardia      Patient Active Problem List   Diagnosis    Sinus tachycardia    Postural orthostatic tachycardia syndrome    ACP (advance care planning)    Post partum depression    CHIDI (generalized anxiety disorder)    Hx of anaphylaxis    Dietary fructose intolerance    Autonomic dysfunction    Irritable bowel syndrome with diarrhea    Candidiasis of vagina       ALLERGIES:  Codeine, Codeine sulfate, Contrast dye, Dairy digestive, Diphenhydramine, Latex, and Plum pulp    Current Outpatient Medications   Medication Sig Dispense Refill    albuterol (PROAIR HFA/PROVENTIL HFA/VENTOLIN HFA) 108 (90 Base) MCG/ACT inhaler Inhale 1-2 puffs into the lungs every 4 hours as needed for shortness of breath or wheezing. 18 g 0    doxycycline hyclate (VIBRAMYCIN) 100 MG capsule Take 1 capsule (100 mg) by mouth 2 times daily for 10 days. 20 capsule 0    fluconazole (DIFLUCAN) 150 MG tablet Take 1 tablet (150 mg) by mouth once a week for 2 doses. One tablet per week 2 tablet 0    albuterol (PROAIR HFA/PROVENTIL HFA/VENTOLIN HFA) 108 (90 Base) MCG/ACT inhaler Inhale 2 puffs into the lungs every 2 hours as needed for shortness of breath, wheezing or cough (Patient not taking: Reported on 11/15/2024) 18 g 0    albuterol (PROVENTIL) (2.5 MG/3ML) 0.083% neb solution Take 1 vial (2.5 mg) by nebulization every 6 hours as needed (Patient not taking: Reported on 11/15/2024) 90 mL 0    ALPRAZolam (XANAX) 0.5 MG tablet Take 1 tablet (0.5 mg) by mouth every 8 hours as needed for anxiety 12 tablet 0    buPROPion (WELLBUTRIN XL) 150 MG 24 hr tablet Take 150 mg by mouth daily      clonazePAM (KLONOPIN) 0.5 MG tablet       desvenlafaxine (PRISTIQ) 50 MG 24 hr tablet Take 50 mg  by mouth daily      EPINEPHrine (ANY BX GENERIC EQUIV) 0.3 MG/0.3ML injection 2-pack Inject 0.3 mLs (0.3 mg) into the muscle once as needed for anaphylaxis May repeat one time in 5-15 minutes if response to initial dose is inadequate. 2 each 0    fluconazole (DIFLUCAN) 150 MG tablet Take 1 pill every 3 days until yeast infection symptoms are gone. (Patient not taking: Reported on 11/15/2024) 4 tablet 0    fluconazole (DIFLUCAN) 150 MG tablet Take one tablet today and repeat in 3 days if still having symptoms. (Patient not taking: Reported on 11/15/2024) 2 tablet 0    lamoTRIgine (LAMICTAL) 150 MG tablet       predniSONE (DELTASONE) 20 MG tablet Take two tablets (= 40mg) each day for 5 (five) days (Patient not taking: Reported on 11/15/2024) 10 tablet 0    rizatriptan (MAXALT) 5 MG tablet Take 1 tablet (5 mg) by mouth at onset of headache for migraine May repeat in 2 hours. Max 6 tablets/24 hours. 30 tablet 1     No current facility-administered medications for this visit.       Social History     Tobacco Use    Smoking status: Never    Smokeless tobacco: Never   Substance Use Topics    Alcohol use: No     Comment: None       Family History   Problem Relation Age of Onset    Coronary Artery Disease Mother     Depression Mother     Anxiety Disorder Mother     Hypertension Father     Anxiety Disorder Father     Depression Father     Depression Sister     Anxiety Disorder Sister     Diabetes Maternal Grandmother     Diabetes Paternal Grandmother     Cerebrovascular Disease No family hx of     Breast Cancer No family hx of     Colon Cancer No family hx of          ROS  CONSTITUTIONAL: low grade fever, chills,    INTEGUMENTARY/SKIN: NEGATIVE for worrisome rashes,  or lesions  EYES: NEGATIVE for vision changes or irritation  ENT/MOUTH: NEGATIVE for ear, mouth and throat problems    OBJECTIVE:  /82 (BP Location: Right arm, Patient Position: Sitting, Cuff Size: Adult Large)   Pulse 69   Temp 98.7  F (37.1  C)  (Tympanic)   LMP 09/25/2024   SpO2 98%   GENERAL APPEARANCE: alert, mild distress, and cooperative,  occasional congested cough  EYES: EOMI,  PERRL, conjunctiva clear  HENT: ear canals and TM's normal.  Nose and mouth without ulcers, erythema or lesions  NECK: supple, nontender, no lymphadenopathy  RESP: lungs clear to auscultation - no rales, rhonchi or wheezes  CV: regular rates and rhythm, normal S1 S2, no murmur noted  NEURO: Normal strength and tone, sensory exam grossly normal,  normal speech and mentation  SKIN: no suspicious lesions or rashes

## 2024-11-24 ENCOUNTER — HEALTH MAINTENANCE LETTER (OUTPATIENT)
Age: 34
End: 2024-11-24

## 2024-12-23 ENCOUNTER — OFFICE VISIT (OUTPATIENT)
Dept: URGENT CARE | Facility: URGENT CARE | Age: 34
End: 2024-12-23
Payer: COMMERCIAL

## 2024-12-23 VITALS — OXYGEN SATURATION: 98 % | WEIGHT: 193 LBS | TEMPERATURE: 99 F | HEART RATE: 89 BPM | BODY MASS INDEX: 29.35 KG/M2

## 2024-12-23 DIAGNOSIS — R10.30 ABDOMINAL PAIN, LOWER: Primary | ICD-10-CM

## 2024-12-23 LAB
ALBUMIN UR-MCNC: NEGATIVE MG/DL
APPEARANCE UR: CLEAR
BILIRUB UR QL STRIP: NEGATIVE
CLUE CELLS: NORMAL
COLOR UR AUTO: YELLOW
GLUCOSE UR STRIP-MCNC: NEGATIVE MG/DL
HGB UR QL STRIP: NEGATIVE
KETONES UR STRIP-MCNC: NEGATIVE MG/DL
LEUKOCYTE ESTERASE UR QL STRIP: NEGATIVE
NITRATE UR QL: NEGATIVE
PH UR STRIP: 7.5 [PH] (ref 5–7)
SP GR UR STRIP: 1.02 (ref 1–1.03)
TRICHOMONAS, WET PREP: NORMAL
UROBILINOGEN UR STRIP-ACNC: 0.2 E.U./DL
WBC'S/HIGH POWER FIELD, WET PREP: NORMAL
YEAST, WET PREP: NORMAL

## 2024-12-23 PROCEDURE — 99213 OFFICE O/P EST LOW 20 MIN: CPT | Performed by: PHYSICIAN ASSISTANT

## 2024-12-23 PROCEDURE — 87210 SMEAR WET MOUNT SALINE/INK: CPT

## 2024-12-23 PROCEDURE — 87591 N.GONORRHOEAE DNA AMP PROB: CPT | Performed by: PHYSICIAN ASSISTANT

## 2024-12-23 PROCEDURE — 87491 CHLMYD TRACH DNA AMP PROBE: CPT | Performed by: PHYSICIAN ASSISTANT

## 2024-12-23 PROCEDURE — 81003 URINALYSIS AUTO W/O SCOPE: CPT

## 2024-12-23 NOTE — PATIENT INSTRUCTIONS
Patient was educated on the natural course of condition. UA and wet prep were negative. G/C is pending. Conservative measures discussed including drinking small amounts of fluids (Pedialyte or Gatorade/water mixture), bland diet, avoidance of dairy products, and analgesics (Tylenol or Ibuprofen) for pain. See your primary care provider if symptoms do not improve in 7 days. Seek emergency care if you develop worsening abdominal pain or fever over 103.

## 2024-12-23 NOTE — PROGRESS NOTES
URGENT CARE VISIT:    SUBJECTIVE:   Mihir Waldron is a 34 year old female who presents with abdominal pain for 2 days. Abdominal pain is located over lower abdomen and is described as cramping. Pain timing/severity is described as gradual onset and mild and moderate.  Pain is improved by nothing and worsened by nothing. Associated symptoms include odor in urine. She denies nausea, vomiting, diarrhea, constipation, dysuria, frequency, fever, and chills. She has tried none with no relief of symptoms.  Appetite is normal. Risk factors include none. Abdominal surgical history includes none. She is currently ovulating.    PMH:   Past Medical History:   Diagnosis Date    Anxiety     Chest pain     Depressive disorder     Postpartum depression     POTS (postural orthostatic tachycardia syndrome)     Sinus tachycardia      Allergies: Codeine, Codeine sulfate, Contrast dye, Dairy digestive, Diphenhydramine, Latex, and Plum pulp  Medications:   Current Outpatient Medications   Medication Sig Dispense Refill    albuterol (PROAIR HFA/PROVENTIL HFA/VENTOLIN HFA) 108 (90 Base) MCG/ACT inhaler Inhale 1-2 puffs into the lungs every 4 hours as needed for shortness of breath or wheezing. 18 g 0    ALPRAZolam (XANAX) 0.5 MG tablet Take 1 tablet (0.5 mg) by mouth every 8 hours as needed for anxiety 12 tablet 0    buPROPion (WELLBUTRIN XL) 150 MG 24 hr tablet Take 150 mg by mouth daily      clonazePAM (KLONOPIN) 0.5 MG tablet       desvenlafaxine (PRISTIQ) 50 MG 24 hr tablet Take 50 mg by mouth daily      EPINEPHrine (ANY BX GENERIC EQUIV) 0.3 MG/0.3ML injection 2-pack Inject 0.3 mLs (0.3 mg) into the muscle once as needed for anaphylaxis May repeat one time in 5-15 minutes if response to initial dose is inadequate. 2 each 0    lamoTRIgine (LAMICTAL) 150 MG tablet       rizatriptan (MAXALT) 5 MG tablet Take 1 tablet (5 mg) by mouth at onset of headache for migraine May repeat in 2 hours. Max 6 tablets/24 hours. 30 tablet 1     albuterol (PROAIR HFA/PROVENTIL HFA/VENTOLIN HFA) 108 (90 Base) MCG/ACT inhaler Inhale 2 puffs into the lungs every 2 hours as needed for shortness of breath, wheezing or cough (Patient not taking: Reported on 11/15/2024) 18 g 0    albuterol (PROVENTIL) (2.5 MG/3ML) 0.083% neb solution Take 1 vial (2.5 mg) by nebulization every 6 hours as needed (Patient not taking: Reported on 11/15/2024) 90 mL 0    fluconazole (DIFLUCAN) 150 MG tablet Take 1 pill every 3 days until yeast infection symptoms are gone. (Patient not taking: Reported on 11/15/2024) 4 tablet 0    fluconazole (DIFLUCAN) 150 MG tablet Take one tablet today and repeat in 3 days if still having symptoms. (Patient not taking: Reported on 11/15/2024) 2 tablet 0    predniSONE (DELTASONE) 20 MG tablet Take two tablets (= 40mg) each day for 5 (five) days (Patient not taking: Reported on 11/15/2024) 10 tablet 0     Social History:   Social History     Socioeconomic History    Marital status:      Spouse name: Tee    Number of children: 2    Years of education: Not on file    Highest education level: Not on file   Occupational History    Not on file   Tobacco Use    Smoking status: Never    Smokeless tobacco: Never   Vaping Use    Vaping status: Never Used   Substance and Sexual Activity    Alcohol use: No     Comment: None    Drug use: No    Sexual activity: Not Currently     Partners: Male     Birth control/protection: Surgical   Other Topics Concern     Service Not Asked    Blood Transfusions Not Asked    Caffeine Concern No    Occupational Exposure Not Asked    Hobby Hazards Not Asked    Sleep Concern Not Asked    Stress Concern Not Asked    Weight Concern Not Asked    Special Diet No    Back Care Not Asked    Exercise No    Bike Helmet Not Asked    Seat Belt Yes    Self-Exams Not Asked    Parent/sibling w/ CABG, MI or angioplasty before 65F 55M? No   Social History Narrative    ** Merged History Encounter **          Social Drivers of Health      Financial Resource Strain: Not on file   Food Insecurity: Not on file   Transportation Needs: Not on file   Physical Activity: Not on file   Stress: Not on file   Social Connections: Not on file   Interpersonal Safety: Not on file   Housing Stability: Not on file       ROS: ROS otherwise found to be negative except as noted above.     OBJECTIVE:  Pulse 89   Temp 99  F (37.2  C) (Tympanic)   Wt 87.5 kg (193 lb)   LMP 12/17/2024   SpO2 98%   BMI 29.35 kg/m    GENERAL APPEARANCE: healthy, alert and no distress  EYES: EOMI,  PERRL, conjunctiva clear  RESP: lungs clear to auscultation - no rales, rhonchi or wheezes  CV: regular rates and rhythm, normal S1 S2, no murmur noted  ABDOMEN: soft, normal bowel sounds, tenderness mild RUQ and RLQ  SKIN: no suspicious lesions or rashes    LABS:  Results for orders placed or performed in visit on 12/23/24   UA Macroscopic with reflex to Microscopic and Culture - Lab Collect     Status: Abnormal    Specimen: Urine, Clean Catch   Result Value Ref Range    Color Urine Yellow Colorless, Straw, Light Yellow, Yellow    Appearance Urine Clear Clear    Glucose Urine Negative Negative mg/dL    Bilirubin Urine Negative Negative    Ketones Urine Negative Negative mg/dL    Specific Gravity Urine 1.020 1.003 - 1.035    Blood Urine Negative Negative    pH Urine 7.5 (H) 5.0 - 7.0    Protein Albumin Urine Negative Negative mg/dL    Urobilinogen Urine 0.2 0.2, 1.0 E.U./dL    Nitrite Urine Negative Negative    Leukocyte Esterase Urine Negative Negative    Narrative    Microscopic not indicated   Wet prep - lab collect     Status: Normal    Specimen: Vagina; Swab   Result Value Ref Range    Trichomonas Absent Absent    Yeast Absent Absent    Clue Cells Absent Absent    WBCs/high power field None None        ASSESSMENT:     ICD-10-CM    1. Abdominal pain, lower  R10.30 UA Macroscopic with reflex to Microscopic and Culture - Lab Collect     Wet prep - lab collect     UA Macroscopic with  reflex to Microscopic and Culture - Lab Collect     Wet prep - lab collect     Chlamydia trachomatis/Neisseria gonorrhoeae by PCR - Clinic Collect           PLAN:  Patient Instructions   Patient was educated on the natural course of condition. UA and wet prep were negative. G/C is pending. Conservative measures discussed including drinking small amounts of fluids (Pedialyte or Gatorade/water mixture), bland diet, avoidance of dairy products, and analgesics (Tylenol or Ibuprofen) for pain. See your primary care provider if symptoms do not improve in 7 days. Seek emergency care if you develop worsening abdominal pain or fever over 103.  Patient verbalized understanding and is agreeable to plan. The patient was discharged ambulatory and in stable condition.    Jenna Haq PA-C ....................  12/23/2024   11:22 AM

## 2024-12-24 LAB
C TRACH DNA SPEC QL PROBE+SIG AMP: NEGATIVE
N GONORRHOEA DNA SPEC QL NAA+PROBE: NEGATIVE

## 2025-05-24 ENCOUNTER — APPOINTMENT (OUTPATIENT)
Dept: GENERAL RADIOLOGY | Facility: CLINIC | Age: 35
End: 2025-05-24
Attending: PHYSICIAN ASSISTANT
Payer: COMMERCIAL

## 2025-05-24 ENCOUNTER — APPOINTMENT (OUTPATIENT)
Dept: GENERAL RADIOLOGY | Facility: CLINIC | Age: 35
End: 2025-05-24
Attending: EMERGENCY MEDICINE
Payer: COMMERCIAL

## 2025-05-24 ENCOUNTER — HOSPITAL ENCOUNTER (EMERGENCY)
Facility: CLINIC | Age: 35
Discharge: HOME | End: 2025-05-24
Attending: EMERGENCY MEDICINE | Admitting: EMERGENCY MEDICINE
Payer: COMMERCIAL

## 2025-05-24 VITALS
SYSTOLIC BLOOD PRESSURE: 113 MMHG | OXYGEN SATURATION: 98 % | HEART RATE: 75 BPM | TEMPERATURE: 98.2 F | HEIGHT: 68 IN | WEIGHT: 180 LBS | BODY MASS INDEX: 27.28 KG/M2 | DIASTOLIC BLOOD PRESSURE: 69 MMHG | RESPIRATION RATE: 18 BRPM

## 2025-05-24 DIAGNOSIS — S93.401A RIGHT ANKLE SPRAIN: ICD-10-CM

## 2025-05-24 PROCEDURE — 250N000013 HC RX MED GY IP 250 OP 250 PS 637: Performed by: PHYSICIAN ASSISTANT

## 2025-05-24 PROCEDURE — 99284 EMERGENCY DEPT VISIT MOD MDM: CPT

## 2025-05-24 PROCEDURE — 73610 X-RAY EXAM OF ANKLE: CPT | Mod: RT

## 2025-05-24 PROCEDURE — 73630 X-RAY EXAM OF FOOT: CPT | Mod: RT

## 2025-05-24 PROCEDURE — 99284 EMERGENCY DEPT VISIT MOD MDM: CPT | Mod: 25

## 2025-05-24 RX ORDER — OXYCODONE HYDROCHLORIDE 5 MG/1
5 TABLET ORAL EVERY 6 HOURS PRN
Qty: 3 TABLET | Refills: 0 | Status: SHIPPED | OUTPATIENT
Start: 2025-05-24

## 2025-05-24 RX ORDER — OXYCODONE HYDROCHLORIDE 5 MG/1
5 TABLET ORAL ONCE
Refills: 0 | Status: COMPLETED | OUTPATIENT
Start: 2025-05-24 | End: 2025-05-24

## 2025-05-24 RX ADMIN — OXYCODONE HYDROCHLORIDE 5 MG: 5 TABLET ORAL at 19:26

## 2025-05-24 ASSESSMENT — ACTIVITIES OF DAILY LIVING (ADL)
ADLS_ACUITY_SCORE: 48

## 2025-05-24 ASSESSMENT — COLUMBIA-SUICIDE SEVERITY RATING SCALE - C-SSRS
6. HAVE YOU EVER DONE ANYTHING, STARTED TO DO ANYTHING, OR PREPARED TO DO ANYTHING TO END YOUR LIFE?: YES
1. IN THE PAST MONTH, HAVE YOU WISHED YOU WERE DEAD OR WISHED YOU COULD GO TO SLEEP AND NOT WAKE UP?: NO
2. HAVE YOU ACTUALLY HAD ANY THOUGHTS OF KILLING YOURSELF IN THE PAST MONTH?: NO

## 2025-05-24 NOTE — ED PROVIDER NOTES
"ED APC SUPERVISION NOTE:   I evaluated this patient in conjunction with Citlaly Priest PA-C.  I have participated in the care of the patient and personally performed key elements of the history, exam, and medical decision making.      HPI:   Mihir Waldron is a 34 year old female who presents to the emergency department for ankle pain. The patient states that this afternoon, she was walking when she inverted her right ankle on a \"ledge\" in the concrete sidewalk. She reports that she heard a \"pop\" and began experiencing an onset of right ankle pain. She has been unable to weightbear on this ankle, secondary to her pain. EMS was called in which they administered 100 mcg of fentanyl and 2 mg of morphine. She denies falling to the ground. No numbness, tingling, weakness in her toes. No hx of ankle fracture.    Independent Historian:   None    Review of External Notes: none      EXAM:   General: Resting on the bed.  Head: No obvious trauma to head.  Ears, Nose, Throat:  External ears normal.  Nose normal.   Eyes:  Conjunctivae clear.  Pupils are equal, round, and reactive.   CV: Regular rate and rhythm.  No murmurs.      Respiratory: Effort normal and breath sounds normal.  No wheezing or crackles.   Gastrointestinal: Soft.  No distension. There is no tenderness.    Musculoskeletal: Normal range of motion.  Non tender extremities to palpations. RLE with tenderness of the lateral malleolus and proximal 5th metatarsal.  2+ DP pulses.  Sensation intact to light touch.  Negative Ciara test.  Nontender proximal tib-fib.  Neuro: Alert. Moving all extremities appropriately.  Normal speech.    Skin: Skin is warm and dry.  No rash noted.     Independent Interpretation (X-rays, CTs, rhythm strip):  Reviewed right ankle XR, no fracture or dislocation.    Consultations/Discussion of Management or Tests:  None     MIPS:  None    MEDICAL DECISION MAKING/ASSESSMENT AND PLAN:    Mihir Waldron is a 34 year old female who " presents for evaluation of right ankle pain.  Signs and symptoms are consistent with an ankle sprain.  Broad differential was considered including but not limited to fracture, sprain, contusion, neurovascular compromise etc.  there are no signs of fracture, no signs of compartment syndrome.  Xray of ankle and foot are negative. The patients neurovascular status is normal. A head to toe trauma exam is otherwise negative; the likelihood of other serious sequelae of trauma (spine, head, chest, abdomen, other extremities, pelvis) is low.  Plan is for protected weightbearing with a boot, crutches, and RICE treatment.  Patient will advance weightbearing and follow-up with primary or ortho in 2-3 days for reevaluation.     DIAGNOSIS:     ICD-10-CM    1. Right ankle sprain  S93.401A         Scribe Disclosure:  I, Wilian Martin, am serving as a scribe at 6:40 PM on 5/24/2025 to document services personally performed by Sandy Agustin MD based on my observations and the provider's statements to me.     5/24/2025  [facility] EMERGENCY DEPT     Sandy Agustin MD  05/25/25 0111

## 2025-05-24 NOTE — ED NOTES
Assisted with patient triage (ambulance). Applied monitoring equipment onto Patient (Pulse ox and BP). Icepack applied to Rt. ankle

## 2025-05-24 NOTE — DISCHARGE INSTRUCTIONS
Please elevate, ice and rest the ankle.  Use the splint as needed for pain and swelling control.  As able may gently resume activities and bearing weight on the ankle.  If continue to have pain in 1 week should have orthopedics or your PCP recheck your ankle.  If numbness, worsening pain, unable to bear weight on ankle or other changes return to the ED.      Discharge Instructions  Ankle Sprain    An ankle sprain is a stretching or tearing of a ligament around your ankle joint. In most cases, we recommend resting the ankle for about 3 days, followed by return to activity. Some severe sprains need longer periods of rest, or can require a cast or boot to immobilize them.    Generally, every Emergency Department visit should have a follow-up clinic visit with either a primary or a specialty clinic/provider. Please follow-up as instructed by your emergency provider today.    Return to the Emergency Department if:  Your pain is much worse, or if there is pain in a new area.  Your foot or leg becomes pale, cool, blue, or numb or tingling.  There is anything concerning to you about how your ankle looks.  Any splint or device is feeling too tight, causing pain, or rubbing into your skin.    Follow-up with your provider:  As recommended by your emergency provider.  If your ankle is not back to normal within about 1 week.  If you are involved in significant athletic activities.        Treatment:  Apply ice your injured area for 15 minutes at a time, at least 3 times a day for the first 1-2 days. Use a cloth between the ice bag and your skin to prevent frostbite.   Do not sleep with an ice pack or heating pad on, since this can cause burns or skin injury.  Raise the injured area above the level of your heart as much as possible in the first 1-2 days.  Pain medications -- You may take a pain medication such as Tylenol  (acetaminophen), Advil , Nuprin  (ibuprofen) or Aleve  (naproxen).  Splint. We often give a stirrup-shaped  ankle splint to support your ankle and prevent it from turning again. Wear this all the time for the first 3-5 days, and then as directed by your provider.  Crutches. If you cannot put weight on the ankle without a lot of pain, we recommend crutches. You can put as much weight on the ankle as possible without severe pain.   Compression. An elastic bandage (Ace  wrap) can help with pain and swelling. Remove this at least twice a day, and leave it off for several hours if you develop swelling of the foot.   Exercises.  Movements, like rotating the foot in circles, should be started when swelling improves.   If you were given a prescription for medicine here today, be sure to read all of the information (including the package insert) that comes with your prescription.  This will include important information about the medicine, its side effects, and any warnings that you need to know about.  The pharmacist who fills the prescription can provide more information and answer questions you may have about the medicine.  If you have questions or concerns that the pharmacist cannot address, please call or return to the Emergency Department.  Remember that you can always come back to the Emergency Department if you are not able to see your regular provider in the amount of time listed above, if you get any new symptoms, or if there is anything that worries you.

## 2025-05-24 NOTE — ED PROVIDER NOTES
"  Emergency Department Note      History of Present Illness     Chief Complaint   Ankle Pain    HPI  Mihir Waldron is a 34 year old female who presents to the emergency department for ankle pain. The patient states that this afternoon, she was walking when she inverted her right ankle on a \"ledge\" in the concrete sidewalk. She reports that she heard a \"pop\" and began experiencing an onset of right ankle pain. She has been unable to weightbear on this ankle, secondary to her pain. EMS was called in which they administered 100 mcg of fentanyl and 2 mg of morphine. She denies falling to the ground. No left leg pain. No right lower leg pain. No foot pain. No numbness, tingling, weakness in her toes. No hx of ankle fracture.    Independent Historian   None    Review of External Notes   None    Past Medical History     Medical History and Problem List   Anxiety  Candidiasis of vagina  Depressive disorder  Postpartum depression  POTS  Sinus tachycardia    Medications   albuterol (PROAIR HFA/PROVENTIL HFA/VENTOLIN HFA) 108 (90 Base) MCG/ACT inhaler  ALPRAZolam (XANAX) 0.5 MG tablet  buPROPion (WELLBUTRIN XL) 150 MG 24 hr tablet  clonazePAM (KLONOPIN) 0.5 MG tablet  desvenlafaxine (PRISTIQ) 50 MG 24 hr tablet  EPINEPHrine (ANY BX GENERIC EQUIV) 0.3 MG/0.3ML injection 2-pack  fluconazole (DIFLUCAN) 150 MG tablet  fluconazole (DIFLUCAN) 150 MG tablet  lamoTRIgine (LAMICTAL) 150 MG tablet  predniSONE (DELTASONE) 20 MG tablet  rizatriptan (MAXALT) 5 MG tablet    Surgical History   Dental surgery  EP study tilt table    Physical Exam     Patient Vitals for the past 24 hrs:   BP Temp Temp src Pulse Resp SpO2 Height Weight   05/24/25 1735 (!) 141/93 98.2  F (36.8  C) Oral 80 18 100 % 1.727 m (5' 8\") 81.6 kg (180 lb)     Physical Exam  General: Resting on the bed.  Head: No obvious trauma to head.  Ears, Nose, Throat:  External ears normal.  Nose normal.   Eyes:  Conjunctivae clear.  Pupils are equal, round, and reactive. "   Neck: Normal range of motion.  Neck supple.   Cardiovascular: Dorsalis pedis pulse 2+ right ankle  Respiratory: Effort normal and breath sounds normal.  Musculoskeletal: Normal range of motion.  Able to wiggle toes.  Range of motion limited due to pain.  Pain to palpation of lateral malleolus, talofibular ligament.  Pain is send of this the fifth metatarsal. No pain of proximal tibia/fibula.  Homans' sign intact  Neuro: Alert. Moving all extremities appropriately.  Normal speech.  Sensation intact  Skin: Skin is warm and dry.  No rash noted.  Mild ecchymosis to lateral right ankle  Psych: Normal mood and affect. Behavior is normal.     Diagnostics     Lab Results   None    Imaging   XR Ankle Right G/E 3 Views   Final Result   IMPRESSION: Normal joint spaces and alignment. No fracture.        Independent Interpretation   Reviewed right ankle XR, no fracture or dislocation.    ED Course      Medications Administered   Medications - No data to display    Discussion of Management   None    ED Course   ED Course as of 05/24/25 1829   Sat May 24, 2025   1820 I obtained history and examined the patient as noted above.        Additional Documentation  None    Medical Decision Making / Diagnosis     CMS Diagnoses: None    MIPS   None    MDM   Mihir Waldron is a 34 year old female who presents today after a rollover right ankle.  She did not fall or hit her head.  Patient's only complaint is right ankle pain.  On her exam she is neurovascularly intact.  She does have some tenderness to the fifth metatarsal.  Proceeded with x-rays of both the right ankle, along with right foot.  She did not have any tenderness to palpation of the proximal tibia or fibula.  The x-rays of the ankle and foot were negative for any acute fractures.  There was some swelling noted around the lateral malleolus.  Her history and physical exam were synonymous with right ankle sprain.  The patient's pain was significant here in the emergency  department, she was given a dose of oxycodone to help with this.  Given that the x-rays were negative, proceeded with a walking boot for the patient, along with crutches if necessary.  We discussed that she should rest and ice the ankle to help with the swelling.  She can take ibuprofen and Tylenol as needed for the pain.  She was also given a prescription for 3 oxycodone to take for any breakthrough pain.  The patient should follow-up with orthopedics, if her ankle is not improving in the next 4 to 5 days.  They can follow-up with any follow-up imaging or PT.  The plan was discussed with the patient and all questions were answered.     Disposition   The patient was discharged.     Diagnosis     ICD-10-CM    1. Right ankle sprain  S93.401A          Discharge Medications   New Prescriptions    No medications on file     Scribe Disclosure:  Wilian TANG, am serving as a scribe at 5:58 PM on 5/24/2025 to document services personally performed by Citlaly Priest PA-C based on my observations and the provider's statements to me.        Citlaly Priest PA-C  05/24/25 6825

## 2025-05-24 NOTE — ED TRIAGE NOTES
Pt arrives via EMS with c/o right ankle injury. Pt stepped down a very small amount off a ledge and her right ankle rolled. Pt reports she felt a pop in her ankle. Swelling noted, CMS intact. 100mcg fentanyl and 2mg morphine given by EMS.     Triage Assessment (Adult)       Row Name 05/24/25 1737          Triage Assessment    Airway WDL WDL        Respiratory WDL    Respiratory WDL WDL        Skin Circulation/Temperature WDL    Skin Circulation/Temperature WDL WDL        Cardiac WDL    Cardiac WDL WDL        Peripheral/Neurovascular WDL    Peripheral Neurovascular WDL WDL        Cognitive/Neuro/Behavioral WDL    Cognitive/Neuro/Behavioral WDL WDL

## (undated) DEVICE — CUFF FINGER CLEARSIGHT MED CSCM

## (undated) RX ORDER — SODIUM CHLORIDE 9 MG/ML
INJECTION, SOLUTION INTRAVENOUS
Status: DISPENSED
Start: 2020-02-07